# Patient Record
Sex: FEMALE | Race: WHITE | HISPANIC OR LATINO | Employment: UNEMPLOYED | URBAN - METROPOLITAN AREA
[De-identification: names, ages, dates, MRNs, and addresses within clinical notes are randomized per-mention and may not be internally consistent; named-entity substitution may affect disease eponyms.]

---

## 2017-02-24 ENCOUNTER — INITIAL CONSULT (OUTPATIENT)
Dept: DERMATOLOGY | Facility: CLINIC | Age: 47
End: 2017-02-24
Payer: COMMERCIAL

## 2017-02-24 ENCOUNTER — OFFICE VISIT (OUTPATIENT)
Dept: INTERNAL MEDICINE | Facility: CLINIC | Age: 47
End: 2017-02-24
Payer: COMMERCIAL

## 2017-02-24 VITALS
DIASTOLIC BLOOD PRESSURE: 70 MMHG | TEMPERATURE: 97 F | HEART RATE: 64 BPM | SYSTOLIC BLOOD PRESSURE: 130 MMHG | BODY MASS INDEX: 42.87 KG/M2 | WEIGHT: 266.75 LBS | RESPIRATION RATE: 16 BRPM | HEIGHT: 66 IN

## 2017-02-24 DIAGNOSIS — D23.9 INTRADERMAL NEVUS: ICD-10-CM

## 2017-02-24 DIAGNOSIS — F32.A DEPRESSION, UNSPECIFIED DEPRESSION TYPE: ICD-10-CM

## 2017-02-24 DIAGNOSIS — L82.0 SEBORRHEIC KERATOSES, INFLAMED: Primary | ICD-10-CM

## 2017-02-24 DIAGNOSIS — M25.562 LEFT KNEE PAIN, UNSPECIFIED CHRONICITY: Primary | ICD-10-CM

## 2017-02-24 DIAGNOSIS — D22.9 ATYPICAL MOLE: ICD-10-CM

## 2017-02-24 DIAGNOSIS — D23.9 DERMATOFIBROMA: ICD-10-CM

## 2017-02-24 DIAGNOSIS — M25.562 ACUTE PAIN OF LEFT KNEE: ICD-10-CM

## 2017-02-24 DIAGNOSIS — M72.2 PLANTAR FASCIITIS: ICD-10-CM

## 2017-02-24 PROBLEM — L82.1 SK (SEBORRHEIC KERATOSIS): Status: ACTIVE | Noted: 2017-02-24

## 2017-02-24 PROCEDURE — 3074F SYST BP LT 130 MM HG: CPT | Mod: S$GLB,,, | Performed by: DERMATOLOGY

## 2017-02-24 PROCEDURE — 3078F DIAST BP <80 MM HG: CPT | Mod: S$GLB,,, | Performed by: INTERNAL MEDICINE

## 2017-02-24 PROCEDURE — 99999 PR PBB SHADOW E&M-EST. PATIENT-LVL III: CPT | Mod: PBBFAC,,, | Performed by: INTERNAL MEDICINE

## 2017-02-24 PROCEDURE — 99214 OFFICE O/P EST MOD 30 MIN: CPT | Mod: S$GLB,,, | Performed by: DERMATOLOGY

## 2017-02-24 PROCEDURE — 3075F SYST BP GE 130 - 139MM HG: CPT | Mod: S$GLB,,, | Performed by: INTERNAL MEDICINE

## 2017-02-24 PROCEDURE — 1160F RVW MEDS BY RX/DR IN RCRD: CPT | Mod: S$GLB,,, | Performed by: INTERNAL MEDICINE

## 2017-02-24 PROCEDURE — 99214 OFFICE O/P EST MOD 30 MIN: CPT | Mod: S$GLB,,, | Performed by: INTERNAL MEDICINE

## 2017-02-24 PROCEDURE — 3078F DIAST BP <80 MM HG: CPT | Mod: S$GLB,,, | Performed by: DERMATOLOGY

## 2017-02-24 PROCEDURE — 1160F RVW MEDS BY RX/DR IN RCRD: CPT | Mod: S$GLB,,, | Performed by: DERMATOLOGY

## 2017-02-24 PROCEDURE — 99999 PR PBB SHADOW E&M-EST. PATIENT-LVL III: CPT | Mod: PBBFAC,,, | Performed by: DERMATOLOGY

## 2017-02-24 RX ORDER — MELOXICAM 15 MG/1
15 TABLET ORAL DAILY
Qty: 30 TABLET | Refills: 6 | Status: SHIPPED | OUTPATIENT
Start: 2017-02-24 | End: 2017-03-26

## 2017-02-24 RX ORDER — BUPROPION HYDROCHLORIDE 150 MG/1
150 TABLET ORAL DAILY
Qty: 30 TABLET | Refills: 11 | Status: SHIPPED | OUTPATIENT
Start: 2017-02-24 | End: 2017-07-01 | Stop reason: DRUGHIGH

## 2017-02-24 NOTE — MR AVS SNAPSHOT
[place] [facility]   [facility]  [place] [address]  Phone: [phone]  Fax: [phone]                  Irina Duagn   2017 12:40 PM   Office Visit    Descripción:  Female : 1970   Personal Médico:  Adrianna Pope DO   Departamento:  [place] [facility]           Razón de la manny     Knee Pain     Foot Injury           Diagnósticos de Esta Visita        Comentarios    Left knee pain, unspecified chronicity    -  Primario     Plantar fasciitis         Acute pain of left knee         Atypical mole         Depression, unspecified depression type                Lista de tareas           Citas próximas        Personal Médico Departamento Tfno del dpto    2017 2:00 PM Carla Fan MD [facility] - Dermatology [phone]      Metas (5 Years of Data)     Ninguna      Recetas para recoger        Disp Refills Start End    meloxicam (MOBIC) 15 MG tablet 30 tablet 6 2017 3/26/2017    Take 1 tablet (15 mg total) by mouth once daily. With food - Oral    Farmacia: [facility] [address] [place] [address] No. de tlfo: #: [phone]       buPROPion (WELLBUTRIN XL) 150 MG TB24 tablet 30 tablet 11 2017     Take 1 tablet (150 mg total) by mouth once daily. - Oral    Farmacia: [address] No. de tlfo: #: [phone]         [facility]     [facility]ermeras - Asistencia   Enfermeras registradas de [facility] pueden ayudarle a reservar issa manny, proveer educación para la brennan, asesoría clínica, y otros servicios de asesoramiento.   Llame para che servicio gratuito a [phone].             Medicamentos           Mensaje sobre Medicamentos     Verificar los cambios y / o adiciones a gant régimen de medicación son los mismos que discutir con gant médico. Si cualquiera de estos cambios o adiciones son incorrectos, por favor notifique a gant  proveedor de atención médica.        EMPEZAR a anay estos medicamentos NUEVOS        Refills    meloxicam (MOBIC) 15 MG tablet 6    Sig: Take 1 tablet (15 mg total) by mouth once daily. With food    Categoría: Normal    Vía: Oral    buPROPion (WELLBUTRIN XL) 150 MG TB24 tablet 11    Sig: Take 1 tablet (150 mg total) by mouth once daily.    Categoría: Normal    Vía: Oral      DEJAR de anay estos medicamentos     azithromycin (Z-HERLINDA) 250 MG tablet            Verifique que la siguiente lista de medicamentos es issa representación exacta de los medicamentos que está tomando actualmente. Si no hay ningunos reportados, la lista puede estar en dunbar. Si no es correcta, por favor póngase en contacto con gant proveedor de atención médica. Lleve esta lista con usted en brenda de emergencia.           Medicamentos Actuales     fluconazole (DIFLUCAN) 150 MG Tab Take 1 tablet (150 mg total) by mouth every 72 hours as needed (itching).    furosemide (LASIX) 20 MG tablet TAKE ONE TABLET BY MOUTH DAILY AS NEEDED (SWELLING)    hydrOXYzine HCl (ATARAX) 25 MG tablet Take 1 tablet (25 mg total) by mouth 3 (three) times daily as needed for Itching.    ibuprofen (ADVIL,MOTRIN) 200 MG tablet Take 3-4 tablets (600-800 mg total) by mouth every 12 (twelve) hours as needed for Pain.    ibuprofen (ADVIL,MOTRIN) 800 MG tablet Take 1 tablet (800 mg total) by mouth 3 (three) times daily as needed for Pain. With food    lisinopril-hydrochlorothiazide (PRINZIDE,ZESTORETIC) 20-25 mg Tab TAKE ONE TABLET BY MOUTH ONCE DAILY    lisinopril-hydrochlorothiazide (PRINZIDE,ZESTORETIC) 20-25 mg Tab TAKE ONE TABLET BY MOUTH ONCE DAILY    norgestimate-ethinyl estradiol (SPRINTEC, 28,) 0.25-35 mg-mcg per tablet Take 1 tablet by mouth once daily.    norgestimate-ethinyl estradiol (SPRINTEC, 28,) 0.25-35 mg-mcg per tablet Take 1 tablet by mouth once daily.    pantoprazole (PROTONIX) 40 MG tablet Take 1 tablet (40 mg total) by mouth once daily.    buPROPion  (WELLBUTRIN XL) 150 MG TB24 tablet Take 1 tablet (150 mg total) by mouth once daily.    meloxicam (MOBIC) 15 MG tablet Take 1 tablet (15 mg total) by mouth once daily. With food           Información de referencia clínica           Rosa signos vitales laura     PS                   130/70 (BP Location: Left arm, Patient Position: Sitting, BP Method: Manual)           Blood Pressure          Most Recent Value    BP  130/70      Alergias     A partir del:  2/24/2017        No Known Allergies      Vacunas     Administradas en la fecha de la visita:  2/24/2017        None      Orders Placed During Today's Visit      Órdenes normales de esta visita    Ambulatory referral to Dermatology       Registrarse para [facility]     La activación de gant cuenta [facility] es tan fácil yuko 1-2-3!    1) Ir a [email or web address], seleccione Registrarse Ahora, meter el código de activación y gant fecha de nacimiento, y seleccione Próximo.    [id number]  Expires: 4/10/2017  1:48 PM      2) Crear un nombre de usuario y contraseña para usar cuando se visita [facility] en el futuro y selecciona issa pregunta de seguridad en brenda de que pierda gant contraseña y seleccione Próximo.    3) Introduzca gant dirección de correo electrónico y jayme clic en Registrarse!    Información Adicional  Si tiene alguna pregunta, por favor, e-mail [email or web address] o llame al [phone] para hablar con nuestro personal. Recuerde, [facility] no debe ser usada para necesidades urgentes. En brenda de emergencia médica, llame al [phone].        Language Assistance Services     ATTENTION: Language assistance services are available, free of charge. Please call [phone].      ATENCIÓN: Si habla español, tiene a gant disposición servicios gratuitos de asistencia lingüística. Llame al [phone].     CHÚ Ý: N?u b?n nói Ti?ng Vi?t, có các d?ch v? h? tr? ngôn ng? mi?n phí dành cho b?n. G?i s? [phone].         [place] [facility] cumple con las leyes  federales aplicables de derechos civiles y no discrimina por motivos de jared, color, origen nacional, edad, discapacidad, o sexo.                 Irina Dugan   2017 12:40 PM   Office Visit    Description:  Female : 1970   Provider:  Adrianna Pope DO   Department:  [place] [facility]           Reason for Visit     Knee Pain     Foot Injury           Diagnoses this Visit        Comments    Left knee pain, unspecified chronicity    -  Primary     Plantar fasciitis         Acute pain of left knee         Atypical mole         Depression, unspecified depression type                To Do List           Future Appointments        Provider Department Dept Phone    2017 2:00 PM Carla Fan MD [facility] - Dermatology [phone]      Goals     None       These Medications        Disp Refills Start End    meloxicam (MOBIC) 15 MG tablet 30 tablet 6 2017 3/26/2017    Take 1 tablet (15 mg total) by mouth once daily. With food - Oral    Pharmacy: [facility] [address] [place] [address] Ph #: [phone]       buPROPion (WELLBUTRIN XL) 150 MG TB24 tablet 30 tablet 11 2017     Take 1 tablet (150 mg total) by mouth once daily. - Oral    Pharmacy: [address] Ph #: [phone]         [facility]     [facility] On Call Nurse Care Line -  Assistance  Registered nurses in the [facility] On Call Center provide clinical advisement, health education, appointment booking, and other advisory services.  Call for this free service at [phone].             Medications           Message regarding Medications     Verify the changes and/or additions to your medication regime listed below are the same as discussed with your clinician today.  If any of these changes or additions are incorrect, please notify your healthcare provider.        START taking these NEW medications        Refills     meloxicam (MOBIC) 15 MG tablet 6    Sig: Take 1 tablet (15 mg total) by mouth once daily. With food    Class: Normal    Route: Oral    buPROPion (WELLBUTRIN XL) 150 MG TB24 tablet 11    Sig: Take 1 tablet (150 mg total) by mouth once daily.    Class: Normal    Route: Oral      STOP taking these medications     azithromycin (Z-HERLINDA) 250 MG tablet            Verify that the below list of medications is an accurate representation of the medications you are currently taking.  If none reported, the list may be blank. If incorrect, please contact your healthcare provider. Carry this list with you in case of emergency.           Current Medications     fluconazole (DIFLUCAN) 150 MG Tab Take 1 tablet (150 mg total) by mouth every 72 hours as needed (itching).    furosemide (LASIX) 20 MG tablet TAKE ONE TABLET BY MOUTH DAILY AS NEEDED (SWELLING)    hydrOXYzine HCl (ATARAX) 25 MG tablet Take 1 tablet (25 mg total) by mouth 3 (three) times daily as needed for Itching.    ibuprofen (ADVIL,MOTRIN) 200 MG tablet Take 3-4 tablets (600-800 mg total) by mouth every 12 (twelve) hours as needed for Pain.    ibuprofen (ADVIL,MOTRIN) 800 MG tablet Take 1 tablet (800 mg total) by mouth 3 (three) times daily as needed for Pain. With food    lisinopril-hydrochlorothiazide (PRINZIDE,ZESTORETIC) 20-25 mg Tab TAKE ONE TABLET BY MOUTH ONCE DAILY    lisinopril-hydrochlorothiazide (PRINZIDE,ZESTORETIC) 20-25 mg Tab TAKE ONE TABLET BY MOUTH ONCE DAILY    norgestimate-ethinyl estradiol (SPRINTEC, 28,) 0.25-35 mg-mcg per tablet Take 1 tablet by mouth once daily.    norgestimate-ethinyl estradiol (SPRINTEC, 28,) 0.25-35 mg-mcg per tablet Take 1 tablet by mouth once daily.    pantoprazole (PROTONIX) 40 MG tablet Take 1 tablet (40 mg total) by mouth once daily.    buPROPion (WELLBUTRIN XL) 150 MG TB24 tablet Take 1 tablet (150 mg total) by mouth once daily.    meloxicam (MOBIC) 15 MG tablet Take 1 tablet (15 mg total) by mouth once daily. With food            Clinical Reference Information           Your Vitals Were     BP                   130/70 (BP Location: Left arm, Patient Position: Sitting, BP Method: Manual)           Blood Pressure          Most Recent Value    BP  130/70      Allergies as of 2/24/2017     No Known Allergies      Immunizations Administered on Date of Encounter - 2/24/2017     None      Orders Placed During Today's Visit      Normal Orders This Visit    Ambulatory referral to Dermatology       [facility] Sign-Up     Activating your [facility] account is as easy as 1-2-3!     1) Visit [email or web address], select Sign Up Now, enter this activation code and your date of birth, then select Next.  [email or web address]  Expires: 4/10/2017  1:48 PM      2) Create a username and password to use when you visit [facility] in the future and select a security question in case you lose your password and select Next.    3) Enter your e-mail address and click Sign Up!    Additional Information  If you have questions, please e-mail [email or web address] or call [phone] to talk to our [facility] staff. Remember, [facility] is NOT to be used for urgent needs. For medical emergencies, dial 911.         Language Assistance Services     ATTENTION: Language assistance services are available, free of charge. Please call [phone].      ATENCIÓN: Si habla español, tiene a gant disposición servicios gratuitos de asistencia lingüística. Llame al [phone].     CHÚ Ý: N?u b?n nói Ti?ng Vi?t, có các d?ch v? h? tr? ngôn ng? mi?n phí dành cho b?n. G?i s? [phone].         [place] [facility] complies with applicable Federal civil rights laws and does not discriminate on the basis of race, color, national origin, age, disability, or sex.

## 2017-02-24 NOTE — PROGRESS NOTES
Subjective:       Patient ID: Irina Dugan is a 46 y.o. female.    Chief Complaint: Knee Pain (left ) and Foot Injury (plantar fasciatis)    Patient is a 46 y.o.female who presents today for depression; she is getting  from her  and having a hard time.      Left knee pain: worse when she tries to squat to picksomething up at work. She is taking ibuprofen with no relief.    She is having bilateral foot pain; bottom of her feet.     Review of Systems   Constitutional: Negative for appetite change, chills, diaphoresis, fatigue and fever.   HENT: Negative for congestion, dental problem, ear discharge, ear pain, hearing loss, postnasal drip, sinus pressure and sore throat.    Eyes: Negative for discharge, redness and itching.   Respiratory: Negative for cough, chest tightness, shortness of breath and wheezing.    Cardiovascular: Negative for chest pain, palpitations and leg swelling.   Gastrointestinal: Negative for abdominal pain, constipation, diarrhea, nausea and vomiting.   Endocrine: Negative for cold intolerance and heat intolerance.   Genitourinary: Negative for difficulty urinating, frequency, hematuria and urgency.   Musculoskeletal: Positive for arthralgias. Negative for back pain, gait problem, myalgias and neck pain.   Skin: Negative for color change and rash.   Neurological: Negative for dizziness, syncope and headaches.   Hematological: Negative for adenopathy.   Psychiatric/Behavioral: Positive for dysphoric mood. Negative for behavioral problems and sleep disturbance. The patient is not nervous/anxious.        Objective:      Physical Exam   Constitutional: She is oriented to person, place, and time. She appears well-developed and well-nourished. No distress.   HENT:   Head: Normocephalic and atraumatic.   Right Ear: External ear normal.   Left Ear: External ear normal.   Eyes: Conjunctivae and EOM are normal. Pupils are equal, round, and reactive to light. Right eye exhibits no  discharge. Left eye exhibits no discharge. No scleral icterus.   Neck: Normal range of motion. Neck supple. No JVD present. No thyromegaly present.   Cardiovascular: Normal rate, regular rhythm, normal heart sounds and intact distal pulses.  Exam reveals no gallop and no friction rub.    No murmur heard.  Pulmonary/Chest: Effort normal and breath sounds normal. No stridor. No respiratory distress. She has no wheezes. She has no rales. She exhibits no tenderness.   Abdominal: Soft. Bowel sounds are normal. She exhibits no distension. There is no tenderness. There is no rebound.   Musculoskeletal: She exhibits no edema or tenderness.        Left knee: She exhibits decreased range of motion.   Lymphadenopathy:     She has no cervical adenopathy.   Neurological: She is alert and oriented to person, place, and time.   Skin: Skin is warm. No rash noted. She is not diaphoretic. No erythema.        Psychiatric: She has a normal mood and affect. Her behavior is normal.   Nursing note and vitals reviewed.      Assessment and Plan:       1. Left knee pain, unspecified chronicity  - meloxicam (MOBIC) 15 MG tablet; Take 1 tablet (15 mg total) by mouth once daily. With food  Dispense: 30 tablet; Refill: 6    2. Plantar fasciitis  - advised on stretching techniques    3. Acute pain of left knee  - if no improvement with meloxicam, will need xray and ortho referral    4. Atypical mole  - Ambulatory referral to Dermatology    5. Depression: trial of wellbutrin        No Follow-up on file.

## 2017-02-24 NOTE — LETTER
February 26, 2017      Adrianna Pope, DO  [address] [facility] [place] [address]           [facility] Dermatology  [address]  [place] [address]  Phone: [phone]  Fax: [phone]          Patient: Irina Dugan   MR Number: [id number]   YOB: 1970   Date of Visit: 2/24/2017       Dear Dr. Adrianna Pope:    Thank you for referring Irina Dugan to me for evaluation. Attached you will find relevant portions of my assessment and plan of care.    If you have questions, please do not hesitate to call me. I look forward to following Irina Dugan along with you.    Sincerely,    Carla Fan MD    Enclosure  CC:  No Recipients    If you would like to receive this communication electronically, please contact [email or web address] or [phone] to request more information on Mailbox Link access.    For providers and/or their staff who would like to refer a patient to [facility], please contact us through our one-stop-shop provider referral line, [facility], at [phone].    If you feel you have received this communication in error or would no longer like to receive these types of communications, please e-mail [email or web address]

## 2017-02-24 NOTE — PROGRESS NOTES
Subjective:       Patient ID:  Irina Dugan is a 46 y.o. female who presents for   Chief Complaint   Patient presents with    Mole     L chest, x yrs, raised, redness & itchy, no tx     Mole  - Initial  Affected locations: chest  Signs / symptoms: itching and redness  Severity: mild  Timing: constant  Aggravated by: nothing  Relieving factors/Treatments tried: nothing      Past Medical History:   Diagnosis Date    Hypertension 10/12/2012    Ovarian cyst     Tubal ectopic pregnancy       Review of Systems   Constitutional: Negative for fever, chills, fatigue and malaise.   Skin: Positive for activity-related sunscreen use. Negative for daily sunscreen use and recent sunburn.   Hematologic/Lymphatic: Does not bruise/bleed easily.        Objective:    Physical Exam   Constitutional: She appears well-developed and well-nourished. No distress.   Neurological: She is alert and oriented to person, place, and time. She is not disoriented.   Psychiatric: She has a normal mood and affect.   Skin:   Areas Examined (abnormalities noted in diagram):   Head / Face Inspection Performed  Neck Inspection Performed  Chest / Axilla Inspection Performed  Back Inspection Performed  RUE Inspected  LUE Inspection Performed              Diagram Legend     Erythematous scaling macule/papule c/w actinic keratosis       Vascular papule c/w angioma      Pigmented verrucoid papule/plaque c/w seborrheic keratosis      Yellow umbilicated papule c/w sebaceous hyperplasia      Irregularly shaped tan macule c/w lentigo     1-2 mm smooth white papules consistent with Milia      Movable subcutaneous cyst with punctum c/w epidermal inclusion cyst      Subcutaneous movable cyst c/w pilar cyst      Firm pink to brown papule c/w dermatofibroma      Pedunculated fleshy papule(s) c/w skin tag(s)      Evenly pigmented macule c/w junctional nevus     Mildly variegated pigmented, slightly irregular-bordered macule c/w mildly atypical nevus       Flesh colored to evenly pigmented papule c/w intradermal nevus       Pink pearly papule/plaque c/w basal cell carcinoma      Erythematous hyperkeratotic cursted plaque c/w SCC      Surgical scar with no sign of skin cancer recurrence      Open and closed comedones      Inflammatory papules and pustules      Verrucoid papule consistent consistent with wart     Erythematous eczematous patches and plaques     Dystrophic onycholytic nail with subungual debris c/w onychomycosis     Umbilicated papule    Erythematous-base heme-crusted tan verrucoid plaque consistent with inflamed seborrheic keratosis     Erythematous Silvery Scaling Plaque c/w Psoriasis     See annotation      Assessment / Plan:        1. Seborrheic keratoses, inflamed  - left chest  - discussed benign nature of lesion and discussed LN  - brochure provided for patient    2. Dermatofibroma, right upper arm  - reassurance on benign nature of lesions    3. Intradermal nevus, right chest, left arm  - reassurance on benign nature of lesions    4.  Cutaneous skin tags  Reassurance         Return if symptoms worsen or fail to improve.

## 2017-04-07 RX ORDER — LISINOPRIL AND HYDROCHLOROTHIAZIDE 20; 25 MG/1; MG/1
TABLET ORAL
Qty: 30 TABLET | Refills: 6 | Status: SHIPPED | OUTPATIENT
Start: 2017-04-07 | End: 2017-06-14

## 2017-04-10 ENCOUNTER — OFFICE VISIT (OUTPATIENT)
Dept: INTERNAL MEDICINE | Facility: CLINIC | Age: 47
End: 2017-04-10
Payer: COMMERCIAL

## 2017-04-10 VITALS
WEIGHT: 267.44 LBS | RESPIRATION RATE: 16 BRPM | DIASTOLIC BLOOD PRESSURE: 90 MMHG | TEMPERATURE: 98 F | HEIGHT: 66 IN | HEART RATE: 88 BPM | BODY MASS INDEX: 42.98 KG/M2 | SYSTOLIC BLOOD PRESSURE: 119 MMHG

## 2017-04-10 DIAGNOSIS — R30.0 DYSURIA: ICD-10-CM

## 2017-04-10 DIAGNOSIS — J01.90 ACUTE SINUSITIS, RECURRENCE NOT SPECIFIED, UNSPECIFIED LOCATION: Primary | ICD-10-CM

## 2017-04-10 LAB
BACTERIA #/AREA URNS AUTO: NORMAL /HPF
BILIRUB UR QL STRIP: NEGATIVE
CLARITY UR REFRACT.AUTO: ABNORMAL
COLOR UR AUTO: YELLOW
GLUCOSE UR QL STRIP: NEGATIVE
HGB UR QL STRIP: NEGATIVE
KETONES UR QL STRIP: NEGATIVE
LEUKOCYTE ESTERASE UR QL STRIP: NEGATIVE
MICROSCOPIC COMMENT: NORMAL
NITRITE UR QL STRIP: NEGATIVE
PH UR STRIP: 5 [PH] (ref 5–8)
PROT UR QL STRIP: NEGATIVE
RBC #/AREA URNS AUTO: 1 /HPF (ref 0–4)
SP GR UR STRIP: 1.02 (ref 1–1.03)
SQUAMOUS #/AREA URNS AUTO: 13 /HPF
URN SPEC COLLECT METH UR: ABNORMAL
UROBILINOGEN UR STRIP-ACNC: NEGATIVE EU/DL
WBC #/AREA URNS AUTO: 1 /HPF (ref 0–5)

## 2017-04-10 PROCEDURE — 3074F SYST BP LT 130 MM HG: CPT | Mod: S$GLB,,, | Performed by: INTERNAL MEDICINE

## 2017-04-10 PROCEDURE — 87086 URINE CULTURE/COLONY COUNT: CPT

## 2017-04-10 PROCEDURE — 3080F DIAST BP >= 90 MM HG: CPT | Mod: S$GLB,,, | Performed by: INTERNAL MEDICINE

## 2017-04-10 PROCEDURE — 1160F RVW MEDS BY RX/DR IN RCRD: CPT | Mod: S$GLB,,, | Performed by: INTERNAL MEDICINE

## 2017-04-10 PROCEDURE — 96372 THER/PROPH/DIAG INJ SC/IM: CPT | Mod: S$GLB,,, | Performed by: INTERNAL MEDICINE

## 2017-04-10 PROCEDURE — 99214 OFFICE O/P EST MOD 30 MIN: CPT | Mod: 25,S$GLB,, | Performed by: INTERNAL MEDICINE

## 2017-04-10 PROCEDURE — 99999 PR PBB SHADOW E&M-EST. PATIENT-LVL III: CPT | Mod: PBBFAC,,, | Performed by: INTERNAL MEDICINE

## 2017-04-10 PROCEDURE — 81001 URINALYSIS AUTO W/SCOPE: CPT

## 2017-04-10 RX ORDER — AMOXICILLIN AND CLAVULANATE POTASSIUM 875; 125 MG/1; MG/1
1 TABLET, FILM COATED ORAL 2 TIMES DAILY
Qty: 14 TABLET | Refills: 0 | Status: SHIPPED | OUTPATIENT
Start: 2017-04-10 | End: 2021-09-22 | Stop reason: SDUPTHER

## 2017-04-10 RX ORDER — FLUCONAZOLE 150 MG/1
150 TABLET ORAL ONCE
Qty: 2 TABLET | Refills: 0 | Status: SHIPPED | OUTPATIENT
Start: 2017-04-10 | End: 2017-05-04 | Stop reason: SDUPTHER

## 2017-04-10 RX ORDER — AZITHROMYCIN 250 MG/1
TABLET, FILM COATED ORAL
COMMUNITY
Start: 2017-04-09 | End: 2017-06-13

## 2017-04-10 RX ORDER — METHYLPREDNISOLONE 4 MG/1
TABLET ORAL
Qty: 1 PACKAGE | Refills: 0 | Status: SHIPPED | OUTPATIENT
Start: 2017-04-10 | End: 2017-06-13

## 2017-04-10 RX ORDER — TRIAMCINOLONE ACETONIDE 40 MG/ML
40 INJECTION, SUSPENSION INTRA-ARTICULAR; INTRAMUSCULAR
Status: COMPLETED | OUTPATIENT
Start: 2017-04-10 | End: 2017-04-10

## 2017-04-10 RX ORDER — BENZONATATE 200 MG/1
200 CAPSULE ORAL 2 TIMES DAILY PRN
Qty: 20 CAPSULE | Refills: 0 | Status: SHIPPED | OUTPATIENT
Start: 2017-04-10 | End: 2017-04-28 | Stop reason: SDUPTHER

## 2017-04-10 RX ADMIN — TRIAMCINOLONE ACETONIDE 40 MG: 40 INJECTION, SUSPENSION INTRA-ARTICULAR; INTRAMUSCULAR at 11:04

## 2017-04-10 NOTE — PROGRESS NOTES
Subjective:       Patient ID: Irina Dugan is a 46 y.o. female.    Chief Complaint: Sinus Problem; Nasal Congestion; Chest Congestion; Generalized Body Aches; Cough; Headache; and Sore Throat    Patient is a 46 y.o.female who presents today for cough, nasal congestion for three days. She denies any fever or chills. She admits to sinus congestion, maxillary sinus pressure and ear pain. She started taking claritin D without much improvement. Cough is productive with green sputum.    She also complains of burning upon urination with suprapubic tenderness.    Review of Systems   Constitutional: Negative for appetite change, chills, diaphoresis, fatigue and fever.   HENT: Positive for congestion. Negative for dental problem, ear discharge, ear pain, hearing loss, postnasal drip, sinus pressure and sore throat.    Eyes: Negative for discharge, redness and itching.   Respiratory: Positive for cough. Negative for chest tightness, shortness of breath and wheezing.    Cardiovascular: Negative for chest pain, palpitations and leg swelling.   Gastrointestinal: Positive for abdominal pain. Negative for constipation, diarrhea, nausea and vomiting.   Endocrine: Negative for cold intolerance and heat intolerance.   Genitourinary: Negative for difficulty urinating, frequency, hematuria and urgency.   Musculoskeletal: Negative for arthralgias, back pain, gait problem, myalgias and neck pain.   Skin: Negative for color change and rash.   Neurological: Negative for dizziness, syncope and headaches.   Hematological: Negative for adenopathy.   Psychiatric/Behavioral: Negative for behavioral problems and sleep disturbance. The patient is not nervous/anxious.        Objective:      Physical Exam   Constitutional: She is oriented to person, place, and time. She appears well-developed and well-nourished. No distress.   HENT:   Head: Normocephalic and atraumatic.   Right Ear: Tympanic membrane and external ear normal.   Left Ear:  Tympanic membrane and external ear normal.   Nose: Mucosal edema and rhinorrhea present.   Mouth/Throat: Uvula is midline and mucous membranes are normal. No oropharyngeal exudate, posterior oropharyngeal edema, posterior oropharyngeal erythema or tonsillar abscesses.   Eyes: Conjunctivae and EOM are normal. Pupils are equal, round, and reactive to light. Right eye exhibits no discharge. Left eye exhibits no discharge. No scleral icterus.   Neck: Normal range of motion. Neck supple. No JVD present. No thyromegaly present.   Cardiovascular: Normal rate, regular rhythm, normal heart sounds and intact distal pulses.  Exam reveals no gallop and no friction rub.    No murmur heard.  Pulmonary/Chest: Effort normal and breath sounds normal. No stridor. No respiratory distress. She has no wheezes. She has no rales. She exhibits no tenderness.   Abdominal: Soft. Bowel sounds are normal. She exhibits no distension. There is no tenderness. There is no rebound.   Musculoskeletal: Normal range of motion. She exhibits no edema or tenderness.   Lymphadenopathy:     She has no cervical adenopathy.   Neurological: She is alert and oriented to person, place, and time.   Skin: Skin is warm. No rash noted. She is not diaphoretic. No erythema.   Psychiatric: She has a normal mood and affect. Her behavior is normal.   Nursing note and vitals reviewed.      Assessment and Plan:       1. Acute sinusitis, recurrence not specified, unspecified location  - triamcinolone acetonide injection 40 mg; Inject 1 mL (40 mg total) into the muscle one time.  - methylPREDNISolone (MEDROL DOSEPACK) 4 mg tablet; Take as directed  Dispense: 1 Package; Refill: 0  - amoxicillin-clavulanate 875-125mg (AUGMENTIN) 875-125 mg per tablet; Take 1 tablet by mouth 2 (two) times daily.  Dispense: 14 tablet; Refill: 0  - benzonatate (TESSALON) 200 MG capsule; Take 1 capsule (200 mg total) by mouth 2 (two) times daily as needed for Cough.  Dispense: 20 capsule; Refill:  0  - fluconazole (DIFLUCAN) 150 MG Tab; Take 1 tablet (150 mg total) by mouth once. Repeat if not resolved in 3 days.  Dispense: 2 tablet; Refill: 0    2. Dysuria  - amoxicillin-clavulanate 875-125mg (AUGMENTIN) 875-125 mg per tablet; Take 1 tablet by mouth 2 (two) times daily.  Dispense: 14 tablet; Refill: 0  - Urinalysis  - Urine culture          No Follow-up on file.

## 2017-04-11 LAB — BACTERIA UR CULT: NO GROWTH

## 2017-04-28 DIAGNOSIS — J01.90 ACUTE SINUSITIS, RECURRENCE NOT SPECIFIED, UNSPECIFIED LOCATION: ICD-10-CM

## 2017-04-28 RX ORDER — BENZONATATE 200 MG/1
CAPSULE ORAL
Qty: 20 CAPSULE | Refills: 0 | Status: SHIPPED | OUTPATIENT
Start: 2017-04-28 | End: 2017-06-13

## 2017-05-03 DIAGNOSIS — N76.0 VAGINITIS: ICD-10-CM

## 2017-05-04 DIAGNOSIS — J01.90 ACUTE SINUSITIS, RECURRENCE NOT SPECIFIED, UNSPECIFIED LOCATION: ICD-10-CM

## 2017-05-05 RX ORDER — FLUCONAZOLE 150 MG/1
TABLET ORAL
Qty: 2 TABLET | Refills: 1 | Status: SHIPPED | OUTPATIENT
Start: 2017-05-05 | End: 2017-07-27 | Stop reason: SDUPTHER

## 2017-05-05 RX ORDER — FLUCONAZOLE 150 MG/1
TABLET ORAL
Qty: 2 TABLET | Refills: 0 | Status: SHIPPED | OUTPATIENT
Start: 2017-05-05 | End: 2017-06-13

## 2017-06-13 ENCOUNTER — TELEPHONE (OUTPATIENT)
Dept: OBSTETRICS AND GYNECOLOGY | Facility: CLINIC | Age: 47
End: 2017-06-13

## 2017-06-13 NOTE — TELEPHONE ENCOUNTER
----- Message from Sarahi Leyva sent at 6/13/2017  9:22 AM CDT -----  Contact: [phone] or [phone]   Patient is on the birth control pill. Her cycle came down a week early stopped for 3 days and the came back. She has been bleeding for 2 weeks now. Patient had this experience before and it was a ectopic pregnancy.  Patient would like to be seen sooner than the next available appointment. Please advise.

## 2017-06-13 NOTE — TELEPHONE ENCOUNTER
Patient is requesting to be seen due to heavy bleeding.  She states her cycle started last Thursday lasted 3 days stopped   She is now bleeding heavy with large clots.  When questioned if she missed any of her OCP  She states last month she missed to days.  Patient states she had a previous ectopic and now only has one tube.  Informed her message would be sent to Dr Hurley, but advised patient to take a UPT so that we could have that information to provide to Dr Hurley.

## 2017-06-14 ENCOUNTER — LAB VISIT (OUTPATIENT)
Dept: LAB | Facility: HOSPITAL | Age: 47
End: 2017-06-14
Attending: OBSTETRICS & GYNECOLOGY
Payer: COMMERCIAL

## 2017-06-14 ENCOUNTER — OFFICE VISIT (OUTPATIENT)
Dept: OBSTETRICS AND GYNECOLOGY | Facility: CLINIC | Age: 47
End: 2017-06-14
Payer: COMMERCIAL

## 2017-06-14 VITALS
DIASTOLIC BLOOD PRESSURE: 90 MMHG | WEIGHT: 273.81 LBS | SYSTOLIC BLOOD PRESSURE: 132 MMHG | BODY MASS INDEX: 44.19 KG/M2

## 2017-06-14 DIAGNOSIS — N93.9 ABNORMAL UTERINE BLEEDING (AUB): Primary | ICD-10-CM

## 2017-06-14 DIAGNOSIS — N93.9 ABNORMAL UTERINE BLEEDING (AUB): ICD-10-CM

## 2017-06-14 LAB
BASOPHILS # BLD AUTO: 0.04 K/UL
BASOPHILS NFR BLD: 0.6 %
DIFFERENTIAL METHOD: ABNORMAL
EOSINOPHIL # BLD AUTO: 0.1 K/UL
EOSINOPHIL NFR BLD: 1.7 %
ERYTHROCYTE [DISTWIDTH] IN BLOOD BY AUTOMATED COUNT: 14.4 %
HCT VFR BLD AUTO: 35.6 %
HGB BLD-MCNC: 11.7 G/DL
LYMPHOCYTES # BLD AUTO: 1.7 K/UL
LYMPHOCYTES NFR BLD: 25.6 %
MCH RBC QN AUTO: 27.6 PG
MCHC RBC AUTO-ENTMCNC: 32.9 %
MCV RBC AUTO: 84 FL
MONOCYTES # BLD AUTO: 0.5 K/UL
MONOCYTES NFR BLD: 7.6 %
NEUTROPHILS # BLD AUTO: 4.2 K/UL
NEUTROPHILS NFR BLD: 64.3 %
PLATELET # BLD AUTO: 221 K/UL
PMV BLD AUTO: 10.9 FL
RBC # BLD AUTO: 4.24 M/UL
TSH SERPL DL<=0.005 MIU/L-ACNC: 0.75 UIU/ML
WBC # BLD AUTO: 6.48 K/UL

## 2017-06-14 PROCEDURE — 84443 ASSAY THYROID STIM HORMONE: CPT

## 2017-06-14 PROCEDURE — 36415 COLL VENOUS BLD VENIPUNCTURE: CPT

## 2017-06-14 PROCEDURE — 99213 OFFICE O/P EST LOW 20 MIN: CPT | Mod: S$GLB,,, | Performed by: OBSTETRICS & GYNECOLOGY

## 2017-06-14 PROCEDURE — 99999 PR PBB SHADOW E&M-EST. PATIENT-LVL III: CPT | Mod: PBBFAC,,, | Performed by: OBSTETRICS & GYNECOLOGY

## 2017-06-14 PROCEDURE — 85025 COMPLETE CBC W/AUTO DIFF WBC: CPT

## 2017-06-14 RX ORDER — NORGESTIMATE AND ETHINYL ESTRADIOL 0.25-0.035
KIT ORAL
Qty: 60 TABLET | Refills: 12 | Status: SHIPPED | OUTPATIENT
Start: 2017-06-14 | End: 2017-11-06

## 2017-06-14 RX ORDER — MELOXICAM 15 MG/1
TABLET ORAL
COMMUNITY
Start: 2017-06-08 | End: 2017-11-06 | Stop reason: SDUPTHER

## 2017-06-14 NOTE — MEDICAL/APP STUDENT
Chief complaint: Heavy vaginal bleeding    HPI:    Ms. Dugan is a 45 yo female with a pmh significant for ruptured ectopic pregnancy resulting in removal of her right uterine tube, presents to the clinic today with complaints of heavy vaginal bleeding. Patient reports that the bleeding started last 17 and was so heavy that she soaked through 13 pads the day prior on 17. Patient states that the bleeding stopped Saturday and  and resumed on 17. Patient states that her periods are normally regular and last 4-5 days due to her OCP . She stopped her taking her OCP after she started bleeding. Patient denies SOB, chest pain, and palpitations. She endorses cramping pain, HA, and somnolence.    LMP: 5/15/17  Sexual history: Currently sexually active with one males, no new partners in last 6 months.   Contraception: Has had left tube ligated and right tube was removed due to ectopic. On  for maintenance of regular period  STDs: no history of STDs  Pap smear: Last pap smear done 3/17/15 and was normal  Mammogram: Done 16 and was normal  Obhx:     ROS:   GENERAL: Denies weight gain or weight loss. Feeling well overall.   CHEST: Denies chest pain or shortness of breath.   CARDIOVASCULAR: Denies palpitations or left sided chest pain.   URINARY: No frequency, dysuria, hematuria, or burning on urination.  REPRODUCTIVE: See HPI.   NEUROLOGIC: Positive for HA and lightheadedness. Denies weakness.   PSYCHIATRIC: Positive for stress/anxiety (started wellbutrin in 2017)      Physical Exam:  BP (!) 132/90   Wt 124.2 kg (273 lb 13 oz)   BMI 44.19 kg/m²   APPEARANCE: Well nourished, well developed, in no acute distress.  CHEST: Lungs clear to auscultation.  CARDIO: Regular rate and rhythm, no murmurs, rubs or gallops.  PELVIC: Normal external female genitalia without lesions.Vagina moist and well rugated without lesions or blood and clots seen in vaginal vault  and external os of cervix on speculum exam. Cervix pink and without lesions.Bimanual exam showed uterus normal size, shape, position, mobile and nontender. Adnexa without masses or tenderness.     Assessment:  Ms. Dugan is a 45 yo female who presents to the clinic with heavy vaginal bleeding.    Plan:  - possibly due to her regular cycle and may be experiencing her period currently  - CBC ordered to check h/h due to blood loss  - tsh ordered to rule out thyroid abnormalities  - Dr. Hurley counseled patient on taper method to restart birth control   - prescription for extra birth control ordered  - will follow up if bleeding does not resolve, otherwise she is due for an annual exam on her appt on 7/28/17    Heavenly Zhao

## 2017-06-15 ENCOUNTER — TELEPHONE (OUTPATIENT)
Dept: OBSTETRICS AND GYNECOLOGY | Facility: CLINIC | Age: 47
End: 2017-06-15

## 2017-06-15 NOTE — TELEPHONE ENCOUNTER
----- Message from Eleazar Omer sent at 6/15/2017  8:35 AM CDT -----  Contact: [facility]   [phone]  Pharmacy need clarification on the prescription norgestimate-ethinyl estradiol (ORTHO-CYCLEN) 0.25-35 mg-mcg per tablet.   Please advise       Spoke With pharmacy and verified that is how she wants the directions on the rx

## 2017-06-15 NOTE — PROGRESS NOTES
47 yo female who presents today for management of abnormal uterine bleeding.  Patient has been on OCPs for over a year to manage her menstrual cycles.  She reports that she did miss 2 pills in her current pack.  Almost 7 days ago, the patient reports that she started having vaginal bleeding that increasing became heavier.  It was so profuse that she reports changing her maxi pad at least 13 times yesterday. Reports that she had been taking her OCPs during this period of bleeding.  Patient presents today to discuss management.  Reports that today, the bleeding is slightly improved.     On speculum exam, the patient is noted to have blood in the vaginal vault.  When she coughs, blood comes out the cervix.  On bimanual exam, her uterus is difficult to palpate given her body habitus.    AP: AUB  -CBC, TSH ordered  -recommend pelvic U/S - but patient declines for now  -will start OCP taper if bleeding continues/persists - patient instructed on use.    RAEGAN Hurley MD

## 2017-06-16 ENCOUNTER — OFFICE VISIT (OUTPATIENT)
Dept: INTERNAL MEDICINE | Facility: CLINIC | Age: 47
End: 2017-06-16
Payer: COMMERCIAL

## 2017-06-16 ENCOUNTER — TELEPHONE (OUTPATIENT)
Dept: OBSTETRICS AND GYNECOLOGY | Facility: CLINIC | Age: 47
End: 2017-06-16

## 2017-06-16 VITALS
SYSTOLIC BLOOD PRESSURE: 128 MMHG | HEIGHT: 66 IN | RESPIRATION RATE: 16 BRPM | BODY MASS INDEX: 43.15 KG/M2 | HEART RATE: 88 BPM | TEMPERATURE: 99 F | WEIGHT: 268.5 LBS | DIASTOLIC BLOOD PRESSURE: 88 MMHG

## 2017-06-16 DIAGNOSIS — N93.9 ABNORMAL UTERINE BLEEDING: ICD-10-CM

## 2017-06-16 DIAGNOSIS — Z12.4 SCREENING FOR CERVICAL CANCER: ICD-10-CM

## 2017-06-16 DIAGNOSIS — R00.2 HEART PALPITATIONS: ICD-10-CM

## 2017-06-16 DIAGNOSIS — R61 EXCESSIVE SWEATING: ICD-10-CM

## 2017-06-16 DIAGNOSIS — Z00.00 ANNUAL PHYSICAL EXAM: Primary | ICD-10-CM

## 2017-06-16 DIAGNOSIS — F32.A DEPRESSION, UNSPECIFIED DEPRESSION TYPE: ICD-10-CM

## 2017-06-16 PROCEDURE — 99999 PR PBB SHADOW E&M-EST. PATIENT-LVL III: CPT | Mod: PBBFAC,,, | Performed by: INTERNAL MEDICINE

## 2017-06-16 PROCEDURE — 99396 PREV VISIT EST AGE 40-64: CPT | Mod: S$GLB,,, | Performed by: INTERNAL MEDICINE

## 2017-06-16 RX ORDER — FLUTICASONE PROPIONATE 50 MCG
2 SPRAY, SUSPENSION (ML) NASAL DAILY
Qty: 1 BOTTLE | Refills: 6 | Status: SHIPPED | OUTPATIENT
Start: 2017-06-16 | End: 2017-11-06

## 2017-06-16 NOTE — PROGRESS NOTES
Subjective:       Patient ID: Irina Dugan is a 46 y.o. female.    Chief Complaint: Follow-up (medication monitoring; and gyno issues and questions)    Patient is a 46 y.o.female who presents today for annual.      Cholesterol: (due now)  Mammogram: due in aug  Gyn exam: dr. lagos    She started spotting two weeks ago; then, she had super heavy flow with clots, then it stopped. This past Monday, she started a heavy flow again. She went to gyn. She would like a second opinion.    She is under a great deal of stress; feels heart palpitations at times; none currently.     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     Review of patient's allergies indicates:  No Known Allergies  Ms. Dugan does not currently have medications on file.    Review of Systems   Constitutional: Negative for appetite change, chills, diaphoresis, fatigue and fever.   HENT: Negative for congestion, dental problem, ear discharge, ear pain, hearing loss, postnasal drip, sinus pressure and sore throat.    Eyes: Negative for discharge, redness and itching.   Respiratory: Negative for cough, chest tightness, shortness of breath and wheezing.    Cardiovascular: Positive for palpitations.  Negative for chest pain and leg swelling.   Gastrointestinal: Negative for abdominal pain, constipation, diarrhea, nausea and vomiting.   Endocrine: Negative for cold intolerance and heat intolerance.   Genitourinary: Positive for vaginal bleeding. Negative for difficulty urinating, frequency, hematuria and urgency.   Musculoskeletal: Negative for arthralgias, back pain, gait problem, myalgias and neck pain.   Skin: Negative for color change and rash.   Neurological: Negative for dizziness, syncope and headaches.   Hematological: Negative for adenopathy.   Psychiatric/Behavioral: Positive for dysphoric mood. Negative for behavioral problems and sleep disturbance. The patient is not nervous/anxious.        Objective:      Physical Exam   Constitutional: She is oriented to person, place, and time. She appears well-developed and well-nourished. No distress.   HENT:   Head: Normocephalic and atraumatic.   Right Ear: External ear normal.   Left Ear: External ear normal.   Eyes: Conjunctivae and EOM are normal. Pupils are equal, round, and reactive to light. Right eye exhibits no discharge. Left eye exhibits no discharge. No scleral icterus.   Neck: Normal range of motion. Neck supple. No JVD present. No thyromegaly present.   Cardiovascular: Normal rate, regular rhythm, normal heart sounds and intact distal pulses.  Exam reveals no gallop and no friction rub.    No murmur heard.  Pulmonary/Chest: Effort normal and breath sounds normal. No stridor. No respiratory distress. She has no wheezes. She has no rales. She exhibits no tenderness.   Abdominal: Soft. Bowel sounds are normal. She exhibits no distension. There is no tenderness. There is no rebound.   Musculoskeletal: Normal range of motion. She exhibits no edema or tenderness.   Lymphadenopathy:     She has no cervical adenopathy.   Neurological: She is alert and oriented to person, place, and time.   Skin: Skin is warm. No rash noted. She is not diaphoretic. No  erythema.   Psychiatric: She has a normal mood and affect. Her behavior is normal.   Nursing note and vitals reviewed.      Assessment and Plan:       1. Annual physical exam  - CBC auto differential; Future  - Comprehensive metabolic panel; Future  - Lipid panel; Future  - Urinalysis; Future    2. Abnormal uterine bleeding  - Ambulatory consult to Gynecology    3. Screening for cervical cancer  - Ambulatory consult to Gynecology    4. Excessive sweating  - Follicle stimulating hormone; Future  - Luteinizing hormone; Future  - Estradiol; Future    5. Heart palpitations  - Holter monitor - 24 hour; Future    6. Depression, unspecified depression type  - increase wellbutrin to 300 mg daily          No Follow-up on file.

## 2017-06-16 NOTE — TELEPHONE ENCOUNTER
Left a message informing the patient that her blood count and thyroid level is normal  If she has any questions to please contact the office

## 2017-06-16 NOTE — TELEPHONE ENCOUNTER
----- Message from Christiana Hurley MD sent at 6/14/2017  9:19 PM CDT -----  Inform the patient that blood count and thyroid level is normal    Dr hurley

## 2017-06-23 ENCOUNTER — TELEPHONE (OUTPATIENT)
Dept: INTERNAL MEDICINE | Facility: CLINIC | Age: 47
End: 2017-06-23

## 2017-06-23 ENCOUNTER — LAB VISIT (OUTPATIENT)
Dept: LAB | Facility: HOSPITAL | Age: 47
End: 2017-06-23
Attending: INTERNAL MEDICINE
Payer: COMMERCIAL

## 2017-06-23 DIAGNOSIS — Z00.00 ANNUAL PHYSICAL EXAM: ICD-10-CM

## 2017-06-23 LAB
BILIRUB UR QL STRIP: NEGATIVE
CLARITY UR REFRACT.AUTO: ABNORMAL
COLOR UR AUTO: YELLOW
GLUCOSE UR QL STRIP: NEGATIVE
HGB UR QL STRIP: NEGATIVE
KETONES UR QL STRIP: NEGATIVE
LEUKOCYTE ESTERASE UR QL STRIP: NEGATIVE
MICROSCOPIC COMMENT: NORMAL
NITRITE UR QL STRIP: NEGATIVE
PH UR STRIP: 5 [PH] (ref 5–8)
PROT UR QL STRIP: NEGATIVE
RBC #/AREA URNS AUTO: 1 /HPF (ref 0–4)
SP GR UR STRIP: 1.02 (ref 1–1.03)
SQUAMOUS #/AREA URNS AUTO: 16 /HPF
URN SPEC COLLECT METH UR: ABNORMAL
UROBILINOGEN UR STRIP-ACNC: NEGATIVE EU/DL
WBC #/AREA URNS AUTO: 2 /HPF (ref 0–5)

## 2017-06-23 PROCEDURE — 81001 URINALYSIS AUTO W/SCOPE: CPT

## 2017-06-23 NOTE — TELEPHONE ENCOUNTER
Notify pt of results:  - blood counts are normal  - electrolytes, kidney, liver and glucose are normal  - cholesterol is at goal  - female hormones are not in a menopausal range  - urine is clear

## 2017-06-30 ENCOUNTER — CLINICAL SUPPORT (OUTPATIENT)
Dept: CARDIOLOGY | Facility: CLINIC | Age: 47
End: 2017-06-30
Payer: COMMERCIAL

## 2017-06-30 DIAGNOSIS — R00.2 HEART PALPITATIONS: ICD-10-CM

## 2017-06-30 PROCEDURE — 93224 XTRNL ECG REC UP TO 48 HRS: CPT | Mod: S$GLB,,, | Performed by: INTERNAL MEDICINE

## 2017-07-01 ENCOUNTER — TELEPHONE (OUTPATIENT)
Dept: INTERNAL MEDICINE | Facility: CLINIC | Age: 47
End: 2017-07-01

## 2017-07-01 RX ORDER — BUPROPION HYDROCHLORIDE 300 MG/1
300 TABLET ORAL DAILY
Qty: 30 TABLET | Refills: 11 | Status: SHIPPED | OUTPATIENT
Start: 2017-07-01 | End: 2018-10-01 | Stop reason: SDUPTHER

## 2017-07-01 NOTE — TELEPHONE ENCOUNTER
----- Message from Osmany Levy sent at 6/30/2017 11:04 AM CDT -----  Contact: Patient walk-in  [phone]   Divina,    Patient came by and I informed her Dr. Pope is on vacation and will not be back til Wednesday.  Patient ask if you could call [facility]) for a refill on her rx: Wellbutrin 150mg.  But patient stating Dr. Pope told her she will double it because she takes it twice and to let her know if that's what she wanted to do. Patient ask if you could please give her a call.  Patient can be reached at [phone].    Thanks Rima

## 2017-07-06 ENCOUNTER — TELEPHONE (OUTPATIENT)
Dept: INTERNAL MEDICINE | Facility: CLINIC | Age: 47
End: 2017-07-06

## 2017-07-06 DIAGNOSIS — E66.9 OBESITY, UNSPECIFIED OBESITY SEVERITY, UNSPECIFIED OBESITY TYPE: Primary | ICD-10-CM

## 2017-07-06 NOTE — TELEPHONE ENCOUNTER
We can do a cardio referral for heart palpitations if she wants; if so, just let me know; however, more than likely her palpitations are due to anxiety

## 2017-07-06 NOTE — TELEPHONE ENCOUNTER
Spoke to pt and informed of holter monitor results. Pt stated that a referral was to be done since holter was normal. Please advise.

## 2017-07-27 DIAGNOSIS — J01.90 ACUTE SINUSITIS, RECURRENCE NOT SPECIFIED, UNSPECIFIED LOCATION: ICD-10-CM

## 2017-07-27 RX ORDER — FLUCONAZOLE 150 MG/1
TABLET ORAL
Qty: 2 TABLET | Refills: 0 | Status: SHIPPED | OUTPATIENT
Start: 2017-07-27 | End: 2017-11-06 | Stop reason: SDUPTHER

## 2017-07-27 RX ORDER — CIPROFLOXACIN 500 MG/1
TABLET ORAL
Qty: 14 TABLET | Refills: 0 | Status: SHIPPED | OUTPATIENT
Start: 2017-07-27 | End: 2017-11-06

## 2017-09-11 ENCOUNTER — INITIAL CONSULT (OUTPATIENT)
Dept: BARIATRICS | Facility: CLINIC | Age: 47
End: 2017-09-11
Payer: COMMERCIAL

## 2017-09-11 VITALS
DIASTOLIC BLOOD PRESSURE: 86 MMHG | HEIGHT: 66 IN | SYSTOLIC BLOOD PRESSURE: 130 MMHG | HEART RATE: 90 BPM | WEIGHT: 271.81 LBS | BODY MASS INDEX: 43.68 KG/M2

## 2017-09-11 DIAGNOSIS — I10 ESSENTIAL HYPERTENSION: ICD-10-CM

## 2017-09-11 DIAGNOSIS — F41.8 ANXIETY ASSOCIATED WITH DEPRESSION: ICD-10-CM

## 2017-09-11 DIAGNOSIS — M25.569 KNEE PAIN, UNSPECIFIED CHRONICITY, UNSPECIFIED LATERALITY: ICD-10-CM

## 2017-09-11 DIAGNOSIS — E66.01 MORBID OBESITY WITH BMI OF 40.0-44.9, ADULT: ICD-10-CM

## 2017-09-11 PROCEDURE — 99999 PR PBB SHADOW E&M-EST. PATIENT-LVL III: CPT | Mod: PBBFAC,,, | Performed by: INTERNAL MEDICINE

## 2017-09-11 PROCEDURE — 99244 OFF/OP CNSLTJ NEW/EST MOD 40: CPT | Mod: S$GLB,,, | Performed by: INTERNAL MEDICINE

## 2017-09-11 RX ORDER — TOPIRAMATE 50 MG/1
50 CAPSULE, EXTENDED RELEASE ORAL DAILY
Qty: 30 CAPSULE | Refills: 2 | Status: SHIPPED | OUTPATIENT
Start: 2017-09-11 | End: 2018-02-05

## 2017-09-11 NOTE — PROGRESS NOTES
Subjective:       Patient ID: Irina Dugan is a 46 y.o. female.    Chief Complaint: No chief complaint on file.    CC: for the evelia year I have gained a lot of weight.     Current attempts at weight loss: New pt to me, referred by Adrianna Pope, DO  [address] [facility]  [place] [address] with Patient Active Problem List:        Sciatica     Hypertension     Colitis, nonspecific     Carpal tunnel syndrome     Other affections of shoulder region, not elsewhere classified     Pain in joint, lower leg     SK (seborrheic keratosis)   States she has gained 20 lbs in the past 2 years. Has knee pain and plantar fasciitis. No exercise currently. Used to exercise before a year ago. Has just purchased 21 day fix containers.     Previous diet attempts: [place] weight loss- 40 lbs lost.    History of medication for loss: [facility] took phentermine- only went for a month. Was irritable.     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  2 sons at home 24 and 18 yo. Pt cooks sometimes. Also Ex-  living there. She is stressed with this situation. Rarely cooks, although she used to..   Breakfast:  Coffee w flavored creamer and 2 pieces toast with cream cheese. Weekends may add egg.     Lunch: Goes out- [place] or [place] soup with salad or sandwich. Weekends- may skip.     Dinner: Fast food- mexican pizza and 2 tacos and drink. Chick yovani sandwich or nuggets. Sub[employer]. If cooking- steaka nd potatoes. Spaghetti.     Snacks: grapes, granola bar and yoguryt    Beverages: Coffee above. Diet coke 2 a day. Eleuterio tea latte with espresso or cold brew and milk. Water.     Willingness to change: 10/10    EKG: Holter without significant abn    BMR: 1898        Review of Systems   Constitutional: Positive for diaphoresis. Negative for chills and fever.   Respiratory: Positive for shortness of  "breath.         + snores   Cardiovascular: Positive for chest pain and leg swelling.        Posistional   Gastrointestinal: Negative for constipation and diarrhea.        Denies GERD   Genitourinary: Negative for menstrual problem.        + stress incontinence   Musculoskeletal: Positive for arthralgias. Negative for back pain.   Neurological: Negative for dizziness and light-headedness.   Psychiatric/Behavioral: Positive for dysphoric mood. The patient is nervous/anxious.         On Wellbutrin       Objective:     /86   Pulse 90   Ht 5' 6" (1.676 m)   Wt 123.3 kg (271 lb 13.2 oz)   BMI 43.87 kg/m²     Physical Exam   Constitutional: She is oriented to person, place, and time. She appears well-developed. No distress.   Morbidly obese    HENT:   Head: Normocephalic.   Eyes: EOM are normal. Pupils are equal, round, and reactive to light. No scleral icterus.   Neck: Normal range of motion. Neck supple. No thyromegaly present.   Cardiovascular: Normal rate and normal heart sounds.  Exam reveals no gallop and no friction rub.    No murmur heard.  Pulmonary/Chest: Effort normal and breath sounds normal. No respiratory distress. She has no wheezes.   Abdominal: Soft. Bowel sounds are normal. She exhibits no distension. There is no tenderness.   Musculoskeletal: Normal range of motion. She exhibits no edema.   Neurological: She is alert and oriented to person, place, and time. No cranial nerve deficit.   Skin: Skin is warm and dry. No erythema.   Psychiatric: She has a normal mood and affect. Her behavior is normal. Judgment normal.   Vitals reviewed.      Assessment:       1. Anxiety associated with depression    2. Essential hypertension    3. Knee pain, unspecified chronicity, unspecified laterality    4. Morbid obesity with BMI of 40.0-44.9, adult        Plan:         1. Anxiety associated with depression  She was tearful during part of visit today. Did talk to her about stress management and being sure that " she is addressing the depression and recent anxiety.     2. Essential hypertension  The current medical regimen is effective;  continue present plan and medications. Expect improvement with weight loss.     3. Knee pain, unspecified chronicity, unspecified laterality  Increase low impact activity as tolerated.  Avoid high impact activity, very heavy lifting or other exercise regimens that may cause discomfort.      4. Morbid obesity with BMI of 40.0-44.9, adult  Patient was informed that topiramate is used for migraine prevention and seizures. Weight loss is a common side effect that is well documented. She understands this. She was informed of the potential side effects such as serious and possibly fatal rash in which case the medication should be discontinued immediately. Paresthesias, forgetfulness, fatigue, kidney stones, GI symptoms, and changes in lab values such as electrolytes, blood counts and kidney function.    3 meals a day made up of the following:  Unlimited green vegetables, tomatoes, mushrooms, spaghetti squash, cauliflower, meat, poultry, seafood, eggs and hard cheeses.   Milk and plain yogurt  Dressings, seasonings, condiments, etc should have less than 2 g sugars.   beans or nuts can have 1 x a day.   1-2 servings of citrus fruits, berries, pineapple or melon a day (1/2 cup)  Avoid fried foods    No grains, rice, pasta, potatoes, bread, corn, peas, oatmeal, grits, tortillas, crackers, chips    No soda, sweet tea, juices or lemonade    [email or web address] for recipes. Moderate carb intake.       Exercise 20 min 3 days a week this month.     Nutrition materials provided today.  Meal ideas given and comfort food recipes given.

## 2017-09-11 NOTE — LETTER
[facility]  [address]  [facility] [address]  Phone: [phone]  Fax: [phone] September 11, 2017      Adrianna Pope,   [address] [facility] [place] [address]    Patient: Irina Dugan   MR Number: [id number]   YOB: 1970   Date of Visit: 9/11/2017     Dear Dr. Pope:    Thank you for referring Irina Dugan to me for evaluation. Below are the relevant portions of my assessment and plan of care.    ASSESSMENT:  1. Anxiety associated with depression    2. Essential hypertension    3. Knee pain, unspecified chronicity, unspecified laterality    4. Morbid obesity with BMI of 40.0-44.9, adult      PLAN:  1. Anxiety associated with depression - She was tearful during part of visit today. Did talk to her about stress management and being sure that she is addressing the depression and recent anxiety.      2. Essential hypertension - The current medical regimen is effective;  continue present plan and medications. Expect improvement with weight loss.      3. Knee pain, unspecified chronicity, unspecified laterality - Increase low impact activity as tolerated.  Avoid high impact activity, very heavy lifting or other exercise regimens that may cause discomfort.       4. Morbid obesity with BMI of 40.0-44.9, adult -  Patient was informed that Topiramate is used for migraine prevention and seizures. Weight loss is a common side effect that is well documented. She understands this. She was informed of the potential side effects such as serious and possibly fatal rash in which case the medication should be discontinued immediately. Paresthesias, forgetfulness, fatigue, kidney stones, GI symptoms, and changes in lab values such as electrolytes, blood counts and kidney function.     3 meals a day made up of the following:  Unlimited green vegetables, tomatoes, mushrooms, spaghetti squash, cauliflower, meat, poultry, seafood, eggs and hard cheeses.   Milk and  plain yogurt  Dressings, seasonings, condiments, etc should have less than 2 g sugars.   beans or nuts can have 1 x a day.   1-2 servings of citrus fruits, berries, pineapple or melon a day (1/2 cup)  Avoid fried foods     No grains, rice, pasta, potatoes, bread, corn, peas, oatmeal, grits, tortillas, crackers, chips     No soda, sweet tea, juices or lemonade     [email or web address] for recipes. Moderate carb intake.      Exercise 20 min 3 days a week this month.      The patient was given individualized diet, exercise, and follow-up instructions.    Nutrition materials provided today.  Meal ideas given and comfort food recipes given.     If you have questions, please do not hesitate to call me. I look forward to following Irina along with you.    Sincerely,      Divina Hardin MD   Medical Weight Loss   [facility]     MARIA TERESA/mary

## 2017-09-11 NOTE — PATIENT INSTRUCTIONS
Patient was informed that topiramate is used for migraine prevention and seizures. Weight loss is a common side effect that is well documented. She understands this. She was informed of the potential side effects such as serious and possibly fatal rash in which case the medication should be discontinued immediately. Paresthesias, forgetfulness, fatigue, kidney stones, GI symptoms, and changes in lab values such as electrolytes, blood counts and kidney function.    3 meals a day made up of the following:  Unlimited green vegetables, tomatoes, mushrooms, spaghetti squash, cauliflower, meat, poultry, seafood, eggs and hard cheeses.   Milk and plain yogurt  Dressings, seasonings, condiments, etc should have less than 2 g sugars.   beans or nuts can have 1 x a day.   1-2 servings of citrus fruits, berries, pineapple or melon a day (1/2 cup)  Avoid fried foods    No grains, rice, pasta, potatoes, bread, corn, peas, oatmeal, grits, tortillas, crackers, chips    No soda, sweet tea, juices or lemonade    [email or web address] for recipes. Moderate carb intake.       Exercise 20 min 3 days a week this month.     Fruits and Vegetables       Include 1-2 servings of fruit daily.      1 serving of fruit includes ½ cup unsweetened applesauce, ½ medium banana, tennis ball size piece of fruit, 17 grapes, 1 cup melon, 1 cup strawberries, ¼ cup dried fruit     Include 2-3 servings of vegetables daily. 1 serving is 1 cup raw or ½ cup cooked.     Non-starchy vegetables include artichoke, asparagus, baby corn, bamboo shoots, beans: green/Italian/wax, bean sprouts, beets, broccoli, [place] sprouts, cabbage, carrots, cauliflower, celery, cucumber, eggplant, green onions or scallions, greens, jicama, leeks, mushrooms, okra, onions, pea pods, peppers, radishes, spinach, summer squash, tomatoes and salsa, turnips, vegetable juice cocktail, water chestnuts, zucchini      Meal Ideas for Regular Bariatric Diet  *Recipes and products available  at www.bariatriceating.com      Breakfast: (15-20g protein)    - Egg white omelet: 2 egg whites or ½ cup Egg Beaters. (Optional proteins: cheese, shrimp, black beans, chicken, sliced turkey) (Optional veggies: tomatoes, salsa, spinach, mushrooms, onions, green peppers, or small slice avocado)     - Egg and sausage: 1 egg or ¼ cup Egg Beaters (any variety), with 1 ezra or 2 links of Turkey sausage or Veggie breakfast sausage (iCIMS or Wealthsimple)    - Crust-less breakfast quiche: To make a glass pie dish, mix 4oz part skim Ricotta, 1 cup skim milk, and 2 eggs as your base. Add protein: shredded cheese, sliced lean ham or turkey, turkey fishman/sausage. Add veggies: tomato, onion, green onion, mushroom, green pepper, spinach, etc.    - Yogurt parfait: Mix 1 - 6oz container Dannon Light N Fit vanilla yogurt, with ¼ cup Kashi Go Lean cereal    - Cottage cheese and fruit: ½ cup part-skim cottage cheese or ricotta cheese topped with fresh fruit or sugar free preserves     - Marie Kruger's Vanilla Egg custard* (add 2 Tbsp instant coffee granules to make Cappuccino Custard*)    - Hi-Protein café latte (skim milk, decaf coffee, 1 scoop protein powder). Optional to add Sugar free syrup or extract flavoring.    Lunch: (20-30g protein)    - ½ cup Black bean soup (Homemade or Progresso), with ¼ cup shredded low-fat cheese. Top with chopped tomato or fresh salsa.     - Lean deli turkey breast and low-fat sliced cheese, mustard or light morales to moisten, rolled up together, or wrapped in a Raul lettuce leaf    - Chicken salad made from dinner leftovers, moisten with low-fat salad dressing or light morales. Also try leftover salmon, shrimp, tuna or boiled eggs. Serve ½ cup over dark green salad    - Fat-free canned refried beans, topped with ¼ cup shredded low-fat cheese. Top with chopped tomato or fresh salsa.     - Greek salad: Top mixed greens with 1-2oz grilled chicken, tomatoes, red onions, 2-3 kalamata olives, and  sprinkle lightly with feta cheese. Spritz with Balsamic vinegar to taste.     - Crust-less lunch quiche: To make a glass pie dish, mix 4oz part skim Ricotta, 1 cup skim milk, and 2 eggs as your base. Add protein: shredded cheese, sliced lean ham or turkey, shrimp, chicken. Add veggies: tomato, onion, green onion, mushroom, green pepper, spinach, artichoke, broccoli, etc.    - Pizza bake: tomato sauce, low-fat shredded mozzarella and turkey pepperoni or [place] fishman. Add any veggies.    - Cucumber crab bites: Spread ¼ cup crab dip (lump crabmeat + light cream cheese and green onions) over sliced cucumber.     - Chicken with light spinach and artichoke dip*: Puree in : 6oz cooked and drained spinach, 2 cloves garlic, 1 can cannelloni beans, ½ cup chopped green onions, 1 can drained artichoke hearts (not marinated in oil), lemon juice and basil. Mix in 2oz chopped up chicken.    Supper: (20-30g protein)    - Serve grilled fish over dark green salad tossed with low-fat dressing, served with grilled asparagus penaloza     - Rotisserie chicken salad: served with sliced strawberries, walnuts, fat-free feta cheese crumbles and 1 tbsp Ara[place]s Own Light Raspberry [place] Vinaigrette    - Shrimp cocktail: Dip cold boiled shrimp in homemade low-sugar cocktail sauce (1/2 cup Emy One Carb ketchup, 2 tbsp horseradish, 1/4 tsp hot sauce, 1 tsp Worcestershire sauce, 1 tbsp freshly-squeezed lemon juice). Serve with dark green salad, walnuts, and crumbled blue cheese drizzled with olive oil and Balsamic vinegar    - Tuna Melt: Spread tuna salad onto 2 thick slices of tomato. Top with low-fat cheese and broil until cheese is melted. May also be made with chicken salad of shrimp salad. [place] with different types of cheeses.    - Homemade low-fat Chili using extra lean ground beef or ground turkey. Top with shredded cheese and salsa as desired. May add dollop fat-free sour cream if desired    - Dinner Omelet with  shrimp or chicken and onion, green peppers and chives.    - No noodle lasagna: Use sliced zucchini or eggplant in place of noodles.  Layer with part skim ricotta cheese and low sugar meat sauce (use very lean ground beef or ground turkey).    - Mexican chicken bake: Bake chunks of chicken breast or thigh with taco seasoning, Pace brand enchilada sauce, green onions and low-fat cheese. Serve with ¼ cup black beans or fat free refried beans topped with chopped tomatoes or salsa.    - Mirela frozen meatballs, simmered in Classico Marinara sauce. Different flavors of salsa or spaghetti sauce create different dishes! Sprinkle with parmesan cheese. Serve with grilled or steamed veggies, or a dark green salad.    - Simmer boneless skinless chicken thigh chunks in Classico Marinara sauce or roasted salsa until tender with chopped onion, bell pepper, garlic, mushrooms, spinach, etc.     - Hamburger, without the bun, dressed the way you like. Served with grilled or steamed veggies.    - Eggplant parmesan: Bake slices of eggplant at 350 degrees for 15 minutes. Layer tomato sauce, sliced eggplant and low-fat mozzarella cheese in a baking dish and cover with foil. Bake 30-40 more minutes or until bubbly. Uncover and bake at 400 degrees for about 15 more minutes, or until top is slightly crisp.    - Fish tacos: grilled/baked white fish, wrapped in Raul lettuce leaf, topped with salsa, shredded low-fat cheese, and light coleslaw.    Snacks: (100-200 calories; >5g protein)    - 1 low-fat cheese stick with 8 cherry tomatoes or 1 serving fresh fruit  - 4 thin slices fat-free turkey breast and 1 slice low-fat cheese  - 4 thin slices fat-free honey ham with wedge of melon  - 1/4 cup unsalted nuts with ½ cup fruit  - 6-oz container Dannon Light n Fit vanilla yogurt, topped with 1oz unsalted nuts         - apple, celery or baby carrots spread with 2 Tbsp natural peanut butter or almond butter   - apple slices with 1 oz slice  low-fat cheese  - celery, cucumber, bell pepper or baby carrots dipped in ¼ cup hummus bean spread or light spinach and artichoke dip (*recipe in lunch section)  - 100 calorie bag microwave light popcorn with 3 tbsp grated parmesan cheese  - Elton Links Beef Steak - 14g protein! (similar to beef jerky)  - 2 wedges [place] - Light Herb & Garlic Cheese with sliced cucumber or green bell pepper  - 1/2 cup low-fat cottage cheese with ¼ cup fruit or ¼ cup salsa  - RTD Protein drinks: Atkins, Low Carb Slim Fast, EAS light, Muscle Milk Light, etc.  - Homemade Protein drinks: GNC Soy95, Isopure, Nectar, UNJURY, Whey Gourmet, etc. Mix 1 scoop powder with 8oz skim/1% milk or light soymilk.  - Protein bars: Atkins, EAS, Pure Protein, Think Thin, Detour, etc. Must have 0-4 grams sugar - Read the label.    Takeout Options: No more than twice/week  Deli - Salads (no pasta or rice), meats, cheeses. Roasted chicken. Lox (salmon)    Mexican - Platters which don't include tortillas, chips, or rice. Go easy on the beans. Example: Fajitas without the tortillas. Ask the  not to bring chips to the table if they are too tempting.    Greek - Meat or fish and vegetable, but no bread or rice. Including hummus, baba ganoush, etc, is OK. Most sit-down Greek restaurants can provide you with cucumber slices for dipping instead of ray bread.    Fast Food (Avoid as much as possible) - Salads (no croutons and limit salad dressing to 2 tbsp), grilled chicken sandwich without the bun and ask for no morales. Glos low fat chili or Taco Bell pintos and cheese.    BBQ - The meats are fine if you ask for sauces on the side, but most of the traditional side dishes are loaded with carbs. Nitesh slaw, baked beans and BBQ sauce are typically made with sugar.    Chinese - Nothing deep-fried, no rice or noodles. Many Chinese sauces have starch and sugar in them, so you'll have to use your judgement. If you find that these sauces trigger cravings, or  cause Dumping, you can ask for the sauce to be made without sugar or just use soy sauce.    Lower Carb Comfort Food Dupes      Skinny Bell Pepper Noel Boats  Yields: 18 boats  Servin boats  Calories: 145  Total Fat: 9g  Saturated Fat: 4g  Trans Fat: 0g  Cholesterol: 50mg  Sodium: 293mg  Carbohydrates: 4g  Fiber: 1g  Sugars: 2g  Protein: 13g  SmartPoints: 4     Ingredients   1 pound lean ground turkey   1 teaspoons chili powder   1 teaspoon cumin   1/2 teaspoon black pepper   1/4 teaspoon kosher or sea salt   3/4 cup salsa, no sugar added   1 cup grated cheddar cheese, reduced-fat   3 bell peppers  Directions  Remove seeds, core, and membrane from bell peppers then slice each one into 6 verticle pieces where they dip down. Set sliced bell peppers aside.  Cook ground turkey over medium-high heat, breaking up as it cooks. Cook until the turkey loses it's pink color and is cooked through. Drain off any fat.  Preheat oven to 375 degrees.  Combine cooked turkey with spices and salsa. Evenly distribute mixture into the bell pepper boats, top with cheese. Bake on a parchment lined baking sheet for 10 minutes or until cheese is melted and peppers are hot.  NOTE: If you prefer much softer bell peppers, add a few tablespoons water to the bottom of a large casserole dish, add filled nachos, cover tightly with foil and bake 15 minutes.  Remove from the oven and add additional toppings, If desired.  Optional ingredients: sliced Jalepeno peppers, diced avocado, fat-free Greek yogurt or sour cream, or sliced green onions.    [place] Chicken Spaghetti Squash  Yields: 4 servings  Calories: 457  Total Fat: 23g  Saturated Fat: 8g  Trans Fat: 0g  Cholesterol: 201mg  Sodium: 1146mg  Carbohydrates: 19g  Fiber: 4g  Sugar: 9g  Protein: 44g  SmartPoints: 13    Ingredients   1 large spaghetti squash   1 small onion, diced   2 medium carrots, diced   2 celery stalks, diced   1 pound cooked chicken,  shredded   1/2 cup hot sauce   1/4 cup Homemade Ranch dressing   1/2 teaspoon garlic powder   salt and pepper to taste   1 cup low-fat shredded cheddar cheese   2 eggs   1/4 cup green onion, chopped  Directions  Preheat oven to 400 degrees F and spray a baking sheet with cooking spray.  Slice your spaghetti squash in half lengthwise and scoop out the seeds, then spray the cut side of the squash with a little olive oil cooking spray and place cut side down on the baking pan.  Roast spaghetti squash for 30-45 minutes or until it is tender.  While the squash is cooking, sauté the onion, celery, and carrots until softened and mostly cooked through.  In a large bowl, combine the sauteed vegetables, chicken, hot sauce, ranch dressing, garlic powder, salt, pepper, eggs, and cheese.  Once the squash is cooked through, allow to cool slightly then use a fork to scrape the insides into your chicken mixture, making sure not to tear the skins.  Make sure that the spaghetti squash is well incorporated with the other ingredients, then divide the mixture between the spaghetti squash halves.  Bake at 350 degrees for 30-35 minutes, or until hot and bubbly.  Remove from the oven and garnish with the green onions and additional ranch or hot sauce if desired.    Lasagna Zucchini PharmaGen  Servings: 8 zucchini boats  Ingredients   Report this ad    4 medium zucchini (2 1/2 lbs), sliced into halves through the length*   1 cup (8.6 oz) part-skim ricotta cheese   1 large egg   1 1/2 Tbsp chopped fresh parsley , plus more for garnish   1 1/4 cups (5 oz) shredded mozzarella cheese   1/2 cup (2 oz) finely shredded parmesan cheese   8 oz 93% lean ground beef or lean ground turkey   4 tsp olive oil , divided   Salt and freshly ground black pepper   1 3/4 cup roasted garlic marinara sauce (low sugar)   1 Tbsp chopped fresh basil , plus more for garnish  Instructions  1. Preheat oven to 400 degrees. Using a spoon, scoop centers from  zucchini while leaving a 1/4-inch rim to create boats. Set aside.  2. In a mixing bowl stir together ricotta cheese, egg and 1 1/2 Tbsp of the parsley. Season lightly with salt and pepper. Stir in 1/2 cup of the mozzarella cheese and the parmesan cheese. Set aside.  3. Heat 2 tsp of the olive oil in a large non-stick skillet over medium-high heat. Crumble beef into pan, season with salt and pepper and cook, stirring occasionally and breaking up beef when stirring, until browned (there shouldn't be any excess fat but if you happened to use a fattier beef then just drain excess rendered fat). Stir in marinara sauce and 1 Tbsp of the basil, remove from heat.  4. To assemble boats, brush both sides of of zucchini lightly with remaining 2 tsp olive oil and place in two baking pans (I used a 13 by 9 and a 9 by 9). Divide cheese mixture among zucchini spooning about 2 1/2 Tbsp into each, then spread cheese mixture into and even layer. Divide sauce among zucchini adding a few heaping spoonfuls to each. Cover baking dishes with foil and place in oven side by side and bake in preheated oven 30 minutes. Remove from oven, sprinkle tops with remaining 3/4 cup mozzarella, return to oven and bake until cheese has melted and zucchini is tender, about 5 minutes. Sprinkle tops with with fresh basil and parsley and serve warm.  5. *Look for zucchini that is wider and more uniform in width. The skinnier zucchini won't fit much filling.  6. Recipe source: Cooking Classy    Chicken Avocado Lime Soup  Prep Time: 15 minutes  Cook Time: 20 minutes  Ingredients   Report this ad    1 1/2 lbs boneless skinless chicken breasts*   1 Tbsp olive oil   1 cup chopped green onions (including whites, mince the whites)   2 jalapeños , seeded and minced (leave seeds if you want soup spicy, omit if you don't like heat)   2 cloves garlic , minced   4 (14.5 oz) cans low-sodium chicken broth   2 [place] tomatoes , seeded and diced   1/2 tsp ground  "cumin   Salt and freshly ground black pepper   1/3 cup chopped cilantro   3 Tbsp fresh lime juice   3 medium avocados , peeled, cored and diced   Tortilla chips , jermaine kamryn cheese, sour cream for serving (optional)    Instructions  1. In a large pot heat 1 Tbsp olive oil over medium heat. Once hot, add green onions and jalapenos and saute until tender, about 2 minutes, adding garlic during last 30 seconds of sauteing. Add chicken broth, tomatoes, cumin, season with salt and pepper to taste and add chicken breasts. Bring mixture to a boil over medium-high heat. Then reduce heat to medium, cover with lid and allow to cook, stirring occasionally, until chicken has cooked through 10 - 15 minutes (cook time will vary based on thickness of chicken breasts). Reduce burner to warm heat, remove chicken from pan and let rest on a cutting board 5 minutes, then shred chicken and return to soup. Stir in cilantro and lime juice. Add avocados to soup just before serving (if you don't plan on serving the soup right away, I would recommend adding the avocados to each bowl individually, about 1/2 an avocado per serving). Serve with tortilla chips, cheese and sour cream if desired.  2. *For thicker chicken breasts, cut breasts in half through the length (thickness) of the breasts, they will cook faster and more evenly.  3. Recipe Source: adapted slightly from [facility]        CAULIFLOWER "MAC" AND CHEESE    INGREDIENTS   1 small head cauliflower cut into small florets about 5-6 cups   1/2 small onion, diced   1 teaspoon olive oil   Kosher salt   Freshly ground black pepper   2 tablespoons parsley   1 teaspoon paprika   2 tablespoons butter   2 tablespoons flour   1 1/4 cups milk, (whole milk or coconut is best)   1/2 teaspoon granulated garlic   1 teaspoon mustard (optional)   1/2 teaspoon paprika   2 1/2 cups grated extra sharp cheddar cheese (reserve 1/2 cup)     PREPARATION  1. Preheat oven to 400°F. Place " cauliflower florets on a baking sheet, mix with diced onion and oil, sprinkle with salt and pepper. Roast for 20-25 minutes tossing half way through until browned.  2. Warm the milk in the microwave, so it is just heated through (this helps prevent the cheese sauce from clumping). Heat a medium saucepan over medium med-high heat. Add 2 tablespoons butter and flour then whisk for 2 minutes (until a smooth laurita is made), add milk and continue whisking until sauce thickens. Once smooth add salt and pepper and other spices. Then add the cheese reserving 1/2 cup. Mix with spatula and add cauliflower, stir gently. Now add the reserved cheese, mix just enough to evenly distribute.   4. Place mixture into a 8x8 inch greased casserole dish and cover with paprika and parsley. Bake in oven for 20 minutes until toasty and bubbly.

## 2017-09-15 ENCOUNTER — TELEPHONE (OUTPATIENT)
Dept: BARIATRICS | Facility: CLINIC | Age: 47
End: 2017-09-15

## 2017-09-15 DIAGNOSIS — J01.90 ACUTE SINUSITIS, RECURRENCE NOT SPECIFIED, UNSPECIFIED LOCATION: ICD-10-CM

## 2017-09-15 RX ORDER — FLUCONAZOLE 150 MG/1
TABLET ORAL
Qty: 2 TABLET | Refills: 1 | Status: SHIPPED | OUTPATIENT
Start: 2017-09-15 | End: 2017-12-21 | Stop reason: SDUPTHER

## 2017-09-15 NOTE — TELEPHONE ENCOUNTER
Called pt to ask if she turned in the coupon code for the medication Trokendi like she was instructed to do at her initial visit, she has not got a chance too but she will do it on today the P/A should be handled once she brings it in

## 2017-11-06 ENCOUNTER — OFFICE VISIT (OUTPATIENT)
Dept: INTERNAL MEDICINE | Facility: CLINIC | Age: 47
End: 2017-11-06
Payer: COMMERCIAL

## 2017-11-06 VITALS
DIASTOLIC BLOOD PRESSURE: 83 MMHG | HEART RATE: 77 BPM | HEIGHT: 66 IN | SYSTOLIC BLOOD PRESSURE: 140 MMHG | WEIGHT: 259.69 LBS | RESPIRATION RATE: 16 BRPM | TEMPERATURE: 98 F | BODY MASS INDEX: 41.73 KG/M2

## 2017-11-06 DIAGNOSIS — R21 RASH: Primary | ICD-10-CM

## 2017-11-06 PROCEDURE — 99999 PR PBB SHADOW E&M-EST. PATIENT-LVL III: CPT | Mod: PBBFAC,,, | Performed by: INTERNAL MEDICINE

## 2017-11-06 PROCEDURE — 99213 OFFICE O/P EST LOW 20 MIN: CPT | Mod: S$GLB,,, | Performed by: INTERNAL MEDICINE

## 2017-11-06 RX ORDER — KETOCONAZOLE 20 MG/G
CREAM TOPICAL 2 TIMES DAILY
Qty: 1 TUBE | Refills: 1 | Status: SHIPPED | OUTPATIENT
Start: 2017-11-06 | End: 2018-02-05

## 2017-11-06 RX ORDER — MELOXICAM 15 MG/1
15 TABLET ORAL DAILY
Qty: 30 TABLET | Refills: 6 | Status: SHIPPED | OUTPATIENT
Start: 2017-11-06 | End: 2018-10-01 | Stop reason: SDUPTHER

## 2017-11-06 RX ORDER — MELOXICAM 15 MG/1
TABLET ORAL
COMMUNITY
Start: 2017-08-19 | End: 2017-11-06 | Stop reason: SDUPTHER

## 2017-11-06 RX ORDER — LISINOPRIL AND HYDROCHLOROTHIAZIDE 20; 25 MG/1; MG/1
1 TABLET ORAL DAILY
Qty: 30 TABLET | Refills: 6 | Status: SHIPPED | OUTPATIENT
Start: 2017-11-06 | End: 2018-10-01 | Stop reason: SDUPTHER

## 2017-11-06 RX ORDER — FLUCONAZOLE 100 MG/1
100 TABLET ORAL DAILY
Qty: 7 TABLET | Refills: 0 | Status: SHIPPED | OUTPATIENT
Start: 2017-11-06 | End: 2017-12-06

## 2017-11-06 NOTE — PROGRESS NOTES
Subjective:       Patient ID: Irina Dugan is a 46 y.o. female.    Chief Complaint: Rash (on inner legs) and Itching    Patient is a 46 y.o.female who presents today for a rash. It is located in the inner thighs. It is itchy but she also noticed a rash under her arms. The rash is red and bumpy.     Review of Systems   Constitutional: Negative for appetite change, chills, diaphoresis, fatigue and fever.   HENT: Negative for congestion, dental problem, ear discharge, ear pain, hearing loss, postnasal drip, sinus pressure and sore throat.    Eyes: Negative for discharge, redness and itching.   Respiratory: Negative for cough, chest tightness, shortness of breath and wheezing.    Cardiovascular: Negative for chest pain, palpitations and leg swelling.   Gastrointestinal: Negative for abdominal pain, constipation, diarrhea, nausea and vomiting.   Endocrine: Negative for cold intolerance and heat intolerance.   Genitourinary: Negative for difficulty urinating, frequency, hematuria and urgency.   Musculoskeletal: Negative for arthralgias, back pain, gait problem, myalgias and neck pain.   Skin: Positive for rash. Negative for color change.   Neurological: Negative for dizziness, syncope and headaches.   Hematological: Negative for adenopathy.   Psychiatric/Behavioral: Negative for behavioral problems and sleep disturbance. The patient is not nervous/anxious.        Objective:      Physical Exam   Constitutional: She is oriented to person, place, and time. She appears well-developed and well-nourished. No distress.   HENT:   Head: Normocephalic and atraumatic.   Right Ear: External ear normal.   Left Ear: External ear normal.   Nose: Nose normal.   Mouth/Throat: Oropharynx is clear and moist. No oropharyngeal exudate.   Eyes: Conjunctivae and EOM are normal. Pupils are equal, round, and reactive to light. Right eye exhibits no discharge. Left eye exhibits no discharge. No scleral icterus.   Neck: Normal range of  motion. Neck supple. No JVD present. No thyromegaly present.   Cardiovascular: Normal rate, regular rhythm, normal heart sounds and intact distal pulses.  Exam reveals no gallop and no friction rub.    No murmur heard.  Pulmonary/Chest: Effort normal and breath sounds normal. No stridor. No respiratory distress. She has no wheezes. She has no rales. She exhibits no tenderness.   Abdominal: Soft. Bowel sounds are normal. She exhibits no distension. There is no tenderness. There is no rebound.   Musculoskeletal: Normal range of motion. She exhibits no edema or tenderness.   Lymphadenopathy:     She has no cervical adenopathy.   Neurological: She is alert and oriented to person, place, and time. No cranial nerve deficit.   Skin: Skin is warm and dry. No rash noted. She is not diaphoretic. No erythema.   Psychiatric: She has a normal mood and affect. Her behavior is normal.   Nursing note and vitals reviewed.      Assessment and Plan:       1. Rash  - ketoconazole (NIZORAL) 2 % cream; Apply topically 2 (two) times daily.  Dispense: 1 Tube; Refill: 1  - fluconazole (DIFLUCAN) 100 MG tablet; Take 1 tablet (100 mg total) by mouth once daily.  Dispense: 7 tablet; Refill: 0          No Follow-up on file.

## 2017-11-10 ENCOUNTER — TELEPHONE (OUTPATIENT)
Dept: INTERNAL MEDICINE | Facility: CLINIC | Age: 47
End: 2017-11-10

## 2017-11-10 DIAGNOSIS — R21 RASH: Primary | ICD-10-CM

## 2017-11-10 RX ORDER — NYSTATIN 100000 [USP'U]/G
POWDER TOPICAL 4 TIMES DAILY PRN
Qty: 30 G | Refills: 6 | Status: SHIPPED | OUTPATIENT
Start: 2017-11-10 | End: 2018-02-05

## 2017-11-10 NOTE — TELEPHONE ENCOUNTER
Powder sent to pharmacy; if she wants to see if an injection would help, she can come to the urgent care clinic tomorrow morning to be seen

## 2017-11-10 NOTE — TELEPHONE ENCOUNTER
----- Message from Kelly Mcnally sent at 11/10/2017  1:19 PM CST -----  Contact: [phone]   Patient called in regards needing to talk with Dr Pope medical assistant about her current situation. Please call and advise.       Thank you!!!

## 2017-11-10 NOTE — TELEPHONE ENCOUNTER
----- Message from Maia Cao sent at 11/10/2017  7:36 AM CST -----  Contact: patient [phone]  Pt was told to call if the medication you prescribed was helping or not. Pt said it is not helping and needs for you to prescribe something else. Pt would like to speak with Divina.

## 2017-11-10 NOTE — TELEPHONE ENCOUNTER
Spoke to, she stated that rash is spreading and medication isn't helping. Informed of derm referral. Referral sent to [facility] to schedule pt.    she stated that she is also getting really bad chills. Advised that we could switch to powder, but requested injection to help.     Please advise.

## 2017-11-13 ENCOUNTER — TELEPHONE (OUTPATIENT)
Dept: DERMATOLOGY | Facility: CLINIC | Age: 47
End: 2017-11-13

## 2017-11-13 NOTE — TELEPHONE ENCOUNTER
----- Message from José Antonio Ibrahim sent at 11/13/2017  1:12 PM CST -----  Contact: PT   Pt returning missed phone call, please call pt at [phone]

## 2017-11-13 NOTE — TELEPHONE ENCOUNTER
----- Message from Hali Mcguire sent at 11/13/2017  9:27 AM CST -----  Contact: [phone]  Good morning, pt is established with Dr Zuñiga. She was recently seen by Dr Pope. She states she has a spreading, itchy, burning rash on her body. She is requesting a urgent appointment. Can you please give her a call to discuss?    Thanks!

## 2017-11-14 ENCOUNTER — OFFICE VISIT (OUTPATIENT)
Dept: DERMATOLOGY | Facility: CLINIC | Age: 47
End: 2017-11-14
Payer: COMMERCIAL

## 2017-11-14 ENCOUNTER — TELEPHONE (OUTPATIENT)
Dept: INTERNAL MEDICINE | Facility: CLINIC | Age: 47
End: 2017-11-14

## 2017-11-14 ENCOUNTER — TELEPHONE (OUTPATIENT)
Dept: DERMATOLOGY | Facility: CLINIC | Age: 47
End: 2017-11-14

## 2017-11-14 DIAGNOSIS — R21 RASH AND NONSPECIFIC SKIN ERUPTION: Primary | ICD-10-CM

## 2017-11-14 PROCEDURE — 99999 PR PBB SHADOW E&M-EST. PATIENT-LVL II: CPT | Mod: PBBFAC,,, | Performed by: DERMATOLOGY

## 2017-11-14 PROCEDURE — 99214 OFFICE O/P EST MOD 30 MIN: CPT | Mod: S$GLB,,, | Performed by: DERMATOLOGY

## 2017-11-14 RX ORDER — PREDNISONE 20 MG/1
TABLET ORAL
Qty: 32 TABLET | Refills: 0 | Status: SHIPPED | OUTPATIENT
Start: 2017-11-14 | End: 2018-02-05 | Stop reason: ALTCHOICE

## 2017-11-14 RX ORDER — TRIAMCINOLONE ACETONIDE 1 MG/G
CREAM TOPICAL 2 TIMES DAILY
Qty: 454 G | Refills: 0 | Status: SHIPPED | OUTPATIENT
Start: 2017-11-14 | End: 2018-02-05

## 2017-11-14 RX ORDER — HYDROXYZINE HYDROCHLORIDE 25 MG/1
25 TABLET, FILM COATED ORAL NIGHTLY
Qty: 30 TABLET | Refills: 1 | Status: SHIPPED | OUTPATIENT
Start: 2017-11-14 | End: 2018-10-01

## 2017-11-14 NOTE — PROGRESS NOTES
Subjective:       Patient ID:  Irina Dugan is a 46 y.o. female who presents for   Chief Complaint   Patient presents with    Rash     body, x 1 month, itchy, burning, tx diflucan, nystatin, keto cream     Rash  - Initial  Affected locations: right axilla, left axilla, chest and groin  Duration: 2 weeks  Signs / symptoms: itching and burning  Timing: worse at night  Treatments tried: keto cream, diflucan, nystatin.  Improvement on treatment: no relief    Uses Gain scented detergent but is unsure if she is currently using a different formula  No new medications  Leaving town next Fri for vacation    Past Medical History:   Diagnosis Date    Hypertension 10/12/2012    Ovarian cyst     Tubal ectopic pregnancy      Review of Systems   Constitutional: Negative for fever, chills, weight loss, weight gain, fatigue and malaise.   Skin: Positive for rash and activity-related sunscreen use. Negative for daily sunscreen use and recent sunburn.   Hematologic/Lymphatic: Does not bruise/bleed easily.        Objective:    Physical Exam   Constitutional: She appears well-developed and well-nourished. No distress.   Neurological: She is alert and oriented to person, place, and time. She is not disoriented.   Psychiatric: She has a normal mood and affect.   Skin:   Areas Examined (abnormalities noted in diagram):   Scalp / Hair Palpated and Inspected  Head / Face Inspection Performed  Neck Inspection Performed  Chest / Axilla Inspection Performed  Abdomen Inspection Performed  Genitals / Buttocks / Groin Inspection Performed  Back Inspection Performed  RUE Inspected  LUE Inspection Performed  RLE Inspected  LLE Inspection Performed  Nails and Digits Inspection Performed              Diagram Legend     Erythematous scaling macule/papule c/w actinic keratosis       Vascular papule c/w angioma      Pigmented verrucoid papule/plaque c/w seborrheic keratosis      Yellow umbilicated papule c/w sebaceous hyperplasia       Irregularly shaped tan macule c/w lentigo     1-2 mm smooth white papules consistent with Milia      Movable subcutaneous cyst with punctum c/w epidermal inclusion cyst      Subcutaneous movable cyst c/w pilar cyst      Firm pink to brown papule c/w dermatofibroma      Pedunculated fleshy papule(s) c/w skin tag(s)      Evenly pigmented macule c/w junctional nevus     Mildly variegated pigmented, slightly irregular-bordered macule c/w mildly atypical nevus      Flesh colored to evenly pigmented papule c/w intradermal nevus       Pink pearly papule/plaque c/w basal cell carcinoma      Erythematous hyperkeratotic cursted plaque c/w SCC      Surgical scar with no sign of skin cancer recurrence      Open and closed comedones      Inflammatory papules and pustules      Verrucoid papule consistent consistent with wart     Erythematous eczematous patches and plaques     Dystrophic onycholytic nail with subungual debris c/w onychomycosis     Umbilicated papule    Erythematous-base heme-crusted tan verrucoid plaque consistent with inflamed seborrheic keratosis     Erythematous Silvery Scaling Plaque c/w Psoriasis     See annotation      Assessment / Plan:        Rash and nonspecific skin eruption-suspect contact dermatitis since it is bilateral, symmetric, in areas of high friction  Discussed drowsiness as potential SE of hydroxyzine  Discussed benefits and risks of treatment with prednisone including but not limited to increased appetite, weight gain, irritability, insomnia, fluid retention, GI upset, increased blood pressure, and increased blood sugars.   Pt is not diabetic  I discussed the side effects of topical steroids including atrophy, telangiectasias, striae.  Avoid use on the face and contact with the eyes    -     predniSONE (DELTASONE) 20 MG tablet; Take 4 pills qd x 3d, 3 pills qd x 3d, then 2 pills qd x3d, then 1 pill qd x 3d, then 1/2 pill qd x 3d  Dispense: 32 tablet; Refill: 0  -     hydrOXYzine HCl (ATARAX)  25 MG tablet; Take 1 tablet (25 mg total) by mouth every evening.  Dispense: 30 tablet; Refill: 1  -     triamcinolone acetonide 0.1% (KENALOG) 0.1 % cream; Apply topically 2 (two) times daily. X up to 2 weeks  Dispense: 454 g; Refill: 0    If no improvement, pt will call prior to her vacation. Discussed biopsy but pt would like to hold off and try the treatment first.           Return for Pt will call for follow up.

## 2017-11-14 NOTE — TELEPHONE ENCOUNTER
Spoke with pt, schedule for this afternoon. Informed pt where to come for appointment.    ----- Message from Andriy Poole MA sent at 11/13/2017  5:03 PM CST -----  Tracy MENSAH Staff  Caller: Unspecified (Today,  1:26 PM)         JACEK-pt- pt is calling to speak with the nurse pt needs to be seen right away pt saw Dr Pope for a rash pt was given some medication but its not helping pt said the rash is spreading. Can you please call pt at [phone] or work [phone]     MEGAN

## 2017-11-14 NOTE — TELEPHONE ENCOUNTER
----- Message from Rigoberto Snider sent at 11/14/2017 10:06 AM CST -----  Contact: self   Patient asks to return her call unable to get derm appt and rash is getting worse. Patient have a name of RX refused to give but before doctor call in asks to speak to nurse first to discuss     Please advise

## 2017-11-30 ENCOUNTER — TELEPHONE (OUTPATIENT)
Dept: INTERNAL MEDICINE | Facility: CLINIC | Age: 47
End: 2017-11-30

## 2017-11-30 ENCOUNTER — TELEPHONE (OUTPATIENT)
Dept: BARIATRICS | Facility: CLINIC | Age: 47
End: 2017-11-30

## 2017-11-30 DIAGNOSIS — Z91.09 ENVIRONMENTAL ALLERGIES: Primary | ICD-10-CM

## 2017-11-30 NOTE — TELEPHONE ENCOUNTER
----- Message from Rigoberto Snider sent at 11/30/2017  8:22 AM CST -----  Contact: self   Patient would like to get a referral.  Does the patient already have the specialty clinic appointment scheduled:  No   If yes, what date is the appointment scheduled:no     Referral to what specialty:  Allergy   Reason (be specific):  constant allergic reaction  Does the patient want the referral with a specific physician:    [facility]   Comments:      ##Advise the patient that once the physician approves this either a nurse or the  will return their call##

## 2017-12-21 DIAGNOSIS — J01.90 ACUTE SINUSITIS, RECURRENCE NOT SPECIFIED, UNSPECIFIED LOCATION: ICD-10-CM

## 2017-12-22 RX ORDER — FLUCONAZOLE 150 MG/1
TABLET ORAL
Qty: 2 TABLET | Refills: 0 | Status: SHIPPED | OUTPATIENT
Start: 2017-12-22 | End: 2018-01-12 | Stop reason: ALTCHOICE

## 2017-12-27 ENCOUNTER — TELEPHONE (OUTPATIENT)
Dept: ALLERGY | Facility: CLINIC | Age: 47
End: 2017-12-27

## 2018-01-12 ENCOUNTER — OFFICE VISIT (OUTPATIENT)
Dept: INTERNAL MEDICINE | Facility: CLINIC | Age: 48
End: 2018-01-12
Payer: COMMERCIAL

## 2018-01-12 VITALS
HEIGHT: 66 IN | TEMPERATURE: 98 F | BODY MASS INDEX: 42.73 KG/M2 | HEART RATE: 76 BPM | WEIGHT: 265.88 LBS | DIASTOLIC BLOOD PRESSURE: 91 MMHG | RESPIRATION RATE: 16 BRPM | SYSTOLIC BLOOD PRESSURE: 123 MMHG

## 2018-01-12 DIAGNOSIS — N39.0 URINARY TRACT INFECTION WITHOUT HEMATURIA, SITE UNSPECIFIED: Primary | ICD-10-CM

## 2018-01-12 DIAGNOSIS — G56.03 BILATERAL CARPAL TUNNEL SYNDROME: ICD-10-CM

## 2018-01-12 DIAGNOSIS — E66.01 MORBID OBESITY: ICD-10-CM

## 2018-01-12 LAB
BILIRUB UR QL STRIP: NEGATIVE
CLARITY UR REFRACT.AUTO: ABNORMAL
COLOR UR AUTO: YELLOW
GLUCOSE UR QL STRIP: NEGATIVE
HGB UR QL STRIP: NEGATIVE
KETONES UR QL STRIP: NEGATIVE
LEUKOCYTE ESTERASE UR QL STRIP: NEGATIVE
NITRITE UR QL STRIP: NEGATIVE
PH UR STRIP: 5 [PH] (ref 5–8)
PROT UR QL STRIP: NEGATIVE
SP GR UR STRIP: 1.02 (ref 1–1.03)
URN SPEC COLLECT METH UR: ABNORMAL
UROBILINOGEN UR STRIP-ACNC: NEGATIVE EU/DL

## 2018-01-12 PROCEDURE — 99999 PR PBB SHADOW E&M-EST. PATIENT-LVL IV: CPT | Mod: PBBFAC,,, | Performed by: INTERNAL MEDICINE

## 2018-01-12 PROCEDURE — 81003 URINALYSIS AUTO W/O SCOPE: CPT

## 2018-01-12 PROCEDURE — 99214 OFFICE O/P EST MOD 30 MIN: CPT | Mod: S$GLB,,, | Performed by: INTERNAL MEDICINE

## 2018-01-12 RX ORDER — FLUCONAZOLE 150 MG/1
150 TABLET ORAL ONCE
Qty: 2 TABLET | Refills: 6 | Status: SHIPPED | OUTPATIENT
Start: 2018-01-12 | End: 2018-01-12

## 2018-01-12 RX ORDER — NITROFURANTOIN 25; 75 MG/1; MG/1
100 CAPSULE ORAL 2 TIMES DAILY
Qty: 14 CAPSULE | Refills: 0 | Status: SHIPPED | OUTPATIENT
Start: 2018-01-12 | End: 2018-01-19

## 2018-01-12 NOTE — PROGRESS NOTES
Subjective:       Patient ID: Irina Dugan is a 47 y.o. female.    Chief Complaint: Urinary Tract Infection (poss.) and Wrist Pain    Patient is a 47 y.o.female with morbid obesity who presents today for urine urgency and frequency. Ongoing for 2-3 days. She also suffers with chronic bilateral carpal tunnel pain; had injections one year ago but pain has returned.    Review of Systems   Constitutional: Negative for appetite change, chills, diaphoresis, fatigue and fever.   HENT: Negative for congestion, dental problem, ear discharge, ear pain, hearing loss, postnasal drip, sinus pressure and sore throat.    Eyes: Negative for discharge, redness and itching.   Respiratory: Negative for cough, chest tightness, shortness of breath and wheezing.    Cardiovascular: Negative for chest pain, palpitations and leg swelling.   Gastrointestinal: Negative for abdominal pain, constipation, diarrhea, nausea and vomiting.   Endocrine: Negative for cold intolerance and heat intolerance.   Genitourinary: Positive for urgency. Negative for difficulty urinating, frequency and hematuria.   Musculoskeletal: Negative for arthralgias, back pain, gait problem, myalgias and neck pain.   Skin: Negative for color change and rash.   Neurological: Negative for dizziness, syncope and headaches.   Hematological: Negative for adenopathy.   Psychiatric/Behavioral: Negative for behavioral problems and sleep disturbance. The patient is not nervous/anxious.        Objective:      Physical Exam   Constitutional: She is oriented to person, place, and time. She appears well-developed and well-nourished. No distress.   HENT:   Head: Normocephalic and atraumatic.   Right Ear: External ear normal.   Left Ear: External ear normal.   Nose: Nose normal.   Mouth/Throat: Oropharynx is clear and moist. No oropharyngeal exudate.   Eyes: Conjunctivae and EOM are normal. Pupils are equal, round, and reactive to light. Right eye exhibits no discharge. Left eye  exhibits no discharge. No scleral icterus.   Neck: Normal range of motion. Neck supple. No JVD present. No thyromegaly present.   Cardiovascular: Normal rate, regular rhythm, normal heart sounds and intact distal pulses.  Exam reveals no gallop and no friction rub.    No murmur heard.  Pulmonary/Chest: Effort normal and breath sounds normal. No stridor. No respiratory distress. She has no wheezes. She has no rales. She exhibits no tenderness.   Abdominal: Soft. Bowel sounds are normal. She exhibits no distension. There is no tenderness. There is no rebound.   Musculoskeletal: Normal range of motion. She exhibits no edema or tenderness.   Lymphadenopathy:     She has no cervical adenopathy.   Neurological: She is alert and oriented to person, place, and time. No cranial nerve deficit.   Skin: Skin is warm and dry. No rash noted. She is not diaphoretic. No erythema.   Psychiatric: She has a normal mood and affect. Her behavior is normal.   Nursing note and vitals reviewed.      Assessment and Plan:       1. Urinary tract infection without hematuria, site unspecified  - start macrobid  - Urinalysis  - Urine culture    2. Morbid obesity  - Patient educated on importance of diet and exercise.  Recommended 30-45 minutes of exercise five days a week.  In addition, counseled patient on importance of low fat diet.  Limit carbohydrate intake.  Increase protein intake and vegetables.     3. Bilateral carpal tunnel syndrome    - Ambulatory Referral to Orthopedics          No Follow-up on file.

## 2018-02-05 ENCOUNTER — OFFICE VISIT (OUTPATIENT)
Dept: INTERNAL MEDICINE | Facility: CLINIC | Age: 48
End: 2018-02-05
Payer: COMMERCIAL

## 2018-02-05 VITALS
RESPIRATION RATE: 16 BRPM | WEIGHT: 265 LBS | HEART RATE: 88 BPM | HEIGHT: 66 IN | DIASTOLIC BLOOD PRESSURE: 100 MMHG | BODY MASS INDEX: 42.59 KG/M2 | TEMPERATURE: 99 F | SYSTOLIC BLOOD PRESSURE: 158 MMHG

## 2018-02-05 DIAGNOSIS — J20.9 ACUTE BRONCHITIS, UNSPECIFIED ORGANISM: Primary | ICD-10-CM

## 2018-02-05 DIAGNOSIS — J01.00 ACUTE NON-RECURRENT MAXILLARY SINUSITIS: ICD-10-CM

## 2018-02-05 DIAGNOSIS — R05.9 COUGH: ICD-10-CM

## 2018-02-05 PROCEDURE — 99999 PR PBB SHADOW E&M-EST. PATIENT-LVL IV: CPT | Mod: PBBFAC,,, | Performed by: INTERNAL MEDICINE

## 2018-02-05 PROCEDURE — 99214 OFFICE O/P EST MOD 30 MIN: CPT | Mod: 25,S$GLB,, | Performed by: INTERNAL MEDICINE

## 2018-02-05 PROCEDURE — 96372 THER/PROPH/DIAG INJ SC/IM: CPT | Mod: S$GLB,,, | Performed by: INTERNAL MEDICINE

## 2018-02-05 PROCEDURE — 3008F BODY MASS INDEX DOCD: CPT | Mod: S$GLB,,, | Performed by: INTERNAL MEDICINE

## 2018-02-05 RX ORDER — CODEINE PHOSPHATE AND GUAIFENESIN 10; 100 MG/5ML; MG/5ML
5 SOLUTION ORAL 3 TIMES DAILY PRN
Qty: 118 ML | Refills: 0 | Status: SHIPPED | OUTPATIENT
Start: 2018-02-05 | End: 2018-02-15

## 2018-02-05 RX ORDER — DOXYCYCLINE HYCLATE 100 MG
100 TABLET ORAL 2 TIMES DAILY
Qty: 14 TABLET | Refills: 0 | Status: SHIPPED | OUTPATIENT
Start: 2018-02-05 | End: 2018-02-12

## 2018-02-05 RX ORDER — TRIAMCINOLONE ACETONIDE 40 MG/ML
40 INJECTION, SUSPENSION INTRA-ARTICULAR; INTRAMUSCULAR ONCE
Status: COMPLETED | OUTPATIENT
Start: 2018-02-05 | End: 2018-02-05

## 2018-02-05 RX ORDER — BENZONATATE 100 MG/1
100 CAPSULE ORAL 3 TIMES DAILY PRN
Qty: 30 CAPSULE | Refills: 0 | Status: SHIPPED | OUTPATIENT
Start: 2018-02-05 | End: 2018-02-12

## 2018-02-05 RX ADMIN — TRIAMCINOLONE ACETONIDE 40 MG: 40 INJECTION, SUSPENSION INTRA-ARTICULAR; INTRAMUSCULAR at 01:02

## 2018-02-05 NOTE — PATIENT INSTRUCTIONS
Below are suggestions for symptomatic relief:              -Tylenol every 4 hours OR ibuprofen every 6 hours as needed for pain/fever.              -Salt water gargles to soothe throat pain.              -Chloroseptic spray also helps to numb throat pain.              -Nasal saline spray reduces inflammation and dryness.              -Warm face compresses to help with facial sinus pain/pressure.              -Vicks vapor rub at night.              -Flonase OTC or Nasacort OTC for nasal congestion.              -Simple foods like chicken noodle soup.              -Delsym helps with coughing at night              -Zyrtec/Claritin during the day & Benadryl at night may help with allergies.                If you DO NOT have Hypertension or any history of palpitations, it is ok to take over the counter Sudafed or Mucinex D or Allegra-D or Claritin-D or Zyrtec-D.  If you do take one of the above, it is ok to combine that with plain over the counter Mucinex or Allegra or Claritin or Zyrtec. If, for example, you are taking Zyrtec -D, you can combine that with Mucinex, but not Mucinex-D.  If you are taking Mucinex-D, you can combine that with plain Allegra or Claritin or Zyrtec.   If you DO have Hypertension or palpitations, it is safe to take Coricidin HBP for relief of sinus symptoms.       Acute Bronchitis  Your healthcare provider has told you that you have acute bronchitis. Bronchitis is infection or inflammation of the bronchial tubes (airways in the lungs). Normally, air moves easily in and out of the airways. Bronchitis narrows the airways, making it harder for air to flow in and out of the lungs. This causes symptoms such as shortness of breath, coughing up yellow or green mucus, and wheezing. Bronchitis can be acute or chronic. Acute means the condition comes on quickly and goes away in a short time, usually within 3 to 10 days. Chronic means a condition lasts a long time and often comes back.    What causes acute  bronchitis?  Acute bronchitis almost always starts as a viral respiratory infection, such as a cold or the flu. Certain factors make it more likely for a cold or flu to turn into bronchitis. These include being very young, being elderly, having a heart or lung problem, or having a weak immune system. Cigarette smoking also makes bronchitis more likely.  When bronchitis develops, the airways become swollen. The airways may also become infected with bacteria. This is known as a secondary infection.  Diagnosing acute bronchitis  Your healthcare provider will examine you and ask about your symptoms and health history. You may also have a sputum culture to test the fluid in your lungs. Chest X-rays may be done to look for infection in the lungs.  Treating acute bronchitis  Bronchitis usually clears up as the cold or flu goes away. You can help feel better faster by doing the following:  · Take medicine as directed. You may be told to take ibuprofen or other over-the-counter medicines. These help relieve inflammation in your bronchial tubes. Your healthcare provider may prescribe an inhaler to help open up the bronchial tubes. Most of the time, acute bronchitis is caused by a viral infection. Antibiotics are usually not prescribed for viral infections.  · Drink plenty of fluids, such as water, juice, or warm soup. Fluids loosen mucus so that you can cough it up. This helps you breathe more easily. Fluids also prevent dehydration.  · Make sure you get plenty of rest.  · Do not smoke. Do not allow anyone else to smoke in your home.  Recovery and follow-up  Follow up with your doctor as you are told. You will likely feel better in a week or two. But a dry cough can linger beyond that time. Let your doctor know if you still have symptoms (other than a dry cough) after 2 weeks, or if youre prone to getting bronchial infections. Take steps to protect yourself from future infections. These steps include stopping smoking and  avoiding tobacco smoke, washing your hands often, and getting a yearly flu shot.  When to call your healthcare provider  Call the healthcare provider if you have any of the following:  · Fever of 100.4°F (38.0°C) or higher, or as advised  · Symptoms that get worse, or new symptoms  · Trouble breathing  · Symptoms that dont start to improve within a week, or within 3 days of taking antibiotics   Date Last Reviewed: 12/1/2016  © 9052-0609 [employer]. [address] [place] [address], [place] [address]. All rights reserved. This information is not intended as a substitute for professional medical care. Always follow your healthcare professional's instructions.

## 2018-02-12 ENCOUNTER — OFFICE VISIT (OUTPATIENT)
Dept: INTERNAL MEDICINE | Facility: CLINIC | Age: 48
End: 2018-02-12
Payer: COMMERCIAL

## 2018-02-12 VITALS
HEIGHT: 66 IN | WEIGHT: 268.31 LBS | HEART RATE: 77 BPM | TEMPERATURE: 98 F | DIASTOLIC BLOOD PRESSURE: 93 MMHG | BODY MASS INDEX: 43.12 KG/M2 | SYSTOLIC BLOOD PRESSURE: 133 MMHG | OXYGEN SATURATION: 99 %

## 2018-02-12 DIAGNOSIS — J20.9 ACUTE BRONCHITIS, UNSPECIFIED ORGANISM: Primary | ICD-10-CM

## 2018-02-12 PROCEDURE — 99999 PR PBB SHADOW E&M-EST. PATIENT-LVL III: CPT | Mod: PBBFAC,,, | Performed by: FAMILY MEDICINE

## 2018-02-12 PROCEDURE — 99213 OFFICE O/P EST LOW 20 MIN: CPT | Mod: S$GLB,,, | Performed by: FAMILY MEDICINE

## 2018-02-12 PROCEDURE — 3008F BODY MASS INDEX DOCD: CPT | Mod: S$GLB,,, | Performed by: FAMILY MEDICINE

## 2018-02-12 RX ORDER — PREDNISONE 20 MG/1
40 TABLET ORAL DAILY
Qty: 10 TABLET | Refills: 0 | Status: SHIPPED | OUTPATIENT
Start: 2018-02-12 | End: 2018-02-17

## 2018-02-12 RX ORDER — ALBUTEROL SULFATE 90 UG/1
2 AEROSOL, METERED RESPIRATORY (INHALATION) EVERY 6 HOURS PRN
Qty: 18 G | Refills: 0 | Status: SHIPPED | OUTPATIENT
Start: 2018-02-12 | End: 2018-10-01 | Stop reason: ALTCHOICE

## 2018-02-12 NOTE — PROGRESS NOTES
Subjective:   Patient ID: Irina Dugan is a 47 y.o. female.    Chief Complaint: Cough      Cough   This is a new problem. The current episode started 1 to 4 weeks ago. The problem has been gradually worsening. The cough is productive of purulent sputum and productive of sputum. Associated symptoms include chest pain, chills, ear congestion, ear pain, a fever, headaches, myalgias, nasal congestion, postnasal drip, a sore throat, shortness of breath and wheezing. Pertinent negatives include no rash. The symptoms are aggravated by lying down, dust, exercise and fumes. She has tried OTC cough suppressant for the symptoms. The treatment provided no relief.       Patient queried and denies any further complaints.        ALLERGIES AND MEDICATIONS: updated and reviewed.  Review of patient's allergies indicates:  No Known Allergies    Current Outpatient Prescriptions:     buPROPion (WELLBUTRIN XL) 300 MG 24 hr tablet, Take 1 tablet (300 mg total) by mouth once daily., Disp: 30 tablet, Rfl: 11    furosemide (LASIX) 20 MG tablet, TAKE ONE TABLET BY MOUTH DAILY AS NEEDED (SWELLING), Disp: 30 tablet, Rfl: 3    guaifenesin-codeine 100-10 mg/5 ml (TUSSI-ORGANIDIN NR)  mg/5 mL syrup, Take 5 mLs by mouth 3 (three) times daily as needed., Disp: 118 mL, Rfl: 0    hydrOXYzine HCl (ATARAX) 25 MG tablet, Take 1 tablet (25 mg total) by mouth every evening., Disp: 30 tablet, Rfl: 1    lisinopril-hydrochlorothiazide (PRINZIDE,ZESTORETIC) 20-25 mg Tab, Take 1 tablet by mouth once daily., Disp: 30 tablet, Rfl: 6    meloxicam (MOBIC) 15 MG tablet, Take 1 tablet (15 mg total) by mouth once daily., Disp: 30 tablet, Rfl: 6    norgestimate-ethinyl estradiol (SPRINTEC, 28,) 0.25-35 mg-mcg per tablet, Take 1 tablet by mouth once daily., Disp: 28 tablet, Rfl: 12    albuterol 90 mcg/actuation inhaler, Inhale 2 puffs into the lungs every 6 (six) hours as needed for Wheezing. Rescue, Disp: 18 g, Rfl: 0    predniSONE (DELTASONE) 20  "MG tablet, Take 2 tablets (40 mg total) by mouth once daily. For 5 days, Disp: 10 tablet, Rfl: 0    Review of Systems   Constitutional: Positive for chills and fever.   HENT: Positive for ear pain, postnasal drip and sore throat.    Respiratory: Positive for cough, shortness of breath and wheezing.    Cardiovascular: Positive for chest pain. Negative for palpitations.   Musculoskeletal: Positive for myalgias.   Skin: Negative for rash.   Neurological: Positive for headaches.       Objective:     Vitals:    02/12/18 1342   BP: (!) 133/93   Pulse: 77   Temp: 98.3 °F (36.8 °C)   TempSrc: Oral   SpO2: 99%   Weight: 121.7 kg (268 lb 4.8 oz)   Height: 5' 6" (1.676 m)   PainSc:   4   PainLoc: Head     Body mass index is 43.3 kg/m².    Physical Exam   Constitutional: She is oriented to person, place, and time. She appears well-developed and well-nourished. She is cooperative. She does not have a sickly appearance. No distress.   HENT:   Head: Normocephalic and atraumatic.   Right Ear: Hearing, tympanic membrane, external ear and ear canal normal. No tenderness.   Left Ear: Hearing, tympanic membrane, external ear and ear canal normal. No tenderness.   Nose: Nose normal.   Mouth/Throat: Oropharynx is clear and moist.   Eyes: Conjunctivae and lids are normal. Pupils are equal, round, and reactive to light. Right eye exhibits no discharge. Left eye exhibits no discharge. Right conjunctiva is not injected. Left conjunctiva is not injected. No scleral icterus. Right eye exhibits normal extraocular motion. Left eye exhibits normal extraocular motion.   Neck: Normal range of motion. Neck supple. No JVD present. Carotid bruit is not present. No tracheal deviation and no edema present. No thyromegaly present.   Cardiovascular: Normal rate, regular rhythm, normal heart sounds and normal pulses.  Exam reveals no friction rub.    No murmur heard.  Pulmonary/Chest: Effort normal. No accessory muscle usage. No respiratory distress. She " has wheezes. She has no rhonchi. She has no rales.   Abdominal: Soft. Bowel sounds are normal. She exhibits no distension, no abdominal bruit, no pulsatile midline mass and no mass. There is no hepatosplenomegaly. There is no tenderness. There is no rebound, no guarding, no CVA tenderness, no tenderness at McBurney's point and negative Parra's sign.   Musculoskeletal: She exhibits no edema.   Lymphadenopathy:        Head (right side): No submandibular, no preauricular and no posterior auricular adenopathy present.        Head (left side): No submandibular, no preauricular and no posterior auricular adenopathy present.     She has no cervical adenopathy.   Neurological: She is alert and oriented to person, place, and time. GCS eye subscore is 4. GCS verbal subscore is 5. GCS motor subscore is 6.   Skin: Skin is warm and dry. No ecchymosis and no rash noted. Rash is not maculopapular and not urticarial. She is not diaphoretic. No cyanosis or erythema. Nails show no clubbing.   Psychiatric: She has a normal mood and affect. Her speech is normal and behavior is normal. Thought content normal. Her mood appears not anxious. Her affect is not angry and not inappropriate. She does not exhibit a depressed mood.       Assessment and Plan:   Irina was seen today for cough.    Diagnoses and all orders for this visit:    Acute bronchitis, unspecified organism    Other orders  -     predniSONE (DELTASONE) 20 MG tablet; Take 2 tablets (40 mg total) by mouth once daily. For 5 days  -     albuterol 90 mcg/actuation inhaler; Inhale 2 puffs into the lungs every 6 (six) hours as needed for Wheezing. Rescue    Hydrate, rest, OTC Mucinex Expectorant as directed, Nasal saline as needed.  OTC Zyrtec as directed.      Follow-up in about 2 weeks (around 2/26/2018), or if symptoms worsen or fail to improve.    THIS NOTE WILL BE SHARED WITH THE PATIENT.

## 2018-02-16 ENCOUNTER — TELEPHONE (OUTPATIENT)
Dept: INTERNAL MEDICINE | Facility: CLINIC | Age: 48
End: 2018-02-16

## 2018-02-16 NOTE — TELEPHONE ENCOUNTER
----- Message from Rigoberto Snider sent at 2/16/2018  9:33 AM CST -----  Contact: self   Patient would like to get medical advice.  Symptoms (please be specific):  Sore throat, ear pain, nasal congestion   How long has patient had these symptoms:  2 days   Pharmacy name and phone #:  [id number]   [phone] (Phone)  [phone] (Fax)  Any drug allergies:  None   Comments Pt asks for a call

## 2018-02-21 ENCOUNTER — HOSPITAL ENCOUNTER (OUTPATIENT)
Dept: RADIOLOGY | Facility: HOSPITAL | Age: 48
Discharge: HOME | End: 2018-02-21
Attending: FAMILY MEDICINE
Payer: COMMERCIAL

## 2018-02-21 ENCOUNTER — OFFICE VISIT (OUTPATIENT)
Dept: INTERNAL MEDICINE | Facility: CLINIC | Age: 48
End: 2018-02-21
Payer: COMMERCIAL

## 2018-02-21 VITALS
BODY MASS INDEX: 42.8 KG/M2 | HEIGHT: 66 IN | WEIGHT: 266.31 LBS | DIASTOLIC BLOOD PRESSURE: 102 MMHG | TEMPERATURE: 99 F | SYSTOLIC BLOOD PRESSURE: 132 MMHG | HEART RATE: 93 BPM

## 2018-02-21 DIAGNOSIS — J20.9 ACUTE BRONCHITIS, UNSPECIFIED ORGANISM: Primary | ICD-10-CM

## 2018-02-21 DIAGNOSIS — R05.9 COUGH: ICD-10-CM

## 2018-02-21 PROCEDURE — 71046 X-RAY EXAM CHEST 2 VIEWS: CPT | Mod: TC,PO

## 2018-02-21 PROCEDURE — 96372 THER/PROPH/DIAG INJ SC/IM: CPT | Mod: S$GLB,,, | Performed by: FAMILY MEDICINE

## 2018-02-21 PROCEDURE — 99999 PR PBB SHADOW E&M-EST. PATIENT-LVL III: CPT | Mod: PBBFAC,,, | Performed by: FAMILY MEDICINE

## 2018-02-21 PROCEDURE — 3008F BODY MASS INDEX DOCD: CPT | Mod: S$GLB,,, | Performed by: FAMILY MEDICINE

## 2018-02-21 PROCEDURE — 71046 X-RAY EXAM CHEST 2 VIEWS: CPT | Mod: 26,,, | Performed by: RADIOLOGY

## 2018-02-21 PROCEDURE — 99213 OFFICE O/P EST LOW 20 MIN: CPT | Mod: 25,S$GLB,, | Performed by: FAMILY MEDICINE

## 2018-02-21 RX ORDER — METHYLPREDNISOLONE 4 MG/1
TABLET ORAL
Qty: 1 PACKAGE | Refills: 0 | Status: SHIPPED | OUTPATIENT
Start: 2018-02-21 | End: 2018-03-14

## 2018-02-21 RX ORDER — TRIAMCINOLONE ACETONIDE 40 MG/ML
40 INJECTION, SUSPENSION INTRA-ARTICULAR; INTRAMUSCULAR
Status: COMPLETED | OUTPATIENT
Start: 2018-02-21 | End: 2018-02-21

## 2018-02-21 RX ORDER — FLUCONAZOLE 150 MG/1
TABLET ORAL
COMMUNITY
Start: 2018-01-26 | End: 2018-10-01

## 2018-02-21 RX ORDER — FLUTICASONE PROPIONATE AND SALMETEROL 100; 50 UG/1; UG/1
1 POWDER RESPIRATORY (INHALATION) 2 TIMES DAILY
Qty: 60 EACH | Refills: 1 | Status: SHIPPED | OUTPATIENT
Start: 2018-02-21 | End: 2018-10-01 | Stop reason: ALTCHOICE

## 2018-02-21 RX ADMIN — TRIAMCINOLONE ACETONIDE 40 MG: 40 INJECTION, SUSPENSION INTRA-ARTICULAR; INTRAMUSCULAR at 11:02

## 2018-02-21 NOTE — PROGRESS NOTES
Subjective:   Patient ID: Irina Dugan is a 47 y.o. female.    Chief Complaint: Cough      Cough   This is a new problem. The current episode started 1 to 4 weeks ago. The problem has been rapidly worsening. The cough is productive of sputum. Associated symptoms include chest pain, chills, ear congestion, ear pain, headaches, myalgias, nasal congestion, postnasal drip, a sore throat, shortness of breath, sweats and wheezing. Pertinent negatives include no fever or rash. The symptoms are aggravated by lying down, dust, exercise and fumes. She has tried a beta-agonist inhaler, cool air, OTC cough suppressant, rest and prescription cough suppressant for the symptoms. The treatment provided mild relief. There is no history of asthma.       Patient queried and denies any further complaints.      ALLERGIES AND MEDICATIONS: updated and reviewed.  Review of patient's allergies indicates:  No Known Allergies    Current Outpatient Prescriptions:     albuterol 90 mcg/actuation inhaler, Inhale 2 puffs into the lungs every 6 (six) hours as needed for Wheezing. Rescue, Disp: 18 g, Rfl: 0    buPROPion (WELLBUTRIN XL) 300 MG 24 hr tablet, Take 1 tablet (300 mg total) by mouth once daily., Disp: 30 tablet, Rfl: 11    fluconazole (DIFLUCAN) 150 MG Tab, , Disp: , Rfl:     furosemide (LASIX) 20 MG tablet, TAKE ONE TABLET BY MOUTH DAILY AS NEEDED (SWELLING), Disp: 30 tablet, Rfl: 3    hydrOXYzine HCl (ATARAX) 25 MG tablet, Take 1 tablet (25 mg total) by mouth every evening., Disp: 30 tablet, Rfl: 1    lisinopril-hydrochlorothiazide (PRINZIDE,ZESTORETIC) 20-25 mg Tab, Take 1 tablet by mouth once daily., Disp: 30 tablet, Rfl: 6    meloxicam (MOBIC) 15 MG tablet, Take 1 tablet (15 mg total) by mouth once daily., Disp: 30 tablet, Rfl: 6    norgestimate-ethinyl estradiol (SPRINTEC, 28,) 0.25-35 mg-mcg per tablet, Take 1 tablet by mouth once daily., Disp: 28 tablet, Rfl: 12    fluticasone-salmeterol 100-50 mcg/dose (ADVAIR)  "100-50 mcg/dose diskus inhaler, Inhale 1 puff into the lungs 2 (two) times daily. Controller, Disp: 60 each, Rfl: 1    methylPREDNISolone (MEDROL DOSEPACK) 4 mg tablet, use as directed, Disp: 1 Package, Rfl: 0  No current facility-administered medications for this visit.     Review of Systems   Constitutional: Positive for chills. Negative for fever.   HENT: Positive for ear pain, postnasal drip and sore throat.    Respiratory: Positive for cough, shortness of breath and wheezing.    Cardiovascular: Positive for chest pain.   Musculoskeletal: Positive for myalgias.   Skin: Negative for rash.   Neurological: Positive for headaches.       Objective:     Vitals:    02/21/18 1102   BP: (!) 132/102   Pulse: 93   Temp: 99.3 °F (37.4 °C)   TempSrc: Oral   Weight: 120.8 kg (266 lb 5.1 oz)   Height: 5' 6" (1.676 m)   PainSc:   5     Body mass index is 42.98 kg/m².    Physical Exam   Constitutional: She is oriented to person, place, and time. She appears well-developed and well-nourished. She is cooperative. She does not have a sickly appearance. No distress.   HENT:   Head: Normocephalic and atraumatic.   Right Ear: Hearing, tympanic membrane, external ear and ear canal normal. No tenderness.   Left Ear: Hearing, tympanic membrane, external ear and ear canal normal. No tenderness.   Nose: Nose normal.   Mouth/Throat: Oropharynx is clear and moist. Normal dentition. No oropharyngeal exudate, posterior oropharyngeal edema or posterior oropharyngeal erythema.   Eyes: Conjunctivae and lids are normal. Right eye exhibits no discharge. Left eye exhibits no discharge. Right conjunctiva is not injected. Left conjunctiva is not injected. No scleral icterus. Right eye exhibits normal extraocular motion. Left eye exhibits normal extraocular motion.   Neck: Normal range of motion. Neck supple. No JVD present. Carotid bruit is not present. No tracheal deviation and no edema present. No thyromegaly present.   Cardiovascular: Normal rate, " regular rhythm, normal heart sounds and normal pulses.  Exam reveals no friction rub.    No murmur heard.  Pulmonary/Chest: Effort normal. No accessory muscle usage. No respiratory distress. She has wheezes in the right lower field and the left lower field. She has no rhonchi. She has no rales.   Musculoskeletal: She exhibits no edema.   Lymphadenopathy:        Head (right side): No submandibular adenopathy present.        Head (left side): No submandibular adenopathy present.     She has no cervical adenopathy.   Neurological: She is alert and oriented to person, place, and time.   Skin: Skin is warm and dry. She is not diaphoretic.   Psychiatric: Her speech is normal and behavior is normal. Thought content normal. Her mood appears not anxious. Her affect is not angry, not labile and not inappropriate. She does not exhibit a depressed mood.       Assessment and Plan:   Irina was seen today for cough.    Diagnoses and all orders for this visit:    Acute bronchitis    Cough  -     X-Ray Chest PA And Lateral; Future    Other orders  -     methylPREDNISolone (MEDROL DOSEPACK) 4 mg tablet; use as directed  -     triamcinolone acetonide injection 40 mg; Inject 1 mL (40 mg total) into the muscle one time.  -     fluticasone-salmeterol 100-50 mcg/dose (ADVAIR) 100-50 mcg/dose diskus inhaler; Inhale 1 puff into the lungs 2 (two) times daily. Controller      Hydrate, rest, OTC Mucinex Expectorant as directed, Nasal saline as needed.  OTC Zyrtec as directed.    Follow-up in about 1 week (around 2/28/2018), or if symptoms worsen or fail to improve.    THIS NOTE WILL BE SHARED WITH THE PATIENT.

## 2018-02-23 ENCOUNTER — TELEPHONE (OUTPATIENT)
Dept: ORTHOPEDICS | Facility: CLINIC | Age: 48
End: 2018-02-23

## 2018-02-23 DIAGNOSIS — M25.531 BILATERAL WRIST PAIN: Primary | ICD-10-CM

## 2018-02-23 DIAGNOSIS — M25.532 BILATERAL WRIST PAIN: Primary | ICD-10-CM

## 2018-02-23 NOTE — TELEPHONE ENCOUNTER
Irina Dugan reminded of appointment on 2/27/18 with Dr. DORENE Albarado w/time and location. Notified of need for xray before OV w/date, time, and location of appts.

## 2018-02-27 ENCOUNTER — OFFICE VISIT (OUTPATIENT)
Dept: ORTHOPEDICS | Facility: CLINIC | Age: 48
End: 2018-02-27
Payer: COMMERCIAL

## 2018-02-27 ENCOUNTER — HOSPITAL ENCOUNTER (OUTPATIENT)
Dept: RADIOLOGY | Facility: OTHER | Age: 48
Discharge: HOME | End: 2018-02-27
Attending: ORTHOPAEDIC SURGERY
Payer: COMMERCIAL

## 2018-02-27 VITALS
HEART RATE: 81 BPM | HEIGHT: 66 IN | DIASTOLIC BLOOD PRESSURE: 93 MMHG | RESPIRATION RATE: 18 BRPM | BODY MASS INDEX: 42.8 KG/M2 | SYSTOLIC BLOOD PRESSURE: 141 MMHG | WEIGHT: 266.31 LBS

## 2018-02-27 DIAGNOSIS — G56.03 BILATERAL CARPAL TUNNEL SYNDROME: Primary | ICD-10-CM

## 2018-02-27 DIAGNOSIS — M25.532 BILATERAL WRIST PAIN: ICD-10-CM

## 2018-02-27 DIAGNOSIS — M25.531 BILATERAL WRIST PAIN: ICD-10-CM

## 2018-02-27 PROCEDURE — 73110 X-RAY EXAM OF WRIST: CPT | Mod: 50,TC,FY

## 2018-02-27 PROCEDURE — 73110 X-RAY EXAM OF WRIST: CPT | Mod: 26,50,, | Performed by: RADIOLOGY

## 2018-02-27 PROCEDURE — 99999 PR PBB SHADOW E&M-EST. PATIENT-LVL III: CPT | Mod: PBBFAC,,, | Performed by: ORTHOPAEDIC SURGERY

## 2018-02-27 PROCEDURE — 3008F BODY MASS INDEX DOCD: CPT | Mod: S$GLB,,, | Performed by: ORTHOPAEDIC SURGERY

## 2018-02-27 PROCEDURE — 99204 OFFICE O/P NEW MOD 45 MIN: CPT | Mod: S$GLB,,, | Performed by: ORTHOPAEDIC SURGERY

## 2018-02-27 NOTE — PROGRESS NOTES
I have personally taken the history and examined the patient. I agree with the Hand Surgery PA's note. The plan will be EMG/ NCS.  Pt has been given carpal tunnel injections which do help. Pt was diagnosed a couple years ago. Pt has never had EMG/ NCS. Pt was offered surgery at first visit with other MD.   Pt has on and off N/T depending on activity. Using mouse gets numb. Phone calls- numb. Now hurting in wrist.   + tinels' B, ? provacative exam on nerve compression

## 2018-02-27 NOTE — LETTER
February 27, 2018      Adrianna Pope, DO  [address] [facility] [address]           [facility]  [address], [address]  [facility] [address]  Phone: [phone]          Patient: Irina Dugan   MR Number: [id number]   YOB: 1970   Date of Visit: 2/27/2018       Dear Dr. Adrianna Pope:    Thank you for referring Irina Dugan to me for evaluation. Attached you will find relevant portions of my assessment and plan of care.    If you have questions, please do not hesitate to call me. I look forward to following Irina Dugan along with you.    Sincerely,    Rosa Lema PA-C    Enclosure  CC:  No Recipients    If you would like to receive this communication electronically, please contact [email or web address] or [phone] to request more information on [employer] access.    For providers and/or their staff who would like to refer a patient to [facility], please contact us through our one-stop-shop provider referral line, [facility], at [phone].    If you feel you have received this communication in error or would no longer like to receive these types of communications, please e-mail [email or web address]

## 2018-02-27 NOTE — PROGRESS NOTES
Subjective:      Patient ID: Irina Dugan is a 47 y.o. female.    Chief Complaint: Pain and Numbness of the Right Hand      HPI  Irina Dugan is a right hand dominant 47 y.o. female presenting today for bilateral carpal tunnel. She notes onset x yrs, R>L. Pt complains of numbness and tingling in the median n distribution bilaterally. Does not wake her at night, but does have morning numbness. 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 She has had two injections in each wrist. Does not have bracing, has not had EMG.     Review of patient's allergies indicates:  No Known Allergies      Current Outpatient Prescriptions   Medication Sig Dispense Refill    albuterol 90 mcg/actuation inhaler Inhale 2 puffs into the lungs every 6 (six) hours as needed for Wheezing. Rescue 18 g 0    buPROPion (WELLBUTRIN XL) 300 MG 24 hr tablet Take 1 tablet (300 mg total) by mouth once daily. 30 tablet 11    fluconazole (DIFLUCAN) 150 MG Tab       fluticasone-salmeterol 100-50 mcg/dose (ADVAIR) 100-50 mcg/dose diskus inhaler Inhale 1 puff into the lungs 2 (two) times daily. Controller 60 each 1    furosemide (LASIX) 20 MG tablet TAKE ONE TABLET BY MOUTH DAILY AS NEEDED (SWELLING) 30 tablet 3    hydrOXYzine HCl (ATARAX) 25 MG tablet Take 1 tablet (25 mg total) by mouth every evening. 30 tablet 1    lisinopril-hydrochlorothiazide (PRINZIDE,ZESTORETIC) 20-25 mg Tab Take 1 tablet by mouth once daily. 30 tablet 6    meloxicam (MOBIC) 15 MG tablet Take 1 tablet (15 mg total) by mouth once daily. 30 tablet 6    methylPREDNISolone (MEDROL DOSEPACK) 4 mg tablet use as directed 1 Package 0    norgestimate-ethinyl estradiol (SPRINTEC, 28,) 0.25-35 mg-mcg per tablet Take 1 tablet by mouth once daily. 28 tablet 12     No current facility-administered medications for this visit.        Past Medical History:   Diagnosis Date    Hypertension 10/12/2012    Ovarian cyst     Tubal ectopic pregnancy   "      Past Surgical History:   Procedure Laterality Date     SECTION, CLASSIC      CHOLECYSTECTOMY      ECTOPIC PREGNANCY SURGERY  age 25    TUBAL LIGATION         Review of Systems:  Constitutional: Negative for chills and fever.   Respiratory: Negative for cough and shortness of breath.    Gastrointestinal: Negative for nausea and vomiting.   Skin: Negative for rash.   Neurological: Negative for dizziness and headaches.   Psychiatric/Behavioral: Negative for depression.   MSK as in HPI       OBJECTIVE:     PHYSICAL EXAM:  BP (!) 141/93   Pulse 81   Resp 18   Ht 5' 6" (1.676 m)   Wt 120.8 kg (266 lb 5.1 oz)   LMP 2018 (Approximate)   BMI 42.98 kg/m²     GEN:  NAD, well-developed, well-groomed.  NEURO: Awake, alert, and oriented. Normal attention and concentration.    PSYCH: Normal mood and affect. Behavior is normal.  HEENT: No cervical lymphadenopathy noted.  CARDIOVASCULAR: Radial pulses 2+ bilaterally. No LE edema noted.  PULMONARY: Breath sounds normal. No respiratory distress.  SKIN: Intact, no rashes.      MSK:   RUE:  Good active ROM of the wrist and fingers. AIN/PIN/Radial/Median/Ulnar Nerves assessed in isolation without deficit. Radial & Ulnar arteries palpated 2+. Capillary Refill <3s. Positive tinels and durkans.     LUE:  Good active ROM of the wrist and fingers. AIN/PIN/Radial/Median/Ulnar Nerves assessed in isolation without deficit. Radial & Ulnar arteries palpated 2+. Capillary Refill <3s. Positive tinels and durkans.       RADIOGRAPHS:  Xray bilateral wrist 18  Impression    No abnormality identified.     Comments: I have personally reviewed the imaging and I agree with the above radiologist's report.    ASSESSMENT/PLAN:       ICD-10-CM ICD-9-CM   1. Bilateral carpal tunnel syndrome G56.03 354.0       Orders Placed This Encounter    EMG W/ ULTRASOUND AND NERVE CONDUCTION TEST 2 Extremities     Orders Placed This Encounter   Procedures    EMG W/ ULTRASOUND AND NERVE " CONDUCTION TEST 2 Extremities        Plan:   -EMG  -bl braces  -RTC after above       The patient indicates understanding of these issues and agrees to the plan.    Rosa Lema PA-C  [facility]   [facility] [place]

## 2018-04-12 ENCOUNTER — PROCEDURE VISIT (OUTPATIENT)
Dept: NEUROLOGY | Facility: CLINIC | Age: 48
End: 2018-04-12
Payer: COMMERCIAL

## 2018-04-12 DIAGNOSIS — G56.03 BILATERAL CARPAL TUNNEL SYNDROME: ICD-10-CM

## 2018-04-12 PROCEDURE — 95886 MUSC TEST DONE W/N TEST COMP: CPT | Mod: S$GLB,,, | Performed by: PSYCHIATRY & NEUROLOGY

## 2018-04-12 PROCEDURE — 95913 NRV CNDJ TEST 13/> STUDIES: CPT | Mod: S$GLB,,, | Performed by: PSYCHIATRY & NEUROLOGY

## 2018-04-17 ENCOUNTER — OFFICE VISIT (OUTPATIENT)
Dept: ORTHOPEDICS | Facility: CLINIC | Age: 48
End: 2018-04-17
Payer: COMMERCIAL

## 2018-04-17 VITALS
WEIGHT: 266.31 LBS | DIASTOLIC BLOOD PRESSURE: 86 MMHG | HEART RATE: 90 BPM | BODY MASS INDEX: 42.8 KG/M2 | SYSTOLIC BLOOD PRESSURE: 133 MMHG | HEIGHT: 66 IN

## 2018-04-17 DIAGNOSIS — G56.03 BILATERAL CARPAL TUNNEL SYNDROME: Primary | ICD-10-CM

## 2018-04-17 DIAGNOSIS — G56.01 RIGHT CARPAL TUNNEL SYNDROME: ICD-10-CM

## 2018-04-17 PROCEDURE — 99999 PR PBB SHADOW E&M-EST. PATIENT-LVL III: CPT | Mod: PBBFAC,,, | Performed by: ORTHOPAEDIC SURGERY

## 2018-04-17 PROCEDURE — 3079F DIAST BP 80-89 MM HG: CPT | Mod: CPTII,S$GLB,, | Performed by: ORTHOPAEDIC SURGERY

## 2018-04-17 PROCEDURE — 99214 OFFICE O/P EST MOD 30 MIN: CPT | Mod: S$GLB,,, | Performed by: ORTHOPAEDIC SURGERY

## 2018-04-17 PROCEDURE — 3075F SYST BP GE 130 - 139MM HG: CPT | Mod: CPTII,S$GLB,, | Performed by: ORTHOPAEDIC SURGERY

## 2018-04-17 NOTE — H&P
Patient ID: Irina Dugan is a 47 y.o. female.    Chief Complaint: Pain of the Right Hand and Pain of the Left Hand      HPI  Irina Dugan is a right hand dominant 47 y.o. 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 She notes onset several yrs, R>L. Pt complains of numbness and tingling in the median n distribution bilaterally. Does not wake her at night, but does have morning numbness. 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 She has had two injections in each wrist - last injection lasted 6 months. Wears night braces occasionally.  Numbness was better after taking PO steroids for another illness.  She reports some weakness in right hand with opening bottles.    Review of patient's allergies indicates:  No Known Allergies      Current Outpatient Prescriptions   Medication Sig Dispense Refill    albuterol 90 mcg/actuation inhaler Inhale 2 puffs into the lungs every 6 (six) hours as needed for Wheezing. Rescue 18 g 0    buPROPion (WELLBUTRIN XL) 300 MG 24 hr tablet Take 1 tablet (300 mg total) by mouth once daily. 30 tablet 11    fluconazole (DIFLUCAN) 150 MG Tab       fluticasone-salmeterol 100-50 mcg/dose (ADVAIR) 100-50 mcg/dose diskus inhaler Inhale 1 puff into the lungs 2 (two) times daily. Controller 60 each 1    furosemide (LASIX) 20 MG tablet TAKE ONE TABLET BY MOUTH DAILY AS NEEDED (SWELLING) 30 tablet 3    hydrOXYzine HCl (ATARAX) 25 MG tablet Take 1 tablet (25 mg total) by mouth every evening. 30 tablet 1    lisinopril-hydrochlorothiazide (PRINZIDE,ZESTORETIC) 20-25 mg Tab Take 1 tablet by mouth once daily. 30 tablet 6    meloxicam (MOBIC) 15 MG tablet Take 1 tablet (15 mg total) by mouth once daily. 30 tablet 6    norgestimate-ethinyl estradiol (SPRINTEC, 28,) 0.25-35 mg-mcg per tablet Take 1 tablet by mouth once daily. 28 tablet 12     No current facility-administered medications for this  "visit.        Past Medical History:   Diagnosis Date    Hypertension 10/12/2012    Ovarian cyst     Tubal ectopic pregnancy        Past Surgical History:   Procedure Laterality Date     SECTION, CLASSIC      CHOLECYSTECTOMY      ECTOPIC PREGNANCY SURGERY  age 25    TUBAL LIGATION         Review of Systems:  Constitutional: Negative for chills and fever.   Respiratory: Negative for cough and shortness of breath.    Gastrointestinal: Negative for nausea and vomiting.   Skin: Negative for rash.   Neurological: Negative for dizziness and headaches.   Psychiatric/Behavioral: Negative for depression.   MSK as in HPI       OBJECTIVE:     PHYSICAL EXAM:  /86   Pulse 90   Ht 5' 6" (1.676 m)   Wt 120.8 kg (266 lb 5.1 oz)   BMI 42.98 kg/m²     GEN:  NAD, well-developed, well-groomed.  NEURO: Awake, alert, and oriented. Normal attention and concentration.    PSYCH: Normal mood and affect. Behavior is normal.  HEENT: No cervical lymphadenopathy noted.  CARDIOVASCULAR: Radial pulses 2+ bilaterally. No LE edema noted.  PULMONARY: Breath sounds normal. No respiratory distress.  SKIN: Intact, no rashes.      MSK:   RUE:  Good active ROM of the wrist and fingers. AIN/PIN/Radial/Median/Ulnar Nerves assessed in isolation without deficit. Radial & Ulnar arteries palpated 2+. Capillary Refill <3s. Positive tinels and +++ durkans.     No thenar atrophy    LUE:  Good active ROM of the wrist and fingers. AIN/PIN/Radial/Median/Ulnar Nerves assessed in isolation without deficit. Radial & Ulnar arteries palpated 2+. Capillary Refill <3s. Positive tinels and +++durkans.     No thenar atrophy    RADIOGRAPHS:  Xray bilateral wrist 18 no fractures    EMG 18 shows moderately severe right carpal tunnel, mild-moderately severe left carpal tunnel syndromes    ASSESSMENT/PLAN:       ICD-10-CM ICD-9-CM   1. Bilateral carpal tunnel syndrome G56.03 354.0       Right > left bilateral carpal tunnel syndrome.  Symptoms and " EMG showing moderately severe carpal tunnel.  She is in danger of permanent symptoms and/or muscle weakness if she does not have operative intervention.     Plan:     PLAN FOR RIGHT CARPAL TUNNEL RELEASE

## 2018-04-17 NOTE — PROGRESS NOTES
I have personally taken the history and examined this patient. I agree with the resident's note as stated above. Plan for Right aCZTR- EMG/NCS + for CTs mod/severe. Discussed surgery at length with pt.  I have explained the risks, benefits, and alternatives of the procedure to the patient in great detail. The patient voices understanding and all questions have been answered. The patient agrees with to proceed as planned. Consents were performed in clinic.  Of note pt was 23 min late for appt.

## 2018-04-17 NOTE — PROGRESS NOTES
Subjective:      Patient ID: Irina Dugan is a 47 y.o. female.    Chief Complaint: Pain of the Right Hand and Pain of the Left Hand      HPI  Irina Dugan is a right hand dominant 47 y.o. 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 She notes onset several yrs, R>L. Pt complains of numbness and tingling in the median n distribution bilaterally. Does not wake her at night, but does have morning numbness. 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 She has had two injections in each wrist - last injection lasted 6 months. Wears night braces occasionally.  Numbness was better after taking PO steroids for another illness.  She reports some weakness in right hand with opening bottles.    Review of patient's allergies indicates:  No Known Allergies      Current Outpatient Prescriptions   Medication Sig Dispense Refill    albuterol 90 mcg/actuation inhaler Inhale 2 puffs into the lungs every 6 (six) hours as needed for Wheezing. Rescue 18 g 0    buPROPion (WELLBUTRIN XL) 300 MG 24 hr tablet Take 1 tablet (300 mg total) by mouth once daily. 30 tablet 11    fluconazole (DIFLUCAN) 150 MG Tab       fluticasone-salmeterol 100-50 mcg/dose (ADVAIR) 100-50 mcg/dose diskus inhaler Inhale 1 puff into the lungs 2 (two) times daily. Controller 60 each 1    furosemide (LASIX) 20 MG tablet TAKE ONE TABLET BY MOUTH DAILY AS NEEDED (SWELLING) 30 tablet 3    hydrOXYzine HCl (ATARAX) 25 MG tablet Take 1 tablet (25 mg total) by mouth every evening. 30 tablet 1    lisinopril-hydrochlorothiazide (PRINZIDE,ZESTORETIC) 20-25 mg Tab Take 1 tablet by mouth once daily. 30 tablet 6    meloxicam (MOBIC) 15 MG tablet Take 1 tablet (15 mg total) by mouth once daily. 30 tablet 6    norgestimate-ethinyl estradiol (SPRINTEC, 28,) 0.25-35 mg-mcg per tablet Take 1 tablet by mouth once daily. 28 tablet 12     No current facility-administered  "medications for this visit.        Past Medical History:   Diagnosis Date    Hypertension 10/12/2012    Ovarian cyst     Tubal ectopic pregnancy        Past Surgical History:   Procedure Laterality Date     SECTION, CLASSIC      CHOLECYSTECTOMY      ECTOPIC PREGNANCY SURGERY  age 25    TUBAL LIGATION         Review of Systems:  Constitutional: Negative for chills and fever.   Respiratory: Negative for cough and shortness of breath.    Gastrointestinal: Negative for nausea and vomiting.   Skin: Negative for rash.   Neurological: Negative for dizziness and headaches.   Psychiatric/Behavioral: Negative for depression.   MSK as in HPI       OBJECTIVE:     PHYSICAL EXAM:  /86   Pulse 90   Ht 5' 6" (1.676 m)   Wt 120.8 kg (266 lb 5.1 oz)   BMI 42.98 kg/m²     GEN:  NAD, well-developed, well-groomed.  NEURO: Awake, alert, and oriented. Normal attention and concentration.    PSYCH: Normal mood and affect. Behavior is normal.  HEENT: No cervical lymphadenopathy noted.  CARDIOVASCULAR: Radial pulses 2+ bilaterally. No LE edema noted.  PULMONARY: Breath sounds normal. No respiratory distress.  SKIN: Intact, no rashes.      MSK:   RUE:  Good active ROM of the wrist and fingers. AIN/PIN/Radial/Median/Ulnar Nerves assessed in isolation without deficit. Radial & Ulnar arteries palpated 2+. Capillary Refill <3s. Positive tinels and +++ durkans.     No thenar atrophy    LUE:  Good active ROM of the wrist and fingers. AIN/PIN/Radial/Median/Ulnar Nerves assessed in isolation without deficit. Radial & Ulnar arteries palpated 2+. Capillary Refill <3s. Positive tinels and +++durkans.     No thenar atrophy    RADIOGRAPHS:  Xray bilateral wrist 18 no fractures    EMG 18 shows moderately severe right carpal tunnel, mild-moderately severe left carpal tunnel syndromes    ASSESSMENT/PLAN:       ICD-10-CM ICD-9-CM   1. Bilateral carpal tunnel syndrome G56.03 354.0       Right > left bilateral carpal tunnel " syndrome.  Symptoms and EMG showing moderately severe carpal tunnel.  She is in danger of permanent symptoms and/or muscle weakness if she does not have operative intervention.     Plan:     PLAN FOR RIGHT CARPAL TUNNEL RELEASE

## 2018-07-02 ENCOUNTER — TELEPHONE (OUTPATIENT)
Dept: OBSTETRICS AND GYNECOLOGY | Facility: CLINIC | Age: 48
End: 2018-07-02

## 2018-07-02 DIAGNOSIS — Z30.011 ENCOUNTER FOR INITIAL PRESCRIPTION OF CONTRACEPTIVE PILLS: ICD-10-CM

## 2018-07-02 NOTE — TELEPHONE ENCOUNTER
Pt. Stating she no longer has ins. And wants a refill on her ocp's,  She is due for her annual.  Last seen 06/2017      Please advice

## 2018-07-03 RX ORDER — NORGESTIMATE AND ETHINYL ESTRADIOL 0.25-0.035
1 KIT ORAL DAILY
Qty: 28 TABLET | Refills: 2 | Status: ON HOLD | OUTPATIENT
Start: 2018-07-03 | End: 2020-07-19

## 2018-09-03 RX ORDER — NORGESTIMATE AND ETHINYL ESTRADIOL 0.25-0.035
KIT ORAL
Qty: 28 TABLET | Refills: 25 | Status: SHIPPED | OUTPATIENT
Start: 2018-09-03 | End: 2018-10-01 | Stop reason: SDUPTHER

## 2018-10-01 ENCOUNTER — OFFICE VISIT (OUTPATIENT)
Dept: INTERNAL MEDICINE | Facility: CLINIC | Age: 48
End: 2018-10-01
Payer: COMMERCIAL

## 2018-10-01 VITALS
HEART RATE: 80 BPM | BODY MASS INDEX: 44.93 KG/M2 | TEMPERATURE: 98 F | SYSTOLIC BLOOD PRESSURE: 128 MMHG | DIASTOLIC BLOOD PRESSURE: 88 MMHG | HEIGHT: 66 IN | WEIGHT: 279.56 LBS

## 2018-10-01 DIAGNOSIS — T78.40XA ALLERGIC REACTION, INITIAL ENCOUNTER: Primary | ICD-10-CM

## 2018-10-01 DIAGNOSIS — L30.9 DERMATITIS: ICD-10-CM

## 2018-10-01 PROCEDURE — 3079F DIAST BP 80-89 MM HG: CPT | Mod: CPTII,S$GLB,, | Performed by: FAMILY MEDICINE

## 2018-10-01 PROCEDURE — 99999 PR PBB SHADOW E&M-EST. PATIENT-LVL III: CPT | Mod: PBBFAC,,, | Performed by: FAMILY MEDICINE

## 2018-10-01 PROCEDURE — 96372 THER/PROPH/DIAG INJ SC/IM: CPT | Mod: S$GLB,,, | Performed by: FAMILY MEDICINE

## 2018-10-01 PROCEDURE — 3074F SYST BP LT 130 MM HG: CPT | Mod: CPTII,S$GLB,, | Performed by: FAMILY MEDICINE

## 2018-10-01 PROCEDURE — 3008F BODY MASS INDEX DOCD: CPT | Mod: CPTII,S$GLB,, | Performed by: FAMILY MEDICINE

## 2018-10-01 PROCEDURE — 99214 OFFICE O/P EST MOD 30 MIN: CPT | Mod: 25,S$GLB,, | Performed by: FAMILY MEDICINE

## 2018-10-01 RX ORDER — LISINOPRIL AND HYDROCHLOROTHIAZIDE 20; 25 MG/1; MG/1
1 TABLET ORAL DAILY
Qty: 90 TABLET | Refills: 1 | Status: SHIPPED | OUTPATIENT
Start: 2018-10-01 | End: 2019-05-06

## 2018-10-01 RX ORDER — MELOXICAM 15 MG/1
15 TABLET ORAL DAILY
Qty: 90 TABLET | Refills: 1 | Status: SHIPPED | OUTPATIENT
Start: 2018-10-01 | End: 2019-08-09 | Stop reason: SDUPTHER

## 2018-10-01 RX ORDER — TRIAMCINOLONE ACETONIDE 40 MG/ML
40 INJECTION, SUSPENSION INTRA-ARTICULAR; INTRAMUSCULAR
Status: COMPLETED | OUTPATIENT
Start: 2018-10-01 | End: 2018-10-01

## 2018-10-01 RX ORDER — FLUCONAZOLE 150 MG/1
150 TABLET ORAL ONCE
Qty: 1 TABLET | Refills: 5 | Status: SHIPPED | OUTPATIENT
Start: 2018-10-01 | End: 2018-10-01

## 2018-10-01 RX ORDER — FUROSEMIDE 20 MG/1
TABLET ORAL
Qty: 90 TABLET | Refills: 1 | Status: SHIPPED | OUTPATIENT
Start: 2018-10-01 | End: 2022-06-01

## 2018-10-01 RX ORDER — BUPROPION HYDROCHLORIDE 300 MG/1
300 TABLET ORAL DAILY
Qty: 90 TABLET | Refills: 1 | Status: SHIPPED | OUTPATIENT
Start: 2018-10-01 | End: 2019-02-16 | Stop reason: SDUPTHER

## 2018-10-01 RX ADMIN — TRIAMCINOLONE ACETONIDE 40 MG: 40 INJECTION, SUSPENSION INTRA-ARTICULAR; INTRAMUSCULAR at 02:10

## 2018-10-02 NOTE — PROGRESS NOTES
Subjective:   Patient ID: Irina Dugan is a 47 y.o. female.    Chief Complaint: Rash (all over body)      HPI  46 yo with rash that is intensely pruritic. Had shellfish prior but has consumed shellfish all of her life. She has had this event 3-4 times prior without known cause. She denies sensation of buccal mucosa edema or sloughing, dyspnea or sensation of throat swelling.     Patient queried and denies any further complaints.      ALLERGIES AND MEDICATIONS: updated and reviewed.  Review of patient's allergies indicates:  No Known Allergies    Current Outpatient Medications:     buPROPion (WELLBUTRIN XL) 300 MG 24 hr tablet, Take 1 tablet (300 mg total) by mouth once daily., Disp: 90 tablet, Rfl: 1    furosemide (LASIX) 20 MG tablet, TAKE ONE TABLET BY MOUTH DAILY AS NEEDED (SWELLING), Disp: 90 tablet, Rfl: 1    lisinopril-hydrochlorothiazide (PRINZIDE,ZESTORETIC) 20-25 mg Tab, Take 1 tablet by mouth once daily., Disp: 90 tablet, Rfl: 1    meloxicam (MOBIC) 15 MG tablet, Take 1 tablet (15 mg total) by mouth once daily., Disp: 90 tablet, Rfl: 1    norgestimate-ethinyl estradiol (SPRINTEC, 28,) 0.25-35 mg-mcg per tablet, Take 1 tablet by mouth once daily., Disp: 28 tablet, Rfl: 2  No current facility-administered medications for this visit.     Review of Systems   Constitutional: Negative for activity change, appetite change, chills, diaphoresis, fatigue, fever and unexpected weight change.   HENT: Negative for congestion, ear discharge, ear pain, facial swelling, hearing loss, nosebleeds, postnasal drip, rhinorrhea, sinus pressure, sneezing, sore throat, tinnitus, trouble swallowing and voice change.    Eyes: Negative for photophobia, pain, discharge, redness, itching and visual disturbance.   Respiratory: Negative for cough, chest tightness, shortness of breath and wheezing.    Cardiovascular: Negative for chest pain, palpitations and leg swelling.   Gastrointestinal: Negative for abdominal distention,  "abdominal pain, anal bleeding, blood in stool, constipation, diarrhea, nausea, rectal pain and vomiting.   Endocrine: Negative for cold intolerance, heat intolerance, polydipsia, polyphagia and polyuria.   Genitourinary: Negative for difficulty urinating, dysuria and flank pain.   Musculoskeletal: Negative for arthralgias, back pain, joint swelling, myalgias and neck pain.   Skin: Positive for rash. Negative for wound.   Neurological: Negative for dizziness, tremors, seizures, syncope, speech difficulty, weakness, light-headedness, numbness and headaches.   Psychiatric/Behavioral: Negative for behavioral problems, confusion, decreased concentration, dysphoric mood, sleep disturbance and suicidal ideas. The patient is not nervous/anxious and is not hyperactive.        Objective:     Vitals:    10/01/18 1347   BP: 128/88   Pulse: 80   Temp: 98.1 °F (36.7 °C)   TempSrc: Oral   Weight: 126.8 kg (279 lb 8.7 oz)   Height: 5' 6" (1.676 m)   PainSc: 0-No pain     Body mass index is 45.12 kg/m².    Physical Exam   Constitutional: She appears well-developed and well-nourished.   HENT:   Head: Normocephalic and atraumatic.   Pulmonary/Chest: Effort normal and breath sounds normal.   Skin: Skin is warm and dry. Capillary refill takes less than 2 seconds. Rash noted. No abrasion, no bruising, no burn, no ecchymosis, no laceration, no lesion and no petechiae noted. Rash is urticarial. Rash is not maculopapular. No cyanosis or erythema.        Psychiatric: She has a normal mood and affect. Her behavior is normal.   Nursing note and vitals reviewed.      Assessment and Plan:   Irina was seen today for rash.    Diagnoses and all orders for this visit:    Allergic reaction, initial encounter  -     Ambulatory consult to Allergy    Dermatitis    Other orders  -     furosemide (LASIX) 20 MG tablet; TAKE ONE TABLET BY MOUTH DAILY AS NEEDED (SWELLING)  -     buPROPion (WELLBUTRIN XL) 300 MG 24 hr tablet; Take 1 tablet (300 mg total) by " mouth once daily.  -     meloxicam (MOBIC) 15 MG tablet; Take 1 tablet (15 mg total) by mouth once daily.  -     lisinopril-hydrochlorothiazide (PRINZIDE,ZESTORETIC) 20-25 mg Tab; Take 1 tablet by mouth once daily.  -     fluconazole (DIFLUCAN) 150 MG Tab; Take 1 tablet (150 mg total) by mouth once. Prn yeast vaginitis for 1 dose  -     triamcinolone acetonide injection 40 mg; Inject 1 mL (40 mg total) into the muscle one time.      Likely shellfish. Refer to allergist. Avoid shellfish for now.     Time spent in the evaluation and management of this patient exceeded 45min and greater than 50% of this time was in face-to-face education regarding diagnoses, medications, plan, and follow-up.    Follow-up in about 2 weeks (around 10/15/2018).    THIS NOTE WILL BE SHARED WITH THE PATIENT.

## 2018-11-02 ENCOUNTER — OFFICE VISIT (OUTPATIENT)
Dept: ALLERGY | Facility: CLINIC | Age: 48
End: 2018-11-02
Payer: COMMERCIAL

## 2018-11-02 VITALS — WEIGHT: 279.13 LBS | OXYGEN SATURATION: 98 % | BODY MASS INDEX: 44.86 KG/M2 | HEART RATE: 97 BPM | HEIGHT: 66 IN

## 2018-11-02 DIAGNOSIS — J31.0 CHRONIC RHINITIS: ICD-10-CM

## 2018-11-02 DIAGNOSIS — L30.9 DERMATITIS: Primary | ICD-10-CM

## 2018-11-02 PROCEDURE — 99244 OFF/OP CNSLTJ NEW/EST MOD 40: CPT | Mod: 25,S$GLB,, | Performed by: ALLERGY & IMMUNOLOGY

## 2018-11-02 PROCEDURE — 99999 PR PBB SHADOW E&M-EST. PATIENT-LVL III: CPT | Mod: PBBFAC,,, | Performed by: ALLERGY & IMMUNOLOGY

## 2018-11-02 PROCEDURE — 95004 PERQ TESTS W/ALRGNC XTRCS: CPT | Mod: S$GLB,,, | Performed by: ALLERGY & IMMUNOLOGY

## 2018-11-02 RX ORDER — AMOXICILLIN 500 MG
CAPSULE ORAL DAILY
Status: ON HOLD | COMMUNITY
End: 2020-07-19

## 2018-11-02 RX ORDER — CETIRIZINE HYDROCHLORIDE 10 MG/1
10 TABLET ORAL DAILY
COMMUNITY
End: 2019-08-30

## 2018-11-02 NOTE — PROGRESS NOTES
Subjective:       Patient ID: Irina Dugan is a 47 y.o. female.    Chief Complaint:  Allergies (suspects food allergy, know trigger vodka. breaks out in rash when she eats. )      48 yo woman presents for consult from Dr Avi Stuart for possible allergies. She states she breaks out in rash. She gets red bumps all over, often starts on thighs then spreads. Very itchy. Will last for days to week until get steroid shot them resolves. Last November when had saw derm and told contact allergy. She feels like may be something she is eating but sometimes eats and fine and then another time eats and breaks out. She thinks cheap alcohol like cheap vodka is a trigger. She often has skin itching without rash as well.   She has frequent sinus pressure and congestion. No sneeze or runny nose or PND. She takes zyrtec off and on for this but none recently. No asthma, or eczema although has had bronchitis in past and told had asthma symptoms then. She has no insect or latex allergy. Been told has deviated septum but no sinus surgery. Has HTN but no other medical issues.         Environmental History: see history section for home environment  Review of Systems   Constitutional: Negative for appetite change, chills, fatigue and fever.   HENT: Positive for congestion and sinus pressure. Negative for ear discharge, ear pain, facial swelling, nosebleeds, postnasal drip, rhinorrhea, sneezing, sore throat, trouble swallowing and voice change.    Eyes: Negative for discharge, redness, itching and visual disturbance.   Respiratory: Negative for cough, choking, chest tightness, shortness of breath and wheezing.    Cardiovascular: Negative for chest pain, palpitations and leg swelling.   Gastrointestinal: Negative for abdominal distention, abdominal pain, constipation, diarrhea, nausea and vomiting.   Genitourinary: Negative for difficulty urinating.   Musculoskeletal: Negative for arthralgias, gait problem, joint swelling and  myalgias.   Skin: Positive for rash. Negative for color change.   Neurological: Positive for headaches. Negative for dizziness, syncope, weakness and light-headedness.   Hematological: Negative for adenopathy. Does not bruise/bleed easily.   Psychiatric/Behavioral: Negative for agitation, behavioral problems, confusion and sleep disturbance. The patient is not nervous/anxious.         Objective:      Physical Exam   Constitutional: She is oriented to person, place, and time. She appears well-developed and well-nourished. No distress.   HENT:   Head: Normocephalic and atraumatic.   Right Ear: Hearing, tympanic membrane, external ear and ear canal normal.   Left Ear: Hearing, tympanic membrane, external ear and ear canal normal.   Nose: No mucosal edema, rhinorrhea, sinus tenderness or septal deviation. No epistaxis. Right sinus exhibits no maxillary sinus tenderness and no frontal sinus tenderness. Left sinus exhibits no maxillary sinus tenderness and no frontal sinus tenderness.   Mouth/Throat: Uvula is midline, oropharynx is clear and moist and mucous membranes are normal. No uvula swelling.   Eyes: Conjunctivae are normal. Right eye exhibits no discharge. Left eye exhibits no discharge.   Neck: Normal range of motion. No thyromegaly present.   Cardiovascular: Normal rate, regular rhythm and normal heart sounds.   No murmur heard.  Pulmonary/Chest: Effort normal and breath sounds normal. No respiratory distress. She has no wheezes.   Abdominal: Soft. She exhibits no distension. There is no tenderness.   Musculoskeletal: Normal range of motion. She exhibits no edema or tenderness.   Lymphadenopathy:     She has no cervical adenopathy.   Neurological: She is alert and oriented to person, place, and time.   Skin: Skin is warm and dry. No rash noted. No erythema.   Psychiatric: She has a normal mood and affect. Her behavior is normal. Judgment and thought content normal.   Nursing note and vitals  reviewed.      Laboratory:   Percutaneous Skin Testing: prick skin test inhalants and select foods #70, 11/2/18: 3+  Histamine and remainder inhalants and foods all negative, see flow sheet  Assessment:       1. Dermatitis    2. Chronic rhinitis         Plan:       1. advised no evidence of IgE mediated allergy to inhalants or foods. Suspect rash is mor contact allergy or other derm process so referred to derm  2. Dr carrillo notified of completed consult via [facility]

## 2018-11-02 NOTE — LETTER
November 2, 2018      Avi Stuart MD  [address]  [address]  [place] [address]           [place] [facility]  [address]  [place] [address]  Phone: [phone]          Patient: Irina Dugan   MR Number: [id number]   YOB: 1970   Date of Visit: 11/2/2018       Dear Dr. Aiv Stuart:    Thank you for referring Irina Dugan to me for evaluation. Attached you will find relevant portions of my assessment and plan of care.    If you have questions, please do not hesitate to call me. I look forward to following Irina Dugan along with you.    Sincerely,    Vandana Cronin MD    Enclosure  CC:  No Recipients    If you would like to receive this communication electronically, please contact [email or web address] or [phone] to request more information on [employer] Link access.    For providers and/or their staff who would like to refer a patient to [facility], please contact us through our one-stop-shop provider referral line, [facility], at [phone].    If you feel you have received this communication in error or would no longer like to receive these types of communications, please e-mail [email or web address]

## 2018-11-30 DIAGNOSIS — Z12.39 BREAST CANCER SCREENING: ICD-10-CM

## 2019-02-18 RX ORDER — BUPROPION HYDROCHLORIDE 300 MG/1
TABLET ORAL
Qty: 30 TABLET | Refills: 0 | Status: SHIPPED | OUTPATIENT
Start: 2019-02-18 | End: 2019-03-11

## 2019-02-19 ENCOUNTER — TELEPHONE (OUTPATIENT)
Dept: INTERNAL MEDICINE | Facility: CLINIC | Age: 49
End: 2019-02-19

## 2019-02-19 NOTE — TELEPHONE ENCOUNTER
----- Message from Avi Stuart MD sent at 2/18/2019 10:10 AM CST -----  30 days wellbutrin sent in. She needs f/u and pancho labs. Please clarify if I or Dr. Pope is her pcp. Thank you

## 2019-03-11 ENCOUNTER — OFFICE VISIT (OUTPATIENT)
Dept: INTERNAL MEDICINE | Facility: CLINIC | Age: 49
End: 2019-03-11
Payer: COMMERCIAL

## 2019-03-11 ENCOUNTER — LAB VISIT (OUTPATIENT)
Dept: LAB | Facility: HOSPITAL | Age: 49
End: 2019-03-11
Attending: FAMILY MEDICINE
Payer: COMMERCIAL

## 2019-03-11 VITALS
HEART RATE: 78 BPM | OXYGEN SATURATION: 98 % | WEIGHT: 278.69 LBS | BODY MASS INDEX: 44.79 KG/M2 | DIASTOLIC BLOOD PRESSURE: 84 MMHG | HEIGHT: 66 IN | TEMPERATURE: 98 F | SYSTOLIC BLOOD PRESSURE: 138 MMHG

## 2019-03-11 DIAGNOSIS — N30.00 ACUTE CYSTITIS WITHOUT HEMATURIA: Primary | ICD-10-CM

## 2019-03-11 DIAGNOSIS — E66.01 MORBID OBESITY: ICD-10-CM

## 2019-03-11 DIAGNOSIS — N30.00 ACUTE CYSTITIS WITHOUT HEMATURIA: ICD-10-CM

## 2019-03-11 LAB
BILIRUB UR QL STRIP: NEGATIVE
CLARITY UR REFRACT.AUTO: CLEAR
COLOR UR AUTO: NORMAL
GLUCOSE UR QL STRIP: NEGATIVE
HGB UR QL STRIP: NEGATIVE
KETONES UR QL STRIP: NEGATIVE
LEUKOCYTE ESTERASE UR QL STRIP: NEGATIVE
NITRITE UR QL STRIP: NEGATIVE
PH UR STRIP: 6 [PH] (ref 5–8)
PROT UR QL STRIP: NEGATIVE
SP GR UR STRIP: 1 (ref 1–1.03)
URN SPEC COLLECT METH UR: NORMAL

## 2019-03-11 PROCEDURE — 99999 PR PBB SHADOW E&M-EST. PATIENT-LVL III: CPT | Mod: PBBFAC,,, | Performed by: FAMILY MEDICINE

## 2019-03-11 PROCEDURE — 3075F SYST BP GE 130 - 139MM HG: CPT | Mod: CPTII,S$GLB,, | Performed by: FAMILY MEDICINE

## 2019-03-11 PROCEDURE — 3079F PR MOST RECENT DIASTOLIC BLOOD PRESSURE 80-89 MM HG: ICD-10-PCS | Mod: CPTII,S$GLB,, | Performed by: FAMILY MEDICINE

## 2019-03-11 PROCEDURE — 99214 OFFICE O/P EST MOD 30 MIN: CPT | Mod: S$GLB,,, | Performed by: FAMILY MEDICINE

## 2019-03-11 PROCEDURE — 99214 PR OFFICE/OUTPT VISIT, EST, LEVL IV, 30-39 MIN: ICD-10-PCS | Mod: S$GLB,,, | Performed by: FAMILY MEDICINE

## 2019-03-11 PROCEDURE — 3075F PR MOST RECENT SYSTOLIC BLOOD PRESS GE 130-139MM HG: ICD-10-PCS | Mod: CPTII,S$GLB,, | Performed by: FAMILY MEDICINE

## 2019-03-11 PROCEDURE — 81003 URINALYSIS AUTO W/O SCOPE: CPT

## 2019-03-11 PROCEDURE — 87086 URINE CULTURE/COLONY COUNT: CPT

## 2019-03-11 PROCEDURE — 3008F PR BODY MASS INDEX (BMI) DOCUMENTED: ICD-10-PCS | Mod: CPTII,S$GLB,, | Performed by: FAMILY MEDICINE

## 2019-03-11 PROCEDURE — 3008F BODY MASS INDEX DOCD: CPT | Mod: CPTII,S$GLB,, | Performed by: FAMILY MEDICINE

## 2019-03-11 PROCEDURE — 3079F DIAST BP 80-89 MM HG: CPT | Mod: CPTII,S$GLB,, | Performed by: FAMILY MEDICINE

## 2019-03-11 PROCEDURE — 99999 PR PBB SHADOW E&M-EST. PATIENT-LVL III: ICD-10-PCS | Mod: PBBFAC,,, | Performed by: FAMILY MEDICINE

## 2019-03-11 RX ORDER — BUPROPION HYDROCHLORIDE 450 MG/1
450 TABLET, FILM COATED, EXTENDED RELEASE ORAL DAILY
Qty: 30 TABLET | Refills: 0 | Status: SHIPPED | OUTPATIENT
Start: 2019-03-11 | End: 2019-05-06

## 2019-03-11 RX ORDER — FLUCONAZOLE 150 MG/1
150 TABLET ORAL DAILY
Qty: 2 TABLET | Refills: 2 | Status: SHIPPED | OUTPATIENT
Start: 2019-03-11 | End: 2019-03-13

## 2019-03-11 RX ORDER — NITROFURANTOIN (MACROCRYSTALS) 100 MG/1
100 CAPSULE ORAL EVERY 12 HOURS
Qty: 14 CAPSULE | Refills: 0 | Status: SHIPPED | OUTPATIENT
Start: 2019-03-11 | End: 2019-05-06

## 2019-03-12 NOTE — PROGRESS NOTES
Subjective:   Patient ID: Irina Dugan is a 48 y.o. female.    Chief Complaint: Urinary Tract Infection      Urinary Tract Infection    This is a new problem. The current episode started yesterday. The problem has been rapidly worsening. The quality of the pain is described as shooting, stabbing and burning. The pain is at a severity of 9/10. The pain is severe. There has been no fever. She is sexually active. There is no history of pyelonephritis. Pertinent negatives include no behavior changes, weight loss or bubble bath use.   Depression   Visit Type: follow-up  Patient presents with the following symptoms: anhedonia, decreased concentration, depressed mood, fatigue, feelings of hopelessness and weight gain.  Patient is not experiencing: chest pain, choking sensation, compulsions, confusion, dizziness, dry mouth, excessive worry, feelings of worthlessness, hypersomnia, hyperventilation, impotence, palpitations, panic, psychomotor agitation, psychomotor retardation, restlessness, shortness of breath, suicidal ideas, suicidal planning and weight loss.      47 yo female  Patient queried and denies any further complaints.    LOCATION  DURATION  SEVERITY  QUALITY  TIMING  CAUSE  ASSOCIATED SYMPTOMS  MODIFIERS.    ALLERGIES AND MEDICATIONS: updated and reviewed.  Review of patient's allergies indicates:   Allergen Reactions    Shellfish containing products Rash     Possible allergy; unknown; rash       Current Outpatient Medications:     cetirizine (ZYRTEC) 10 MG tablet, Take 10 mg by mouth once daily., Disp: , Rfl:     fish oil-omega-3 fatty acids 300-1,000 mg capsule, Take by mouth once daily., Disp: , Rfl:     furosemide (LASIX) 20 MG tablet, TAKE ONE TABLET BY MOUTH DAILY AS NEEDED (SWELLING), Disp: 90 tablet, Rfl: 1    lisinopril-hydrochlorothiazide (PRINZIDE,ZESTORETIC) 20-25 mg Tab, Take 1 tablet by mouth once daily., Disp: 90 tablet, Rfl: 1    meloxicam (MOBIC) 15 MG tablet, Take 1 tablet (15 mg  "total) by mouth once daily., Disp: 90 tablet, Rfl: 1    norgestimate-ethinyl estradiol (SPRINTEC, 28,) 0.25-35 mg-mcg per tablet, Take 1 tablet by mouth once daily., Disp: 28 tablet, Rfl: 2    buPROPion 450 mg Tb24, Take 450 mg by mouth once daily., Disp: 30 tablet, Rfl: 0    fluconazole (DIFLUCAN) 150 MG Tab, Take 1 tablet (150 mg total) by mouth once daily. As needed for yeast vaginitis for 2 days, Disp: 2 tablet, Rfl: 2    nitrofurantoin (MACRODANTIN) 100 MG capsule, Take 1 capsule (100 mg total) by mouth every 12 (twelve) hours. X 7 days, Disp: 14 capsule, Rfl: 0    Review of Systems   Constitutional: Positive for weight gain. Negative for weight loss.   Respiratory: Negative for choking and shortness of breath.    Cardiovascular: Negative for palpitations.   Genitourinary: Negative for impotence.   Psychiatric/Behavioral: Positive for decreased concentration and depression. Negative for confusion and suicidal ideas.       Objective:     Vitals:    03/11/19 1554   BP: 138/84   Pulse: 78   Temp: 98.3 °F (36.8 °C)   TempSrc: Oral   SpO2: 98%   Weight: 126.4 kg (278 lb 10.6 oz)   Height: 5' 6" (1.676 m)   PainSc:   4   PainLoc: Abdomen     Body mass index is 44.98 kg/m².    Physical Exam    Assessment and Plan:   Irina was seen today for urinary tract infection.    Diagnoses and all orders for this visit:    Acute cystitis without hematuria  -     Urinalysis; Future  -     Urine culture; Future    Morbid obesity  -     Ambulatory consult to Bariatric Surgery    Other orders  -     nitrofurantoin (MACRODANTIN) 100 MG capsule; Take 1 capsule (100 mg total) by mouth every 12 (twelve) hours. X 7 days  -     fluconazole (DIFLUCAN) 150 MG Tab; Take 1 tablet (150 mg total) by mouth once daily. As needed for yeast vaginitis for 2 days  -     buPROPion 450 mg Tb24; Take 450 mg by mouth once daily.    Time spent in the evaluation and management of this patient exceeded 45min and greater than 50% of this time was in " face-to-face education regarding diagnoses, medications, plan, and follow-up.      No Follow-up on file.    THIS NOTE WILL BE SHARED WITH THE PATIENT.

## 2019-03-13 LAB — BACTERIA UR CULT: NORMAL

## 2019-05-06 ENCOUNTER — OFFICE VISIT (OUTPATIENT)
Dept: INTERNAL MEDICINE | Facility: CLINIC | Age: 49
End: 2019-05-06
Payer: COMMERCIAL

## 2019-05-06 VITALS
HEART RATE: 90 BPM | WEIGHT: 281.5 LBS | BODY MASS INDEX: 45.24 KG/M2 | HEIGHT: 66 IN | TEMPERATURE: 99 F | SYSTOLIC BLOOD PRESSURE: 140 MMHG | DIASTOLIC BLOOD PRESSURE: 90 MMHG

## 2019-05-06 DIAGNOSIS — J01.90 ACUTE SINUSITIS, RECURRENCE NOT SPECIFIED, UNSPECIFIED LOCATION: Primary | ICD-10-CM

## 2019-05-06 DIAGNOSIS — I10 ESSENTIAL HYPERTENSION: ICD-10-CM

## 2019-05-06 PROCEDURE — 99999 PR PBB SHADOW E&M-EST. PATIENT-LVL III: ICD-10-PCS | Mod: PBBFAC,,, | Performed by: INTERNAL MEDICINE

## 2019-05-06 PROCEDURE — 99214 PR OFFICE/OUTPT VISIT, EST, LEVL IV, 30-39 MIN: ICD-10-PCS | Mod: S$GLB,,, | Performed by: INTERNAL MEDICINE

## 2019-05-06 PROCEDURE — 3008F PR BODY MASS INDEX (BMI) DOCUMENTED: ICD-10-PCS | Mod: CPTII,S$GLB,, | Performed by: INTERNAL MEDICINE

## 2019-05-06 PROCEDURE — 3077F SYST BP >= 140 MM HG: CPT | Mod: CPTII,S$GLB,, | Performed by: INTERNAL MEDICINE

## 2019-05-06 PROCEDURE — 3080F PR MOST RECENT DIASTOLIC BLOOD PRESSURE >= 90 MM HG: ICD-10-PCS | Mod: CPTII,S$GLB,, | Performed by: INTERNAL MEDICINE

## 2019-05-06 PROCEDURE — 99214 OFFICE O/P EST MOD 30 MIN: CPT | Mod: S$GLB,,, | Performed by: INTERNAL MEDICINE

## 2019-05-06 PROCEDURE — 99999 PR PBB SHADOW E&M-EST. PATIENT-LVL III: CPT | Mod: PBBFAC,,, | Performed by: INTERNAL MEDICINE

## 2019-05-06 PROCEDURE — 3077F PR MOST RECENT SYSTOLIC BLOOD PRESSURE >= 140 MM HG: ICD-10-PCS | Mod: CPTII,S$GLB,, | Performed by: INTERNAL MEDICINE

## 2019-05-06 PROCEDURE — 3008F BODY MASS INDEX DOCD: CPT | Mod: CPTII,S$GLB,, | Performed by: INTERNAL MEDICINE

## 2019-05-06 PROCEDURE — 3080F DIAST BP >= 90 MM HG: CPT | Mod: CPTII,S$GLB,, | Performed by: INTERNAL MEDICINE

## 2019-05-06 RX ORDER — FLUCONAZOLE 150 MG/1
150 TABLET ORAL DAILY
Qty: 1 TABLET | Refills: 0 | Status: SHIPPED | OUTPATIENT
Start: 2019-05-06 | End: 2019-05-07

## 2019-05-06 RX ORDER — DOXYCYCLINE HYCLATE 100 MG
100 TABLET ORAL 2 TIMES DAILY
Qty: 14 TABLET | Refills: 0 | Status: SHIPPED | OUTPATIENT
Start: 2019-05-06 | End: 2019-08-09

## 2019-05-06 RX ORDER — IRBESARTAN AND HYDROCHLOROTHIAZIDE 150; 12.5 MG/1; MG/1
1 TABLET, FILM COATED ORAL DAILY
Qty: 90 TABLET | Refills: 3 | Status: SHIPPED | OUTPATIENT
Start: 2019-05-06 | End: 2019-08-09 | Stop reason: SDUPTHER

## 2019-05-06 RX ORDER — BUPROPION HYDROCHLORIDE 150 MG/1
150 TABLET ORAL DAILY
Qty: 30 TABLET | Refills: 3 | Status: SHIPPED | OUTPATIENT
Start: 2019-05-06 | End: 2020-02-15

## 2019-05-06 NOTE — PROGRESS NOTES
CC: sinusitis  HPI:  The patient is a 48 y.o. year old female who presents to the office for sinusitis.  she complains of nasal congestion, postnasal drip, rhinorrhea and headache.  Symptoms started 3 weeks ago.  she also complains of productive cough.  The patient has taken a zpak.  Her symptoms improved initially, but worsened after returning from vacation.  She also complains of intermittent redness and swelling of her right lower extremity    PAST MEDICAL HISTORY:  Past Medical History:   Diagnosis Date    Hypertension 10/12/2012    Ovarian cyst     Tubal ectopic pregnancy        SURGICAL HISTORY:  Past Surgical History:   Procedure Laterality Date     SECTION, CLASSIC      CHOLECYSTECTOMY      ECTOPIC PREGNANCY SURGERY  age 25    TUBAL LIGATION         MEDS:  Medcard reviewed and updated    ALLERGIES: Allergy Card reviewed and updated    SOCIAL HISTORY:   The patient is a nonsmoker.    PE:   APPEARANCE: Well nourished, well developed, in no acute distress.    EARS: TM's intact. No retraction or perforation.    NOSE: Mucosa pink. Airway clear. Positive tenderness of maxillary sinuses.  MOUTH & THROAT: No tonsillar enlargement. No pharyngeal erythema or exudate. No stridor.  CHEST: Lungs clear to auscultation with unlabored respirations.  CARDIOVASCULAR: Normal S1, S2. No murmurs. No carotid bruits. No pedal edema.  ABDOMEN: Bowel sounds normal. Not distended. Soft. No tenderness or masses.  PSYCHIATRIC: The patient is oriented to person, place, and time and has a pleasant affect.        ASSESSMENT/PLAN:  Irina was seen today for sinus problem.    Diagnoses and all orders for this visit:    Acute sinusitis, recurrence not specified, unspecified location  -     prescribe doxycycline    Essential hypertension  -     blood pressure is elevated  -     start Avalide    Other orders  -     irbesartan-hydrochlorothiazide (AVALIDE) 150-12.5 mg per tablet; Take 1 tablet by mouth once daily.  -      buPROPion (WELLBUTRIN XL) 150 MG TB24 tablet; Take 1 tablet (150 mg total) by mouth once daily.  -     doxycycline (VIBRA-TABS) 100 MG tablet; Take 1 tablet (100 mg total) by mouth 2 (two) times daily.

## 2019-08-09 ENCOUNTER — OFFICE VISIT (OUTPATIENT)
Dept: INTERNAL MEDICINE | Facility: CLINIC | Age: 49
End: 2019-08-09
Payer: COMMERCIAL

## 2019-08-09 ENCOUNTER — HOSPITAL ENCOUNTER (OUTPATIENT)
Dept: RADIOLOGY | Facility: HOSPITAL | Age: 49
Discharge: HOME | End: 2019-08-09
Attending: INTERNAL MEDICINE
Payer: COMMERCIAL

## 2019-08-09 VITALS
BODY MASS INDEX: 44.36 KG/M2 | HEART RATE: 86 BPM | RESPIRATION RATE: 18 BRPM | HEIGHT: 66 IN | TEMPERATURE: 99 F | WEIGHT: 276 LBS | DIASTOLIC BLOOD PRESSURE: 99 MMHG | SYSTOLIC BLOOD PRESSURE: 137 MMHG

## 2019-08-09 DIAGNOSIS — M79.641 PAIN OF RIGHT HAND: ICD-10-CM

## 2019-08-09 DIAGNOSIS — J01.90 ACUTE SINUSITIS, RECURRENCE NOT SPECIFIED, UNSPECIFIED LOCATION: ICD-10-CM

## 2019-08-09 DIAGNOSIS — J34.2 DEVIATED SEPTUM: ICD-10-CM

## 2019-08-09 DIAGNOSIS — I83.893 VARICOSE VEINS OF LEG WITH EDEMA, BILATERAL: ICD-10-CM

## 2019-08-09 DIAGNOSIS — I10 ESSENTIAL HYPERTENSION: Primary | ICD-10-CM

## 2019-08-09 PROCEDURE — 3080F DIAST BP >= 90 MM HG: CPT | Mod: CPTII,S$GLB,, | Performed by: INTERNAL MEDICINE

## 2019-08-09 PROCEDURE — 99999 PR PBB SHADOW E&M-EST. PATIENT-LVL IV: ICD-10-PCS | Mod: PBBFAC,,, | Performed by: INTERNAL MEDICINE

## 2019-08-09 PROCEDURE — 73130 X-RAY EXAM OF HAND: CPT | Mod: 26,RT,, | Performed by: RADIOLOGY

## 2019-08-09 PROCEDURE — 73130 XR HAND COMPLETE 3 VIEW RIGHT: ICD-10-PCS | Mod: 26,RT,, | Performed by: RADIOLOGY

## 2019-08-09 PROCEDURE — 3008F BODY MASS INDEX DOCD: CPT | Mod: CPTII,S$GLB,, | Performed by: INTERNAL MEDICINE

## 2019-08-09 PROCEDURE — 3075F SYST BP GE 130 - 139MM HG: CPT | Mod: CPTII,S$GLB,, | Performed by: INTERNAL MEDICINE

## 2019-08-09 PROCEDURE — 3080F PR MOST RECENT DIASTOLIC BLOOD PRESSURE >= 90 MM HG: ICD-10-PCS | Mod: CPTII,S$GLB,, | Performed by: INTERNAL MEDICINE

## 2019-08-09 PROCEDURE — 99214 PR OFFICE/OUTPT VISIT, EST, LEVL IV, 30-39 MIN: ICD-10-PCS | Mod: S$GLB,,, | Performed by: INTERNAL MEDICINE

## 2019-08-09 PROCEDURE — 73130 X-RAY EXAM OF HAND: CPT | Mod: TC,PO,RT

## 2019-08-09 PROCEDURE — 3008F PR BODY MASS INDEX (BMI) DOCUMENTED: ICD-10-PCS | Mod: CPTII,S$GLB,, | Performed by: INTERNAL MEDICINE

## 2019-08-09 PROCEDURE — 99214 OFFICE O/P EST MOD 30 MIN: CPT | Mod: S$GLB,,, | Performed by: INTERNAL MEDICINE

## 2019-08-09 PROCEDURE — 3075F PR MOST RECENT SYSTOLIC BLOOD PRESS GE 130-139MM HG: ICD-10-PCS | Mod: CPTII,S$GLB,, | Performed by: INTERNAL MEDICINE

## 2019-08-09 PROCEDURE — 99999 PR PBB SHADOW E&M-EST. PATIENT-LVL IV: CPT | Mod: PBBFAC,,, | Performed by: INTERNAL MEDICINE

## 2019-08-09 RX ORDER — MELOXICAM 15 MG/1
15 TABLET ORAL DAILY
Qty: 90 TABLET | Refills: 1 | Status: SHIPPED | OUTPATIENT
Start: 2019-08-09 | End: 2020-02-15

## 2019-08-09 RX ORDER — IRBESARTAN AND HYDROCHLOROTHIAZIDE 150; 12.5 MG/1; MG/1
1 TABLET, FILM COATED ORAL DAILY
Qty: 90 TABLET | Refills: 3 | Status: SHIPPED | OUTPATIENT
Start: 2019-08-09 | End: 2019-08-22

## 2019-08-09 NOTE — PROGRESS NOTES
"CC: followup of hypertension  HPI:  The patient is a 48 y.o. year old female who presents to the office for followup of hypertension.  The patient denies any chest pain, shortness of breath, headache, blurred vision, excessive fatigue, nausea or vomiting.  She stopped taking wellbutrin about 2 months ago.  She started taken a vitamin "Balance" with improvement in symptoms.  She complains of right hand stiffness and swelling, especially in the morning.  She also complains of lower extremity swelling.  She has not taken her blood pressure medication yet today.  She complains of painful varicose veins, right greater than left.  She complains of intermittent dizziness and nasal congestion.    PAST MEDICAL HISTORY:  Past Medical History:   Diagnosis Date    Hypertension 10/12/2012    Ovarian cyst     Tubal ectopic pregnancy        SURGICAL HISTORY:  Past Surgical History:   Procedure Laterality Date     SECTION, CLASSIC      CHOLECYSTECTOMY      ECTOPIC PREGNANCY SURGERY  age 25    TUBAL LIGATION         MEDS:  Medcard reviewed and updated    ALLERGIES: Allergy Card reviewed and updated    SOCIAL HISTORY:   The patient is a nonsmoker.    PE:   APPEARANCE: Well nourished, well developed, in no acute distress.    EYES: Sclerae anicteric. PERRL. EOMI.      EARS: TM's intact. No retraction or perforation.    NOSE: Mucosa pink. Airway clear. Positive tenderness of maxillary sinuses.  MOUTH & THROAT: No tonsillar enlargement. No pharyngeal erythema or exudate. No stridor.  CHEST: Lungs clear to auscultation with unlabored respirations.  CARDIOVASCULAR: Normal S1, S2. No murmurs. No carotid bruits. No pedal edema.  ABDOMEN: Bowel sounds normal. Not distended. Soft. No tenderness or masses.   NEUROLOGIC: Cranial Nerves: Intact.  PSYCHIATRIC: The patient is oriented to person, place, and time and has a pleasant affect.        ASSESSMENT/PLAN:  Irina was seen today for follow-up.    Diagnoses and all orders for " this visit:    Essential hypertension  -     blood pressure is elevated  -     start Avalide    Varicose veins of leg with edema, bilateral  -     Ambulatory Referral to Vascular Medicine    Deviated septum  -     Ambulatory Referral to ENT    Acute sinusitis, recurrence not specified, unspecified location  -     recommend antihistamine over-the-counter    Pain of right hand  -     X-Ray Hand Complete Right; Future    Other orders  -     irbesartan-hydrochlorothiazide (AVALIDE) 150-12.5 mg per tablet; Take 1 tablet by mouth once daily.  -     meloxicam (MOBIC) 15 MG tablet; Take 1 tablet (15 mg total) by mouth once daily.

## 2019-08-21 ENCOUNTER — TELEPHONE (OUTPATIENT)
Dept: INTERNAL MEDICINE | Facility: CLINIC | Age: 49
End: 2019-08-21

## 2019-08-21 NOTE — TELEPHONE ENCOUNTER
----- Message from Yue Nevarez sent at 8/21/2019 11:22 AM CDT -----  Contact: [phone] cell or [phone]  Patient is requesting a call from the office regarding the new medication the doctor prescribe for her at the last visit.  Patient does not know the name, but the pharmacy stated it is on back order.      Patient stated she is complete out of lisinopril.  Please advise, thank you.

## 2019-08-22 RX ORDER — OLMESARTAN MEDOXOMIL AND HYDROCHLOROTHIAZIDE 20/12.5 20; 12.5 MG/1; MG/1
1 TABLET ORAL DAILY
Qty: 30 TABLET | Refills: 3 | Status: SHIPPED | OUTPATIENT
Start: 2019-08-22 | End: 2020-02-15 | Stop reason: SDUPTHER

## 2019-08-28 ENCOUNTER — PATIENT OUTREACH (OUTPATIENT)
Dept: ADMINISTRATIVE | Facility: OTHER | Age: 49
End: 2019-08-28

## 2019-08-30 ENCOUNTER — OFFICE VISIT (OUTPATIENT)
Dept: OTOLARYNGOLOGY | Facility: CLINIC | Age: 49
End: 2019-08-30
Payer: COMMERCIAL

## 2019-08-30 VITALS
SYSTOLIC BLOOD PRESSURE: 134 MMHG | HEART RATE: 85 BPM | BODY MASS INDEX: 44.63 KG/M2 | HEIGHT: 66 IN | DIASTOLIC BLOOD PRESSURE: 82 MMHG | WEIGHT: 277.69 LBS | TEMPERATURE: 98 F

## 2019-08-30 DIAGNOSIS — M95.0 NASAL DEFORMITY, ACQUIRED: ICD-10-CM

## 2019-08-30 DIAGNOSIS — J34.2 NASAL SEPTAL DEVIATION: ICD-10-CM

## 2019-08-30 DIAGNOSIS — J34.3 HYPERTROPHY OF INFERIOR NASAL TURBINATE: ICD-10-CM

## 2019-08-30 DIAGNOSIS — J32.8 OTHER CHRONIC SINUSITIS: Primary | ICD-10-CM

## 2019-08-30 PROCEDURE — 31231 PR NASAL ENDOSCOPY, DX: ICD-10-PCS | Mod: S$GLB,,, | Performed by: OTOLARYNGOLOGY

## 2019-08-30 PROCEDURE — 31231 NASAL ENDOSCOPY DX: CPT | Mod: S$GLB,,, | Performed by: OTOLARYNGOLOGY

## 2019-08-30 PROCEDURE — 99999 PR PBB SHADOW E&M-EST. PATIENT-LVL III: CPT | Mod: PBBFAC,,, | Performed by: OTOLARYNGOLOGY

## 2019-08-30 PROCEDURE — 99243 OFF/OP CNSLTJ NEW/EST LOW 30: CPT | Mod: 25,S$GLB,, | Performed by: OTOLARYNGOLOGY

## 2019-08-30 PROCEDURE — 99243 PR OFFICE CONSULTATION,LEVEL III: ICD-10-PCS | Mod: 25,S$GLB,, | Performed by: OTOLARYNGOLOGY

## 2019-08-30 PROCEDURE — 99999 PR PBB SHADOW E&M-EST. PATIENT-LVL III: ICD-10-PCS | Mod: PBBFAC,,, | Performed by: OTOLARYNGOLOGY

## 2019-08-30 RX ORDER — LORATADINE 10 MG/1
10 TABLET ORAL DAILY
Refills: 0 | COMMUNITY
Start: 2019-08-30 | End: 2020-02-15

## 2019-08-30 RX ORDER — AMOXICILLIN AND CLAVULANATE POTASSIUM 875; 125 MG/1; MG/1
1 TABLET, FILM COATED ORAL 2 TIMES DAILY
Qty: 42 TABLET | Refills: 0 | Status: SHIPPED | OUTPATIENT
Start: 2019-08-30 | End: 2019-09-20

## 2019-08-30 RX ORDER — AZELASTINE 1 MG/ML
1 SPRAY, METERED NASAL 2 TIMES DAILY
Qty: 30 ML | Refills: 0 | Status: SHIPPED | OUTPATIENT
Start: 2019-08-30 | End: 2020-02-15

## 2019-08-30 RX ORDER — FLUTICASONE PROPIONATE 50 MCG
2 SPRAY, SUSPENSION (ML) NASAL DAILY
Qty: 1 BOTTLE | Refills: 12 | Status: SHIPPED | OUTPATIENT
Start: 2019-08-30 | End: 2020-02-15

## 2019-08-30 RX ORDER — FLUCONAZOLE 150 MG/1
150 TABLET ORAL DAILY
Qty: 1 TABLET | Refills: 0 | Status: SHIPPED | OUTPATIENT
Start: 2019-08-30 | End: 2019-08-31

## 2019-08-30 NOTE — LETTER
August 30, 2019      Cherie Duggan MD  [address] [facility] [address]  [place] [address]           [place] [facility] Otorhinol[facility]ology  [address]  [facility] [address]  Phone: [phone]  Fax: [phone]          Patient: Irina Dugan   MR Number: [id number]   YOB: 1970   Date of Visit: 8/30/2019       Dear Dr. Cherie Duggan:    Thank you for referring Irina Dugan to me for evaluation. Attached you will find relevant portions of my assessment and plan of care.    If you have questions, please do not hesitate to call me. I look forward to following Irina Dugan along with you.    Sincerely,    Cheryl Callahan MD    Enclosure  CC:  No Recipients    If you would like to receive this communication electronically, please contact [email or web address] or [phone] to request more information on [employer] access.    For providers and/or their staff who would like to refer a patient to [facility], please contact us through our one-stop-shop provider referral line, [facility] [place], at [phone].    If you feel you have received this communication in error or would no longer like to receive these types of communications, please e-mail [email or web address]

## 2019-08-30 NOTE — PROCEDURES
Procedures     PROCEDURE NOTE:  Nasal endoscopy   Preprocedure diagnosis:  Chronic sinusitis  Postprocedure diangosis:  Same  Complications:  None  Blood Loss:  None    Procedure in detail:  After verbal consent was obtained, the patient's nasal cavity was anesthesized using topical 1%lidocaine and Neosynepherine.  A rigid 0 degree endoscope was placed in first the right, then the left nasal cavity.  The inferior and middle turbinates were examined, and found to be edematous bilaterally.  The middle meatus and maxillary antrum was also examined, and found to be narrowed bilaterally with edematous mucosa.  Purulent posterior nasal drainage noted.  No masses seen.  The patient tolerated the procedure well and there were no complications.

## 2019-08-30 NOTE — PROGRESS NOTES
Chief Complaint   Patient presents with    Nasal Congestion     midface pressure, deviated septum    Sinusitis   .    HPI:     Irina Dugan is a 48 y.o. female who is referred by Dr. Cherie Duggan for  evaluation of a several year history of nasal obstruction, nasal congestion, and postnasal drip. She states that she was diagnosed with nasal septal deviation approximately 6 years ago.  She describes difficulty breathing at night. There is bilateral nasal obstruction but the left is much worse. She does not use sinus rinses or nasal sprays. She had been taking Zyrtec with some relief but had to stop because she felt it was causing drowsiness. She does take sudafed on occasion.  She admits to midface pain and pressure.  She admits to rhinorrhea and postnasal drip intermittently. There is not maxillary tooth pain. She  admits to headaches.  She has not had sinus or nasal surgery. There is no history of sinonasal trauma.      Past Medical History:   Diagnosis Date    Hypertension 10/12/2012    Ovarian cyst     Tubal ectopic pregnancy      Social History     Socioeconomic History    Marital status: Single     Spouse name: Not on file    Number of children: 2    Years of education: 15    Highest education level: Not on file   Occupational History    Occupation: Dental assistant      Employer: [employer]   Social Needs    Financial resource strain: Not on file    Food insecurity:     Worry: Not on file     Inability: Not on file    Transportation needs:     Medical: Not on file     Non-medical: Not on file   Tobacco Use    Smoking status: Never Smoker    Smokeless tobacco: Never Used   Substance and Sexual Activity    Alcohol use: No     Alcohol/week: 0.0 oz    Drug use: No    Sexual activity: Yes     Partners: Male     Birth control/protection: OCP   Lifestyle    Physical activity:     Days per week: Not on file     Minutes per session: Not on file    Stress: Not on file    Relationships    Social connections:     Talks on phone: Not on file     Gets together: Not on file     Attends Anglican service: Not on file     Active member of club or organization: Not on file     Attends meetings of clubs or organizations: Not on file     Relationship status: Not on file   Other Topics Concern    Are you pregnant or think you may be? Not Asked    Breast-feeding Not Asked   Social History Narrative    Single mom of 2 children, 19 & yr old.     Past Surgical History:   Procedure Laterality Date     SECTION, CLASSIC      CHOLECYSTECTOMY      ECTOPIC PREGNANCY SURGERY  age 25    TUBAL LIGATION       Family History   Problem Relation Age of Onset    Hypertension Unknown     Cancer Unknown     Ovarian cysts Unknown     Breast cancer Neg Hx     Colon cancer Neg Hx     Ovarian cancer Neg Hx     Allergic rhinitis Neg Hx     Angioedema Neg Hx     Atopy Neg Hx     Immunodeficiency Neg Hx     Rhinitis Neg Hx     Urticaria Neg Hx     Eczema Neg Hx     Asthma Neg Hx     Allergies Neg Hx            Review of Systems  General: negative for chills, fever or weight loss  Psychological: negative for mood changes or depression  Ophthalmic: negative for blurry vision, photophobia or eye pain  ENT: see HPI  Respiratory: no cough, shortness of breath, or wheezing  Cardiovascular: no chest pain or dyspnea on exertion  Gastrointestinal: no abdominal pain, change in bowel habits, or black/ bloody stools  Musculoskeletal: negative for gait disturbance or muscular weakness  Neurological: no syncope or seizures; no ataxia  Dermatological: negative for puritis,  rash and jaundice  Hematologic/lymphatic: no easy bruising, no new lumps or bumps      Physical Exam:    Vitals:    19 1331   BP: 134/82   Pulse: 85   Temp: 97.8 °F (36.6 °C)       Constitutional: Well appearing / communicating without difficutly.  NAD.  Eyes: EOM I Bilaterally  Head/Face: Normocephalic.  Negative paranasal  sinus pressure/tenderness.  Salivary glands WNL.  House Brackmann I Bilaterally.    Right Ear: Auricle normal appearance. External Auditory Canal within normal limits,TM w/o masses/lesions/perforations. TM mobility noted.   Left Ear: Auricle normal appearance. External Auditory Canal WNL,TM w/o masses/lesions/perforations. TM mobility noted.  Nose: Nasal septal deviation to the left; +internal nasal valve collapse; +nasal dorsum deviation . Inferior Turbinates 3+ bilaterally. No septal perforation. No masses/lesions. External nasal skin appears normal without masses/lesions.  Oral Cavity: Gingiva/lips within normal limits.  Dentition/gingiva healthy appearing. Mucus membranes moist. Floor of mouth soft, no masses palpated. Oral Tongue mobile. Hard Palate appears normal.    Oropharynx: Base of tongue appears normal. No masses/lesions noted. Tonsillar fossa/pharyngeal wall without lesions. Posterior oropharynx WNL.  Soft palate without masses. Midline uvula.   Neck/Lymphatic: No LAD I-VI bilaterally.  No thyromegaly.  No masses noted on exam.    Mirror laryngoscopy/nasopharyngoscopy: Active gag reflex.  Unable to perform.    Neuro/Psychiatric: AOx3.  Normal mood and affect.   Cardiovascular: Normal carotid pulses bilaterally, no increasing jugular venous distention noted at cervical region bilaterally.    Respiratory: Normal respiratory effort, no stridor, no retractions noted.      See separate procedure note for nasal endoscopy.       Assessment:    ICD-10-CM ICD-9-CM    1. Other chronic sinusitis J32.8 473.8 CT Medtronic Sinuses without   2. Nasal septal deviation J34.2 470    3. Hypertrophy of inferior nasal turbinate J34.3 478.0    4. Nasal deformity, acquired M95.0 738.0      The primary encounter diagnosis was Other chronic sinusitis. Diagnoses of Nasal septal deviation, Hypertrophy of inferior nasal turbinate, and Nasal deformity, acquired were also pertinent to this visit.      Plan:  Orders Placed This  Encounter   Procedures    CT Medtronic Sinuses without     Start Flonase 2 sprays per nostril daily and Astelin 1 spray per nostril twice daily  Start Claritin 10 mg p.o. Daily  Start nasal saline rinses b.i.d.  Start Augmentin 875 mg p.o. b.i.d. for 2 weeks  Obtain CT scan of the sinuses to assess intraluminal patency.  Follow-up in 3-4 weeks to re-evaluate response to treatment and to further discuss CT scan findings.    Cheryl Callahan MD

## 2019-09-23 ENCOUNTER — PATIENT OUTREACH (OUTPATIENT)
Dept: ADMINISTRATIVE | Facility: OTHER | Age: 49
End: 2019-09-23

## 2019-09-24 ENCOUNTER — OFFICE VISIT (OUTPATIENT)
Dept: INTERNAL MEDICINE | Facility: CLINIC | Age: 49
End: 2019-09-24
Payer: COMMERCIAL

## 2019-09-24 VITALS
WEIGHT: 280.44 LBS | RESPIRATION RATE: 20 BRPM | TEMPERATURE: 98 F | BODY MASS INDEX: 45.07 KG/M2 | SYSTOLIC BLOOD PRESSURE: 124 MMHG | HEART RATE: 80 BPM | DIASTOLIC BLOOD PRESSURE: 80 MMHG | HEIGHT: 66 IN

## 2019-09-24 DIAGNOSIS — Z00.00 ROUTINE MEDICAL EXAM: Primary | ICD-10-CM

## 2019-09-24 PROCEDURE — 99396 PR PREVENTIVE VISIT,EST,40-64: ICD-10-PCS | Mod: 25,S$GLB,, | Performed by: INTERNAL MEDICINE

## 2019-09-24 PROCEDURE — 99999 PR PBB SHADOW E&M-EST. PATIENT-LVL III: CPT | Mod: PBBFAC,,, | Performed by: INTERNAL MEDICINE

## 2019-09-24 PROCEDURE — 3079F PR MOST RECENT DIASTOLIC BLOOD PRESSURE 80-89 MM HG: ICD-10-PCS | Mod: CPTII,S$GLB,, | Performed by: INTERNAL MEDICINE

## 2019-09-24 PROCEDURE — 3074F PR MOST RECENT SYSTOLIC BLOOD PRESSURE < 130 MM HG: ICD-10-PCS | Mod: CPTII,S$GLB,, | Performed by: INTERNAL MEDICINE

## 2019-09-24 PROCEDURE — 93005 EKG 12-LEAD: ICD-10-PCS | Mod: S$GLB,,, | Performed by: INTERNAL MEDICINE

## 2019-09-24 PROCEDURE — 90686 IIV4 VACC NO PRSV 0.5 ML IM: CPT | Mod: S$GLB,,, | Performed by: INTERNAL MEDICINE

## 2019-09-24 PROCEDURE — 3074F SYST BP LT 130 MM HG: CPT | Mod: CPTII,S$GLB,, | Performed by: INTERNAL MEDICINE

## 2019-09-24 PROCEDURE — 99396 PREV VISIT EST AGE 40-64: CPT | Mod: 25,S$GLB,, | Performed by: INTERNAL MEDICINE

## 2019-09-24 PROCEDURE — 99999 PR PBB SHADOW E&M-EST. PATIENT-LVL III: ICD-10-PCS | Mod: PBBFAC,,, | Performed by: INTERNAL MEDICINE

## 2019-09-24 PROCEDURE — 90471 IMMUNIZATION ADMIN: CPT | Mod: 59,S$GLB,, | Performed by: INTERNAL MEDICINE

## 2019-09-24 PROCEDURE — 3079F DIAST BP 80-89 MM HG: CPT | Mod: CPTII,S$GLB,, | Performed by: INTERNAL MEDICINE

## 2019-09-24 PROCEDURE — 90471 FLU VACCINE (QUAD) GREATER THAN OR EQUAL TO 3YO PRESERVATIVE FREE IM: ICD-10-PCS | Mod: 59,S$GLB,, | Performed by: INTERNAL MEDICINE

## 2019-09-24 PROCEDURE — 93010 EKG 12-LEAD: ICD-10-PCS | Mod: S$GLB,,, | Performed by: INTERNAL MEDICINE

## 2019-09-24 PROCEDURE — 93010 ELECTROCARDIOGRAM REPORT: CPT | Mod: S$GLB,,, | Performed by: INTERNAL MEDICINE

## 2019-09-24 PROCEDURE — 90686 FLU VACCINE (QUAD) GREATER THAN OR EQUAL TO 3YO PRESERVATIVE FREE IM: ICD-10-PCS | Mod: S$GLB,,, | Performed by: INTERNAL MEDICINE

## 2019-09-24 PROCEDURE — 93005 ELECTROCARDIOGRAM TRACING: CPT | Mod: S$GLB,,, | Performed by: INTERNAL MEDICINE

## 2019-09-24 RX ORDER — CIPROFLOXACIN 500 MG/1
500 TABLET ORAL EVERY 12 HOURS
Qty: 10 TABLET | Refills: 0 | Status: SHIPPED | OUTPATIENT
Start: 2019-09-24 | End: 2020-02-15

## 2019-09-24 NOTE — PROGRESS NOTES
The patient is a 48 y.o. old female who presents to the office for a physical.    PAST MEDICAL HISTORY  Past Medical History:   Diagnosis Date    Hypertension 10/12/2012    Ovarian cyst     Tubal ectopic pregnancy        SURGICAL HISTORY:  Past Surgical History:   Procedure Laterality Date     SECTION, CLASSIC      CHOLECYSTECTOMY      ECTOPIC PREGNANCY SURGERY  age 25    TUBAL LIGATION           MEDS:  Medcard reviewed and updated    ALLERGIES: Allergy Card reviewed and updated    SOCIAL HISTORY:   The patient is a nonsmoker, denies alcohol or illicit drug use.    ROS:  GENERAL: No fever, chills or weight loss.  Positive fatigue.  SKIN: No rashes.  HEAD: No headaches or recent head trauma.  EYES: No photophobia, ocular pain or diplopia.  Blurred vision.  EARS: Denies ear pain or discharge.  Positive vertigo.  NOSE: No epistaxis or postnasal drip.  MOUTH & THROAT: Mild hoarseness.   NODES: Denies swollen glands.  CHEST: Denies shortness of breath, wheezing, cough and sputum production.  CARDIOVASCULAR: Recent chest pain that radiated to her back and neck.  Denies palpitations.  ABDOMEN: Appetite fine. Denies diarrhea, abdominal pain, constipation or blood in stool.  URINARY: No dysuria or hematuria.  MUSCULOSKELETAL: Right knee pain.. Denies back pain.  NEUROLOGIC: No history of seizures.  ENDOCRINE: Denies polyuria or polydipsia.  PSYCHIATRIC: Denies mood swings, depression, anxiety, homicidal or suicidal thoughts.    SCREENINGS:  Last cholesterol:   Last colonoscopy: none  Last mammogram:    Last Pap smear:   Last tetanus:   Last Pneumovax: none  Last eye exam: 1 year ago  Last bone density: none  Last menstrual period: 2019    PE:   Vitals:  Vitals:    19 1401   BP: 124/80   Pulse: 80   Resp: 20   Temp: 98 °F (36.7 °C)       APPEARANCE: Well nourished, well developed, in no acute distress.    EYES: Sclerae anicteric. PERRL. EOMI.      EARS: TM's intact. No retraction or  perforation.    NOSE: Mucosa pink. Airway clear.  MOUTH & THROAT: No tonsillar enlargement. No pharyngeal erythema or exudate. No stridor.  NECK: Supple, no thyromegaly.  CHEST: Lungs clear to auscultation with unlabored respirations.  CARDIOVASCULAR: Normal S1, S2. No murmurs. No carotid bruits. No pedal edema.  ABDOMEN: Bowel sounds normal. Not distended. Soft. No tenderness or masses.   MUSCULOSKELETAL:  Normal gait, no cyanosis or clubbing.   SKIN: Normal skin turgor, warm and dry.  NEUROLOGIC: Cranial Nerves: Intact.  PSYCHIATRIC: The patient is oriented to person, place, and time and has a pleasant affect.        ASSESSMENT/PLAN:  Irina was seen today for annual exam.    Diagnoses and all orders for this visit:    Routine medical exam  -     EKG 12-lead  -     CBC auto differential; Future  -     Comprehensive metabolic panel; Future  -     Hemoglobin A1c; Future  -     Lipid panel; Future  -     TSH; Future  -     Vitamin D; Future  -     Urinalysis; Future    Other orders  -     ciprofloxacin HCl (CIPRO) 500 MG tablet; Take 1 tablet (500 mg total) by mouth every 12 (twelve) hours.

## 2019-09-26 ENCOUNTER — TELEPHONE (OUTPATIENT)
Dept: VASCULAR SURGERY | Facility: CLINIC | Age: 49
End: 2019-09-26

## 2019-09-26 DIAGNOSIS — I87.2 VENOUS INSUFFICIENCY: Primary | ICD-10-CM

## 2019-09-26 NOTE — TELEPHONE ENCOUNTER
"Spoke with Ms Dugan appointment canceled today with Vascular Surgery because patient referral is for Vascular Medicine. Patients states" she wants to be seen in Vascular Medicine."  "

## 2019-09-27 ENCOUNTER — HOSPITAL ENCOUNTER (OUTPATIENT)
Dept: RADIOLOGY | Facility: HOSPITAL | Age: 49
Discharge: HOME | End: 2019-09-27
Attending: INTERNAL MEDICINE
Payer: COMMERCIAL

## 2019-09-27 DIAGNOSIS — Z12.39 BREAST CANCER SCREENING: ICD-10-CM

## 2019-09-27 PROCEDURE — 77067 SCR MAMMO BI INCL CAD: CPT | Mod: TC

## 2019-09-27 PROCEDURE — 77067 SCR MAMMO BI INCL CAD: CPT | Mod: 26,,, | Performed by: RADIOLOGY

## 2019-09-27 PROCEDURE — 77063 MAMMO DIGITAL SCREENING BILAT WITH TOMOSYNTHESIS_CAD: ICD-10-PCS | Mod: 26,,, | Performed by: RADIOLOGY

## 2019-09-27 PROCEDURE — 77063 BREAST TOMOSYNTHESIS BI: CPT | Mod: 26,,, | Performed by: RADIOLOGY

## 2019-09-27 PROCEDURE — 77067 MAMMO DIGITAL SCREENING BILAT WITH TOMOSYNTHESIS_CAD: ICD-10-PCS | Mod: 26,,, | Performed by: RADIOLOGY

## 2019-09-30 ENCOUNTER — TELEPHONE (OUTPATIENT)
Dept: INTERNAL MEDICINE | Facility: CLINIC | Age: 49
End: 2019-09-30

## 2019-09-30 ENCOUNTER — LAB VISIT (OUTPATIENT)
Dept: LAB | Facility: HOSPITAL | Age: 49
End: 2019-09-30
Attending: INTERNAL MEDICINE
Payer: COMMERCIAL

## 2019-09-30 DIAGNOSIS — Z00.00 ROUTINE MEDICAL EXAM: ICD-10-CM

## 2019-09-30 LAB
25(OH)D3+25(OH)D2 SERPL-MCNC: 15 NG/ML (ref 30–96)
ALBUMIN SERPL BCP-MCNC: 3.6 G/DL (ref 3.5–5.2)
ALP SERPL-CCNC: 71 U/L (ref 55–135)
ALT SERPL W/O P-5'-P-CCNC: 27 U/L (ref 10–44)
ANION GAP SERPL CALC-SCNC: 7 MMOL/L (ref 8–16)
AST SERPL-CCNC: 17 U/L (ref 10–40)
BASOPHILS # BLD AUTO: 0.04 K/UL (ref 0–0.2)
BASOPHILS NFR BLD: 0.7 % (ref 0–1.9)
BILIRUB SERPL-MCNC: 0.3 MG/DL (ref 0.1–1)
BUN SERPL-MCNC: 19 MG/DL (ref 6–20)
CALCIUM SERPL-MCNC: 9 MG/DL (ref 8.7–10.5)
CHLORIDE SERPL-SCNC: 104 MMOL/L (ref 95–110)
CHOLEST SERPL-MCNC: 183 MG/DL (ref 120–199)
CHOLEST/HDLC SERPL: 3.4 {RATIO} (ref 2–5)
CO2 SERPL-SCNC: 26 MMOL/L (ref 23–29)
CREAT SERPL-MCNC: 0.7 MG/DL (ref 0.5–1.4)
DIFFERENTIAL METHOD: ABNORMAL
EOSINOPHIL # BLD AUTO: 0.1 K/UL (ref 0–0.5)
EOSINOPHIL NFR BLD: 2 % (ref 0–8)
ERYTHROCYTE [DISTWIDTH] IN BLOOD BY AUTOMATED COUNT: 14.3 % (ref 11.5–14.5)
EST. GFR  (AFRICAN AMERICAN): >60 ML/MIN/1.73 M^2
EST. GFR  (NON AFRICAN AMERICAN): >60 ML/MIN/1.73 M^2
ESTIMATED AVG GLUCOSE: 114 MG/DL (ref 68–131)
GLUCOSE SERPL-MCNC: 96 MG/DL (ref 70–110)
HBA1C MFR BLD HPLC: 5.6 % (ref 4–5.6)
HCT VFR BLD AUTO: 34.8 % (ref 37–48.5)
HDLC SERPL-MCNC: 54 MG/DL (ref 40–75)
HDLC SERPL: 29.5 % (ref 20–50)
HGB BLD-MCNC: 11.6 G/DL (ref 12–16)
IMM GRANULOCYTES # BLD AUTO: 0.02 K/UL (ref 0–0.04)
IMM GRANULOCYTES NFR BLD AUTO: 0.4 % (ref 0–0.5)
LDLC SERPL CALC-MCNC: 100.8 MG/DL (ref 63–159)
LYMPHOCYTES # BLD AUTO: 2.1 K/UL (ref 1–4.8)
LYMPHOCYTES NFR BLD: 37.3 % (ref 18–48)
MCH RBC QN AUTO: 28.6 PG (ref 27–31)
MCHC RBC AUTO-ENTMCNC: 33.3 G/DL (ref 32–36)
MCV RBC AUTO: 86 FL (ref 82–98)
MONOCYTES # BLD AUTO: 0.5 K/UL (ref 0.3–1)
MONOCYTES NFR BLD: 8.2 % (ref 4–15)
NEUTROPHILS # BLD AUTO: 2.8 K/UL (ref 1.8–7.7)
NEUTROPHILS NFR BLD: 51.4 % (ref 38–73)
NONHDLC SERPL-MCNC: 129 MG/DL
NRBC BLD-RTO: 0 /100 WBC
PLATELET # BLD AUTO: 170 K/UL (ref 150–350)
PMV BLD AUTO: 12.7 FL (ref 9.2–12.9)
POTASSIUM SERPL-SCNC: 4.1 MMOL/L (ref 3.5–5.1)
PROT SERPL-MCNC: 7.1 G/DL (ref 6–8.4)
RBC # BLD AUTO: 4.06 M/UL (ref 4–5.4)
SODIUM SERPL-SCNC: 137 MMOL/L (ref 136–145)
TRIGL SERPL-MCNC: 141 MG/DL (ref 30–150)
TSH SERPL DL<=0.005 MIU/L-ACNC: 2.18 UIU/ML (ref 0.4–4)
WBC # BLD AUTO: 5.5 K/UL (ref 3.9–12.7)

## 2019-09-30 PROCEDURE — 83036 HEMOGLOBIN GLYCOSYLATED A1C: CPT

## 2019-09-30 PROCEDURE — 36415 COLL VENOUS BLD VENIPUNCTURE: CPT | Mod: PO

## 2019-09-30 PROCEDURE — 80061 LIPID PANEL: CPT

## 2019-09-30 PROCEDURE — 82306 VITAMIN D 25 HYDROXY: CPT

## 2019-09-30 PROCEDURE — 85025 COMPLETE CBC W/AUTO DIFF WBC: CPT

## 2019-09-30 PROCEDURE — 84443 ASSAY THYROID STIM HORMONE: CPT

## 2019-09-30 PROCEDURE — 80053 COMPREHEN METABOLIC PANEL: CPT

## 2020-01-24 ENCOUNTER — TELEPHONE (OUTPATIENT)
Dept: INTERNAL MEDICINE | Facility: CLINIC | Age: 50
End: 2020-01-24

## 2020-01-24 ENCOUNTER — OFFICE VISIT (OUTPATIENT)
Dept: INTERNAL MEDICINE | Facility: CLINIC | Age: 50
End: 2020-01-24
Payer: COMMERCIAL

## 2020-01-24 VITALS
BODY MASS INDEX: 46.28 KG/M2 | DIASTOLIC BLOOD PRESSURE: 86 MMHG | TEMPERATURE: 99 F | HEIGHT: 66 IN | RESPIRATION RATE: 18 BRPM | WEIGHT: 287.94 LBS | SYSTOLIC BLOOD PRESSURE: 124 MMHG | HEART RATE: 85 BPM

## 2020-01-24 DIAGNOSIS — B35.1 FUNGAL NAIL INFECTION: ICD-10-CM

## 2020-01-24 DIAGNOSIS — J01.90 ACUTE SINUSITIS, RECURRENCE NOT SPECIFIED, UNSPECIFIED LOCATION: ICD-10-CM

## 2020-01-24 DIAGNOSIS — J10.1 INFLUENZA A: Primary | ICD-10-CM

## 2020-01-24 LAB
INFLUENZA A, MOLECULAR: POSITIVE
INFLUENZA B, MOLECULAR: NEGATIVE
SPECIMEN SOURCE: ABNORMAL

## 2020-01-24 PROCEDURE — 99213 PR OFFICE/OUTPT VISIT, EST, LEVL III, 20-29 MIN: ICD-10-PCS | Mod: S$GLB,,, | Performed by: HOSPITALIST

## 2020-01-24 PROCEDURE — 3074F PR MOST RECENT SYSTOLIC BLOOD PRESSURE < 130 MM HG: ICD-10-PCS | Mod: CPTII,S$GLB,, | Performed by: HOSPITALIST

## 2020-01-24 PROCEDURE — 3079F DIAST BP 80-89 MM HG: CPT | Mod: CPTII,S$GLB,, | Performed by: HOSPITALIST

## 2020-01-24 PROCEDURE — 3008F PR BODY MASS INDEX (BMI) DOCUMENTED: ICD-10-PCS | Mod: CPTII,S$GLB,, | Performed by: HOSPITALIST

## 2020-01-24 PROCEDURE — 99999 PR PBB SHADOW E&M-EST. PATIENT-LVL III: CPT | Mod: PBBFAC,,, | Performed by: HOSPITALIST

## 2020-01-24 PROCEDURE — 3079F PR MOST RECENT DIASTOLIC BLOOD PRESSURE 80-89 MM HG: ICD-10-PCS | Mod: CPTII,S$GLB,, | Performed by: HOSPITALIST

## 2020-01-24 PROCEDURE — 87502 INFLUENZA DNA AMP PROBE: CPT | Mod: PO

## 2020-01-24 PROCEDURE — 99999 PR PBB SHADOW E&M-EST. PATIENT-LVL III: ICD-10-PCS | Mod: PBBFAC,,, | Performed by: HOSPITALIST

## 2020-01-24 PROCEDURE — 99213 OFFICE O/P EST LOW 20 MIN: CPT | Mod: S$GLB,,, | Performed by: HOSPITALIST

## 2020-01-24 PROCEDURE — 3008F BODY MASS INDEX DOCD: CPT | Mod: CPTII,S$GLB,, | Performed by: HOSPITALIST

## 2020-01-24 PROCEDURE — 3074F SYST BP LT 130 MM HG: CPT | Mod: CPTII,S$GLB,, | Performed by: HOSPITALIST

## 2020-01-24 RX ORDER — OSELTAMIVIR PHOSPHATE 75 MG/1
75 CAPSULE ORAL 2 TIMES DAILY
Qty: 10 CAPSULE | Refills: 0 | Status: SHIPPED | OUTPATIENT
Start: 2020-01-24 | End: 2020-01-29

## 2020-01-24 RX ORDER — AZITHROMYCIN 250 MG/1
TABLET, FILM COATED ORAL
Qty: 6 TABLET | Refills: 0 | Status: SHIPPED | OUTPATIENT
Start: 2020-01-24 | End: 2020-01-29

## 2020-01-24 RX ORDER — BENZONATATE 100 MG/1
100 CAPSULE ORAL 3 TIMES DAILY PRN
Qty: 30 CAPSULE | Refills: 1 | Status: SHIPPED | OUTPATIENT
Start: 2020-01-24 | End: 2020-02-03

## 2020-01-24 RX ORDER — CICLOPIROX 80 MG/ML
SOLUTION TOPICAL NIGHTLY
Qty: 1 BOTTLE | Refills: 0 | Status: ON HOLD | OUTPATIENT
Start: 2020-01-24 | End: 2020-07-19

## 2020-01-24 NOTE — PROGRESS NOTES
"Subjective:     @Patient ID: Irina Dugan is a 49 y.o. female.    Chief Complaint: URI (abdomen pain)    HPI  50 yo F with HTN presents for urgent visit with c/o 5 days of runny nose, congestion. Endorse having chills. Reports having body aches since 2 days ago  Mild sore throat, PND. Endorsing dry cough and sneezing.   Taking sudafed, tylenol cold&sinus  Has had flu shot   Reports burning sensation of epigastric pain  Reports her son has been sick w/ similar symptoms but he is better now  Pt does report having yellowing of great toe nails      Review of Systems   Constitutional: Negative for chills and fever.   HENT: Positive for congestion, postnasal drip, rhinorrhea and sore throat.    Eyes: Negative for pain and visual disturbance.   Respiratory: Positive for cough. Negative for shortness of breath.    Cardiovascular: Negative for chest pain and leg swelling.   Musculoskeletal: Negative for arthralgias and back pain.   Neurological: Negative for dizziness, weakness and headaches.   Psychiatric/Behavioral: Negative for agitation and confusion.     Past medical history, surgical history, and family medical history reviewed and updated as appropriate.    Medications and allergies reviewed.     Objective:     Vitals:    01/24/20 1047   BP: 124/86   Pulse: 85   Resp: 18   Temp: 99.1 °F (37.3 °C)   TempSrc: Oral   Weight: 130.6 kg (287 lb 14.7 oz)   Height: 5' 6" (1.676 m)     Body mass index is 46.47 kg/m².  Physical Exam   Constitutional: She is oriented to person, place, and time. She appears well-developed and well-nourished. No distress.   HENT:   Head: Normocephalic and atraumatic.   Right Ear: External ear normal.   Left Ear: External ear normal.   Mouth/Throat: Oropharynx is clear and moist. No oropharyngeal exudate.   Eyes: Conjunctivae are normal. Right eye exhibits no discharge. Left eye exhibits no discharge.   Neck: Normal range of motion. Neck supple.   Cardiovascular: Normal rate, regular rhythm " and intact distal pulses. Exam reveals no friction rub.   No murmur heard.  Pulmonary/Chest: Effort normal and breath sounds normal.   Musculoskeletal: Normal range of motion.   Lymphadenopathy:     She has no cervical adenopathy.   Neurological: She is alert and oriented to person, place, and time.   Skin: Skin is warm and dry.   Psychiatric: She has a normal mood and affect. Her behavior is normal.   Vitals reviewed.      Lab Results   Component Value Date    WBC 5.50 09/30/2019    HGB 11.6 (L) 09/30/2019    HCT 34.8 (L) 09/30/2019     09/30/2019    CHOL 183 09/30/2019    TRIG 141 09/30/2019    HDL 54 09/30/2019    ALT 27 09/30/2019    AST 17 09/30/2019     09/30/2019    K 4.1 09/30/2019     09/30/2019    CREATININE 0.7 09/30/2019    BUN 19 09/30/2019    CO2 26 09/30/2019    TSH 2.177 09/30/2019    HGBA1C 5.6 09/30/2019       Assessment:     1. Influenza A    2. Acute sinusitis, recurrence not specified, unspecified location    3. Fungal nail infection      Plan:   Irina was seen today for uri.    Diagnoses and all orders for this visit:    Influenza A  - Flu returned positive after office visit. Pt notified at home. Tamiflu sent in to pharmacy. Pt counseled on hydration. Will notify family members to call their pcp for prophylaxis   -     benzonatate (TESSALON) 100 MG capsule; Take 1 capsule (100 mg total) by mouth 3 (three) times daily as needed.  -     Influenza A & B by Molecular    Acute sinusitis, recurrence not specified, unspecified location  - Pt will hold off on zpak while on tamiflu unless sinus sx did not improve.  -     benzonatate (TESSALON) 100 MG capsule; Take 1 capsule (100 mg total) by mouth 3 (three) times daily as needed.  -     azithromycin (Z-HERLINDA) 250 MG tablet; Take 2 tablets by mouth on day 1; Take 1 tablet by mouth on days 2-5    Fungal nail infection  -     ciclopirox (PENLAC) 8 % Soln; Apply topically nightly.      No follow-ups on file.    Hilda Barnett,  MD  Internal Medicine    1/24/2020

## 2020-02-15 ENCOUNTER — OFFICE VISIT (OUTPATIENT)
Dept: INTERNAL MEDICINE | Facility: CLINIC | Age: 50
End: 2020-02-15
Payer: COMMERCIAL

## 2020-02-15 VITALS
DIASTOLIC BLOOD PRESSURE: 84 MMHG | BODY MASS INDEX: 46.67 KG/M2 | WEIGHT: 290.38 LBS | SYSTOLIC BLOOD PRESSURE: 122 MMHG | HEART RATE: 93 BPM | OXYGEN SATURATION: 98 % | HEIGHT: 66 IN

## 2020-02-15 DIAGNOSIS — B96.89 ACUTE BACTERIAL BRONCHITIS: Primary | ICD-10-CM

## 2020-02-15 DIAGNOSIS — I10 ESSENTIAL HYPERTENSION: ICD-10-CM

## 2020-02-15 DIAGNOSIS — E66.01 MORBID OBESITY: ICD-10-CM

## 2020-02-15 DIAGNOSIS — L82.1 SK (SEBORRHEIC KERATOSIS): ICD-10-CM

## 2020-02-15 DIAGNOSIS — J30.9 CHRONIC ALLERGIC RHINITIS: ICD-10-CM

## 2020-02-15 DIAGNOSIS — J20.8 ACUTE BACTERIAL BRONCHITIS: Primary | ICD-10-CM

## 2020-02-15 DIAGNOSIS — G56.03 BILATERAL CARPAL TUNNEL SYNDROME: ICD-10-CM

## 2020-02-15 DIAGNOSIS — E66.01 CLASS 3 SEVERE OBESITY DUE TO EXCESS CALORIES WITH SERIOUS COMORBIDITY AND BODY MASS INDEX (BMI) OF 45.0 TO 49.9 IN ADULT: ICD-10-CM

## 2020-02-15 PROBLEM — E66.813 CLASS 3 SEVERE OBESITY DUE TO EXCESS CALORIES WITH SERIOUS COMORBIDITY AND BODY MASS INDEX (BMI) OF 45.0 TO 49.9 IN ADULT: Status: ACTIVE | Noted: 2020-02-15

## 2020-02-15 PROCEDURE — 3079F DIAST BP 80-89 MM HG: CPT | Mod: CPTII,S$GLB,, | Performed by: FAMILY MEDICINE

## 2020-02-15 PROCEDURE — 3008F BODY MASS INDEX DOCD: CPT | Mod: CPTII,S$GLB,, | Performed by: FAMILY MEDICINE

## 2020-02-15 PROCEDURE — 96372 PR INJECTION,THERAP/PROPH/DIAG2ST, IM OR SUBCUT: ICD-10-PCS | Mod: S$GLB,,, | Performed by: FAMILY MEDICINE

## 2020-02-15 PROCEDURE — 3079F PR MOST RECENT DIASTOLIC BLOOD PRESSURE 80-89 MM HG: ICD-10-PCS | Mod: CPTII,S$GLB,, | Performed by: FAMILY MEDICINE

## 2020-02-15 PROCEDURE — 96372 THER/PROPH/DIAG INJ SC/IM: CPT | Mod: S$GLB,,, | Performed by: FAMILY MEDICINE

## 2020-02-15 PROCEDURE — 3074F SYST BP LT 130 MM HG: CPT | Mod: CPTII,S$GLB,, | Performed by: FAMILY MEDICINE

## 2020-02-15 PROCEDURE — 99213 OFFICE O/P EST LOW 20 MIN: CPT | Mod: 25,S$GLB,, | Performed by: FAMILY MEDICINE

## 2020-02-15 PROCEDURE — 99213 PR OFFICE/OUTPT VISIT, EST, LEVL III, 20-29 MIN: ICD-10-PCS | Mod: 25,S$GLB,, | Performed by: FAMILY MEDICINE

## 2020-02-15 PROCEDURE — 99999 PR PBB SHADOW E&M-EST. PATIENT-LVL III: CPT | Mod: PBBFAC,,, | Performed by: FAMILY MEDICINE

## 2020-02-15 PROCEDURE — 3008F PR BODY MASS INDEX (BMI) DOCUMENTED: ICD-10-PCS | Mod: CPTII,S$GLB,, | Performed by: FAMILY MEDICINE

## 2020-02-15 PROCEDURE — 99999 PR PBB SHADOW E&M-EST. PATIENT-LVL III: ICD-10-PCS | Mod: PBBFAC,,, | Performed by: FAMILY MEDICINE

## 2020-02-15 PROCEDURE — 3074F PR MOST RECENT SYSTOLIC BLOOD PRESSURE < 130 MM HG: ICD-10-PCS | Mod: CPTII,S$GLB,, | Performed by: FAMILY MEDICINE

## 2020-02-15 RX ORDER — ALBUTEROL SULFATE 90 UG/1
2 AEROSOL, METERED RESPIRATORY (INHALATION) EVERY 6 HOURS PRN
Qty: 18 G | Refills: 2 | Status: SHIPPED | OUTPATIENT
Start: 2020-02-15 | End: 2020-02-15 | Stop reason: SDUPTHER

## 2020-02-15 RX ORDER — METHYLPREDNISOLONE 4 MG/1
TABLET ORAL
Qty: 1 PACKAGE | Refills: 0 | Status: SHIPPED | OUTPATIENT
Start: 2020-02-15 | End: 2020-03-07

## 2020-02-15 RX ORDER — CIPROFLOXACIN 500 MG/1
500 TABLET ORAL EVERY 12 HOURS
Qty: 20 TABLET | Refills: 0 | Status: SHIPPED | OUTPATIENT
Start: 2020-02-15 | End: 2020-07-11 | Stop reason: ALTCHOICE

## 2020-02-15 RX ORDER — FLUCONAZOLE 150 MG/1
150 TABLET ORAL DAILY
Qty: 1 TABLET | Refills: 2 | Status: SHIPPED | OUTPATIENT
Start: 2020-02-15 | End: 2020-02-15

## 2020-02-15 RX ORDER — PROMETHAZINE HYDROCHLORIDE AND DEXTROMETHORPHAN HYDROBROMIDE 6.25; 15 MG/5ML; MG/5ML
5 SYRUP ORAL EVERY 4 HOURS PRN
Qty: 240 ML | Refills: 0 | Status: SHIPPED | OUTPATIENT
Start: 2020-02-15 | End: 2020-02-25

## 2020-02-15 RX ORDER — TRIAMCINOLONE ACETONIDE 40 MG/ML
40 INJECTION, SUSPENSION INTRA-ARTICULAR; INTRAMUSCULAR
Status: COMPLETED | OUTPATIENT
Start: 2020-02-15 | End: 2020-02-15

## 2020-02-15 RX ORDER — OLMESARTAN MEDOXOMIL AND HYDROCHLOROTHIAZIDE 20/12.5 20; 12.5 MG/1; MG/1
1 TABLET ORAL DAILY
Qty: 90 TABLET | Refills: 0 | Status: SHIPPED | OUTPATIENT
Start: 2020-02-15 | End: 2020-08-22 | Stop reason: SDUPTHER

## 2020-02-15 RX ORDER — ALBUTEROL SULFATE 90 UG/1
2 AEROSOL, METERED RESPIRATORY (INHALATION) EVERY 6 HOURS PRN
Qty: 18 G | Refills: 2 | OUTPATIENT
Start: 2020-02-15 | End: 2020-07-16

## 2020-02-15 RX ADMIN — TRIAMCINOLONE ACETONIDE 40 MG: 40 INJECTION, SUSPENSION INTRA-ARTICULAR; INTRAMUSCULAR at 11:02

## 2020-02-15 NOTE — PROGRESS NOTES
Subjective:   Patient ID: Irina Dugan is a 49 y.o. female.    Chief Complaint: Cough      Cough   This is a new problem. The current episode started 1 to 4 weeks ago. The problem has been gradually worsening. The cough is productive of sputum and productive of purulent sputum. Associated symptoms include chills, ear congestion, ear pain, a fever, headaches, nasal congestion, postnasal drip, a sore throat, shortness of breath, sweats and wheezing. Pertinent negatives include no chest pain or rhinorrhea. The symptoms are aggravated by dust, exercise, fumes and lying down. She has tried nothing for the symptoms. The treatment provided no relief.       Patient queried and denies any further complaints.    LOCATION  DURATION  SEVERITY  QUALITY  TIMING  CAUSE  ASSOCIATED SYMPTOMS  MODIFIERS.    ALLERGIES AND MEDICATIONS: updated and reviewed.  Review of patient's allergies indicates:  No Known Allergies    Current Outpatient Medications:     ciclopirox (PENLAC) 8 % Soln, Apply topically nightly., Disp: 1 Bottle, Rfl: 0    fish oil-omega-3 fatty acids 300-1,000 mg capsule, Take by mouth once daily., Disp: , Rfl:     furosemide (LASIX) 20 MG tablet, TAKE ONE TABLET BY MOUTH DAILY AS NEEDED (SWELLING), Disp: 90 tablet, Rfl: 1    norgestimate-ethinyl estradiol (SPRINTEC, 28,) 0.25-35 mg-mcg per tablet, Take 1 tablet by mouth once daily., Disp: 28 tablet, Rfl: 2    olmesartan-hydrochlorothiazide (BENICAR HCT) 20-12.5 mg per tablet, Take 1 tablet by mouth once daily., Disp: 90 tablet, Rfl: 0    albuterol (PROVENTIL/VENTOLIN HFA) 90 mcg/actuation inhaler, Inhale 2 puffs into the lungs every 6 (six) hours as needed for Wheezing. CANCEL DIFLUCAN, Disp: 18 g, Rfl: 2    ciprofloxacin HCl (CIPRO) 500 MG tablet, Take 1 tablet (500 mg total) by mouth every 12 (twelve) hours., Disp: 20 tablet, Rfl: 0    methylPREDNISolone (MEDROL DOSEPACK) 4 mg tablet, use as directed, Disp: 1 Package, Rfl: 0     "promethazine-dextromethorphan (PROMETHAZINE-DM) 6.25-15 mg/5 mL Syrp, Take 5 mLs by mouth every 4 (four) hours as needed. Do not take and drive. Do not take while working., Disp: 240 mL, Rfl: 0  No current facility-administered medications for this visit.     Review of Systems   Constitutional: Positive for chills and fever. Negative for fatigue.   HENT: Positive for ear pain, postnasal drip and sore throat. Negative for congestion, rhinorrhea and sinus pressure.    Respiratory: Positive for cough, shortness of breath and wheezing.    Cardiovascular: Negative for chest pain, palpitations and leg swelling.   Gastrointestinal: Negative for abdominal pain, diarrhea, nausea and vomiting.   Genitourinary: Negative for dysuria.   Neurological: Positive for headaches. Negative for dizziness and light-headedness.       Objective:     Vitals:    02/15/20 1056   BP: 122/84   Pulse: 93   SpO2: 98%   Weight: 131.7 kg (290 lb 5.5 oz)   Height: 5' 6" (1.676 m)   PainSc: 0-No pain     Body mass index is 46.86 kg/m².    Physical Exam   Pulmonary/Chest: She has wheezes in the right lower field and the left lower field.   Nursing note and vitals reviewed.      Assessment and Plan:   Irina was seen today for cough.    Diagnoses and all orders for this visit:    Acute bacterial bronchitis    Class 3 severe obesity due to excess calories with serious comorbidity and body mass index (BMI) of 45.0 to 49.9 in adult    Bilateral carpal tunnel syndrome    SK (seborrheic keratosis)    Essential hypertension    Morbid obesity    Chronic allergic rhinitis    Other orders  -     olmesartan-hydrochlorothiazide (BENICAR HCT) 20-12.5 mg per tablet; Take 1 tablet by mouth once daily.  -     promethazine-dextromethorphan (PROMETHAZINE-DM) 6.25-15 mg/5 mL Syrp; Take 5 mLs by mouth every 4 (four) hours as needed. Do not take and drive. Do not take while working.  -     ciprofloxacin HCl (CIPRO) 500 MG tablet; Take 1 tablet (500 mg total) by mouth " every 12 (twelve) hours.  -     triamcinolone acetonide injection 40 mg  -     methylPREDNISolone (MEDROL DOSEPACK) 4 mg tablet; use as directed  -     Discontinue: albuterol (PROVENTIL/VENTOLIN HFA) 90 mcg/actuation inhaler; Inhale 2 puffs into the lungs every 6 (six) hours as needed for Wheezing. Rescue  -     Discontinue: fluconazole (DIFLUCAN) 150 MG Tab; Take 1 tablet (150 mg total) by mouth once daily. As needed for yeast vaginitis for 1 day  -     albuterol (PROVENTIL/VENTOLIN HFA) 90 mcg/actuation inhaler; Inhale 2 puffs into the lungs every 6 (six) hours as needed for Wheezing. CANCEL DIFLUCAN    Hydrate, rest, OTC Mucinex Expectorant as directed, Nasal saline as needed.  OTC Zyrtec as directed.      Follow up in about 2 weeks (around 2/29/2020) for lack of improvement.    THIS NOTE WILL BE SHARED WITH THE PATIENT.

## 2020-07-11 ENCOUNTER — OFFICE VISIT (OUTPATIENT)
Dept: URGENT CARE | Facility: CLINIC | Age: 50
End: 2020-07-11
Payer: MEDICAID

## 2020-07-11 VITALS
BODY MASS INDEX: 46.61 KG/M2 | OXYGEN SATURATION: 97 % | WEIGHT: 290 LBS | DIASTOLIC BLOOD PRESSURE: 99 MMHG | SYSTOLIC BLOOD PRESSURE: 152 MMHG | HEART RATE: 84 BPM | RESPIRATION RATE: 18 BRPM | HEIGHT: 66 IN

## 2020-07-11 DIAGNOSIS — J02.9 SORE THROAT: ICD-10-CM

## 2020-07-11 DIAGNOSIS — Z86.19 HISTORY OF CANDIDAL VULVOVAGINITIS: ICD-10-CM

## 2020-07-11 DIAGNOSIS — M79.10 MUSCLE PAIN: ICD-10-CM

## 2020-07-11 DIAGNOSIS — B96.89 ACUTE BACTERIAL SINUSITIS: Primary | ICD-10-CM

## 2020-07-11 DIAGNOSIS — R05.9 COUGH: ICD-10-CM

## 2020-07-11 DIAGNOSIS — J01.90 ACUTE BACTERIAL SINUSITIS: Primary | ICD-10-CM

## 2020-07-11 DIAGNOSIS — Z11.59 SCREENING FOR VIRAL DISEASE: ICD-10-CM

## 2020-07-11 DIAGNOSIS — B99.9 FEVER DUE TO INFECTION: ICD-10-CM

## 2020-07-11 PROCEDURE — 99214 PR OFFICE/OUTPT VISIT, EST, LEVL IV, 30-39 MIN: ICD-10-PCS | Mod: S$GLB,,, | Performed by: NURSE PRACTITIONER

## 2020-07-11 PROCEDURE — U0003 INFECTIOUS AGENT DETECTION BY NUCLEIC ACID (DNA OR RNA); SEVERE ACUTE RESPIRATORY SYNDROME CORONAVIRUS 2 (SARS-COV-2) (CORONAVIRUS DISEASE [COVID-19]), AMPLIFIED PROBE TECHNIQUE, MAKING USE OF HIGH THROUGHPUT TECHNOLOGIES AS DESCRIBED BY CMS-2020-01-R: HCPCS

## 2020-07-11 PROCEDURE — 99214 OFFICE O/P EST MOD 30 MIN: CPT | Mod: S$GLB,,, | Performed by: NURSE PRACTITIONER

## 2020-07-11 RX ORDER — AMOXICILLIN AND CLAVULANATE POTASSIUM 875; 125 MG/1; MG/1
1 TABLET, FILM COATED ORAL 2 TIMES DAILY
Qty: 20 TABLET | Refills: 0 | Status: ON HOLD | OUTPATIENT
Start: 2020-07-11 | End: 2020-07-19 | Stop reason: ALTCHOICE

## 2020-07-11 RX ORDER — FLUCONAZOLE 150 MG/1
150 TABLET ORAL DAILY
Qty: 1 TABLET | Refills: 1 | Status: SHIPPED | OUTPATIENT
Start: 2020-07-11 | End: 2020-07-12

## 2020-07-11 RX ORDER — IPRATROPIUM BROMIDE 21 UG/1
2 SPRAY, METERED NASAL 2 TIMES DAILY
Qty: 30 ML | Refills: 0 | Status: ON HOLD | OUTPATIENT
Start: 2020-07-11 | End: 2020-07-26 | Stop reason: HOSPADM

## 2020-07-11 NOTE — PATIENT INSTRUCTIONS
Sinusitis (Antibiotic Treatment)    The sinuses are air-filled spaces within the bones of the face. They connect to the inside of the nose. Sinusitis is an inflammation of the tissue lining the sinus cavity. Sinus inflammation can occur during a cold. It can also be due to allergies to pollens and other particles in the air. Sinusitis can cause symptoms of sinus congestion and fullness. A sinus infection causes fever, headache and facial pain. There is often green or yellow drainage from the nose or into the back of the throat (post-nasal drip). You have been given antibiotics to treat this condition.  Home care:  · Take the full course of antibiotics as instructed. Do not stop taking them, even if you feel better.  · Drink plenty of water, hot tea, and other liquids. This may help thin mucus. It also may promote sinus drainage.  · Heat may help soothe painful areas of the face. Use a towel soaked in hot water. Or,  the shower and direct the hot spray onto your face. Using a vaporizer along with a menthol rub at night may also help.   · An expectorant containing guaifenesin may help thin the mucus and promote drainage from the sinuses.  · Over-the-counter decongestants may be used unless a similar medicine was prescribed. Nasal sprays work the fastest. Use one that contains phenylephrine or oxymetazoline. First blow the nose gently. Then use the spray. Do not use these medicines more often than directed on the label or symptoms may get worse. You may also use tablets containing pseudoephedrine. Avoid products that combine ingredients, because side effects may be increased. Read labels. You can also ask the pharmacist for help. (NOTE: Persons with high blood pressure should not use decongestants. They can raise blood pressure.)  · Over-the-counter antihistamines may help if allergies contributed to your sinusitis.    · Do not use nasal rinses or irrigation during an acute sinus infection, unless told to by  your health care provider. Rinsing may spread the infection to other sinuses.  · Use acetaminophen or ibuprofen to control pain, unless another pain medicine was prescribed. (If you have chronic liver or kidney disease or ever had a stomach ulcer, talk with your doctor before using these medicines. Aspirin should never be used in anyone under 18 years of age who is ill with a fever. It may cause severe liver damage.)  · Don't smoke. This can worsen symptoms.  Follow-up care  Follow up with your healthcare provider or our staff if you are not improving within the next week.  When to seek medical advice  Call your healthcare provider if any of these occur:  · Facial pain or headache becoming more severe  · Stiff neck  · Unusual drowsiness or confusion  · Swelling of the forehead or eyelids  · Vision problems, including blurred or double vision  · Fever of 100.4ºF (38ºC) or higher, or as directed by your healthcare provider  · Seizure  · Breathing problems  · Symptoms not resolving within 10 days  Date Last Reviewed: 4/13/2015 © 2000-2017 [employer]. [address] [place] [address]. All rights reserved. This information is not intended as a substitute for professional medical care. Always follow your healthcare professional's instructions.    Instructions for Patients with Confirmed or Suspected COVID-19    If you are awaiting your test result, you will either be called or it will be released to the patient portal.  If you have any questions about your test, please visit [email or web address] or call our COVID-19 information line at [phone].      Preventing the Spread of Coronavirus Disease 2019 (COVID-19) in Homes and Residential Communities -- Patients     Prevention steps for people with confirmed or suspected COVID-19 (including persons under investigation) who do not need to be hospitalized and people with confirmed COVID-19 who were hospitalized and determined to be  medically stable to go home.      Stay home except to get medical care.    Separate yourself from other people and animals in your home.    Call ahead before visiting your doctor.    Wear a face mask.    Cover your coughs and sneezes.    Clean your hands often.    Avoid sharing personal household items.    Clean all high-touch surfaces every day.    Monitor your symptoms. Seek prompt medical attention if your illness is worsening (e.g., difficulty breathing). Before seeking care, call your healthcare provider.    If you have a medical emergency and must call 911, notify the dispatcher that you have or are being evaluated for COVID-19. If possible, put on a face mask before emergency medical services arrive.    Use the following symptom-based strategy to return to normal activity following a suspected or confirmed case of COVID-19. Continue isolation until:   o At least 3 days (72 hours) have passed since recovery defined as resolution of fever without the use of fever-reducing medications and improvement in respiratory symptoms (e.g. cough, shortness of breath), and   o At least 10 days have passed since symptoms first appeared.     Precautions for household members, intimate partners and caregivers in a non-healthcare setting of a patient with symptomatic laboratory-confirmed COVID-19 or a patient under investigation.     Household members, intimate partners and caregivers in a non-healthcare setting may have close contact with a person with symptomatic, laboratory-confirmed COVID-19 or a person under investigation. Close contacts should monitor their health; they should call their healthcare provider right away if they develop symptoms suggestive of COVID-19 (e.g., fever, cough, shortness of breath). Close contacts should also follow these recommendations:     · Stay home for the duration of the time recommended by healthcare provider, except to get medical care. Separate yourself from other people  and animals in the home.  · Monitor the patients symptoms. If the patient is getting sicker, call his or her healthcare provider. If the patient has a medical emergency and you need to call 911, notify the dispatch personnel that the patient has or is being evaluated for COVID-19.   · Wear a facemask when around other people such as sharing a room or vehicle and before entering a healthcare provider's office.  · Cover coughs and sneezes with a tissue. Throw used tissues in a lined trash can immediately and wash hands.  · Clean hands often with soap and water for at least 20 seconds or with an alcohol-based hand , rubbing hands together until they feel dry. Avoid touching your eyes, nose, and mouth with unwashed hands.  · Clean all high-touch; surfaces every day, including counters, tabletops, doorknobs, bathroom fixtures, toilets, phones, keyboards, tablets, bedside tables, etc. Use a household cleaning spray or wipe according to label instructions.  · Avoid sharing personal household items such as dishes, drinking glasses, cups, towels, bedding, etc. After these items are used, they should be washed thoroughly with soap and water.  · Use the following symptom-based strategy to return to normal activity following a suspected or confirmed case of COVID-19. Continue isolation until:   · At least 3 days (72 hours) have passed since recovery defined as resolution of fever without the use of fever-reducing medications and improvement in respiratory symptoms (e.g. cough, shortness of breath), and   · At least 10 days have passed since symptoms first appeared.

## 2020-07-11 NOTE — PROGRESS NOTES
"Subjective:       Patient ID: Irina Dugan is a 49 y.o. female.    Vitals:  height is 5' 6" (1.676 m) and weight is 131.5 kg (290 lb). Her blood pressure is 152/99 (abnormal) and her pulse is 84. Her respiration is 18 and oxygen saturation is 97%.     Chief Complaint: Sinus Problem and Cough    This is a 49 y.o. female who presents today with a chief complaint of sinus, cough and fever that started 2 days ago. Patient wants to be tested for Covid-19.    Provider note begins below:    Patient with 2 days of severe sinus congestion, PND, scratchy sore throat, upset stomach, full feeling to ears and frontal headache. Also states intermittent fever and chills. No known sick contacts. No diarrhea. No anosmia or ageusia. No known sick contacts.    Pt is tearful on exam. States she has an elderly parent she cares for at home and  is diabetic and she is concerned regarding COVID19. We discussed isolation within home and other measures to mitigate possibility of infection. Pt acknolwedged understanding and agrees to isolation measures.      Sinus Problem  This is a new problem. The current episode started in the past 7 days. The problem has been gradually worsening since onset. The maximum temperature recorded prior to her arrival was 101 - 101.9 F. Her pain is at a severity of 0/10. She is experiencing no pain. Associated symptoms include chills, congestion, coughing, diaphoresis and a sore throat. Pertinent negatives include no ear pain, shortness of breath or sinus pressure. Past treatments include oral decongestants. The treatment provided mild relief.   Cough  This is a new problem. The current episode started in the past 7 days. The problem has been gradually worsening. The problem occurs every few minutes. The cough is non-productive. Associated symptoms include chills, a fever, myalgias, postnasal drip and a sore throat. Pertinent negatives include no ear pain, eye redness, hemoptysis, rash, shortness " of breath or wheezing. Nothing aggravates the symptoms. She has tried OTC cough suppressant for the symptoms. The treatment provided mild relief. Her past medical history is significant for bronchitis. There is no history of asthma or pneumonia.       Constitution: Positive for chills, sweating, fatigue and fever.   HENT: Positive for congestion, postnasal drip and sore throat. Negative for ear pain, sinus pain, sinus pressure and voice change.    Neck: Negative for painful lymph nodes.   Eyes: Negative for eye redness.   Respiratory: Positive for cough. Negative for chest tightness, sputum production, bloody sputum, COPD, shortness of breath, stridor, wheezing and asthma.    Gastrointestinal: Negative for nausea and vomiting.   Musculoskeletal: Positive for muscle ache.   Skin: Negative for rash.   Allergic/Immunologic: Negative for seasonal allergies and asthma.   Hematologic/Lymphatic: Negative for swollen lymph nodes.       Objective:      Physical Exam   Constitutional: She is oriented to person, place, and time. She appears well-developed. She is cooperative.  Non-toxic appearance. She does not appear ill. No distress.   HENT:   Head: Normocephalic and atraumatic.   Right Ear: Hearing, external ear and ear canal normal. A middle ear effusion is present.   Left Ear: Hearing, external ear and ear canal normal. A middle ear effusion is present.   Nose: Mucosal edema and rhinorrhea present. No nasal deformity. No epistaxis. Right sinus exhibits maxillary sinus tenderness. Right sinus exhibits no frontal sinus tenderness. Left sinus exhibits maxillary sinus tenderness. Left sinus exhibits no frontal sinus tenderness.   Mouth/Throat: Uvula is midline, oropharynx is clear and moist and mucous membranes are normal. No trismus in the jaw. Normal dentition. No uvula swelling. Cobblestoning present. No oropharyngeal exudate, posterior oropharyngeal edema or posterior oropharyngeal erythema.   Bilateral clear fluid  effusion      Comments: Bilateral clear fluid effusion  Eyes: Conjunctivae and lids are normal. No scleral icterus.   Neck: Trachea normal, full passive range of motion without pain and phonation normal. Neck supple. No neck rigidity. No edema and no erythema present.   Cardiovascular: Normal rate, regular rhythm, normal heart sounds and normal pulses.   Pulmonary/Chest: Effort normal and breath sounds normal. No stridor. No respiratory distress. She has no decreased breath sounds. She has no wheezes. She has no rhonchi. She has no rales.   Abdominal: Normal appearance.   Musculoskeletal: Normal range of motion.         General: No deformity.   Lymphadenopathy:     She has no cervical adenopathy.   Neurological: She is alert and oriented to person, place, and time. She exhibits normal muscle tone. Coordination normal.   Skin: Skin is warm, dry, intact, not diaphoretic and not pale.   Psychiatric: Her speech is normal and behavior is normal. Judgment and thought content normal. Her mood appears anxious.   Nursing note and vitals reviewed.        Assessment:       1. Acute bacterial sinusitis    2. Screening for viral disease    3. Cough    4. Muscle pain    5. Sore throat    6. Fever    7. History of candidal vulvovaginitis        Plan:         Acute bacterial sinusitis  -     amoxicillin-clavulanate 875-125mg (AUGMENTIN) 875-125 mg per tablet; Take 1 tablet by mouth 2 (two) times daily. for 10 days  Dispense: 20 tablet; Refill: 0  -     ipratropium (ATROVENT) 0.03 % nasal spray; 2 sprays by Nasal route 2 (two) times daily. for 7 days  Dispense: 30 mL; Refill: 0    Screening for viral disease    Cough  -     COVID-19 Routine Screening    Muscle pain  -     COVID-19 Routine Screening    Sore throat  -     COVID-19 Routine Screening    Fever  -     COVID-19 Routine Screening    History of candidal vulvovaginitis  -     fluconazole (DIFLUCAN) 150 MG Tab; Take 1 tablet (150 mg total) by mouth once daily. for 1 day   Dispense: 1 tablet; Refill: 1      Patient Instructions     Sinusitis (Antibiotic Treatment)    The sinuses are air-filled spaces within the bones of the face. They connect to the inside of the nose. Sinusitis is an inflammation of the tissue lining the sinus cavity. Sinus inflammation can occur during a cold. It can also be due to allergies to pollens and other particles in the air. Sinusitis can cause symptoms of sinus congestion and fullness. A sinus infection causes fever, headache and facial pain. There is often green or yellow drainage from the nose or into the back of the throat (post-nasal drip). You have been given antibiotics to treat this condition.  Home care:  · Take the full course of antibiotics as instructed. Do not stop taking them, even if you feel better.  · Drink plenty of water, hot tea, and other liquids. This may help thin mucus. It also may promote sinus drainage.  · Heat may help soothe painful areas of the face. Use a towel soaked in hot water. Or,  the shower and direct the hot spray onto your face. Using a vaporizer along with a menthol rub at night may also help.   · An expectorant containing guaifenesin may help thin the mucus and promote drainage from the sinuses.  · Over-the-counter decongestants may be used unless a similar medicine was prescribed. Nasal sprays work the fastest. Use one that contains phenylephrine or oxymetazoline. First blow the nose gently. Then use the spray. Do not use these medicines more often than directed on the label or symptoms may get worse. You may also use tablets containing pseudoephedrine. Avoid products that combine ingredients, because side effects may be increased. Read labels. You can also ask the pharmacist for help. (NOTE: Persons with high blood pressure should not use decongestants. They can raise blood pressure.)  · Over-the-counter antihistamines may help if allergies contributed to your sinusitis.    · Do not use nasal rinses or  irrigation during an acute sinus infection, unless told to by your health care provider. Rinsing may spread the infection to other sinuses.  · Use acetaminophen or ibuprofen to control pain, unless another pain medicine was prescribed. (If you have chronic liver or kidney disease or ever had a stomach ulcer, talk with your doctor before using these medicines. Aspirin should never be used in anyone under 18 years of age who is ill with a fever. It may cause severe liver damage.)  · Don't smoke. This can worsen symptoms.  Follow-up care  Follow up with your healthcare provider or our staff if you are not improving within the next week.  When to seek medical advice  Call your healthcare provider if any of these occur:  · Facial pain or headache becoming more severe  · Stiff neck  · Unusual drowsiness or confusion  · Swelling of the forehead or eyelids  · Vision problems, including blurred or double vision  · Fever of 100.4ºF (38ºC) or higher, or as directed by your healthcare provider  · Seizure  · Breathing problems  · Symptoms not resolving within 10 days  Date Last Reviewed: 4/13/2015  © 1113-2877 [employer]. [address] [place] [address]. All rights reserved. This information is not intended as a substitute for professional medical care. Always follow your healthcare professional's instructions.    Instructions for Patients with Confirmed or Suspected COVID-19    If you are awaiting your test result, you will either be called or it will be released to the patient portal.  If you have any questions about your test, please visit [email or web address] or call our COVID-19 information line at [phone].      Preventing the Spread of Coronavirus Disease 2019 (COVID-19) in Homes and Residential Communities -- Patients     Prevention steps for people with confirmed or suspected COVID-19 (including persons under investigation) who do not need to be hospitalized and people with  confirmed COVID-19 who were hospitalized and determined to be medically stable to go home.      Stay home except to get medical care.    Separate yourself from other people and animals in your home.    Call ahead before visiting your doctor.    Wear a face mask.    Cover your coughs and sneezes.    Clean your hands often.    Avoid sharing personal household items.    Clean all high-touch surfaces every day.    Monitor your symptoms. Seek prompt medical attention if your illness is worsening (e.g., difficulty breathing). Before seeking care, call your healthcare provider.    If you have a medical emergency and must call 911, notify the dispatcher that you have or are being evaluated for COVID-19. If possible, put on a face mask before emergency medical services arrive.    Use the following symptom-based strategy to return to normal activity following a suspected or confirmed case of COVID-19. Continue isolation until:   o At least 3 days (72 hours) have passed since recovery defined as resolution of fever without the use of fever-reducing medications and improvement in respiratory symptoms (e.g. cough, shortness of breath), and   o At least 10 days have passed since symptoms first appeared.     Precautions for household members, intimate partners and caregivers in a non-healthcare setting of a patient with symptomatic laboratory-confirmed COVID-19 or a patient under investigation.     Household members, intimate partners and caregivers in a non-healthcare setting may have close contact with a person with symptomatic, laboratory-confirmed COVID-19 or a person under investigation. Close contacts should monitor their health; they should call their healthcare provider right away if they develop symptoms suggestive of COVID-19 (e.g., fever, cough, shortness of breath). Close contacts should also follow these recommendations:     · Stay home for the duration of the time recommended by healthcare provider,  except to get medical care. Separate yourself from other people and animals in the home.  · Monitor the patients symptoms. If the patient is getting sicker, call his or her healthcare provider. If the patient has a medical emergency and you need to call 911, notify the dispatch personnel that the patient has or is being evaluated for COVID-19.   · Wear a facemask when around other people such as sharing a room or vehicle and before entering a healthcare provider's office.  · Cover coughs and sneezes with a tissue. Throw used tissues in a lined trash can immediately and wash hands.  · Clean hands often with soap and water for at least 20 seconds or with an alcohol-based hand , rubbing hands together until they feel dry. Avoid touching your eyes, nose, and mouth with unwashed hands.  · Clean all high-touch; surfaces every day, including counters, tabletops, doorknobs, bathroom fixtures, toilets, phones, keyboards, tablets, bedside tables, etc. Use a household cleaning spray or wipe according to label instructions.  · Avoid sharing personal household items such as dishes, drinking glasses, cups, towels, bedding, etc. After these items are used, they should be washed thoroughly with soap and water.  · Use the following symptom-based strategy to return to normal activity following a suspected or confirmed case of COVID-19. Continue isolation until:   · At least 3 days (72 hours) have passed since recovery defined as resolution of fever without the use of fever-reducing medications and improvement in respiratory symptoms (e.g. cough, shortness of breath), and   · At least 10 days have passed since symptoms first appeared.

## 2020-07-14 ENCOUNTER — NURSE TRIAGE (OUTPATIENT)
Dept: ADMINISTRATIVE | Facility: CLINIC | Age: 50
End: 2020-07-14

## 2020-07-14 NOTE — TELEPHONE ENCOUNTER
Went to [facility] over weekend, diagnosed with sinusitis, prescribed Augmentin. Pt reports symptoms have persisted and evolved, now having loss of taste, fatigue, temp of 101.4 body aches, and concerned regarding Elderly mother that has COPD is now having symptoms and she is concerned about her. Pt denies SOB or chest pain. Waiting on COVID results. Agreed to [facility]    Mother (Rere Ca).     Reason for Disposition   [1] COVID-19 infection suspected by caller or triager AND [2] mild symptoms (cough, fever, or others) AND [3] no complications or SOB    Additional Information   Negative: SEVERE difficulty breathing (e.g., struggling for each breath, speaks in single words)   Negative: Difficult to awaken or acting confused (e.g., disoriented, slurred speech)   Negative: Bluish (or gray) lips or face now   Negative: Shock suspected (e.g., cold/pale/clammy skin, too weak to stand, low BP, rapid pulse)   Negative: Sounds like a life-threatening emergency to the triager   Negative: [1] COVID-19 exposure AND [2] NO symptoms   Negative: COVID-19 and Breastfeeding, questions about   Negative: [1] Adult with possible COVID-19 symptoms AND [2] triager concerned about severity of symptoms or other causes   Negative: SEVERE or constant chest pain or pressure (Exception: mild central chest pain, present only when coughing)   Negative: MODERATE difficulty breathing (e.g., speaks in phrases, SOB even at rest, pulse 100-120)   Negative: MILD difficulty breathing (e.g., minimal/no SOB at rest, SOB with walking, pulse <100)   Negative: Chest pain   Negative: Patient sounds very sick or weak to the triager   Negative: Fever > 103 F (39.4 C)   Negative: [1] Fever > 101 F (38.3 C) AND [2] age > 60   Negative: [1] Fever > 100.0 F (37.8 C) AND [2] bedridden (e.g., nursing home patient, CVA, chronic illness, recovering from surgery)   Negative: HIGH RISK patient (e.g., age > 64 years, diabetes, heart or lung disease, weak  immune system)    Protocols used: CORONAVIRUS (COVID-19) DIAGNOSED OR [id number]

## 2020-07-15 ENCOUNTER — TELEPHONE (OUTPATIENT)
Dept: URGENT CARE | Facility: CLINIC | Age: 50
End: 2020-07-15

## 2020-07-15 DIAGNOSIS — U07.1 COVID-19 VIRUS DETECTED: ICD-10-CM

## 2020-07-15 LAB — SARS-COV-2 RNA RESP QL NAA+PROBE: DETECTED

## 2020-07-15 NOTE — TELEPHONE ENCOUNTER
Discussed positive COVID 19 test.  Discussed ER precautions.  Answered all questions.        Your test was POSITIVE for COVID-19 (coronavirus).       Prevention steps for patients with confirmed COVID-19       Stay home and stay away from family members and friends. The CDC says, you can leave home after these three things have happened: 1) You have had no fever for at least 72 hours (that is three full days of no fever without the use of medicine that reduces fevers) 2) AND other symptoms have improved (for example, when your cough or shortness of breath have improved) 3) AND at least 10 days have passed since your first positive test.   Separate yourself from other people and animals in your home.   Call ahead before visiting your doctor.   Wear a facemask.   Cover your coughs and sneezes.   Wash your hands often with soap and water; hand  can be used, too.   Avoid sharing personal household items.   Wipe down surfaces used daily.   Monitor your symptoms. Seek prompt medical attention if your illness is worsening (e.g., difficulty breathing).    Before seeking care, call your healthcare provider.   If you have a medical emergency and need to call 911, notify the dispatch personnel that you have, or are being evaluated for COVID-19. If possible, put on a facemask before emergency medical services arrive.        Recommended precautions for household members, intimate partners, and caregivers in a home setting of a patient with symptomatic laboratory-confirmed COVID-19 or a patient under investigation.  Household members, intimate partners, and caregivers in the home setting awaiting tests results have close contact with a person with symptomatic, laboratory-confirmed COVID-19 or a person under investigation. Close contacts should monitor their health; they should call their provider right away if they develop symptoms suggestive of COVID-19 (e.g., fever, cough, shortness of breath).    Close  contacts should also follow these recommendations:   Make sure that you understand and can help the patient follow their provider's instructions for medication(s) and care. You should help the patient with basic needs in the home and provide support for getting groceries, prescriptions, and other personal needs.   Monitor the patient's symptoms. If the patient is getting sicker, call his or her healthcare provider and tell them that the patient has laboratory-confirmed COVID-19. If the patient has a medical emergency and you need to call 911, notify the dispatch personnel that the patient has, or is being evaluated for COVID-19.   Household members should stay in another room or be  from the patient. Household members should use a separate bedroom and bathroom, if available.   Prohibit visitors.   Household members should care for any pets in the home.   Make sure that shared spaces in the home have good air flow, such as by an air conditioner or an opened window, weather permitting.   Perform hand hygiene frequently. Wash your hands often with soap and water for at least 20 seconds or use an alcohol-based hand  (that contains > 60% alcohol) covering all surfaces of your hands and rubbing them together until they feel dry. Soap and water should be used preferentially.   Avoid touching your eyes, nose, and mouth.   The patient should wear a facemask. If the patient is not able to wear a facemask (for example, because it causes trouble breathing), caregivers should wear a mask when they are in the same room as the patient.   Wear a disposable facemask and gloves when you touch or have contact with the patient's blood, stool, or body fluids, such as saliva, sputum, nasal mucus, vomit, urine.  o Throw out disposable facemasks and gloves after using them. Do not reuse.  o When removing personal protective equipment, first remove and dispose of gloves. Then, immediately clean your hands with  soap and water or alcohol-based hand . Next, remove and dispose of facemask, and immediately clean your hands again with soap and water or alcohol-based hand .   You should not share dishes, drinking glasses, cups, eating utensils, towels, bedding, or other items with the patient. After the patient uses these items, you should wash them thoroughly (see below Wash laundry thoroughly).   Clean all high-touch surfaces, such as counters, tabletops, doorknobs, bathroom fixtures, toilets, phones, keyboards, tablets, and bedside tables, every day. Also, clean any surfaces that may have blood, stool, or body fluids on them.   Use a household cleaning spray or wipe, according to the label instructions. Labels contain instructions for safe and effective use of the cleaning product including precautions you should take when applying the product, such as wearing gloves and making sure you have good ventilation during use of the product.   Wash laundry thoroughly.  o Immediately remove and wash clothes or bedding that have blood, stool, or body fluids on them.  o Wear disposable gloves while handling soiled items and keep soiled items away from your body. Clean your hands (with soap and water or an alcohol-based hand ) immediately after removing your gloves.  o Read and follow directions on labels of laundry or clothing items and detergent. In general, using a normal laundry detergent according to washing machine instructions and dry thoroughly using the warmest temperatures recommended on the clothing label.   Place all used disposable gloves, facemasks, and other contaminated items in a lined container before disposing of them with other household waste. Clean your hands (with soap and water or an alcohol-based hand ) immediately after handling these items. Soap and water should be used preferentially if hands are visibly dirty.   Discuss any additional questions with your state or  local health department or healthcare provider. Check available hours when contacting your local health department.    For more information see CDC link below.      https://www.cdc.gov/coronavirus/2019-ncov/hcp/guidance-prevent-spread.html#precautions        Sources:  [facility], [place] [facility]     Sincerely,     Avi Jalloh PA-C

## 2020-07-16 ENCOUNTER — NURSE TRIAGE (OUTPATIENT)
Dept: ADMINISTRATIVE | Facility: CLINIC | Age: 50
End: 2020-07-16

## 2020-07-16 ENCOUNTER — HOSPITAL ENCOUNTER (EMERGENCY)
Facility: HOSPITAL | Age: 50
Discharge: HOME | End: 2020-07-16
Attending: EMERGENCY MEDICINE
Payer: MEDICAID

## 2020-07-16 VITALS
SYSTOLIC BLOOD PRESSURE: 115 MMHG | HEART RATE: 80 BPM | RESPIRATION RATE: 18 BRPM | BODY MASS INDEX: 46.61 KG/M2 | OXYGEN SATURATION: 97 % | WEIGHT: 290 LBS | TEMPERATURE: 99 F | HEIGHT: 66 IN | DIASTOLIC BLOOD PRESSURE: 73 MMHG

## 2020-07-16 DIAGNOSIS — R11.2 NAUSEA AND VOMITING, INTRACTABILITY OF VOMITING NOT SPECIFIED, UNSPECIFIED VOMITING TYPE: Primary | ICD-10-CM

## 2020-07-16 DIAGNOSIS — U07.1 PNEUMONIA DUE TO COVID-19 VIRUS: ICD-10-CM

## 2020-07-16 DIAGNOSIS — J12.82 PNEUMONIA DUE TO COVID-19 VIRUS: ICD-10-CM

## 2020-07-16 DIAGNOSIS — U07.1 COVID-19 VIRUS INFECTION: ICD-10-CM

## 2020-07-16 LAB
ALBUMIN SERPL BCP-MCNC: 3.9 G/DL (ref 3.5–5.2)
ALP SERPL-CCNC: 73 U/L (ref 55–135)
ALT SERPL W/O P-5'-P-CCNC: 65 U/L (ref 10–44)
ANION GAP SERPL CALC-SCNC: 9 MMOL/L (ref 8–16)
AST SERPL-CCNC: 43 U/L (ref 10–40)
B-HCG UR QL: NEGATIVE
BASOPHILS # BLD AUTO: 0.01 K/UL (ref 0–0.2)
BASOPHILS NFR BLD: 0.3 % (ref 0–1.9)
BILIRUB SERPL-MCNC: 0.4 MG/DL (ref 0.1–1)
BILIRUB UR QL STRIP: NEGATIVE
BUN SERPL-MCNC: 10 MG/DL (ref 6–20)
CALCIUM SERPL-MCNC: 8.9 MG/DL (ref 8.7–10.5)
CHLORIDE SERPL-SCNC: 98 MMOL/L (ref 95–110)
CLARITY UR: CLEAR
CO2 SERPL-SCNC: 26 MMOL/L (ref 23–29)
COLOR UR: ABNORMAL
CREAT SERPL-MCNC: 0.8 MG/DL (ref 0.5–1.4)
CTP QC/QA: YES
DIFFERENTIAL METHOD: ABNORMAL
EOSINOPHIL # BLD AUTO: 0 K/UL (ref 0–0.5)
EOSINOPHIL NFR BLD: 0 % (ref 0–8)
ERYTHROCYTE [DISTWIDTH] IN BLOOD BY AUTOMATED COUNT: 13.6 % (ref 11.5–14.5)
EST. GFR  (AFRICAN AMERICAN): >60 ML/MIN/1.73 M^2
EST. GFR  (NON AFRICAN AMERICAN): >60 ML/MIN/1.73 M^2
GLUCOSE SERPL-MCNC: 117 MG/DL (ref 70–110)
GLUCOSE UR QL STRIP: NEGATIVE
HCT VFR BLD AUTO: 38.8 % (ref 37–48.5)
HGB BLD-MCNC: 13 G/DL (ref 12–16)
HGB UR QL STRIP: ABNORMAL
IMM GRANULOCYTES # BLD AUTO: 0.01 K/UL (ref 0–0.04)
IMM GRANULOCYTES NFR BLD AUTO: 0.3 % (ref 0–0.5)
KETONES UR QL STRIP: NEGATIVE
LEUKOCYTE ESTERASE UR QL STRIP: NEGATIVE
LIPASE SERPL-CCNC: 33 U/L (ref 4–60)
LYMPHOCYTES # BLD AUTO: 0.7 K/UL (ref 1–4.8)
LYMPHOCYTES NFR BLD: 21.8 % (ref 18–48)
MCH RBC QN AUTO: 28.1 PG (ref 27–31)
MCHC RBC AUTO-ENTMCNC: 33.5 G/DL (ref 32–36)
MCV RBC AUTO: 84 FL (ref 82–98)
MONOCYTES # BLD AUTO: 0.3 K/UL (ref 0.3–1)
MONOCYTES NFR BLD: 9.4 % (ref 4–15)
NEUTROPHILS # BLD AUTO: 2.1 K/UL (ref 1.8–7.7)
NEUTROPHILS NFR BLD: 68.2 % (ref 38–73)
NITRITE UR QL STRIP: NEGATIVE
NRBC BLD-RTO: 0 /100 WBC
PH UR STRIP: 6 [PH] (ref 5–8)
PLATELET # BLD AUTO: 110 K/UL (ref 150–350)
PLATELET BLD QL SMEAR: ABNORMAL
PMV BLD AUTO: 12 FL (ref 9.2–12.9)
POTASSIUM SERPL-SCNC: 4.1 MMOL/L (ref 3.5–5.1)
PROT SERPL-MCNC: 7.9 G/DL (ref 6–8.4)
PROT UR QL STRIP: NEGATIVE
RBC # BLD AUTO: 4.62 M/UL (ref 4–5.4)
SODIUM SERPL-SCNC: 133 MMOL/L (ref 136–145)
SP GR UR STRIP: 1.02 (ref 1–1.03)
URN SPEC COLLECT METH UR: ABNORMAL
UROBILINOGEN UR STRIP-ACNC: NEGATIVE EU/DL
WBC # BLD AUTO: 3.07 K/UL (ref 3.9–12.7)

## 2020-07-16 PROCEDURE — 25000003 PHARM REV CODE 250: Performed by: PHYSICIAN ASSISTANT

## 2020-07-16 PROCEDURE — 99284 EMERGENCY DEPT VISIT MOD MDM: CPT | Mod: 25

## 2020-07-16 PROCEDURE — 81025 URINE PREGNANCY TEST: CPT | Performed by: PHYSICIAN ASSISTANT

## 2020-07-16 PROCEDURE — 96361 HYDRATE IV INFUSION ADD-ON: CPT

## 2020-07-16 PROCEDURE — 85025 COMPLETE CBC W/AUTO DIFF WBC: CPT

## 2020-07-16 PROCEDURE — 63600175 PHARM REV CODE 636 W HCPCS: Performed by: PHYSICIAN ASSISTANT

## 2020-07-16 PROCEDURE — 81003 URINALYSIS AUTO W/O SCOPE: CPT

## 2020-07-16 PROCEDURE — 80053 COMPREHEN METABOLIC PANEL: CPT

## 2020-07-16 PROCEDURE — 96374 THER/PROPH/DIAG INJ IV PUSH: CPT

## 2020-07-16 PROCEDURE — 83690 ASSAY OF LIPASE: CPT

## 2020-07-16 RX ORDER — ACETAMINOPHEN 325 MG/1
650 TABLET ORAL
Status: COMPLETED | OUTPATIENT
Start: 2020-07-16 | End: 2020-07-16

## 2020-07-16 RX ORDER — ONDANSETRON 4 MG/1
4 TABLET, ORALLY DISINTEGRATING ORAL EVERY 6 HOURS PRN
Qty: 30 TABLET | Refills: 0 | Status: SHIPPED | OUTPATIENT
Start: 2020-07-16 | End: 2021-05-31

## 2020-07-16 RX ORDER — IBUPROFEN 600 MG/1
600 TABLET ORAL EVERY 6 HOURS PRN
Qty: 20 TABLET | Refills: 0 | Status: SHIPPED | OUTPATIENT
Start: 2020-07-16 | End: 2023-12-06

## 2020-07-16 RX ORDER — ALBUTEROL SULFATE 90 UG/1
1-2 AEROSOL, METERED RESPIRATORY (INHALATION) EVERY 6 HOURS PRN
Qty: 8 G | Refills: 0 | Status: SHIPPED | OUTPATIENT
Start: 2020-07-16 | End: 2021-05-31

## 2020-07-16 RX ORDER — AZITHROMYCIN 250 MG/1
TABLET, FILM COATED ORAL
Qty: 6 TABLET | Refills: 0 | Status: ON HOLD | OUTPATIENT
Start: 2020-07-16 | End: 2020-07-26 | Stop reason: HOSPADM

## 2020-07-16 RX ORDER — IBUPROFEN 600 MG/1
600 TABLET ORAL
Status: COMPLETED | OUTPATIENT
Start: 2020-07-16 | End: 2020-07-16

## 2020-07-16 RX ORDER — ONDANSETRON 2 MG/ML
4 INJECTION INTRAMUSCULAR; INTRAVENOUS
Status: COMPLETED | OUTPATIENT
Start: 2020-07-16 | End: 2020-07-16

## 2020-07-16 RX ADMIN — ACETAMINOPHEN 650 MG: 325 TABLET ORAL at 03:07

## 2020-07-16 RX ADMIN — LIDOCAINE HYDROCHLORIDE: 20 SOLUTION ORAL; TOPICAL at 05:07

## 2020-07-16 RX ADMIN — ONDANSETRON 4 MG: 2 INJECTION INTRAMUSCULAR; INTRAVENOUS at 03:07

## 2020-07-16 RX ADMIN — SODIUM CHLORIDE 500 ML: 0.9 INJECTION, SOLUTION INTRAVENOUS at 03:07

## 2020-07-16 RX ADMIN — IBUPROFEN 600 MG: 600 TABLET, FILM COATED ORAL at 05:07

## 2020-07-16 NOTE — TELEPHONE ENCOUNTER
Called pt back. Informed report was given to DENISE Trinidad @ [facility]. Verbalized understanding. Reminded pt if condition changed/ worsened and she no longer felt safe to ride POV to call 911 for ambulance assistance. Verbalized understanding

## 2020-07-16 NOTE — ED PROVIDER NOTES
Encounter Date: 2020       History     Chief Complaint   Patient presents with    COVID-19 Concerns     Tested Positive for COVID yesterday.     Emesis     Vomiting since yesterday morning and through the night- bile emesis.     Fever     Pt running 101.2 fever x 2 days     Irina Dugan, a 49 y.o. female  has a past medical history of Colitis, nonspecific (2012), Hypertension (10/12/2012), Ovarian cyst, and Tubal ectopic pregnancy.     She presents to the ED evaluation nausea and vomiting that started yesterday.  Patient states that she tested positive for COVID yesterday.  She has been running a fever for the 2 days.  She has been alternating Tylenol Motrin with her last dose taken yesterday.  Denies any alan abdominal pain.  No diarrhea.  No blood or stool      The history is provided by the patient.     Review of patient's allergies indicates:  No Known Allergies  Past Medical History:   Diagnosis Date    Colitis, nonspecific 2012    Hypertension 10/12/2012    Ovarian cyst     Tubal ectopic pregnancy      Past Surgical History:   Procedure Laterality Date     SECTION, CLASSIC      CHOLECYSTECTOMY      ECTOPIC PREGNANCY SURGERY  age 25    TUBAL LIGATION       Family History   Problem Relation Age of Onset    Hypertension Unknown     Cancer Unknown     Ovarian cysts Unknown     Breast cancer Mother     Colon cancer Neg Hx     Ovarian cancer Neg Hx     Allergic rhinitis Neg Hx     Angioedema Neg Hx     Atopy Neg Hx     Immunodeficiency Neg Hx     Rhinitis Neg Hx     Urticaria Neg Hx     Eczema Neg Hx     Asthma Neg Hx     Allergies Neg Hx      Social History     Tobacco Use    Smoking status: Never Smoker    Smokeless tobacco: Never Used   Substance Use Topics    Alcohol use: No     Alcohol/week: 0.0 standard drinks    Drug use: No     Review of Systems   Constitutional: Positive for appetite change (decreased), chills, fatigue and fever.   HENT: Negative  for congestion.    Respiratory: Negative for cough and shortness of breath.    Cardiovascular: Negative for chest pain.   Gastrointestinal: Positive for nausea and vomiting. Negative for abdominal pain and diarrhea.   Genitourinary: Negative for dysuria.   Musculoskeletal: Positive for myalgias.   Skin: Negative for rash.   Allergic/Immunologic: Negative for immunocompromised state.   Neurological: Negative for weakness.   Hematological: Negative for adenopathy.   Psychiatric/Behavioral: Negative for agitation.       Physical Exam     Initial Vitals [07/16/20 1433]   BP Pulse Resp Temp SpO2   128/83 91 18 (!) 101.2 °F (38.4 °C) (!) 94 %      MAP       --         Physical Exam    Nursing note and vitals reviewed.  Constitutional: She appears well-developed and well-nourished. She is not diaphoretic. No distress.   HENT:   Head: Normocephalic and atraumatic.   Right Ear: External ear normal.   Left Ear: External ear normal.   Nose: Nose normal.   Eyes: Conjunctivae and EOM are normal.   Neck: Normal range of motion.   Cardiovascular: Normal rate and regular rhythm.   Pulmonary/Chest: Breath sounds normal. No respiratory distress. She has no wheezes. She has no rhonchi. She has no rales. She exhibits no tenderness.   Abdominal: Soft. Bowel sounds are normal. She exhibits no distension. There is no abdominal tenderness. There is no rebound and no guarding.   Musculoskeletal: Normal range of motion.   Neurological: She is alert and oriented to person, place, and time. No cranial nerve deficit or sensory deficit. GCS score is 15. GCS eye subscore is 4. GCS verbal subscore is 5. GCS motor subscore is 6.   Skin: Skin is warm and dry. Capillary refill takes less than 2 seconds. No rash noted. No erythema.   Psychiatric: She has a normal mood and affect. Thought content normal.         ED Course   Procedures  Labs Reviewed   CBC W/ AUTO DIFFERENTIAL - Abnormal; Notable for the following components:       Result Value    WBC  3.07 (*)     Platelets 110 (*)     Lymph # 0.7 (*)     Platelet Estimate Decreased (*)     All other components within normal limits   COMPREHENSIVE METABOLIC PANEL - Abnormal; Notable for the following components:    Sodium 133 (*)     Glucose 117 (*)     AST 43 (*)     ALT 65 (*)     All other components within normal limits   URINALYSIS, REFLEX TO URINE CULTURE - Abnormal; Notable for the following components:    Color, UA Brown (*)     Occult Blood UA Trace (*)     All other components within normal limits    Narrative:     Specimen Source->Urine   LIPASE   POCT URINE PREGNANCY          Imaging Results          X-Ray Chest AP Portable (In process)  Result time 07/16/20 16:49:39                 Medical Decision Making:   Initial Assessment:   Fever, body, fatigue  Differential Diagnosis:   Dehydration, electrolyte abnormality, COVID pneumonia, sepsis  Clinical Tests:   Lab Tests: Reviewed and Ordered  The following lab test(s) were unremarkable: CBC, CMP, Urinalysis and Lipase  Radiological Study: Ordered and Reviewed  ED Management:  Patient presents to the ER for evaluation of fever, nausea vomiting.  Recent COVID positive test.  Tylenol and ibuprofen were given as well as Zofran fluids with improvement of her symptoms.  No concerning on blood work.  Chest x-ray concerning for pneumonia.  She will be discharged home with albuterol, azithromycin, ibuprofen and albuterol.  She was strict return precautions.  She verbalized understanding and agreement with plan.                                Clinical Impression:       ICD-10-CM ICD-9-CM   1. Nausea and vomiting, intractability of vomiting not specified, unspecified vomiting type  R11.2 787.01   2. COVID-19 virus infection  U07.1    3. Pneumonia due to COVID-19 virus  U07.1     J12.89                                 Divina Peña PA-C  07/16/20 1802

## 2020-07-16 NOTE — ED NOTES
APPEARANCE: Alert, oriented and in no acute distress.  CARDIAC: Normal rate and rhythm, no murmur heard.   PERIPHERAL VASCULAR: peripheral pulses present. Normal cap refill. No edema. Warm to touch.    RESPIRATORY:Normal rate and effort, breath sounds clear bilaterally throughout chest. Respirations are equal and unlabored no obvious signs of distress.  GASTRO: soft, bowel sounds normal,upper mid abdominal pain, no abdominal distention.  MUSC: Full ROM. No bony tenderness or soft tissue tenderness. No obvious deformity.  SKIN: Skin is warm and dry, normal skin turgor, mucous membranes moist.  NEURO: 5/5 strength major flexors/extensors bilaterally. Sensory intact to light touch bilaterally. [place] coma scale: eyes open spontaneously-4, oriented & converses-5, obeys commands-6. No neurological abnormalities.   MENTAL STATUS: awake, alert and aware of environment.

## 2020-07-16 NOTE — ED NOTES
Assumed care of pt, pt resting in stretcher, in no acute distress. Vitals taken, will continue to monitor.

## 2020-07-16 NOTE — TELEPHONE ENCOUNTER
Pt contacted through Covid Symptom tracking for an escalation of symptoms. Pt stats she is having nausea, vomiting since yesterday. Vomiting bile per pt. Adds now she is having burning sensation in stomach. Also complains of AVITIA. Fever 101 yesterday. States she feels febrile today but she did not take her temp or treat fever because she is too nauseated to keep it mai. Reports she ate some watermelon today and has kept it down and helped with the burning pain in stomach. No diarrhea/. + dizziness upon standing. Also reports back pain worse when she tries to lay in bed -- adds finding it difficult to find a comfortable poisition.    Dispo+ to ED. Discussed dispo with pt. States she has a ride to hospital and states she will go to [facility] for eval and treatment.         Reason for Disposition   MILD difficulty breathing (e.g., minimal/no SOB at rest, SOB with walking, pulse <100)    Additional Information   Negative: SEVERE difficulty breathing (e.g., struggling for each breath, speaks in single words)   Negative: Difficult to awaken or acting confused (e.g., disoriented, slurred speech)   Negative: Bluish (or gray) lips or face now   Negative: Shock suspected (e.g., cold/pale/clammy skin, too weak to stand, low BP, rapid pulse)   Negative: Sounds like a life-threatening emergency to the triager   Negative: SEVERE or constant chest pain or pressure (Exception: mild central chest pain, present only when coughing)   Negative: MODERATE difficulty breathing (e.g., speaks in phrases, SOB even at rest, pulse 100-120)    Protocols used: CORONAVIRUS (COVID-19) DIAGNOSED OR [id number]

## 2020-07-18 ENCOUNTER — NURSE TRIAGE (OUTPATIENT)
Dept: ADMINISTRATIVE | Facility: CLINIC | Age: 50
End: 2020-07-18

## 2020-07-18 NOTE — TELEPHONE ENCOUNTER
COVID 19 Symptom Tracker Outreach:  Pt contacted through Covid Symptom tracking for an escalation of symptoms. Patient reported that she has not had any fever since yesterday, last temp was 101.2.  Denies any CP, reported experiencing SOB with exertion. Stated that she is having headaches and back pain, and Vomiting. Reported that her last emesis was earlier today.  COVID 19 protocol completed with patient.       DISPO: Call PCP Now.  Reviewed disposition with patient, Patient declined contact with provider, stated that she felt worse earlier and is feeling better now.Reviewed care recommendations care recommendations with patient for management of symptoms and vomiting. Patient verbalized understanding. Encouraged to contact [facility] with additional questions or concerns. Verbalized understanding.     Contacted IM on call provider, Dr. Werner for further advice.  In agreement with continued home care and going to ED if symptoms worsen.    Reason for Disposition   MILD difficulty breathing (e.g., minimal/no SOB at rest, SOB with walking, pulse <100)    Additional Information   Negative: Severe difficulty breathing (e.g., struggling for each breath, speaks in single words)   Negative: Difficult to awaken or acting confused (e.g., disoriented, slurred speech)   Negative: Shock suspected (e.g., cold/pale/clammy skin, too weak to stand, low BP, rapid pulse)   Negative: Bluish (or gray) lips or face now   Negative: SEVERE or constant chest pain (Exception: mild central chest pain, present only when coughing)   Negative: MODERATE difficulty breathing (e.g., speaks in phrases, SOB even at rest, pulse 100-120)   Negative: Patient sounds very sick or weak to the triager   Negative: Sounds like a life-threatening emergency to the triager    Protocols used: CORONAVIRUS (COVID-19) - DIAGNOSED OR [id number]

## 2020-07-19 ENCOUNTER — NURSE TRIAGE (OUTPATIENT)
Dept: ADMINISTRATIVE | Facility: CLINIC | Age: 50
End: 2020-07-19

## 2020-07-19 ENCOUNTER — HOSPITAL ENCOUNTER (INPATIENT)
Facility: HOSPITAL | Age: 50
LOS: 7 days | Discharge: HOME | DRG: 177 | End: 2020-07-26
Attending: EMERGENCY MEDICINE | Admitting: FAMILY MEDICINE
Payer: MEDICAID

## 2020-07-19 DIAGNOSIS — U07.1 COVID-19 VIRUS INFECTION: Primary | ICD-10-CM

## 2020-07-19 DIAGNOSIS — Z20.822 SUSPECTED COVID-19 VIRUS INFECTION: ICD-10-CM

## 2020-07-19 PROBLEM — J12.82 PNEUMONIA DUE TO COVID-19 VIRUS: Status: ACTIVE | Noted: 2020-07-19

## 2020-07-19 PROBLEM — R74.8 ELEVATED LIVER ENZYMES: Status: ACTIVE | Noted: 2020-07-19

## 2020-07-19 LAB
ALBUMIN SERPL BCP-MCNC: 3.4 G/DL (ref 3.5–5.2)
ALP SERPL-CCNC: 71 U/L (ref 55–135)
ALT SERPL W/O P-5'-P-CCNC: 56 U/L (ref 10–44)
ANION GAP SERPL CALC-SCNC: 9 MMOL/L (ref 8–16)
AST SERPL-CCNC: 47 U/L (ref 10–40)
BASOPHILS # BLD AUTO: 0 K/UL (ref 0–0.2)
BASOPHILS NFR BLD: 0 % (ref 0–1.9)
BILIRUB SERPL-MCNC: 0.4 MG/DL (ref 0.1–1)
BUN SERPL-MCNC: 10 MG/DL (ref 6–20)
CALCIUM SERPL-MCNC: 8.2 MG/DL (ref 8.7–10.5)
CHLORIDE SERPL-SCNC: 100 MMOL/L (ref 95–110)
CK SERPL-CCNC: 34 U/L (ref 20–180)
CO2 SERPL-SCNC: 26 MMOL/L (ref 23–29)
CREAT SERPL-MCNC: 0.7 MG/DL (ref 0.5–1.4)
CRP SERPL-MCNC: 74.6 MG/L (ref 0–8.2)
D DIMER PPP IA.FEU-MCNC: 0.4 MG/L FEU
DIFFERENTIAL METHOD: ABNORMAL
EOSINOPHIL # BLD AUTO: 0 K/UL (ref 0–0.5)
EOSINOPHIL NFR BLD: 0 % (ref 0–8)
ERYTHROCYTE [DISTWIDTH] IN BLOOD BY AUTOMATED COUNT: 13.7 % (ref 11.5–14.5)
EST. GFR  (AFRICAN AMERICAN): >60 ML/MIN/1.73 M^2
EST. GFR  (NON AFRICAN AMERICAN): >60 ML/MIN/1.73 M^2
FERRITIN SERPL-MCNC: 278 NG/ML (ref 20–300)
GLUCOSE SERPL-MCNC: 112 MG/DL (ref 70–110)
HCT VFR BLD AUTO: 34.9 % (ref 37–48.5)
HGB BLD-MCNC: 11.7 G/DL (ref 12–16)
IMM GRANULOCYTES # BLD AUTO: 0.01 K/UL (ref 0–0.04)
IMM GRANULOCYTES NFR BLD AUTO: 0.3 % (ref 0–0.5)
LACTATE SERPL-SCNC: 0.7 MMOL/L (ref 0.5–2.2)
LDH SERPL L TO P-CCNC: 294 U/L (ref 110–260)
LDH SERPL L TO P-CCNC: 309 U/L (ref 110–260)
LYMPHOCYTES # BLD AUTO: 0.8 K/UL (ref 1–4.8)
LYMPHOCYTES NFR BLD: 21 % (ref 18–48)
MCH RBC QN AUTO: 28.3 PG (ref 27–31)
MCHC RBC AUTO-ENTMCNC: 33.5 G/DL (ref 32–36)
MCV RBC AUTO: 85 FL (ref 82–98)
MONOCYTES # BLD AUTO: 0.2 K/UL (ref 0.3–1)
MONOCYTES NFR BLD: 5.8 % (ref 4–15)
NEUTROPHILS # BLD AUTO: 2.8 K/UL (ref 1.8–7.7)
NEUTROPHILS NFR BLD: 72.9 % (ref 38–73)
NRBC BLD-RTO: 0 /100 WBC
PLATELET # BLD AUTO: 107 K/UL (ref 150–350)
PMV BLD AUTO: 11.7 FL (ref 9.2–12.9)
POTASSIUM SERPL-SCNC: 4.3 MMOL/L (ref 3.5–5.1)
PROCALCITONIN SERPL IA-MCNC: 0.03 NG/ML
PROT SERPL-MCNC: 7.1 G/DL (ref 6–8.4)
RBC # BLD AUTO: 4.13 M/UL (ref 4–5.4)
SODIUM SERPL-SCNC: 135 MMOL/L (ref 136–145)
TROPONIN I SERPL DL<=0.01 NG/ML-MCNC: 0.02 NG/ML (ref 0–0.03)
WBC # BLD AUTO: 3.77 K/UL (ref 3.9–12.7)

## 2020-07-19 PROCEDURE — 25000003 PHARM REV CODE 250: Performed by: NURSE PRACTITIONER

## 2020-07-19 PROCEDURE — 25000003 PHARM REV CODE 250: Performed by: EMERGENCY MEDICINE

## 2020-07-19 PROCEDURE — 63600175 PHARM REV CODE 636 W HCPCS: Performed by: NURSE PRACTITIONER

## 2020-07-19 PROCEDURE — 93010 ELECTROCARDIOGRAM REPORT: CPT | Mod: ,,, | Performed by: INTERNAL MEDICINE

## 2020-07-19 PROCEDURE — 96376 TX/PRO/DX INJ SAME DRUG ADON: CPT

## 2020-07-19 PROCEDURE — 99285 EMERGENCY DEPT VISIT HI MDM: CPT | Mod: 25

## 2020-07-19 PROCEDURE — 93005 ELECTROCARDIOGRAM TRACING: CPT

## 2020-07-19 PROCEDURE — 84145 PROCALCITONIN (PCT): CPT

## 2020-07-19 PROCEDURE — 96374 THER/PROPH/DIAG INJ IV PUSH: CPT

## 2020-07-19 PROCEDURE — 84484 ASSAY OF TROPONIN QUANT: CPT

## 2020-07-19 PROCEDURE — 83605 ASSAY OF LACTIC ACID: CPT

## 2020-07-19 PROCEDURE — 63600175 PHARM REV CODE 636 W HCPCS: Performed by: EMERGENCY MEDICINE

## 2020-07-19 PROCEDURE — 85379 FIBRIN DEGRADATION QUANT: CPT

## 2020-07-19 PROCEDURE — 11000001 HC ACUTE MED/SURG PRIVATE ROOM

## 2020-07-19 PROCEDURE — 93010 EKG 12-LEAD: ICD-10-PCS | Mod: ,,, | Performed by: INTERNAL MEDICINE

## 2020-07-19 PROCEDURE — 86140 C-REACTIVE PROTEIN: CPT

## 2020-07-19 PROCEDURE — 96372 THER/PROPH/DIAG INJ SC/IM: CPT | Mod: 59

## 2020-07-19 PROCEDURE — 82550 ASSAY OF CK (CPK): CPT

## 2020-07-19 PROCEDURE — 63600175 PHARM REV CODE 636 W HCPCS: Performed by: FAMILY MEDICINE

## 2020-07-19 PROCEDURE — 80053 COMPREHEN METABOLIC PANEL: CPT

## 2020-07-19 PROCEDURE — 83615 LACTATE (LD) (LDH) ENZYME: CPT

## 2020-07-19 PROCEDURE — 96375 TX/PRO/DX INJ NEW DRUG ADDON: CPT

## 2020-07-19 PROCEDURE — 82728 ASSAY OF FERRITIN: CPT

## 2020-07-19 PROCEDURE — 25000003 PHARM REV CODE 250: Performed by: FAMILY MEDICINE

## 2020-07-19 PROCEDURE — 85025 COMPLETE CBC W/AUTO DIFF WBC: CPT

## 2020-07-19 RX ORDER — ASCORBIC ACID 500 MG
500 TABLET ORAL DAILY
Status: DISCONTINUED | OUTPATIENT
Start: 2020-07-19 | End: 2020-07-26 | Stop reason: HOSPADM

## 2020-07-19 RX ORDER — ALBUTEROL SULFATE 90 UG/1
2 AEROSOL, METERED RESPIRATORY (INHALATION) EVERY 6 HOURS PRN
Status: DISCONTINUED | OUTPATIENT
Start: 2020-07-19 | End: 2020-07-26 | Stop reason: HOSPADM

## 2020-07-19 RX ORDER — ONDANSETRON 2 MG/ML
4 INJECTION INTRAMUSCULAR; INTRAVENOUS EVERY 6 HOURS PRN
Status: DISCONTINUED | OUTPATIENT
Start: 2020-07-19 | End: 2020-07-26 | Stop reason: HOSPADM

## 2020-07-19 RX ORDER — HYDROCHLOROTHIAZIDE 12.5 MG/1
12.5 TABLET ORAL DAILY
Status: DISCONTINUED | OUTPATIENT
Start: 2020-07-19 | End: 2020-07-26 | Stop reason: HOSPADM

## 2020-07-19 RX ORDER — LOSARTAN POTASSIUM 50 MG/1
50 TABLET ORAL DAILY
Status: DISCONTINUED | OUTPATIENT
Start: 2020-07-19 | End: 2020-07-26 | Stop reason: HOSPADM

## 2020-07-19 RX ORDER — ENOXAPARIN SODIUM 100 MG/ML
30 INJECTION SUBCUTANEOUS EVERY 12 HOURS
Status: DISCONTINUED | OUTPATIENT
Start: 2020-07-19 | End: 2020-07-19

## 2020-07-19 RX ORDER — ACETAMINOPHEN 325 MG/1
650 TABLET ORAL EVERY 6 HOURS PRN
Status: DISCONTINUED | OUTPATIENT
Start: 2020-07-19 | End: 2020-07-26 | Stop reason: HOSPADM

## 2020-07-19 RX ORDER — OLMESARTAN MEDOXOMIL AND HYDROCHLOROTHIAZIDE 20/12.5 20; 12.5 MG/1; MG/1
1 TABLET ORAL DAILY
Status: DISCONTINUED | OUTPATIENT
Start: 2020-07-19 | End: 2020-07-19

## 2020-07-19 RX ORDER — GUAIFENESIN 1200 MG
650 TABLET, EXTENDED RELEASE 12 HR ORAL
COMMUNITY
End: 2023-12-06

## 2020-07-19 RX ORDER — ENOXAPARIN SODIUM 100 MG/ML
40 INJECTION SUBCUTANEOUS EVERY 24 HOURS
Status: DISCONTINUED | OUTPATIENT
Start: 2020-07-19 | End: 2020-07-26 | Stop reason: HOSPADM

## 2020-07-19 RX ORDER — BENZONATATE 100 MG/1
200 CAPSULE ORAL 3 TIMES DAILY PRN
Status: DISCONTINUED | OUTPATIENT
Start: 2020-07-19 | End: 2020-07-26 | Stop reason: HOSPADM

## 2020-07-19 RX ORDER — DEXAMETHASONE SODIUM PHOSPHATE 4 MG/ML
6 INJECTION, SOLUTION INTRA-ARTICULAR; INTRALESIONAL; INTRAMUSCULAR; INTRAVENOUS; SOFT TISSUE EVERY 24 HOURS
Status: DISCONTINUED | OUTPATIENT
Start: 2020-07-20 | End: 2020-07-19

## 2020-07-19 RX ORDER — LANOLIN ALCOHOL/MO/W.PET/CERES
400 CREAM (GRAM) TOPICAL DAILY
Status: DISCONTINUED | OUTPATIENT
Start: 2020-07-19 | End: 2020-07-26 | Stop reason: HOSPADM

## 2020-07-19 RX ORDER — ACETAMINOPHEN 325 MG/1
650 TABLET ORAL
Status: COMPLETED | OUTPATIENT
Start: 2020-07-19 | End: 2020-07-19

## 2020-07-19 RX ORDER — ONDANSETRON 2 MG/ML
4 INJECTION INTRAMUSCULAR; INTRAVENOUS
Status: COMPLETED | OUTPATIENT
Start: 2020-07-19 | End: 2020-07-19

## 2020-07-19 RX ORDER — IBUPROFEN 400 MG/1
400 TABLET ORAL ONCE
Status: COMPLETED | OUTPATIENT
Start: 2020-07-19 | End: 2020-07-19

## 2020-07-19 RX ADMIN — ONDANSETRON 4 MG: 2 INJECTION INTRAMUSCULAR; INTRAVENOUS at 02:07

## 2020-07-19 RX ADMIN — MAGNESIUM OXIDE 400 MG (241.3 MG MAGNESIUM) TABLET 400 MG: TABLET at 04:07

## 2020-07-19 RX ADMIN — ONDANSETRON 4 MG: 2 INJECTION INTRAMUSCULAR; INTRAVENOUS at 08:07

## 2020-07-19 RX ADMIN — IBUPROFEN 400 MG: 400 TABLET ORAL at 02:07

## 2020-07-19 RX ADMIN — DEXAMETHASONE 6 MG: 4 TABLET ORAL at 03:07

## 2020-07-19 RX ADMIN — HYDROCHLOROTHIAZIDE 12.5 MG: 12.5 TABLET ORAL at 08:07

## 2020-07-19 RX ADMIN — BENZONATATE 200 MG: 100 CAPSULE ORAL at 10:07

## 2020-07-19 RX ADMIN — Medication 500 MG: at 04:07

## 2020-07-19 RX ADMIN — THERA TABS 1 TABLET: TAB at 04:07

## 2020-07-19 RX ADMIN — CEFTRIAXONE 1 G: 1 INJECTION, SOLUTION INTRAVENOUS at 08:07

## 2020-07-19 RX ADMIN — ENOXAPARIN SODIUM 40 MG: 100 INJECTION SUBCUTANEOUS at 05:07

## 2020-07-19 RX ADMIN — AZITHROMYCIN MONOHYDRATE 500 MG: 500 INJECTION, POWDER, LYOPHILIZED, FOR SOLUTION INTRAVENOUS at 09:07

## 2020-07-19 RX ADMIN — DEXAMETHASONE 6 MG: 4 TABLET ORAL at 08:07

## 2020-07-19 RX ADMIN — ACETAMINOPHEN 650 MG: 325 TABLET ORAL at 03:07

## 2020-07-19 RX ADMIN — ACETAMINOPHEN 650 MG: 325 TABLET ORAL at 08:07

## 2020-07-19 RX ADMIN — LOSARTAN POTASSIUM 50 MG: 50 TABLET ORAL at 08:07

## 2020-07-19 NOTE — ED PROVIDER NOTES
Encounter Date: 2020    SCRIBE #1 NOTE: I, Michelle Ahumada, am scribing for, and in the presence of,  Dr. Ansari. I have scribed the entire note.       History     Chief Complaint   Patient presents with    Cough     pt seen 2 days ago and dx with pneumonia.  reports continuing cough, SOB, nausea/vomiting.  on Z-hai    Shortness of Breath     Irina Dugan is a 49 y.o. female who  has a past medical history of Colitis, nonspecific (2012), Hypertension (10/12/2012), Ovarian cyst, and Tubal ectopic pregnancy.    The patient presents to the ED due to worsening nausea, vomiting, myalgias, chills, and shortness of breath for the past week. Patient was tested positive for COVID-19 about 2 days ago. She had a CXR done which showed bilateral infiltrates. She reports her shortness of breath is worse today. She says she is unable to walk a block without having to catch her breath. She denies diarrhea, fever, changes of smell or taste, abdominal pain, or any other complaints at this time. No medication reported.    The history is provided by the patient.     Review of patient's allergies indicates:  No Known Allergies  Past Medical History:   Diagnosis Date    Colitis, nonspecific 2012    Hypertension 10/12/2012    Ovarian cyst     Tubal ectopic pregnancy      Past Surgical History:   Procedure Laterality Date     SECTION, CLASSIC      CHOLECYSTECTOMY      ECTOPIC PREGNANCY SURGERY  age 25    TUBAL LIGATION       Family History   Problem Relation Age of Onset    Hypertension Unknown     Cancer Unknown     Ovarian cysts Unknown     Breast cancer Mother     Colon cancer Neg Hx     Ovarian cancer Neg Hx     Allergic rhinitis Neg Hx     Angioedema Neg Hx     Atopy Neg Hx     Immunodeficiency Neg Hx     Rhinitis Neg Hx     Urticaria Neg Hx     Eczema Neg Hx     Asthma Neg Hx     Allergies Neg Hx      Social History     Tobacco Use    Smoking status: Never Smoker    Smokeless  tobacco: Never Used   Substance Use Topics    Alcohol use: Yes     Alcohol/week: 0.0 standard drinks     Comment: socially    Drug use: No     Review of Systems   Constitutional: Positive for chills. Negative for fever.   HENT: Negative for congestion, rhinorrhea and sore throat.    Eyes: Negative for redness and visual disturbance.   Respiratory: Positive for shortness of breath. Negative for cough and wheezing.    Cardiovascular: Negative for chest pain and palpitations.   Gastrointestinal: Positive for nausea and vomiting. Negative for abdominal pain and diarrhea.   Genitourinary: Negative for dysuria and hematuria.   Musculoskeletal: Positive for myalgias. Negative for back pain and neck pain.   Skin: Negative for rash.   Neurological: Negative for dizziness, weakness and light-headedness.   Psychiatric/Behavioral: Negative for confusion.   All other systems reviewed and are negative.      Physical Exam     Initial Vitals [07/19/20 0112]   BP Pulse Resp Temp SpO2   132/81 87 20 99.3 °F (37.4 °C) (!) 92 %      MAP       --         Physical Exam    Nursing note and vitals reviewed.  Constitutional: She appears well-developed and well-nourished. She is not diaphoretic. No distress.   HENT:   Head: Normocephalic and atraumatic.   Mouth/Throat: Oropharynx is clear and moist.   Eyes: Conjunctivae are normal.   Cardiovascular: Normal rate, regular rhythm and intact distal pulses.   Pulmonary/Chest: No respiratory distress.   Musculoskeletal: Normal range of motion.   Neurological: She is alert and oriented to person, place, and time.   Skin: Skin is warm and dry. Capillary refill takes less than 2 seconds. No rash noted. No erythema.   Psychiatric: She has a normal mood and affect.         ED Course   Procedures  Labs Reviewed   CBC W/ AUTO DIFFERENTIAL - Abnormal; Notable for the following components:       Result Value    WBC 3.77 (*)     Hemoglobin 11.7 (*)     Hematocrit 34.9 (*)     Platelets 107 (*)     Lymph #  0.8 (*)     Mono # 0.2 (*)     All other components within normal limits   COMPREHENSIVE METABOLIC PANEL - Abnormal; Notable for the following components:    Sodium 135 (*)     Glucose 112 (*)     Calcium 8.2 (*)     Albumin 3.4 (*)     AST 47 (*)     ALT 56 (*)     All other components within normal limits   C-REACTIVE PROTEIN - Abnormal; Notable for the following components:    CRP 74.6 (*)     All other components within normal limits   LACTATE DEHYDROGENASE - Abnormal; Notable for the following components:     (*)     All other components within normal limits   FERRITIN   CK   LACTIC ACID, PLASMA   TROPONIN I   D DIMER, QUANTITATIVE     EKG Readings: (Independently Interpreted)   Normal sinus rhythm, rate 85, no ST elevation or depression, single flipped T wave in lead III, no STEMI. This EKG was interpreted by myself.       X-Rays:   Independently Interpreted Readings:   Other Readings:  Reviewed by myself, read by radiology.    Imaging Results          X-Ray Chest AP Portable (Final result)  Result time 07/19/20 01:59:09    Final result by Romaine Trejo MD (07/19/20 01:59:09)                 Impression:      Prominence of the central pulmonary vascular as well as bilateral interstitial and alveolar infiltrates noted.  The findings demonstrate progression of radiographic findings when compared to the prior exam.      Electronically signed by: Romaine Trejo  Date:    07/19/2020  Time:    01:59             Narrative:    EXAMINATION:  XR CHEST AP PORTABLE    CLINICAL HISTORY:  Suspected Covid-19 Virus Infection;    TECHNIQUE:  Single frontal view of the chest was performed.    COMPARISON:  June 16, 2020    FINDINGS:  Single portable chest view is submitted.  There is mild diminished depth of inspiration and mild rotation when accounting for this the cardiomediastinal silhouette appears stable.  There is appearance thought to represent increased prominence of the central pulmonary vascular as well as  bilateral pattern interstitial and alveolar infiltrates.  There is no evidence for dense consolidation, significant pleural effusion or pneumothorax.  The osseous structures demonstrate chronic change.                              Medical Decision Making:   Initial Assessment:   The patient presents to the ED due to worsening nausea, vomiting, myalgias, chills, and shortness of breath for the past week. Patient's initial SpO2 92%.  Differential Diagnosis:   Ddx includes but is not limited to:  COVID-19, pneumonia, ACS, heart failure  Clinical Tests:   Lab Tests: Ordered and Reviewed  Radiological Study: Ordered and Reviewed  Medical Tests: Ordered and Reviewed  ED Management:  Plan: Obtain labs, CXR, and EKG. Will order Zofran and Tylenol. Will monitor and reassess.                   ED Course as of Jul 19 2248   Sun Jul 19, 2020   0442 Ambulated patient patient satting well however became acutely dyspneic with lightheadedness and increased work of breathing.  Noted chest x-ray with progressively worsening bilateral infiltrates.  Leukopenia elevated LDH elevated CRP will admit for likely worsening COVID-19 infection.    [DC]   0449 Patient signed out to [facility].    [DC]      ED Course User Index  [DC] Craig Ansari Jr., MD                Clinical Impression:     1. COVID-19 virus infection    2. Suspected Covid-19 Virus Infection          ED Disposition Condition    Observation                I, Craig Ansari,  personally performed the services described in this documentation. All medical record entries made by the scribe were at my direction and in my presence.  I have reviewed the chart and agree that the record reflects my personal performance and is accurate and complete. Craig Ansari Jr., MD  07/19/20 6717

## 2020-07-19 NOTE — HPI
Irina Dugan is a 48 y/o F with PMH of hypertension, ovarian cyst, presents to the ED presents with 1-week history of progressive SOB that is worse in the past 2 days especially with activities such as walking, with associated nausea, vomiting, myalgias, chills. Patient denies fever, diarrhea, changes of smell or taste, or abdominal pain. Patient tested positive for COVID-19 2 days ago, CXR done, showed bilateral infiltrates. CRP 74.6, , LFT slightly elevated, ferritin, troponin, CK, D-dimer all wnl.

## 2020-07-19 NOTE — CONSULTS
U Infectious Diseases Consult - Resident Note    Primary Attending Physician: Dr. Ines Llanos  Primary Team: [facility]  Consultant Attending: Dr. Tong Osullivan  Consultant Resident: Watson    Assessment/Plan:     Acute hypoxic respiratory failure 2/2 COVID-19 infection  - Remdesivir: consult pharmacy for hospital approval. If started, monitor renal function (discontinue for CrCl <30).  - Steroids: continue dexamethasone 6mg IV qd (IV preferred over oral - changed by ID team), day 1.  - Antibiotics: procal ordered by ID team: 0.03, no consolidations seen on imaging. On rocephin and azithro by primary team, recommend discontinue at this time.  - Anti-coagulation: per primary team  - Monitor on tele  - Monitor LFTs     Thank you for allowing us to participate in the care of this patient. Please contact me if you have any questions regarding this consult.    Milena Oakley MD   [facility]  U Infectious Diseases    Reason for Consult:     COVID-19 infection    Subjective:      History of Present Illness:  Irina Dugan is a 49 y.o. female who  has a past medical history of Colitis, nonspecific (11/6/2012), Hypertension (10/12/2012), Ovarian cyst, and Tubal ectopic pregnancy.. The patient presented to the [facility] on 7/19/2020 with a primary complaint of Cough (pt seen 2 days ago and dx with pneumonia.  reports continuing cough, SOB, nausea/vomiting.  on Z-hai) and Shortness of Breath    The patient was in her usual state of health until a week ago when she had onset of myalgias, shortness of breath, nausea and vomiting. Tested +COVID on 7/11. Was discharged from ED with z-pack. Returned to ED this morning with worsening of symptoms. Cannot walk a block without having to rest.     In the ED, patient afebrile and on room air. D-Dimer 0.4, Ferritin 278,  CRP 74.6, . CXR with worsening bilateral interstitial infiltrates than previously seen 7/11.      Past Medical History:  Past Medical History:   Diagnosis Date    Colitis, nonspecific 2012    Hypertension 10/12/2012    Ovarian cyst     Tubal ectopic pregnancy        Past Surgical History:  Past Surgical History:   Procedure Laterality Date     SECTION, CLASSIC      CHOLECYSTECTOMY      ECTOPIC PREGNANCY SURGERY  age 25    TUBAL LIGATION         Allergies:  Review of patient's allergies indicates:  No Known Allergies    Medications:   In-Hospital Scheduled Medications:   azithromycin  500 mg Intravenous Q24H    cefTRIAXone (ROCEPHIN) IVPB  1 g Intravenous Q24H    dexAMETHasone  6 mg Oral Daily    enoxaparin  40 mg Subcutaneous Q24H    hydroCHLOROthiazide  12.5 mg Oral Daily    losartan  50 mg Oral Daily      In-Hospital PRN Medications:  acetaminophen, albuterol, ondansetron   In-Hospital IV Infusion Medications:     Home Medications:  Prior to Admission medications    Medication Sig Start Date End Date Taking? Authorizing Provider   acetaminophen (TYLENOL) 325 mg Cap Take 650 mg by mouth as needed.   Yes Historical Provider, MD   albuterol (PROVENTIL/VENTOLIN HFA) 90 mcg/actuation inhaler Inhale 1-2 puffs into the lungs every 6 (six) hours as needed. Rescue 20 Yes Divina Peña PA-C   azithromycin (Z-HERLINDA) 250 MG tablet Take 2 tablets by mouth on day 1; Take 1 tablet by mouth on days 2-5 20 Yes Divina Peña PA-C   ibuprofen (ADVIL,MOTRIN) 600 MG tablet Take 1 tablet (600 mg total) by mouth every 6 (six) hours as needed. 20  Yes Divina Peña PA-C   ipratropium (ATROVENT) 0.03 % nasal spray 2 sprays by Nasal route 2 (two) times daily. for 7 days 20 Yes Jose Alfredo Martin NP   olmesartan-hydrochlorothiazide (BENICAR HCT) 20-12.5 mg per tablet Take 1 tablet by mouth once daily. 2/15/20 2/14/21 Yes Avi Stuart MD   ondansetron (ZOFRAN-ODT) 4 MG TbDL Take 1 tablet (4 mg total) by mouth every 6 (six) hours as needed. 20   "Yes Divina Peña PA-C   furosemide (LASIX) 20 MG tablet TAKE ONE TABLET BY MOUTH DAILY AS NEEDED (SWELLING) 10/1/18   Avi Stuart MD   amoxicillin-clavulanate 875-125mg (AUGMENTIN) 875-125 mg per tablet Take 1 tablet by mouth 2 (two) times daily. for 10 days 20  Jose Alfredo Martin NP   ciclopirox (PENLAC) 8 % Soln Apply topically nightly.  Patient not taking: Reported on 2020  Hilda Barnett MD   fish oil-omega-3 fatty acids 300-1,000 mg capsule Take by mouth once daily.  20  Historical Provider, MD   norgestimate-ethinyl estradiol (SPRINTEC, 28,) 0.25-35 mg-mcg per tablet Take 1 tablet by mouth once daily.  Patient not taking: Reported on 2020 7/3/18 7/19/20  Christiana Hurley MD       Family History:  Family History   Problem Relation Age of Onset    Hypertension Unknown     Cancer Unknown     Ovarian cysts Unknown     Breast cancer Mother     Colon cancer Neg Hx     Ovarian cancer Neg Hx     Allergic rhinitis Neg Hx     Angioedema Neg Hx     Atopy Neg Hx     Immunodeficiency Neg Hx     Rhinitis Neg Hx     Urticaria Neg Hx     Eczema Neg Hx     Asthma Neg Hx     Allergies Neg Hx        Social History:  Social History     Tobacco Use    Smoking status: Never Smoker    Smokeless tobacco: Never Used   Substance Use Topics    Alcohol use: Yes     Alcohol/week: 0.0 standard drinks     Comment: socially    Drug use: No       Review of Systems:  Pertinent positives as noted in HPI. All other systems are reviewed and are negative.     Objective:   Last 24 Hour Vital Signs:  BP  Min: 124/71  Max: 142/84  Temp  Av °F (37.2 °C)  Min: 97.6 °F (36.4 °C)  Max: 99.9 °F (37.7 °C)  Pulse  Av.2  Min: 82  Max: 90  Resp  Av.3  Min: 17  Max: 22  SpO2  Av.7 %  Min: 87 %  Max: 97 %  Height  Av' 6" (167.6 cm)  Min: 5' 6" (167.6 cm)  Max: 5' 6" (167.6 cm)  Weight  Av.6 kg (287 lb 13.7 oz)  Min: 129.6 kg (285 lb 11.5 oz)  Max: 131.5 " kg (290 lb)  No intake/output data recorded.    Physical Examination:  Limited 2/2 COVID-19 pandemic. Comfortable on room air. No apparent distress.     Laboratory Results:  Most Recent Data:  CBC:   Lab Results   Component Value Date    WBC 3.77 (L) 07/19/2020    HGB 11.7 (L) 07/19/2020    HCT 34.9 (L) 07/19/2020     (L) 07/19/2020    MCV 85 07/19/2020    RDW 13.7 07/19/2020     BMP:   Lab Results   Component Value Date     (L) 07/19/2020    K 4.3 07/19/2020     07/19/2020    CO2 26 07/19/2020    BUN 10 07/19/2020     (H) 07/19/2020    CALCIUM 8.2 (L) 07/19/2020     LFTs:   Lab Results   Component Value Date    PROT 7.1 07/19/2020    ALBUMIN 3.4 (L) 07/19/2020    BILITOT 0.4 07/19/2020    AST 47 (H) 07/19/2020    ALKPHOS 71 07/19/2020    ALT 56 (H) 07/19/2020     Coags: No results found for: INR, PROTIME, PTT  FLP:   Lab Results   Component Value Date    CHOL 183 09/30/2019    HDL 54 09/30/2019    LDLCALC 100.8 09/30/2019    TRIG 141 09/30/2019    CHOLHDL 29.5 09/30/2019     DM:   Lab Results   Component Value Date    HGBA1C 5.6 09/30/2019    HGBA1C 5.7 09/18/2015    LDLCALC 100.8 09/30/2019    CREATININE 0.7 07/19/2020     Thyroid:   Lab Results   Component Value Date    TSH 2.177 09/30/2019    FREET4 1.06 03/18/2016     Anemia:   Lab Results   Component Value Date    FERRITIN 278 07/19/2020    JNWICJIW16 427 06/25/2013     Cardiac:   Lab Results   Component Value Date    TROPONINI 0.024 07/19/2020     Urinalysis:   Lab Results   Component Value Date    LABURIN No significant growth 03/11/2019    COLORU Brown (A) 07/16/2020    SPECGRAV 1.025 07/16/2020    NITRITE Negative 07/16/2020    PROTEINUR 30 01/26/2015    KETONESU Negative 07/16/2020    UROBILINOGEN Negative 07/16/2020    BILIRUBINUR Neg 01/26/2015       Trended Lab Data:  Recent Labs   Lab 07/16/20  1455 07/19/20  0203   WBC 3.07* 3.77*   HGB 13.0 11.7*   HCT 38.8 34.9*   * 107*   MCV 84 85   RDW 13.6 13.7   * 135*    K 4.1 4.3   CL 98 100   CO2 26 26   BUN 10 10   * 112*   PROT 7.9 7.1   ALBUMIN 3.9 3.4*   BILITOT 0.4 0.4   AST 43* 47*   ALKPHOS 73 71   ALT 65* 56*       Trended Cardiac Data:  Recent Labs   Lab 07/19/20  0203   TROPONINI 0.024       Microbiology Data:  None      Other Results:  Radiology:  X-ray Chest Ap Portable    Result Date: 7/19/2020  EXAMINATION: XR CHEST AP PORTABLE CLINICAL HISTORY: Suspected Covid-19 Virus Infection; TECHNIQUE: Single frontal view of the chest was performed. COMPARISON: June 16, 2020 FINDINGS: Single portable chest view is submitted.  There is mild diminished depth of inspiration and mild rotation when accounting for this the cardiomediastinal silhouette appears stable.  There is appearance thought to represent increased prominence of the central pulmonary vascular as well as bilateral pattern interstitial and alveolar infiltrates.  There is no evidence for dense consolidation, significant pleural effusion or pneumothorax.  The osseous structures demonstrate chronic change.     Prominence of the central pulmonary vascular as well as bilateral interstitial and alveolar infiltrates noted.  The findings demonstrate progression of radiographic findings when compared to the prior exam. Electronically signed by: Romaine Trejo Date:    07/19/2020 Time:    01:59    X-ray Chest Ap Portable    Result Date: 7/16/2020  EXAMINATION: XR CHEST AP PORTABLE CLINICAL HISTORY: COVID-19 TECHNIQUE: Single frontal view of the chest was performed. COMPARISON: Chest radiograph 02/21/2018 FINDINGS: Resolution is somewhat limited by body habitus with underpenetration.  Cardiomediastinal silhouette is midline and within normal limits for age.  Pulmonary vasculature and hilar contours are within normal limits. The lungs are symmetrically normal to slightly hypoinflated with bilateral mild diffuse nonspecific interstitial coarsening and subtle patchy opacities at the left lung base.  Slight blunting of  the left costophrenic angle that may represent pleural thickening/scarring versus trace pleural effusion.  No lobar consolidation or pneumothorax.  Osseous structures appear intact.  PA and lateral views can be obtained.     Bilateral diffuse nonspecific interstitial coarsening with left basilar patchy opacities concerning for sequela of inflammatory or infectious process including atypical bacterial or viral etiology, noting COVID 19 pneumonia could present similarly. This report was flagged in Epic as abnormal. Electronically signed by: Jason Richter MD Date:    07/16/2020 Time:    16:49

## 2020-07-19 NOTE — H&P
[facility] Medicine  History & Physical    Patient Name: Irina Dugan  MRN: [id number]  Admission Date: 2020  Attending Physician: Ines Wright MD  Primary Care Provider: Adrianna Pope DO         Patient information was obtained from patient and ER records.     Subjective:     Principal Problem:Pneumonia due to COVID-19 virus    Chief Complaint:   Chief Complaint   Patient presents with    Cough     pt seen 2 days ago and dx with pneumonia.  reports continuing cough, SOB, nausea/vomiting.  on Z-herlinda    Shortness of Breath        HPI:  Irina Dugan is a 50 y/o F with PMH of hypertension, ovarian cyst, presents to the ED presents with 1-week history of progressive SOB that is worse in the past 2 days especially with activities such as walking, with associated nausea, vomiting, myalgias, chills. Patient denies fever, diarrhea, changes of smell or taste, or abdominal pain. Patient tested positive for COVID-19 2 days ago, CXR done, showed bilateral infiltrates. CRP 74.6, , LFT slightly elevated, ferritin, troponin, CK, D-dimer all wnl.     Past Medical History:   Diagnosis Date    Colitis, nonspecific 2012    Hypertension 10/12/2012    Ovarian cyst     Tubal ectopic pregnancy        Past Surgical History:   Procedure Laterality Date     SECTION, CLASSIC      CHOLECYSTECTOMY      ECTOPIC PREGNANCY SURGERY  age 25    TUBAL LIGATION         Review of patient's allergies indicates:  No Known Allergies    No current facility-administered medications on file prior to encounter.      Current Outpatient Medications on File Prior to Encounter   Medication Sig    acetaminophen (TYLENOL) 325 mg Cap Take 650 mg by mouth as needed.    albuterol (PROVENTIL/VENTOLIN HFA) 90 mcg/actuation inhaler Inhale 1-2 puffs into the lungs every 6 (six) hours as needed. Rescue    azithromycin (Z-HERLINDA) 250 MG tablet Take 2 tablets by mouth on day 1; Take 1  tablet by mouth on days 2-5    ibuprofen (ADVIL,MOTRIN) 600 MG tablet Take 1 tablet (600 mg total) by mouth every 6 (six) hours as needed.    ipratropium (ATROVENT) 0.03 % nasal spray 2 sprays by Nasal route 2 (two) times daily. for 7 days    olmesartan-hydrochlorothiazide (BENICAR HCT) 20-12.5 mg per tablet Take 1 tablet by mouth once daily.    ondansetron (ZOFRAN-ODT) 4 MG TbDL Take 1 tablet (4 mg total) by mouth every 6 (six) hours as needed.    furosemide (LASIX) 20 MG tablet TAKE ONE TABLET BY MOUTH DAILY AS NEEDED (SWELLING)    [DISCONTINUED] amoxicillin-clavulanate 875-125mg (AUGMENTIN) 875-125 mg per tablet Take 1 tablet by mouth 2 (two) times daily. for 10 days    [DISCONTINUED] ciclopirox (PENLAC) 8 % Soln Apply topically nightly. (Patient not taking: Reported on 7/11/2020)    [DISCONTINUED] fish oil-omega-3 fatty acids 300-1,000 mg capsule Take by mouth once daily.    [DISCONTINUED] norgestimate-ethinyl estradiol (SPRINTEC, 28,) 0.25-35 mg-mcg per tablet Take 1 tablet by mouth once daily. (Patient not taking: Reported on 7/11/2020)     Family History     Problem Relation (Age of Onset)    Breast cancer Mother    Cancer     Hypertension     Ovarian cysts         Tobacco Use    Smoking status: Never Smoker    Smokeless tobacco: Never Used   Substance and Sexual Activity    Alcohol use: Yes     Alcohol/week: 0.0 standard drinks     Comment: socially    Drug use: No    Sexual activity: Yes     Partners: Male     Birth control/protection: OCP     Review of Systems   Constitutional: Positive for chills. Negative for fatigue and fever.   HENT: Negative for congestion.    Respiratory: Positive for shortness of breath. Negative for cough and chest tightness.    Cardiovascular: Negative for chest pain.   Gastrointestinal: Positive for abdominal distention, abdominal pain and nausea. Negative for anal bleeding, blood in stool, constipation, diarrhea and vomiting.   Genitourinary: Negative for dysuria  and urgency.   Musculoskeletal: Positive for arthralgias and myalgias.   Neurological: Negative for dizziness and weakness.   Psychiatric/Behavioral: Negative for agitation. The patient is not nervous/anxious.      Objective:     Vital Signs (Most Recent):  Temp: 97.6 °F (36.4 °C) (07/19/20 0813)  Pulse: 85 (07/19/20 0813)  Resp: (!) 22 (07/19/20 0813)  BP: 139/86 (07/19/20 0813)  SpO2: (!) 87 % (07/19/20 0813) Vital Signs (24h Range):  Temp:  [97.6 °F (36.4 °C)-99.9 °F (37.7 °C)] 97.6 °F (36.4 °C)  Pulse:  [82-90] 85  Resp:  [17-22] 22  SpO2:  [87 %-97 %] 87 %  BP: (124-142)/(71-86) 139/86     Weight: 129.6 kg (285 lb 11.5 oz)  Body mass index is 46.12 kg/m².    Physical Exam  Constitutional:       Appearance: She is well-developed.   HENT:      Head: Normocephalic and atraumatic.      Nose: Nose normal.      Mouth/Throat:      Mouth: Mucous membranes are moist.   Eyes:      Pupils: Pupils are equal, round, and reactive to light.   Neck:      Musculoskeletal: Normal range of motion and neck supple.   Cardiovascular:      Rate and Rhythm: Normal rate and regular rhythm.      Heart sounds: Normal heart sounds. No murmur. No friction rub. No gallop.    Pulmonary:      Effort: Tachypnea present.      Breath sounds: Normal breath sounds.   Abdominal:      General: Bowel sounds are normal. There is no distension.      Palpations: Abdomen is soft. There is no mass.      Tenderness: There is no abdominal tenderness. There is no rebound.   Musculoskeletal: Normal range of motion.         General: No tenderness.   Skin:     General: Skin is warm.   Neurological:      General: No focal deficit present.      Mental Status: She is alert and oriented to person, place, and time.      Deep Tendon Reflexes: Reflexes are normal and symmetric.   Psychiatric:         Mood and Affect: Mood normal.         Behavior: Behavior normal.           CRANIAL NERVES     CN III, IV, VI   Pupils are equal, round, and reactive to light.        Significant Labs:   A1C: No results for input(s): HGBA1C in the last 4320 hours.  BMP:   Recent Labs   Lab 07/19/20 0203   *   *   K 4.3      CO2 26   BUN 10   CREATININE 0.7   CALCIUM 8.2*     CBC:   Recent Labs   Lab 07/19/20 0203   WBC 3.77*   HGB 11.7*   HCT 34.9*   *     CMP:   Recent Labs   Lab 07/19/20 0203   *   K 4.3      CO2 26   *   BUN 10   CREATININE 0.7   CALCIUM 8.2*   PROT 7.1   ALBUMIN 3.4*   BILITOT 0.4   ALKPHOS 71   AST 47*   ALT 56*   ANIONGAP 9   EGFRNONAA >60     Coagulation: No results for input(s): PT, INR, APTT in the last 48 hours.  Lactic Acid:   Recent Labs   Lab 07/19/20 0203   LACTATE 0.7     Lipid Panel: No results for input(s): CHOL, HDL, LDLCALC, TRIG, CHOLHDL in the last 48 hours.  Magnesium: No results for input(s): MG in the last 48 hours.  Prealbumin: No results for input(s): PREALBUMIN in the last 48 hours.  Respiratory Culture: No results for input(s): GSRESP, RESPIRATORYC in the last 48 hours.  Troponin:   Recent Labs   Lab 07/19/20 0203   TROPONINI 0.024     Urine Culture: No results for input(s): LABURIN in the last 48 hours.  Urine Studies: No results for input(s): COLORU, APPEARANCEUA, PHUR, SPECGRAV, PROTEINUA, GLUCUA, KETONESU, BILIRUBINUA, OCCULTUA, NITRITE, UROBILINOGEN, LEUKOCYTESUR, RBCUA, WBCUA, BACTERIA, SQUAMEPITHEL, HYALINECASTS in the last 48 hours.    Invalid input(s): WRIGHTSUR    Significant Imaging: I have reviewed all pertinent imaging results/findings within the past 24 hours.    Assessment/Plan:     * Pneumonia due to COVID-19 virus  Elevated Liver enzymes  CXR showed worsening central pulmonary vascular as well as bilateral interstitial and alveolar infiltrates noted.    COVID 19 test positive   CRP 74.6, , LFT slightly elevated, ferritin, troponin, CK, D-dimer all wnl.   Rocephin /Azithromycin  Decadron 6mg   Albuterol inhaler prn  Lovenox bid  Supplemental oxygen  Tylenol   Antiemetics.  Blood  cultures pending  Enoxaparin  40mg subq BID  Tylenol prn  Consult Pulm  Consult ID  Consult Remdesivir      Essential hypertension    continue losartan, HCT      VTE Risk Mitigation (From admission, onward)         Ordered     enoxaparin injection 40 mg  Every 24 hours      07/19/20 0512                   Ines Wright MD  Department of Hospital Medicine   [facility]

## 2020-07-19 NOTE — TELEPHONE ENCOUNTER
COVID 19 Symptom Tracker Outreach:   Chart review completed. Patient currently admitted to hospital. No contact required at this time.         Additional Information   Negative: Caller has already spoken with the PCP and has no further questions.   Negative: Caller has already spoken with another triager and has no further questions.   Negative: Caller has already spoken with another triager or PCP AND has further questions AND triager able to answer questions.   Negative: Caller is angry or rude (e.g., hangs up, verbally abusive, yelling)   Negative: Caller hangs up   Negative: Busy signal.  First attempt to contact caller.  Follow-up call scheduled within 15 minutes.   Negative: No answer.  First attempt to contact caller.  Follow-up call scheduled within 15 minutes.   Negative: Message left on identified voice mail   Negative: Message left on unidentified voice mail.  Phone number verified.   Negative: Message left with person in household.   Negative: Wrong number reached.  Phone number verified.   Negative: Second attempt to contact family AND no contact made.  Phone number verified.   Negative: Cell phone out of range.  Phone number verified.   Negative: Pager number given.  Answering service notified.   Negative: Patient already left for the hospital/clinic.   Negative: Caller has cancelled the call before the first contact   Negative: Unable to complete triage due to phone connection issues   Negative: NON-URGENT call redirected to PCP's office because it is open    Protocols used: NO CONTACT OR DUPLICATE CONTACT CALL-A-

## 2020-07-19 NOTE — PLAN OF CARE
Received from ED on stable conditions. Care plan reviewed with patient, Britt4, no respiratory distress noted, on room air. Education provided on medication effect and side effect, voices understanding. C/O headache, states received tylenol in the emergency room. Call light within her reach, no apparent distress noted, bed in low position, bed alarm on, educated on the importance of calling as needed, voices understanding, stable at this time.

## 2020-07-19 NOTE — PLAN OF CARE
VN cued into room to complete admit assessment, VIP model introduced, VN working alongside bedside treatment team.  Plan of care reviewed with patient. Patient verbalized understanding. Patient informed of fall risk and fall precautions, call light within reach, 2x bed rails. Patient notified to ask staff for assistance and pt verbalized complete understanding. Time allowed for questions. Will continue to monitor and intervene as needed.    Pt c/o headache 7/10. Pt received tylenol in ed. States it help but then came back. Pt thinks it may be bc she is hungry. She hasnt eaten anything and had been vomiting. Received zofran in ed. Pt denies any nausea at this time. Pt would like to try and eat something and see if that helps with headache. Pt provided crackers by bedside nurse until breakfast served. Instructed pt to notify nursing if eating does not help with headache.

## 2020-07-19 NOTE — ASSESSMENT & PLAN NOTE
Elevated Liver enzymes  CXR showed worsening central pulmonary vascular as well as bilateral interstitial and alveolar infiltrates noted.    COVID 19 test positive   CRP 74.6, , LFT slightly elevated, ferritin, troponin, CK, D-dimer all wnl.   Rocephin /Azithromycin  Decadron 6mg   Albuterol inhaler prn  Lovenox bid  Supplemental oxygen  Tylenol   Antiemetics.  Blood cultures pending  Enoxaparin  40mg subq BID  Tylenol prn  Consult Pulm  Consult ID  Consult Remdesivir

## 2020-07-19 NOTE — PLAN OF CARE
VN cued into pt's room for introduction. VN informed pt that VN would be working along side bedside nurse and PCT throughout shift. Level of present pain assessed. At present no distress noted. Patient states cough worsened but remains dry. Patient states severe head ache is not being relieved with Tylenol. Dr Llanos notified via secure chat. Discussed with patient the plan of care. Discussed with patient High fall risk protocol and interventions that have been initiated and cont be in place for safety. Patient verbalized clear understanding and cooperation using teach back method. Bed alarm presently activated and in use. Will cont to be available to patient and intervene prn.

## 2020-07-19 NOTE — PLAN OF CARE
POC reviewed with the pt, verbalized understanding. AAOx3. c/o pain,analgesia given as per MAR. PIV remained intact.  On 2L oxygen via NC, O2 sats 95-96%.Safety maintained at all times. Instructed to call for any assistance. Call bell within reach. Bed alarm on. Contact, Droplet, Respiratory precautions maintained. Will continue to monitor.

## 2020-07-19 NOTE — SUBJECTIVE & OBJECTIVE
Past Medical History:   Diagnosis Date    Colitis, nonspecific 2012    Hypertension 10/12/2012    Ovarian cyst     Tubal ectopic pregnancy        Past Surgical History:   Procedure Laterality Date     SECTION, CLASSIC      CHOLECYSTECTOMY      ECTOPIC PREGNANCY SURGERY  age 25    TUBAL LIGATION         Review of patient's allergies indicates:  No Known Allergies    No current facility-administered medications on file prior to encounter.      Current Outpatient Medications on File Prior to Encounter   Medication Sig    acetaminophen (TYLENOL) 325 mg Cap Take 650 mg by mouth as needed.    albuterol (PROVENTIL/VENTOLIN HFA) 90 mcg/actuation inhaler Inhale 1-2 puffs into the lungs every 6 (six) hours as needed. Rescue    azithromycin (Z-HERLINDA) 250 MG tablet Take 2 tablets by mouth on day 1; Take 1 tablet by mouth on days 2-5    ibuprofen (ADVIL,MOTRIN) 600 MG tablet Take 1 tablet (600 mg total) by mouth every 6 (six) hours as needed.    ipratropium (ATROVENT) 0.03 % nasal spray 2 sprays by Nasal route 2 (two) times daily. for 7 days    olmesartan-hydrochlorothiazide (BENICAR HCT) 20-12.5 mg per tablet Take 1 tablet by mouth once daily.    ondansetron (ZOFRAN-ODT) 4 MG TbDL Take 1 tablet (4 mg total) by mouth every 6 (six) hours as needed.    furosemide (LASIX) 20 MG tablet TAKE ONE TABLET BY MOUTH DAILY AS NEEDED (SWELLING)    [DISCONTINUED] amoxicillin-clavulanate 875-125mg (AUGMENTIN) 875-125 mg per tablet Take 1 tablet by mouth 2 (two) times daily. for 10 days    [DISCONTINUED] ciclopirox (PENLAC) 8 % Soln Apply topically nightly. (Patient not taking: Reported on 2020)    [DISCONTINUED] fish oil-omega-3 fatty acids 300-1,000 mg capsule Take by mouth once daily.    [DISCONTINUED] norgestimate-ethinyl estradiol (SPRINTEC, 28,) 0.25-35 mg-mcg per tablet Take 1 tablet by mouth once daily. (Patient not taking: Reported on 2020)     Family History     Problem Relation (Age of Onset)     Breast cancer Mother    Cancer     Hypertension     Ovarian cysts         Tobacco Use    Smoking status: Never Smoker    Smokeless tobacco: Never Used   Substance and Sexual Activity    Alcohol use: Yes     Alcohol/week: 0.0 standard drinks     Comment: socially    Drug use: No    Sexual activity: Yes     Partners: Male     Birth control/protection: OCP     Review of Systems   Constitutional: Positive for chills. Negative for fatigue and fever.   HENT: Negative for congestion.    Respiratory: Positive for shortness of breath. Negative for cough and chest tightness.    Cardiovascular: Negative for chest pain.   Gastrointestinal: Positive for abdominal distention, abdominal pain and nausea. Negative for anal bleeding, blood in stool, constipation, diarrhea and vomiting.   Genitourinary: Negative for dysuria and urgency.   Musculoskeletal: Positive for arthralgias and myalgias.   Neurological: Negative for dizziness and weakness.   Psychiatric/Behavioral: Negative for agitation. The patient is not nervous/anxious.      Objective:     Vital Signs (Most Recent):  Temp: 97.6 °F (36.4 °C) (07/19/20 0813)  Pulse: 85 (07/19/20 0813)  Resp: (!) 22 (07/19/20 0813)  BP: 139/86 (07/19/20 0813)  SpO2: (!) 87 % (07/19/20 0813) Vital Signs (24h Range):  Temp:  [97.6 °F (36.4 °C)-99.9 °F (37.7 °C)] 97.6 °F (36.4 °C)  Pulse:  [82-90] 85  Resp:  [17-22] 22  SpO2:  [87 %-97 %] 87 %  BP: (124-142)/(71-86) 139/86     Weight: 129.6 kg (285 lb 11.5 oz)  Body mass index is 46.12 kg/m².    Physical Exam  Constitutional:       Appearance: She is well-developed.   HENT:      Head: Normocephalic and atraumatic.      Nose: Nose normal.      Mouth/Throat:      Mouth: Mucous membranes are moist.   Eyes:      Pupils: Pupils are equal, round, and reactive to light.   Neck:      Musculoskeletal: Normal range of motion and neck supple.   Cardiovascular:      Rate and Rhythm: Normal rate and regular rhythm.      Heart sounds: Normal heart  sounds. No murmur. No friction rub. No gallop.    Pulmonary:      Effort: Tachypnea present.      Breath sounds: Normal breath sounds.   Abdominal:      General: Bowel sounds are normal. There is no distension.      Palpations: Abdomen is soft. There is no mass.      Tenderness: There is no abdominal tenderness. There is no rebound.   Musculoskeletal: Normal range of motion.         General: No tenderness.   Skin:     General: Skin is warm.   Neurological:      General: No focal deficit present.      Mental Status: She is alert and oriented to person, place, and time.      Deep Tendon Reflexes: Reflexes are normal and symmetric.   Psychiatric:         Mood and Affect: Mood normal.         Behavior: Behavior normal.           CRANIAL NERVES     CN III, IV, VI   Pupils are equal, round, and reactive to light.       Significant Labs:   A1C: No results for input(s): HGBA1C in the last 4320 hours.  BMP:   Recent Labs   Lab 07/19/20 0203   *   *   K 4.3      CO2 26   BUN 10   CREATININE 0.7   CALCIUM 8.2*     CBC:   Recent Labs   Lab 07/19/20 0203   WBC 3.77*   HGB 11.7*   HCT 34.9*   *     CMP:   Recent Labs   Lab 07/19/20 0203   *   K 4.3      CO2 26   *   BUN 10   CREATININE 0.7   CALCIUM 8.2*   PROT 7.1   ALBUMIN 3.4*   BILITOT 0.4   ALKPHOS 71   AST 47*   ALT 56*   ANIONGAP 9   EGFRNONAA >60     Coagulation: No results for input(s): PT, INR, APTT in the last 48 hours.  Lactic Acid:   Recent Labs   Lab 07/19/20 0203   LACTATE 0.7     Lipid Panel: No results for input(s): CHOL, HDL, LDLCALC, TRIG, CHOLHDL in the last 48 hours.  Magnesium: No results for input(s): MG in the last 48 hours.  Prealbumin: No results for input(s): PREALBUMIN in the last 48 hours.  Respiratory Culture: No results for input(s): GSRESP, RESPIRATORYC in the last 48 hours.  Troponin:   Recent Labs   Lab 07/19/20 0203   TROPONINI 0.024     Urine Culture: No results for input(s): LABURIN in the last  48 hours.  Urine Studies: No results for input(s): COLORU, APPEARANCEUA, PHUR, SPECGRAV, PROTEINUA, GLUCUA, KETONESU, BILIRUBINUA, OCCULTUA, NITRITE, UROBILINOGEN, LEUKOCYTESUR, RBCUA, WBCUA, BACTERIA, SQUAMEPITHEL, HYALINECASTS in the last 48 hours.    Invalid input(s): LOULOU    Significant Imaging: I have reviewed all pertinent imaging results/findings within the past 24 hours.

## 2020-07-19 NOTE — ED NOTES
Patient identifiers for Irina Dugan checked and correct.  LOC: The patient is awake, alert and aware of environment with an appropriate affect, the patient is oriented x 3 and speaking appropriately.  APPEARANCE: Obese. Patient uncomfortable, patient is clean and well groomed, patient's clothing are properly fastened.  SKIN: The skin is warm and dry, patient has normal skin turgor and moist mucus membranes, skin intact.  MUSKULOSKELETAL: Patient moving all extremities well, no obvious swelling or deformities noted.  RESPIRATORY: Airway is open and patent, respirations are spontaneous, patient has a normal effort and rate.  CARDIAC: Patient has a normal rate and rhythm.

## 2020-07-19 NOTE — CONSULTS
Pulmonary / Critical Care Medicine  Consult Note        Reason for Consult: COVID-19       History of Present Illness:     Ms. Irina Dugan is a 49 y.o. female who  has a past medical history of HTN who presented to [facility] 7/19/2020 with a primary complaint of Cough, headache, fatigue, and shortness of breath. Found to have COVID-19.     The patient was in her usual state of health until a week ago when she had onset of myalgias, shortness of breath, nausea and vomiting. Tested +COVID on 7/11. Was discharged from ED with z-pack. Returned to ED this morning with worsening of symptoms. Cannot walk a block without having to rest.      Maciel symptoms inclulde shortness of breath, headache, myalgias. Reports works as . States both her mother and son are currently in ER now getting evaluated for similar symptoms.     ROS: Comprehensive review of systems obtained and is negative except as noted in HPI.    PMHx:   Past Medical History:   Diagnosis Date    Colitis, nonspecific 11/6/2012    Hypertension 10/12/2012    Ovarian cyst     Tubal ectopic pregnancy        Home Meds:   No current facility-administered medications on file prior to encounter.      Current Outpatient Medications on File Prior to Encounter   Medication Sig Dispense Refill    acetaminophen (TYLENOL) 325 mg Cap Take 650 mg by mouth as needed.      albuterol (PROVENTIL/VENTOLIN HFA) 90 mcg/actuation inhaler Inhale 1-2 puffs into the lungs every 6 (six) hours as needed. Rescue 8 g 0    azithromycin (Z-HERLINDA) 250 MG tablet Take 2 tablets by mouth on day 1; Take 1 tablet by mouth on days 2-5 6 tablet 0    ibuprofen (ADVIL,MOTRIN) 600 MG tablet Take 1 tablet (600 mg total) by mouth every 6 (six) hours as needed. 20 tablet 0    ipratropium (ATROVENT) 0.03 % nasal spray 2 sprays by Nasal route 2 (two) times daily. for 7 days 30 mL 0    olmesartan-hydrochlorothiazide (BENICAR HCT) 20-12.5 mg per tablet Take 1 tablet by mouth once daily. 90  tablet 0    ondansetron (ZOFRAN-ODT) 4 MG TbDL Take 1 tablet (4 mg total) by mouth every 6 (six) hours as needed. 30 tablet 0    furosemide (LASIX) 20 MG tablet TAKE ONE TABLET BY MOUTH DAILY AS NEEDED (SWELLING) 90 tablet 1    [DISCONTINUED] amoxicillin-clavulanate 875-125mg (AUGMENTIN) 875-125 mg per tablet Take 1 tablet by mouth 2 (two) times daily. for 10 days 20 tablet 0    [DISCONTINUED] ciclopirox (PENLAC) 8 % Soln Apply topically nightly. (Patient not taking: Reported on 2020) 1 Bottle 0    [DISCONTINUED] fish oil-omega-3 fatty acids 300-1,000 mg capsule Take by mouth once daily.      [DISCONTINUED] norgestimate-ethinyl estradiol (SPRINTEC, 28,) 0.25-35 mg-mcg per tablet Take 1 tablet by mouth once daily. (Patient not taking: Reported on 2020) 28 tablet 2       PSHx:   Past Surgical History:   Procedure Laterality Date     SECTION, CLASSIC      CHOLECYSTECTOMY      ECTOPIC PREGNANCY SURGERY  age 25    TUBAL LIGATION         Allergies:   Review of patient's allergies indicates:  No Known Allergies      SHX:   - Tobacco:  reports that she has never smoked. She has never used smokeless tobacco.  - EtOH:  reports current alcohol use.  - Illicit:   Social History     Substance and Sexual Activity   Drug Use No     - Occupation: Data Unavailable    FHx:   family history includes Breast cancer in her mother; Cancer in her unknown relative; Hypertension in her unknown relative; Ovarian cysts in her unknown relative.    Current Meds:   Scheduled:    azithromycin  500 mg Intravenous Q24H    cefTRIAXone (ROCEPHIN) IVPB  1 g Intravenous Q24H    [START ON 2020] dexamethasone  6 mg Intravenous Q24H    enoxaparin  40 mg Subcutaneous Q24H    hydroCHLOROthiazide  12.5 mg Oral Daily    losartan  50 mg Oral Daily       Continuous Infusions:       PRN:   acetaminophen, albuterol, ondansetron    VITAL SIGNS:   Temp:  [97.6 °F (36.4 °C)-99.9 °F (37.7 °C)]   Pulse:  [82-90]   Resp:  [17-22]    BP: (124-142)/(71-86)   SpO2:  [87 %-97 %]       Physical Exam:   Limited 2/2 COVID-19 pandemic, to reduce infectiious risk and preserve PPE patient was seen and examined via virtual rounds.  Comfortable on room air. No apparent distress.         LABS:     Recent Labs   Lab 07/19/20  0203   WBC 3.77*   RBC 4.13   HGB 11.7*   HCT 34.9*   *   MCV 85   MCH 28.3   MCHC 33.5   *   K 4.3      CO2 26   BUN 10   CREATININE 0.7   ALT 56*   AST 47*   ALKPHOS 71   BILITOT 0.4   PROT 7.1   ALBUMIN 3.4*   CPK 34   TROPONINI 0.024           CXR:    X-ray Chest Ap Portable    Result Date: 7/19/2020  Prominence of the central pulmonary vascular as well as bilateral interstitial and alveolar infiltrates noted.  The findings demonstrate progression of radiographic findings when compared to the prior exam. Electronically signed by: Romaine Trejo Date:    07/19/2020 Time:    01:59        ASSESSMENT/PLAN:     1. COVID-19 Infection  2. Acute hypoxic respiratory failure   - symptom onset ~ 10 days ago  - CXR with poor lung volumes, but show bilateral patchy airspace disease consistent with COVID.   -  d-dimer normal, Ferritin normal, CRP elevated to 74  - on RA and intermittently 2L NC to achieve sats > 90%%  - continue dexamethasone 6mg IV daily x 10 days  - agree with pharmacy consult to assess for possible remdesivir  - renal function good, Cr 0.7  - low suspicion for bacterial coinfection, procalcitonin low, but can continue rocephin/azithro for now, if lack of other signs of bacterial superinfection, can consider discontinuing abx tomorrow  - on lovenox 40 daily, AC per primary  - incentive spirometry       Thank you for this consult. Do not hesitate to contact team for questions or if there is change in clinical status.     Adeola Myrick MD  Pulmonary / Critical Care Fellow  Pager: [phone]  07/19/2020  1:27 PM

## 2020-07-20 LAB
ALBUMIN SERPL BCP-MCNC: 3.4 G/DL (ref 3.5–5.2)
ALP SERPL-CCNC: 73 U/L (ref 55–135)
ALT SERPL W/O P-5'-P-CCNC: 48 U/L (ref 10–44)
ANION GAP SERPL CALC-SCNC: 10 MMOL/L (ref 8–16)
AST SERPL-CCNC: 30 U/L (ref 10–40)
BASOPHILS # BLD AUTO: 0 K/UL (ref 0–0.2)
BASOPHILS NFR BLD: 0 % (ref 0–1.9)
BILIRUB SERPL-MCNC: 0.4 MG/DL (ref 0.1–1)
BUN SERPL-MCNC: 9 MG/DL (ref 6–20)
CALCIUM SERPL-MCNC: 8.9 MG/DL (ref 8.7–10.5)
CHLORIDE SERPL-SCNC: 98 MMOL/L (ref 95–110)
CO2 SERPL-SCNC: 29 MMOL/L (ref 23–29)
CREAT SERPL-MCNC: 0.6 MG/DL (ref 0.5–1.4)
DIFFERENTIAL METHOD: ABNORMAL
EOSINOPHIL # BLD AUTO: 0 K/UL (ref 0–0.5)
EOSINOPHIL NFR BLD: 0 % (ref 0–8)
ERYTHROCYTE [DISTWIDTH] IN BLOOD BY AUTOMATED COUNT: 13.4 % (ref 11.5–14.5)
EST. GFR  (AFRICAN AMERICAN): >60 ML/MIN/1.73 M^2
EST. GFR  (NON AFRICAN AMERICAN): >60 ML/MIN/1.73 M^2
GLUCOSE SERPL-MCNC: 131 MG/DL (ref 70–110)
HCT VFR BLD AUTO: 37.3 % (ref 37–48.5)
HGB BLD-MCNC: 12.5 G/DL (ref 12–16)
IMM GRANULOCYTES # BLD AUTO: 0.01 K/UL (ref 0–0.04)
IMM GRANULOCYTES NFR BLD AUTO: 0.2 % (ref 0–0.5)
LYMPHOCYTES # BLD AUTO: 0.6 K/UL (ref 1–4.8)
LYMPHOCYTES NFR BLD: 12.7 % (ref 18–48)
MAGNESIUM SERPL-MCNC: 1.9 MG/DL (ref 1.6–2.6)
MCH RBC QN AUTO: 28 PG (ref 27–31)
MCHC RBC AUTO-ENTMCNC: 33.5 G/DL (ref 32–36)
MCV RBC AUTO: 83 FL (ref 82–98)
MONOCYTES # BLD AUTO: 0.3 K/UL (ref 0.3–1)
MONOCYTES NFR BLD: 7.2 % (ref 4–15)
NEUTROPHILS # BLD AUTO: 3.5 K/UL (ref 1.8–7.7)
NEUTROPHILS NFR BLD: 79.9 % (ref 38–73)
NRBC BLD-RTO: 0 /100 WBC
PHOSPHATE SERPL-MCNC: 3.8 MG/DL (ref 2.7–4.5)
PLATELET # BLD AUTO: 158 K/UL (ref 150–350)
PMV BLD AUTO: 11.1 FL (ref 9.2–12.9)
POTASSIUM SERPL-SCNC: 3.7 MMOL/L (ref 3.5–5.1)
PROT SERPL-MCNC: 7.5 G/DL (ref 6–8.4)
RBC # BLD AUTO: 4.47 M/UL (ref 4–5.4)
SODIUM SERPL-SCNC: 137 MMOL/L (ref 136–145)
WBC # BLD AUTO: 4.42 K/UL (ref 3.9–12.7)

## 2020-07-20 PROCEDURE — 36415 COLL VENOUS BLD VENIPUNCTURE: CPT

## 2020-07-20 PROCEDURE — 25000242 PHARM REV CODE 250 ALT 637 W/ HCPCS: Performed by: NURSE PRACTITIONER

## 2020-07-20 PROCEDURE — 85025 COMPLETE CBC W/AUTO DIFF WBC: CPT

## 2020-07-20 PROCEDURE — 11000001 HC ACUTE MED/SURG PRIVATE ROOM

## 2020-07-20 PROCEDURE — 99900035 HC TECH TIME PER 15 MIN (STAT)

## 2020-07-20 PROCEDURE — 63600175 PHARM REV CODE 636 W HCPCS: Performed by: NURSE PRACTITIONER

## 2020-07-20 PROCEDURE — 63600175 PHARM REV CODE 636 W HCPCS: Performed by: FAMILY MEDICINE

## 2020-07-20 PROCEDURE — 25000003 PHARM REV CODE 250: Performed by: FAMILY MEDICINE

## 2020-07-20 PROCEDURE — 83735 ASSAY OF MAGNESIUM: CPT

## 2020-07-20 PROCEDURE — 27000221 HC OXYGEN, UP TO 24 HOURS

## 2020-07-20 PROCEDURE — 25000003 PHARM REV CODE 250: Performed by: NURSE PRACTITIONER

## 2020-07-20 PROCEDURE — 84100 ASSAY OF PHOSPHORUS: CPT

## 2020-07-20 PROCEDURE — 94640 AIRWAY INHALATION TREATMENT: CPT

## 2020-07-20 PROCEDURE — 80053 COMPREHEN METABOLIC PANEL: CPT

## 2020-07-20 RX ORDER — CETIRIZINE HYDROCHLORIDE 10 MG/1
10 TABLET ORAL DAILY
Status: DISCONTINUED | OUTPATIENT
Start: 2020-07-20 | End: 2020-07-26 | Stop reason: HOSPADM

## 2020-07-20 RX ORDER — GUAIFENESIN 100 MG/5ML
200 SOLUTION ORAL EVERY 6 HOURS PRN
Status: DISCONTINUED | OUTPATIENT
Start: 2020-07-20 | End: 2020-07-26 | Stop reason: HOSPADM

## 2020-07-20 RX ORDER — DEXAMETHASONE SODIUM PHOSPHATE 4 MG/ML
6 INJECTION, SOLUTION INTRA-ARTICULAR; INTRALESIONAL; INTRAMUSCULAR; INTRAVENOUS; SOFT TISSUE DAILY
Status: COMPLETED | OUTPATIENT
Start: 2020-07-21 | End: 2020-07-24

## 2020-07-20 RX ORDER — AZITHROMYCIN 250 MG/1
500 TABLET, FILM COATED ORAL DAILY
Status: DISCONTINUED | OUTPATIENT
Start: 2020-07-21 | End: 2020-07-20

## 2020-07-20 RX ADMIN — ALBUTEROL SULFATE 2 PUFF: 90 AEROSOL, METERED RESPIRATORY (INHALATION) at 06:07

## 2020-07-20 RX ADMIN — GUAIFENESIN 200 MG: 200 SOLUTION ORAL at 06:07

## 2020-07-20 RX ADMIN — Medication 500 MG: at 09:07

## 2020-07-20 RX ADMIN — LOSARTAN POTASSIUM 50 MG: 50 TABLET ORAL at 09:07

## 2020-07-20 RX ADMIN — ENOXAPARIN SODIUM 40 MG: 100 INJECTION SUBCUTANEOUS at 05:07

## 2020-07-20 RX ADMIN — CETIRIZINE HYDROCHLORIDE 10 MG: 10 TABLET, FILM COATED ORAL at 03:07

## 2020-07-20 RX ADMIN — DEXAMETHASONE 6 MG: 4 TABLET ORAL at 09:07

## 2020-07-20 RX ADMIN — HYDROCHLOROTHIAZIDE 12.5 MG: 12.5 TABLET ORAL at 09:07

## 2020-07-20 RX ADMIN — AZITHROMYCIN MONOHYDRATE 500 MG: 500 INJECTION, POWDER, LYOPHILIZED, FOR SOLUTION INTRAVENOUS at 09:07

## 2020-07-20 RX ADMIN — MAGNESIUM OXIDE 400 MG (241.3 MG MAGNESIUM) TABLET 400 MG: TABLET at 09:07

## 2020-07-20 RX ADMIN — THERA TABS 1 TABLET: TAB at 09:07

## 2020-07-20 NOTE — PROGRESS NOTES
U Infectious Diseases Plan of Care    Assessment/Plan:    1. Acute hypoxic respiratory failure 2/2 COVID-19 infection  - Remdesivir: Pharmacy consulted. Patients are reviewed by ID group at [facility] for eligibility. If approved, this will be communicated to the primary team via secure chat.   - Steroids: continue dexamethasone 6mg IV qd, today is day 2/10.  - Antibiotics: CXR without consolidation, procal < 1. All symptoms documented/reported consistent with COVID infection. There is no indication for antibiotics in this patient, please discontinue.   - Anti-coagulation: per primary team  - Monitor on tele  - Monitor LFTs      Thank you for allowing us to participate in the care of this patient. ID will sign off at this time. Please call us with any further questions.     Hakan Alejandre MD  [facility] Infectious Diseases Fellow, PGY-4  Cell: [phone]

## 2020-07-20 NOTE — PLAN OF CARE
VN cued into patients room for rounding - Patient is in no acute distress at this time.  o2 nc intact. Call light within reach. Will continue to monitor closely.

## 2020-07-20 NOTE — SUBJECTIVE & OBJECTIVE
Interval History: awake and alert, stilll feel very SOB, chest pain with deep breath, cough irritated throat.   O2 sat 91% on 2l NC    Review of Systems   Constitutional: Negative for chills, fatigue and fever.   Respiratory: Positive for cough, chest tightness and shortness of breath.    Cardiovascular: Positive for chest pain.   Gastrointestinal: Negative for abdominal distention, abdominal pain, anal bleeding, blood in stool, constipation, diarrhea, nausea and vomiting.   Genitourinary: Negative for dysuria and urgency.   Musculoskeletal: Negative for arthralgias and myalgias.   Neurological: Negative for dizziness and weakness.   Psychiatric/Behavioral: Negative for agitation. The patient is not nervous/anxious.      Objective:     Vital Signs (Most Recent):  Temp: 96.8 °F (36 °C) (07/20/20 1142)  Pulse: 81 (07/20/20 1142)  Resp: 18 (07/20/20 1142)  BP: 127/83 (07/20/20 1142)  SpO2: (!) 91 % (07/20/20 1142) Vital Signs (24h Range):  Temp:  [96.5 °F (35.8 °C)-97.5 °F (36.4 °C)] 96.8 °F (36 °C)  Pulse:  [70-81] 81  Resp:  [16-20] 18  SpO2:  [91 %-96 %] 91 %  BP: (127-139)/(77-90) 127/83     Weight: 129.6 kg (285 lb 11.5 oz)  Body mass index is 46.12 kg/m².  No intake or output data in the 24 hours ending 07/20/20 1331   Physical Exam  Constitutional:       Appearance: She is well-developed.   HENT:      Head: Normocephalic and atraumatic.   Neck:      Musculoskeletal: Neck supple.   Cardiovascular:      Rate and Rhythm: Normal rate and regular rhythm.      Pulses: Normal pulses.      Heart sounds: No friction rub.   Pulmonary:      Breath sounds: Normal breath sounds.   Abdominal:      General: Bowel sounds are normal. There is no distension.      Palpations: Abdomen is soft.      Tenderness: There is no abdominal tenderness.   Musculoskeletal: Normal range of motion.         General: No tenderness.   Skin:     General: Skin is warm.   Neurological:      Mental Status: She is alert and oriented to person, place,  and time.      Deep Tendon Reflexes: Reflexes are normal and symmetric.   Psychiatric:         Mood and Affect: Mood normal.         Behavior: Behavior normal.         Significant Labs:   A1C: No results for input(s): HGBA1C in the last 4320 hours.  Blood Culture: No results for input(s): LABBLOO in the last 48 hours.  CBC:   Recent Labs   Lab 07/19/20 0203 07/20/20  0923   WBC 3.77* 4.42   HGB 11.7* 12.5   HCT 34.9* 37.3   * 158     CMP:   Recent Labs   Lab 07/19/20  0203 07/20/20  0923   * 137   K 4.3 3.7    98   CO2 26 29   * 131*   BUN 10 9   CREATININE 0.7 0.6   CALCIUM 8.2* 8.9   PROT 7.1 7.5   ALBUMIN 3.4* 3.4*   BILITOT 0.4 0.4   ALKPHOS 71 73   AST 47* 30   ALT 56* 48*   ANIONGAP 9 10   EGFRNONAA >60 >60     Coagulation: No results for input(s): PT, INR, APTT in the last 48 hours.  Lactic Acid:   Recent Labs   Lab 07/19/20  0203   LACTATE 0.7     Lipid Panel: No results for input(s): CHOL, HDL, LDLCALC, TRIG, CHOLHDL in the last 48 hours.  Magnesium:   Recent Labs   Lab 07/20/20  0923   MG 1.9     Respiratory Culture: No results for input(s): GSRESP, RESPIRATORYC in the last 48 hours.  TSH: No results for input(s): TSH in the last 4320 hours.  Urine Culture: No results for input(s): LABURIN in the last 48 hours.  Urine Studies: No results for input(s): COLORU, APPEARANCEUA, PHUR, SPECGRAV, PROTEINUA, GLUCUA, KETONESU, BILIRUBINUA, OCCULTUA, NITRITE, UROBILINOGEN, LEUKOCYTESUR, RBCUA, WBCUA, BACTERIA, SQUAMEPITHEL, HYALINECASTS in the last 48 hours.    Invalid input(s): WRIGHTSUR    Significant Imaging: I have reviewed all pertinent imaging results/findings within the past 24 hours.

## 2020-07-20 NOTE — PLAN OF CARE
Care plan reviewed with patient, AAOx4, O2 at 2 L n/c. Denies any pain of discomfort, education provided on medication effect and side effect, voices understanding. Call light within her reach, no apparent distress noted, bed in low position, bed alarm on, educated on the importance of calling as needed, voices understanding, stable at this time.

## 2020-07-20 NOTE — PLAN OF CARE
Covid +  TN spoke to pt via phone; Pt informed Tn she lives with her mother and boyfriend and is independent with adls. Pt stated her boyfriend can provide help at home if needed and will pick her up when d/c. Tn offered to call pt's mother or boyfriend to update but pt declined stating she has been speaking with them.  Tn encouraged pt to call for any needs/concerns.     07/20/20 1410   Discharge Assessment   Assessment Type Discharge Planning Assessment   Confirmed/corrected address and phone number on facesheet? Yes   Assessment information obtained from? Patient   Expected Length of Stay (days) 2   Communicated expected length of stay with patient/caregiver yes   Prior to hospitilization cognitive status: Alert/Oriented   Prior to hospitalization functional status: Independent   Current cognitive status: Alert/Oriented   Current Functional Status: Needs Assistance   Lives With significant other;parent(s)   Able to Return to Prior Arrangements yes   Is patient able to care for self after discharge? Yes   Who are your caregiver(s) and their phone number(s)? [facility] ([facility] [phone]   Patient's perception of discharge disposition home or selfcare   Readmission Within the Last 30 Days no previous admission in last 30 days   Patient currently being followed by outpatient case management? No   Patient currently receives any other outside agency services? No   Equipment Currently Used at Home none   Do you have any problems affording any of your prescribed medications? No   Is the patient taking medications as prescribed? yes   Does the patient have transportation home? Yes   Transportation Anticipated family or friend will provide   Discharge Plan A Home with family   Discharge Plan B Home Health   DME Needed Upon Discharge  none   Patient/Family in Agreement with Plan yes

## 2020-07-20 NOTE — PLAN OF CARE
VN cued into patients room for rounding - Plan of care reviewed with pt. o2 nc intact. Patient is in no acute distress at this time.  Call light within reach. Will continue to monitor closely.  Patient instructed to call immediately for SOB. Pt c/o having intermittent coughing spells. Cough is dry and nonproductive. No prn cough meds noted. ERNESTO Bowers NP notified and bedside nursedidier, notified. Order for consult for case management was  previously placed for assistance with advance directives. Notified by pt that she has not received any information regarding advance directives. Bedside nurse, didier, notified

## 2020-07-20 NOTE — PROGRESS NOTES
Pharmacist Intervention IV to PO Note    Irina Dugan is a 49 y.o. female being treated with IV medication azithromycin    Patient Data:    Vital Signs (Most Recent):  Temp: 96.5 °F (35.8 °C) (07/20/20 0851)  Pulse: 70 (07/20/20 0851)  Resp: 18 (07/20/20 0851)  BP: 136/87 (07/20/20 0851)  SpO2: (!) 92 % (07/20/20 0851)   Vital Signs (72h Range):  Temp:  [96.5 °F (35.8 °C)-99.9 °F (37.7 °C)]   Pulse:  [70-90]   Resp:  [16-22]   BP: (124-142)/(71-90)   SpO2:  [87 %-97 %]      CBC:  Recent Labs   Lab 07/16/20  1455 07/19/20  0203 07/20/20  0923   WBC 3.07* 3.77* 4.42   RBC 4.62 4.13 4.47   HGB 13.0 11.7* 12.5   HCT 38.8 34.9* 37.3   * 107* 158   MCV 84 85 83   MCH 28.1 28.3 28.0   MCHC 33.5 33.5 33.5     CMP:     Recent Labs   Lab 07/16/20  1455 07/19/20  0203 07/20/20  0923   * 112* 131*   CALCIUM 8.9 8.2* 8.9   ALBUMIN 3.9 3.4* 3.4*   PROT 7.9 7.1 7.5   * 135* 137   K 4.1 4.3 3.7   CO2 26 26 29   CL 98 100 98   BUN 10 10 9   CREATININE 0.8 0.7 0.6   ALKPHOS 73 71 73   ALT 65* 56* 48*   AST 43* 47* 30   BILITOT 0.4 0.4 0.4       Dietary Orders:  Diet Orders            Diet [facility]; Vegetarian: Clay starting at 07/19 1351            Based on the following criteria, this patient qualifies for intravenous to oral conversion:  [x] The patients gastrointestinal tract is functioning (tolerating medications via oral or enteral route for 24 hours and tolerating food or enteral feeds for 24 hours).  [x] The patient is hemodynamically stable for 24 hours (heart rate <100 beats per minute, systolic blood pressure >99 mm Hg, and respiratory rate <20 breaths per minute).  [x] The patient shows clinical improvement (afebrile for at least 24 hours and white blood cell count downtrending or normalized). Additionally, the patient must be non-neutropenic (absolute neutrophil count >500 cells/mm3).  [x] For antimicrobials, the patient has received IV therapy for at least 24 hours.    IV  medication azithromycin will be changed to oral medication     Pharmacist's Name: Andry Mendez  Pharmacist's Extension: [phone]

## 2020-07-20 NOTE — PLAN OF CARE
VN entered into the patient's room with permission. The patient was awake and alert. She reports feeling more SOB and having coughing spells today. Medications were reviewed with the patient as well as her POC. RT and the BSN were notified and will adminster PRN meds.

## 2020-07-20 NOTE — PROGRESS NOTES
[facility] Hospital Medicine  Progress Note    Patient Name: Irina Dugan  MRN: [id number]  Patient Class: IP- Inpatient   Admission Date: 7/19/2020  Length of Stay: 1 days  Attending Physician: Ines Wright*  Primary Care Provider: Adrianna Pope DO        Subjective:     Principal Problem:Pneumonia due to COVID-19 virus        HPI:   Irina Dugan is a 50 y/o F with PMH of hypertension, ovarian cyst, presents to the ED presents with 1-week history of progressive SOB that is worse in the past 2 days especially with activities such as walking, with associated nausea, vomiting, myalgias, chills. Patient denies fever, diarrhea, changes of smell or taste, or abdominal pain. Patient tested positive for COVID-19 2 days ago, CXR done, showed bilateral infiltrates. CRP 74.6, , LFT slightly elevated, ferritin, troponin, CK, D-dimer all wnl.     Overview/Hospital Course:  No notes on file    Interval History: awake and alert, stilll feel very SOB, chest pain with deep breath, cough irritated throat.   O2 sat 91% on 2l NC    Review of Systems   Constitutional: Negative for chills, fatigue and fever.   Respiratory: Positive for cough, chest tightness and shortness of breath.    Cardiovascular: Positive for chest pain.   Gastrointestinal: Negative for abdominal distention, abdominal pain, anal bleeding, blood in stool, constipation, diarrhea, nausea and vomiting.   Genitourinary: Negative for dysuria and urgency.   Musculoskeletal: Negative for arthralgias and myalgias.   Neurological: Negative for dizziness and weakness.   Psychiatric/Behavioral: Negative for agitation. The patient is not nervous/anxious.      Objective:     Vital Signs (Most Recent):  Temp: 96.8 °F (36 °C) (07/20/20 1142)  Pulse: 81 (07/20/20 1142)  Resp: 18 (07/20/20 1142)  BP: 127/83 (07/20/20 1142)  SpO2: (!) 91 % (07/20/20 1142) Vital Signs (24h Range):  Temp:  [96.5 °F (35.8 °C)-97.5 °F (36.4 °C)] 96.8  °F (36 °C)  Pulse:  [70-81] 81  Resp:  [16-20] 18  SpO2:  [91 %-96 %] 91 %  BP: (127-139)/(77-90) 127/83     Weight: 129.6 kg (285 lb 11.5 oz)  Body mass index is 46.12 kg/m².  No intake or output data in the 24 hours ending 07/20/20 1331   Physical Exam  Constitutional:       Appearance: She is well-developed.   HENT:      Head: Normocephalic and atraumatic.   Neck:      Musculoskeletal: Neck supple.   Cardiovascular:      Rate and Rhythm: Normal rate and regular rhythm.      Pulses: Normal pulses.      Heart sounds: No friction rub.   Pulmonary:      Breath sounds: Normal breath sounds.   Abdominal:      General: Bowel sounds are normal. There is no distension.      Palpations: Abdomen is soft.      Tenderness: There is no abdominal tenderness.   Musculoskeletal: Normal range of motion.         General: No tenderness.   Skin:     General: Skin is warm.   Neurological:      Mental Status: She is alert and oriented to person, place, and time.      Deep Tendon Reflexes: Reflexes are normal and symmetric.   Psychiatric:         Mood and Affect: Mood normal.         Behavior: Behavior normal.         Significant Labs:   A1C: No results for input(s): HGBA1C in the last 4320 hours.  Blood Culture: No results for input(s): LABBLOO in the last 48 hours.  CBC:   Recent Labs   Lab 07/19/20 0203 07/20/20  0923   WBC 3.77* 4.42   HGB 11.7* 12.5   HCT 34.9* 37.3   * 158     CMP:   Recent Labs   Lab 07/19/20 0203 07/20/20  0923   * 137   K 4.3 3.7    98   CO2 26 29   * 131*   BUN 10 9   CREATININE 0.7 0.6   CALCIUM 8.2* 8.9   PROT 7.1 7.5   ALBUMIN 3.4* 3.4*   BILITOT 0.4 0.4   ALKPHOS 71 73   AST 47* 30   ALT 56* 48*   ANIONGAP 9 10   EGFRNONAA >60 >60     Coagulation: No results for input(s): PT, INR, APTT in the last 48 hours.  Lactic Acid:   Recent Labs   Lab 07/19/20  0203   LACTATE 0.7     Lipid Panel: No results for input(s): CHOL, HDL, LDLCALC, TRIG, CHOLHDL in the last 48 hours.  Magnesium:    Recent Labs   Lab 07/20/20  0923   MG 1.9     Respiratory Culture: No results for input(s): GSRESP, RESPIRATORYC in the last 48 hours.  TSH: No results for input(s): TSH in the last 4320 hours.  Urine Culture: No results for input(s): LABURIN in the last 48 hours.  Urine Studies: No results for input(s): COLORU, APPEARANCEUA, PHUR, SPECGRAV, PROTEINUA, GLUCUA, KETONESU, BILIRUBINUA, OCCULTUA, NITRITE, UROBILINOGEN, LEUKOCYTESUR, RBCUA, WBCUA, BACTERIA, SQUAMEPITHEL, HYALINECASTS in the last 48 hours.    Invalid input(s): WRIGHTSUR    Significant Imaging: I have reviewed all pertinent imaging results/findings within the past 24 hours.      Assessment/Plan:      * Pneumonia due to COVID-19 virus  Elevated Liver enzymes  CXR showed worsening central pulmonary vascular as well as bilateral interstitial and alveolar infiltrates noted.    COVID 19 test positive   CRP 74.6, , LFT slightly elevated, ferritin, troponin, CK, D-dimer all wnl.   Rocephin /Azithromycin  Decadron 6mg   Albuterol inhaler prn  Lovenox bid  Supplemental oxygen  Tylenol   Antiemetics.  Blood cultures pending  Enoxaparin  40mg subq BID  Tylenol prn  Consult Pulm  Consult ID  Consult Remdesivir      Essential hypertension    continue losartan, HCT      VTE Risk Mitigation (From admission, onward)         Ordered     enoxaparin injection 40 mg  Every 24 hours      07/19/20 0512                      Ines Wright MD  Department of Hospital Medicine   [facility]

## 2020-07-20 NOTE — PLAN OF CARE
VN cued into patients room for rounding - Patient is in no acute distress at this time.  Call light within reach. Will continue to monitor closely.  Pt c/o throat being dry and sore. Bedside nurse, didier, notified.

## 2020-07-20 NOTE — PLAN OF CARE
VN cued into patients room for rounding - Patient is in no acute distress at this time.  o2 nc intact. Call light within reach. Will continue to monitor closely.  Patient instructed to call immediately for SOB.

## 2020-07-21 PROBLEM — R74.8 ELEVATED LIVER ENZYMES: Status: RESOLVED | Noted: 2020-07-19 | Resolved: 2020-07-21

## 2020-07-21 LAB
BASOPHILS # BLD AUTO: 0 K/UL (ref 0–0.2)
BASOPHILS NFR BLD: 0 % (ref 0–1.9)
DIFFERENTIAL METHOD: ABNORMAL
EOSINOPHIL # BLD AUTO: 0 K/UL (ref 0–0.5)
EOSINOPHIL NFR BLD: 0 % (ref 0–8)
ERYTHROCYTE [DISTWIDTH] IN BLOOD BY AUTOMATED COUNT: 13.3 % (ref 11.5–14.5)
HCT VFR BLD AUTO: 37.1 % (ref 37–48.5)
HGB BLD-MCNC: 12.3 G/DL (ref 12–16)
IMM GRANULOCYTES # BLD AUTO: 0.02 K/UL (ref 0–0.04)
IMM GRANULOCYTES NFR BLD AUTO: 0.3 % (ref 0–0.5)
LYMPHOCYTES # BLD AUTO: 0.8 K/UL (ref 1–4.8)
LYMPHOCYTES NFR BLD: 13.2 % (ref 18–48)
MAGNESIUM SERPL-MCNC: 2 MG/DL (ref 1.6–2.6)
MCH RBC QN AUTO: 28 PG (ref 27–31)
MCHC RBC AUTO-ENTMCNC: 33.2 G/DL (ref 32–36)
MCV RBC AUTO: 85 FL (ref 82–98)
MONOCYTES # BLD AUTO: 0.4 K/UL (ref 0.3–1)
MONOCYTES NFR BLD: 7.3 % (ref 4–15)
NEUTROPHILS # BLD AUTO: 4.8 K/UL (ref 1.8–7.7)
NEUTROPHILS NFR BLD: 79.2 % (ref 38–73)
NRBC BLD-RTO: 0 /100 WBC
PHOSPHATE SERPL-MCNC: 4 MG/DL (ref 2.7–4.5)
PLATELET # BLD AUTO: 195 K/UL (ref 150–350)
PMV BLD AUTO: 11 FL (ref 9.2–12.9)
RBC # BLD AUTO: 4.39 M/UL (ref 4–5.4)
WBC # BLD AUTO: 6.05 K/UL (ref 3.9–12.7)

## 2020-07-21 PROCEDURE — 85025 COMPLETE CBC W/AUTO DIFF WBC: CPT

## 2020-07-21 PROCEDURE — 25000003 PHARM REV CODE 250: Performed by: FAMILY MEDICINE

## 2020-07-21 PROCEDURE — 84100 ASSAY OF PHOSPHORUS: CPT

## 2020-07-21 PROCEDURE — 11000001 HC ACUTE MED/SURG PRIVATE ROOM

## 2020-07-21 PROCEDURE — 36415 COLL VENOUS BLD VENIPUNCTURE: CPT

## 2020-07-21 PROCEDURE — 27000221 HC OXYGEN, UP TO 24 HOURS

## 2020-07-21 PROCEDURE — 83735 ASSAY OF MAGNESIUM: CPT

## 2020-07-21 PROCEDURE — 99900035 HC TECH TIME PER 15 MIN (STAT)

## 2020-07-21 PROCEDURE — 63600175 PHARM REV CODE 636 W HCPCS: Performed by: FAMILY MEDICINE

## 2020-07-21 RX ADMIN — SODIUM CHLORIDE 200 MG: 9 INJECTION, SOLUTION INTRAVENOUS at 05:07

## 2020-07-21 RX ADMIN — GUAIFENESIN 200 MG: 200 SOLUTION ORAL at 11:07

## 2020-07-21 RX ADMIN — ENOXAPARIN SODIUM 40 MG: 100 INJECTION SUBCUTANEOUS at 05:07

## 2020-07-21 RX ADMIN — DEXAMETHASONE SODIUM PHOSPHATE 6 MG: 4 INJECTION, SOLUTION INTRAMUSCULAR; INTRAVENOUS at 09:07

## 2020-07-21 RX ADMIN — MAGNESIUM OXIDE 400 MG (241.3 MG MAGNESIUM) TABLET 400 MG: TABLET at 09:07

## 2020-07-21 RX ADMIN — THERA TABS 1 TABLET: TAB at 09:07

## 2020-07-21 RX ADMIN — CETIRIZINE HYDROCHLORIDE 10 MG: 10 TABLET, FILM COATED ORAL at 09:07

## 2020-07-21 RX ADMIN — GUAIFENESIN 200 MG: 200 SOLUTION ORAL at 12:07

## 2020-07-21 RX ADMIN — Medication 500 MG: at 09:07

## 2020-07-21 RX ADMIN — HYDROCHLOROTHIAZIDE 12.5 MG: 12.5 TABLET ORAL at 09:07

## 2020-07-21 RX ADMIN — LOSARTAN POTASSIUM 50 MG: 50 TABLET ORAL at 09:07

## 2020-07-21 NOTE — NURSING
Pt awake, alert, and oriented. Skin warm and dry to touch. Resp even and unlabored. O2 @ 2L/NC in progress. Pt started Remdesivir and had a reaction. C/O pain and burning at site. Some swelling noted. Stopped infusion and contacted MD. No other complaints of pain at this time. Pt requested to speak with MD about an alternate treatment. Continue with isolation precautions.

## 2020-07-21 NOTE — NURSING
Pt awake, alert, and oriented. Skin warm and dry to touch. Complaints of SOB. Able to make needs known to staff. Annelise meds well. Call bell in reach. Bed in lowest. Reported off to oncoming nurse.

## 2020-07-21 NOTE — SUBJECTIVE & OBJECTIVE
Interval History: 2 L NC 93%  Review of Systems   Constitutional: Positive for activity change and fatigue. Negative for fever.   HENT: Negative for congestion.    Respiratory: Positive for cough and shortness of breath.    Cardiovascular: Negative for chest pain and palpitations.   Gastrointestinal: Negative for abdominal distention, nausea and vomiting.   Neurological: Negative for dizziness, light-headedness and numbness.     Objective:     Vital Signs (Most Recent):  Temp: 97 °F (36.1 °C) (07/21/20 1218)  Pulse: 67 (07/21/20 1218)  Resp: 17 (07/21/20 0356)  BP: 122/78 (07/21/20 1218)  SpO2: 95 % (07/21/20 1218) Vital Signs (24h Range):  Temp:  [96.6 °F (35.9 °C)-97 °F (36.1 °C)] 97 °F (36.1 °C)  Pulse:  [65-84] 67  Resp:  [17-18] 17  SpO2:  [92 %-96 %] 95 %  BP: (113-134)/(70-88) 122/78     Weight: 129.6 kg (285 lb 11.5 oz)  Body mass index is 46.12 kg/m².    Intake/Output Summary (Last 24 hours) at 7/21/2020 1654  Last data filed at 7/21/2020 0604  Gross per 24 hour   Intake 240 ml   Output --   Net 240 ml      Physical Exam  Vitals signs and nursing note reviewed.   Constitutional:       Appearance: Normal appearance.   HENT:      Head: Normocephalic and atraumatic.   Eyes:      Extraocular Movements: Extraocular movements intact.   Neck:      Musculoskeletal: Normal range of motion.   Cardiovascular:      Rate and Rhythm: Normal rate.   Pulmonary:      Effort: No respiratory distress.   Neurological:      General: No focal deficit present.      Mental Status: She is alert and oriented to person, place, and time.   Psychiatric:         Mood and Affect: Mood normal.         Behavior: Behavior normal.         Thought Content: Thought content normal.         Judgment: Judgment normal.         Significant Labs:   Recent Labs   Lab 07/19/20  0203 07/20/20  0923 07/21/20  0639   WBC 3.77* 4.42 6.05   HGB 11.7* 12.5 12.3   HCT 34.9* 37.3 37.1   * 158 195     Recent Labs   Lab 07/16/20  1455 07/19/20  0207  07/20/20  0923 07/21/20  0639   * 135* 137  --    K 4.1 4.3 3.7  --    CL 98 100 98  --    CO2 26 26 29  --    BUN 10 10 9  --    CREATININE 0.8 0.7 0.6  --    * 112* 131*  --    CALCIUM 8.9 8.2* 8.9  --    MG  --   --  1.9 2.0   PHOS  --   --  3.8 4.0   LIPASE 33  --   --   --      Recent Labs   Lab 07/16/20  1455 07/19/20  0203 07/20/20  0923   ALKPHOS 73 71 73   ALT 65* 56* 48*   AST 43* 47* 30   ALBUMIN 3.9 3.4* 3.4*   PROT 7.9 7.1 7.5   BILITOT 0.4 0.4 0.4      Recent Labs     07/19/20  0203   CPK 34   TROPONINI 0.024     No results for input(s): POCTGLUCOSE in the last 168 hours.  Hemoglobin A1C   Date Value Ref Range Status   09/30/2019 5.6 4.0 - 5.6 % Final     Comment:     ADA Screening Guidelines:  5.7-6.4%  Consistent with prediabetes  >or=6.5%  Consistent with diabetes  High levels of fetal hemoglobin interfere with the HbA1C  assay. Heterozygous hemoglobin variants (HbS, HgC, etc)do  not significantly interfere with this assay.   However, presence of multiple variants may affect accuracy.     09/18/2015 5.7 4.5 - 6.2 % Final       No results for input(s): COLORU, CLARITYU, SPECGRAV, PHUR, PROTEINUA, GLUCOSEU, BLOODU, WBCU, RBCU, BACTERIA, MUCUS in the last 24 hours.    Invalid input(s):  BILIRUBINCON    Microbiology Results (last 7 days)     ** No results found for the last 168 hours. **          Scheduled Meds:   ascorbic acid (vitamin C)  500 mg Oral Daily    cetirizine  10 mg Oral Daily    dexamethasone  6 mg Intravenous Daily    enoxaparin  40 mg Subcutaneous Q24H    hydroCHLOROthiazide  12.5 mg Oral Daily    losartan  50 mg Oral Daily    magnesium oxide  400 mg Oral Daily    multivitamin  1 tablet Oral Daily    [START ON 7/22/2020] EUA remdesivir infusion (NO CHARGE)  100 mg Intravenous Q24H    EUA remdesivir infusion (NO CHARGE)  200 mg Intravenous Once     Continuous Infusions:  As Needed: acetaminophen, albuterol, benzonatate, guaifenesin 100 mg/5 ml,  ondansetron      Significant Imaging:   X-Ray Chest AP Portable  Narrative: EXAMINATION:  XR CHEST AP PORTABLE    CLINICAL HISTORY:  Suspected Covid-19 Virus Infection;    TECHNIQUE:  Single frontal view of the chest was performed.    COMPARISON:  June 16, 2020    FINDINGS:  Single portable chest view is submitted.  There is mild diminished depth of inspiration and mild rotation when accounting for this the cardiomediastinal silhouette appears stable.  There is appearance thought to represent increased prominence of the central pulmonary vascular as well as bilateral pattern interstitial and alveolar infiltrates.  There is no evidence for dense consolidation, significant pleural effusion or pneumothorax.  The osseous structures demonstrate chronic change.  Impression: Prominence of the central pulmonary vascular as well as bilateral interstitial and alveolar infiltrates noted.  The findings demonstrate progression of radiographic findings when compared to the prior exam.    Electronically signed by: Romaine Trejo  Date:    07/19/2020  Time:    01:59

## 2020-07-21 NOTE — PLAN OF CARE
VN cued into patients room for rounding - Patient is in no acute distress at this time.  Call light within reach. Will continue to monitor closely.  Patient instructed to call immediately for SOB.

## 2020-07-21 NOTE — PLAN OF CARE
VN cued into patients room for q2h rounding - Patient sleeping. Patient is in no acute distress at this time.  Call light within reach. Will continue to monitor closely.

## 2020-07-21 NOTE — ASSESSMENT & PLAN NOTE
Elevated Liver enzymes  CXR showed worsening central pulmonary vascular as well as bilateral interstitial and alveolar infiltrates noted.    COVID 19 test positive   CRP 74.6, , LFT slightly elevated, ferritin, troponin, CK, D-dimer all wnl.   Rocephin /Azithromycin  Decadron 6mg   Albuterol inhaler prn  Lovenox bid  Supplemental oxygen  Tylenol   Antiemetics.  Blood cultures pending  Enoxaparin  40mg subq BID  Tylenol prn  Consult Pulm  Consult ID  Consult Remdesivir    7/21 started remdisivir  Some cough, improving SOB

## 2020-07-21 NOTE — PROGRESS NOTES
[facility] Hospital Medicine  Progress Note    Patient Name: Irina Dugan  MRN: [id number]  Patient Class: IP- Inpatient   Admission Date: 7/19/2020  Length of Stay: 2 days  Attending Physician: Thom Ramsey DO  Primary Care Provider: Adrianna Pope DO        Subjective:     Principal Problem:Pneumonia due to COVID-19 virus        HPI:   Irina Dugan is a 48 y/o F with PMH of hypertension, ovarian cyst, presents to the ED presents with 1-week history of progressive SOB that is worse in the past 2 days especially with activities such as walking, with associated nausea, vomiting, myalgias, chills. Patient denies fever, diarrhea, changes of smell or taste, or abdominal pain. Patient tested positive for COVID-19 2 days ago, CXR done, showed bilateral infiltrates. CRP 74.6, , LFT slightly elevated, ferritin, troponin, CK, D-dimer all wnl.     Overview/Hospital Course:  7/21 The patient was evaluated via virtual rounding, discussion with nurses and reviewing VS, notes was part of this evaluation. Evaluation done by technology on site.  The pt is improving slowly, les SOB from this am, some cough, rendisivir had been ordered.      Interval History: 2 L NC 93%  Review of Systems   Constitutional: Positive for activity change and fatigue. Negative for fever.   HENT: Negative for congestion.    Respiratory: Positive for cough and shortness of breath.    Cardiovascular: Negative for chest pain and palpitations.   Gastrointestinal: Negative for abdominal distention, nausea and vomiting.   Neurological: Negative for dizziness, light-headedness and numbness.     Objective:     Vital Signs (Most Recent):  Temp: 97 °F (36.1 °C) (07/21/20 1218)  Pulse: 67 (07/21/20 1218)  Resp: 17 (07/21/20 0356)  BP: 122/78 (07/21/20 1218)  SpO2: 95 % (07/21/20 1218) Vital Signs (24h Range):  Temp:  [96.6 °F (35.9 °C)-97 °F (36.1 °C)] 97 °F (36.1 °C)  Pulse:  [65-84] 67  Resp:  [17-18] 17  SpO2:  [92 %-96  %] 95 %  BP: (113-134)/(70-88) 122/78     Weight: 129.6 kg (285 lb 11.5 oz)  Body mass index is 46.12 kg/m².    Intake/Output Summary (Last 24 hours) at 7/21/2020 1654  Last data filed at 7/21/2020 0604  Gross per 24 hour   Intake 240 ml   Output --   Net 240 ml      Physical Exam  Vitals signs and nursing note reviewed.   Constitutional:       Appearance: Normal appearance.   HENT:      Head: Normocephalic and atraumatic.   Eyes:      Extraocular Movements: Extraocular movements intact.   Neck:      Musculoskeletal: Normal range of motion.   Cardiovascular:      Rate and Rhythm: Normal rate.   Pulmonary:      Effort: No respiratory distress.   Neurological:      General: No focal deficit present.      Mental Status: She is alert and oriented to person, place, and time.   Psychiatric:         Mood and Affect: Mood normal.         Behavior: Behavior normal.         Thought Content: Thought content normal.         Judgment: Judgment normal.         Significant Labs:   Recent Labs   Lab 07/19/20 0203 07/20/20 0923 07/21/20  0639   WBC 3.77* 4.42 6.05   HGB 11.7* 12.5 12.3   HCT 34.9* 37.3 37.1   * 158 195     Recent Labs   Lab 07/16/20  1455 07/19/20 0203 07/20/20  0923 07/21/20  0639   * 135* 137  --    K 4.1 4.3 3.7  --    CL 98 100 98  --    CO2 26 26 29  --    BUN 10 10 9  --    CREATININE 0.8 0.7 0.6  --    * 112* 131*  --    CALCIUM 8.9 8.2* 8.9  --    MG  --   --  1.9 2.0   PHOS  --   --  3.8 4.0   LIPASE 33  --   --   --      Recent Labs   Lab 07/16/20  1455 07/19/20 0203 07/20/20  0923   ALKPHOS 73 71 73   ALT 65* 56* 48*   AST 43* 47* 30   ALBUMIN 3.9 3.4* 3.4*   PROT 7.9 7.1 7.5   BILITOT 0.4 0.4 0.4      Recent Labs     07/19/20 0203   CPK 34   TROPONINI 0.024     No results for input(s): POCTGLUCOSE in the last 168 hours.  Hemoglobin A1C   Date Value Ref Range Status   09/30/2019 5.6 4.0 - 5.6 % Final     Comment:     ADA Screening Guidelines:  5.7-6.4%  Consistent with  prediabetes  >or=6.5%  Consistent with diabetes  High levels of fetal hemoglobin interfere with the HbA1C  assay. Heterozygous hemoglobin variants (HbS, HgC, etc)do  not significantly interfere with this assay.   However, presence of multiple variants may affect accuracy.     09/18/2015 5.7 4.5 - 6.2 % Final       No results for input(s): COLORU, CLARITYU, SPECGRAV, PHUR, PROTEINUA, GLUCOSEU, BLOODU, WBCU, RBCU, BACTERIA, MUCUS in the last 24 hours.    Invalid input(s):  BILIRUBINCON    Microbiology Results (last 7 days)     ** No results found for the last 168 hours. **          Scheduled Meds:   ascorbic acid (vitamin C)  500 mg Oral Daily    cetirizine  10 mg Oral Daily    dexamethasone  6 mg Intravenous Daily    enoxaparin  40 mg Subcutaneous Q24H    hydroCHLOROthiazide  12.5 mg Oral Daily    losartan  50 mg Oral Daily    magnesium oxide  400 mg Oral Daily    multivitamin  1 tablet Oral Daily    [START ON 7/22/2020] EUA remdesivir infusion (NO CHARGE)  100 mg Intravenous Q24H    EUA remdesivir infusion (NO CHARGE)  200 mg Intravenous Once     Continuous Infusions:  As Needed: acetaminophen, albuterol, benzonatate, guaifenesin 100 mg/5 ml, ondansetron      Significant Imaging:   X-Ray Chest AP Portable  Narrative: EXAMINATION:  XR CHEST AP PORTABLE    CLINICAL HISTORY:  Suspected Covid-19 Virus Infection;    TECHNIQUE:  Single frontal view of the chest was performed.    COMPARISON:  June 16, 2020    FINDINGS:  Single portable chest view is submitted.  There is mild diminished depth of inspiration and mild rotation when accounting for this the cardiomediastinal silhouette appears stable.  There is appearance thought to represent increased prominence of the central pulmonary vascular as well as bilateral pattern interstitial and alveolar infiltrates.  There is no evidence for dense consolidation, significant pleural effusion or pneumothorax.  The osseous structures demonstrate chronic change.  Impression:  Prominence of the central pulmonary vascular as well as bilateral interstitial and alveolar infiltrates noted.  The findings demonstrate progression of radiographic findings when compared to the prior exam.    Electronically signed by: Romaine Trejo  Date:    07/19/2020  Time:    01:59          Assessment/Plan:      * Pneumonia due to COVID-19 virus  Elevated Liver enzymes  CXR showed worsening central pulmonary vascular as well as bilateral interstitial and alveolar infiltrates noted.    COVID 19 test positive   CRP 74.6, , LFT slightly elevated, ferritin, troponin, CK, D-dimer all wnl.   Rocephin /Azithromycin  Decadron 6mg   Albuterol inhaler prn  Lovenox bid  Supplemental oxygen  Tylenol   Antiemetics.  Blood cultures pending  Enoxaparin  40mg subq BID  Tylenol prn  Consult Pulm  Consult ID  Consult Remdesivir    7/21 started remdisivir  Some cough, improving SOB    Essential hypertension    continue losartan, HCT      VTE Risk Mitigation (From admission, onward)         Ordered     enoxaparin injection 40 mg  Every 24 hours      07/19/20 0512                      Thom Ramsey DO  Department of Hospital Medicine   [facility]

## 2020-07-21 NOTE — HOSPITAL COURSE
7/21 The patient was evaluated via virtual rounding, discussion with nurses and reviewing VS, notes was part of this evaluation. Evaluation done by technology on site.  7/22 The pt is improving slowly, les SOB from this am, some cough, rendisivir had been ordered.  Had a reaction yesterday mostly related to the IV given the findings and description  7/23 The patient was evaluated via virtual rounding, discussion with nurses and reviewing VS, notes was part of this evaluation.  Evaluation done on site, the pt is improving, less cough and SOB  7/24 pt evaluated, doing ok, improving, had some facial warm after remdisivir, will slow the rate of infusion.  7/25 The patient was evaluated via virtual rounding, discussion with nurses and reviewing VS, notes was part of this evaluation. Evaluation done on site, no acute events, improving, possible dc in am tomorrow  7/26 pt is doing better.  Cough is managed with cough meds  No SOB.  Able to be dc home  F/u with PCP.  Instruction for continue isolation for 1 week was provided

## 2020-07-21 NOTE — PLAN OF CARE
VN cued into patients room for rounding - Patient is in no acute distress at this time.  Call light within reach. Will continue to monitor closely.

## 2020-07-21 NOTE — PLAN OF CARE
VN cued into patient's room. Permission received per patient to turn camera to view patient. Pt awake at this time. O2 in place, no distress noted.  Requesting assistance to use restroom. Informed PCT of pt request.instructed to use call light for further assistance, Patient verbalized understanding.  Will continue to monitor.

## 2020-07-22 LAB
ALBUMIN SERPL BCP-MCNC: 3.2 G/DL (ref 3.5–5.2)
ALP SERPL-CCNC: 69 U/L (ref 55–135)
ALT SERPL W/O P-5'-P-CCNC: 45 U/L (ref 10–44)
ANION GAP SERPL CALC-SCNC: 9 MMOL/L (ref 8–16)
AST SERPL-CCNC: 20 U/L (ref 10–40)
BASOPHILS # BLD AUTO: NORMAL K/UL (ref 0–0.2)
BASOPHILS NFR BLD: 0 % (ref 0–1.9)
BILIRUB SERPL-MCNC: 0.3 MG/DL (ref 0.1–1)
BUN SERPL-MCNC: 19 MG/DL (ref 6–20)
CALCIUM SERPL-MCNC: 8.7 MG/DL (ref 8.7–10.5)
CHLORIDE SERPL-SCNC: 99 MMOL/L (ref 95–110)
CO2 SERPL-SCNC: 31 MMOL/L (ref 23–29)
CREAT SERPL-MCNC: 0.7 MG/DL (ref 0.5–1.4)
DIFFERENTIAL METHOD: NORMAL
EOSINOPHIL # BLD AUTO: NORMAL K/UL (ref 0–0.5)
EOSINOPHIL NFR BLD: 0 % (ref 0–8)
ERYTHROCYTE [DISTWIDTH] IN BLOOD BY AUTOMATED COUNT: 13.2 % (ref 11.5–14.5)
EST. GFR  (AFRICAN AMERICAN): >60 ML/MIN/1.73 M^2
EST. GFR  (NON AFRICAN AMERICAN): >60 ML/MIN/1.73 M^2
GLUCOSE SERPL-MCNC: 93 MG/DL (ref 70–110)
HCT VFR BLD AUTO: 37.6 % (ref 37–48.5)
HGB BLD-MCNC: 12.5 G/DL (ref 12–16)
IMM GRANULOCYTES # BLD AUTO: NORMAL K/UL (ref 0–0.04)
IMM GRANULOCYTES NFR BLD AUTO: NORMAL % (ref 0–0.5)
LYMPHOCYTES # BLD AUTO: NORMAL K/UL (ref 1–4.8)
LYMPHOCYTES NFR BLD: 30 % (ref 18–48)
MAGNESIUM SERPL-MCNC: 2.1 MG/DL (ref 1.6–2.6)
MCH RBC QN AUTO: 28.2 PG (ref 27–31)
MCHC RBC AUTO-ENTMCNC: 33.2 G/DL (ref 32–36)
MCV RBC AUTO: 85 FL (ref 82–98)
MONOCYTES # BLD AUTO: NORMAL K/UL (ref 0.3–1)
MONOCYTES NFR BLD: 8 % (ref 4–15)
NEUTROPHILS NFR BLD: 62 % (ref 38–73)
NRBC BLD-RTO: 0 /100 WBC
PHOSPHATE SERPL-MCNC: 3.5 MG/DL (ref 2.7–4.5)
PLATELET # BLD AUTO: 202 K/UL (ref 150–350)
PLATELET BLD QL SMEAR: NORMAL
PMV BLD AUTO: 10.4 FL (ref 9.2–12.9)
POTASSIUM SERPL-SCNC: 4 MMOL/L (ref 3.5–5.1)
PROT SERPL-MCNC: 6.9 G/DL (ref 6–8.4)
RBC # BLD AUTO: 4.44 M/UL (ref 4–5.4)
SODIUM SERPL-SCNC: 139 MMOL/L (ref 136–145)
WBC # BLD AUTO: 4.52 K/UL (ref 3.9–12.7)

## 2020-07-22 PROCEDURE — 94761 N-INVAS EAR/PLS OXIMETRY MLT: CPT

## 2020-07-22 PROCEDURE — 83735 ASSAY OF MAGNESIUM: CPT

## 2020-07-22 PROCEDURE — 99900035 HC TECH TIME PER 15 MIN (STAT)

## 2020-07-22 PROCEDURE — 84100 ASSAY OF PHOSPHORUS: CPT

## 2020-07-22 PROCEDURE — 25000003 PHARM REV CODE 250: Performed by: FAMILY MEDICINE

## 2020-07-22 PROCEDURE — 25000003 PHARM REV CODE 250: Performed by: NURSE PRACTITIONER

## 2020-07-22 PROCEDURE — 85007 BL SMEAR W/DIFF WBC COUNT: CPT

## 2020-07-22 PROCEDURE — 25000003 PHARM REV CODE 250: Performed by: HOSPITALIST

## 2020-07-22 PROCEDURE — 63600175 PHARM REV CODE 636 W HCPCS: Performed by: FAMILY MEDICINE

## 2020-07-22 PROCEDURE — 11000001 HC ACUTE MED/SURG PRIVATE ROOM

## 2020-07-22 PROCEDURE — 80053 COMPREHEN METABOLIC PANEL: CPT

## 2020-07-22 PROCEDURE — 27000221 HC OXYGEN, UP TO 24 HOURS

## 2020-07-22 PROCEDURE — 85027 COMPLETE CBC AUTOMATED: CPT

## 2020-07-22 RX ADMIN — ACETAMINOPHEN 650 MG: 325 TABLET ORAL at 10:07

## 2020-07-22 RX ADMIN — HYDROCHLOROTHIAZIDE 12.5 MG: 12.5 TABLET ORAL at 09:07

## 2020-07-22 RX ADMIN — ENOXAPARIN SODIUM 40 MG: 100 INJECTION SUBCUTANEOUS at 04:07

## 2020-07-22 RX ADMIN — SODIUM CHLORIDE 100 MG: 9 INJECTION, SOLUTION INTRAVENOUS at 04:07

## 2020-07-22 RX ADMIN — GUAIFENESIN 200 MG: 200 SOLUTION ORAL at 10:07

## 2020-07-22 RX ADMIN — CETIRIZINE HYDROCHLORIDE 10 MG: 10 TABLET, FILM COATED ORAL at 09:07

## 2020-07-22 RX ADMIN — LOSARTAN POTASSIUM 50 MG: 50 TABLET ORAL at 09:07

## 2020-07-22 RX ADMIN — DEXAMETHASONE SODIUM PHOSPHATE 6 MG: 4 INJECTION, SOLUTION INTRAMUSCULAR; INTRAVENOUS at 09:07

## 2020-07-22 RX ADMIN — MAGNESIUM OXIDE 400 MG (241.3 MG MAGNESIUM) TABLET 400 MG: TABLET at 09:07

## 2020-07-22 RX ADMIN — THERA TABS 1 TABLET: TAB at 09:07

## 2020-07-22 RX ADMIN — Medication 500 MG: at 09:07

## 2020-07-22 RX ADMIN — ACETAMINOPHEN 650 MG: 325 TABLET ORAL at 04:07

## 2020-07-22 NOTE — PLAN OF CARE
VN cued into patients room for rounding - Patient is in no acute distress at this time.  She states that her right arm is swollen from an IV that went bad.  Instructed her on using warm compresses to help relieve the pain.  Call light within reach. Will continue to monitor closely.

## 2020-07-22 NOTE — NURSING
Spoke with MD about IV site and informed of the reaction. MD is aware that the infusion was stopped. Was informed to D/C IV due to pain and burning. Pt will discuss other options with MD on tomorrow.

## 2020-07-22 NOTE — NURSING
Moderate size, firm abscess-like knot/swelling observed just above right forearm PIV site. Pt denies c/o pain, burning to site at present. No drainage no redness noted, just a swollen area. Pt and previous nurse stated swelling  appeared after IV viral infusion earlier today and MD did not treat as a reaction. PIV d/c'd now with tip intact. No arm complaints voiced.

## 2020-07-22 NOTE — PROGRESS NOTES
[facility] Hospital Medicine  Progress Note    Patient Name: Irina Dugan  MRN: [id number]  Patient Class: IP- Inpatient   Admission Date: 7/19/2020  Length of Stay: 3 days  Attending Physician: Thom Ramsey DO  Primary Care Provider: Adrianna Pope DO        Subjective:     Principal Problem:Pneumonia due to COVID-19 virus        HPI:   Irina Dugan is a 48 y/o F with PMH of hypertension, ovarian cyst, presents to the ED presents with 1-week history of progressive SOB that is worse in the past 2 days especially with activities such as walking, with associated nausea, vomiting, myalgias, chills. Patient denies fever, diarrhea, changes of smell or taste, or abdominal pain. Patient tested positive for COVID-19 2 days ago, CXR done, showed bilateral infiltrates. CRP 74.6, , LFT slightly elevated, ferritin, troponin, CK, D-dimer all wnl.     Overview/Hospital Course:  7/21 The patient was evaluated via virtual rounding, discussion with nurses and reviewing VS, notes was part of this evaluation. Evaluation done by technology on site.  The pt is improving slowly, les SOB from this am, some cough, rendisivir had been ordered.  Had a reaction yesterday mostly related to the IV given the findings and description      Interval History: slowing   Review of Systems   Constitutional: Positive for activity change and fatigue. Negative for fever.   HENT: Negative for congestion.    Respiratory: Positive for cough and shortness of breath (improving slowly).    Cardiovascular: Negative for chest pain and palpitations.   Gastrointestinal: Negative for abdominal distention, nausea and vomiting.   Neurological: Negative for dizziness, light-headedness and numbness.   Psychiatric/Behavioral: Negative for agitation. The patient is not nervous/anxious and is not hyperactive.      Objective:     Vital Signs (Most Recent):  Temp: 97.3 °F (36.3 °C) (07/22/20 0901)  Pulse: 65 (07/22/20 0901)  Resp:  19 (07/22/20 0624)  BP: (!) 141/92 (07/22/20 0901)  SpO2: 95 % (07/22/20 0901) Vital Signs (24h Range):  Temp:  [96.1 °F (35.6 °C)-98 °F (36.7 °C)] 97.3 °F (36.3 °C)  Pulse:  [60-75] 65  Resp:  [19-28] 19  SpO2:  [93 %-95 %] 95 %  BP: (134-158)/(72-92) 141/92     Weight: 129.6 kg (285 lb 11.5 oz)  Body mass index is 46.12 kg/m².    Intake/Output Summary (Last 24 hours) at 7/22/2020 1522  Last data filed at 7/22/2020 0600  Gross per 24 hour   Intake 120 ml   Output 500 ml   Net -380 ml      Physical Exam  Vitals signs and nursing note reviewed.   Constitutional:       Appearance: Normal appearance.   HENT:      Head: Normocephalic and atraumatic.   Eyes:      Extraocular Movements: Extraocular movements intact.   Neck:      Musculoskeletal: Normal range of motion.   Cardiovascular:      Rate and Rhythm: Normal rate.   Pulmonary:      Effort: No respiratory distress.   Musculoskeletal: Normal range of motion.   Neurological:      General: No focal deficit present.      Mental Status: She is alert and oriented to person, place, and time.   Psychiatric:         Mood and Affect: Mood normal.         Behavior: Behavior normal.         Thought Content: Thought content normal.         Judgment: Judgment normal.         Significant Labs:   Recent Labs   Lab 07/20/20  0923 07/21/20  0639 07/22/20  0823   WBC 4.42 6.05 4.52   HGB 12.5 12.3 12.5   HCT 37.3 37.1 37.6    195 202     Recent Labs   Lab 07/16/20  1455 07/19/20  0203 07/20/20  0923 07/21/20  0639 07/22/20  0823   * 135* 137  --  139   K 4.1 4.3 3.7  --  4.0   CL 98 100 98  --  99   CO2 26 26 29  --  31*   BUN 10 10 9  --  19   CREATININE 0.8 0.7 0.6  --  0.7   * 112* 131*  --  93   CALCIUM 8.9 8.2* 8.9  --  8.7   MG  --   --  1.9 2.0 2.1   PHOS  --   --  3.8 4.0 3.5   LIPASE 33  --   --   --   --      Recent Labs   Lab 07/19/20  0203 07/20/20  0923 07/22/20  0823   ALKPHOS 71 73 69   ALT 56* 48* 45*   AST 47* 30 20   ALBUMIN 3.4* 3.4* 3.2*   PROT  7.1 7.5 6.9   BILITOT 0.4 0.4 0.3      No results for input(s): CPK, CPKMB, MB, TROPONINI in the last 72 hours.  No results for input(s): POCTGLUCOSE in the last 168 hours.  Hemoglobin A1C   Date Value Ref Range Status   09/30/2019 5.6 4.0 - 5.6 % Final     Comment:     ADA Screening Guidelines:  5.7-6.4%  Consistent with prediabetes  >or=6.5%  Consistent with diabetes  High levels of fetal hemoglobin interfere with the HbA1C  assay. Heterozygous hemoglobin variants (HbS, HgC, etc)do  not significantly interfere with this assay.   However, presence of multiple variants may affect accuracy.     09/18/2015 5.7 4.5 - 6.2 % Final       No results for input(s): COLORU, CLARITYU, SPECGRAV, PHUR, PROTEINUA, GLUCOSEU, BLOODU, WBCU, RBCU, BACTERIA, MUCUS in the last 24 hours.    Invalid input(s):  BILIRUBINCON    Microbiology Results (last 7 days)     ** No results found for the last 168 hours. **          Scheduled Meds:   ascorbic acid (vitamin C)  500 mg Oral Daily    cetirizine  10 mg Oral Daily    dexamethasone  6 mg Intravenous Daily    enoxaparin  40 mg Subcutaneous Q24H    hydroCHLOROthiazide  12.5 mg Oral Daily    losartan  50 mg Oral Daily    magnesium oxide  400 mg Oral Daily    multivitamin  1 tablet Oral Daily    EUA remdesivir infusion (NO CHARGE)  100 mg Intravenous Q24H     Continuous Infusions:  As Needed: acetaminophen, albuterol, benzonatate, guaifenesin 100 mg/5 ml, ondansetron      Significant Imaging:   X-Ray Chest AP Portable  Narrative: EXAMINATION:  XR CHEST AP PORTABLE    CLINICAL HISTORY:  Suspected Covid-19 Virus Infection;    TECHNIQUE:  Single frontal view of the chest was performed.    COMPARISON:  June 16, 2020    FINDINGS:  Single portable chest view is submitted.  There is mild diminished depth of inspiration and mild rotation when accounting for this the cardiomediastinal silhouette appears stable.  There is appearance thought to represent increased prominence of the central  pulmonary vascular as well as bilateral pattern interstitial and alveolar infiltrates.  There is no evidence for dense consolidation, significant pleural effusion or pneumothorax.  The osseous structures demonstrate chronic change.  Impression: Prominence of the central pulmonary vascular as well as bilateral interstitial and alveolar infiltrates noted.  The findings demonstrate progression of radiographic findings when compared to the prior exam.    Electronically signed by: Romaine Trejo  Date:    07/19/2020  Time:    01:59          Assessment/Plan:      * Pneumonia due to COVID-19 virus  Elevated Liver enzymes  CXR showed worsening central pulmonary vascular as well as bilateral interstitial and alveolar infiltrates noted.    COVID 19 test positive   CRP 74.6, , LFT slightly elevated, ferritin, troponin, CK, D-dimer all wnl.   Rocephin /Azithromycin  Decadron 6mg   Albuterol inhaler prn  Lovenox bid  Supplemental oxygen  Tylenol   Antiemetics.  Blood cultures pending  Enoxaparin  40mg subq BID  Tylenol prn  Consult Pulm  Consult ID  Consult Remdesivir    7/21 started remdisivir  Some cough, improving SOB  7/22 continue remdisivir,   Monitor for symptoms    Essential hypertension    continue losartan, HCT      VTE Risk Mitigation (From admission, onward)         Ordered     enoxaparin injection 40 mg  Every 24 hours      07/19/20 0512                      Thom Ramsey DO  Department of Hospital Medicine   [facility]

## 2020-07-22 NOTE — PLAN OF CARE
"Covid +  TN spoke to pt via phone and updated on POC. Pt informed Tn still sob but "getting better." Pt concerned about her mother who is also in hospital. Tn gave pt # to call to speak to mother's nurse for update. Tn to continue to follow and encouraged pt to call for any needs/concerns.       07/22/20 1424   Discharge Reassessment   Assessment Type Discharge Planning Reassessment   Provided patient/caregiver education on the expected discharge date and the discharge plan Yes   Do you have any problems affording any of your prescribed medications? No   Discharge Plan A Home   Discharge Plan B Home with family   DME Needed Upon Discharge  other (see comments)  (tbd)   Patient choice form signed by patient/caregiver N/A   Anticipated Discharge Disposition Home   Can the patient/caregiver answer the patient profile reliably? Yes, cognitively intact   How does the patient rate their overall health at the present time? Fair   Describe the patient's ability to walk at the present time. Minor restrictions or changes   How often would a person be available to care for the patient? Often   During the past month, has the patient often been bothered by feeling down, depressed or hopeless? No   During the past month, has the patient often been bothered by little interest or pleasure in doing things? No     "

## 2020-07-22 NOTE — PLAN OF CARE
Cued into the room on patient's request.  She stated that her mother, who is in room 455, had called all upset because of the way she was being treated. Charge nurse notified.

## 2020-07-22 NOTE — SUBJECTIVE & OBJECTIVE
Interval History: slowing   Review of Systems   Constitutional: Positive for activity change and fatigue. Negative for fever.   HENT: Negative for congestion.    Respiratory: Positive for cough and shortness of breath (improving slowly).    Cardiovascular: Negative for chest pain and palpitations.   Gastrointestinal: Negative for abdominal distention, nausea and vomiting.   Neurological: Negative for dizziness, light-headedness and numbness.   Psychiatric/Behavioral: Negative for agitation. The patient is not nervous/anxious and is not hyperactive.      Objective:     Vital Signs (Most Recent):  Temp: 97.3 °F (36.3 °C) (07/22/20 0901)  Pulse: 65 (07/22/20 0901)  Resp: 19 (07/22/20 0624)  BP: (!) 141/92 (07/22/20 0901)  SpO2: 95 % (07/22/20 0901) Vital Signs (24h Range):  Temp:  [96.1 °F (35.6 °C)-98 °F (36.7 °C)] 97.3 °F (36.3 °C)  Pulse:  [60-75] 65  Resp:  [19-28] 19  SpO2:  [93 %-95 %] 95 %  BP: (134-158)/(72-92) 141/92     Weight: 129.6 kg (285 lb 11.5 oz)  Body mass index is 46.12 kg/m².    Intake/Output Summary (Last 24 hours) at 7/22/2020 1522  Last data filed at 7/22/2020 0600  Gross per 24 hour   Intake 120 ml   Output 500 ml   Net -380 ml      Physical Exam  Vitals signs and nursing note reviewed.   Constitutional:       Appearance: Normal appearance.   HENT:      Head: Normocephalic and atraumatic.   Eyes:      Extraocular Movements: Extraocular movements intact.   Neck:      Musculoskeletal: Normal range of motion.   Cardiovascular:      Rate and Rhythm: Normal rate.   Pulmonary:      Effort: No respiratory distress.   Musculoskeletal: Normal range of motion.   Neurological:      General: No focal deficit present.      Mental Status: She is alert and oriented to person, place, and time.   Psychiatric:         Mood and Affect: Mood normal.         Behavior: Behavior normal.         Thought Content: Thought content normal.         Judgment: Judgment normal.         Significant Labs:   Recent Labs   Lab  07/20/20  0923 07/21/20  0639 07/22/20  0823   WBC 4.42 6.05 4.52   HGB 12.5 12.3 12.5   HCT 37.3 37.1 37.6    195 202     Recent Labs   Lab 07/16/20  1455 07/19/20  0203 07/20/20  0923 07/21/20  0639 07/22/20  0823   * 135* 137  --  139   K 4.1 4.3 3.7  --  4.0   CL 98 100 98  --  99   CO2 26 26 29  --  31*   BUN 10 10 9  --  19   CREATININE 0.8 0.7 0.6  --  0.7   * 112* 131*  --  93   CALCIUM 8.9 8.2* 8.9  --  8.7   MG  --   --  1.9 2.0 2.1   PHOS  --   --  3.8 4.0 3.5   LIPASE 33  --   --   --   --      Recent Labs   Lab 07/19/20  0203 07/20/20  0923 07/22/20  0823   ALKPHOS 71 73 69   ALT 56* 48* 45*   AST 47* 30 20   ALBUMIN 3.4* 3.4* 3.2*   PROT 7.1 7.5 6.9   BILITOT 0.4 0.4 0.3      No results for input(s): CPK, CPKMB, MB, TROPONINI in the last 72 hours.  No results for input(s): POCTGLUCOSE in the last 168 hours.  Hemoglobin A1C   Date Value Ref Range Status   09/30/2019 5.6 4.0 - 5.6 % Final     Comment:     ADA Screening Guidelines:  5.7-6.4%  Consistent with prediabetes  >or=6.5%  Consistent with diabetes  High levels of fetal hemoglobin interfere with the HbA1C  assay. Heterozygous hemoglobin variants (HbS, HgC, etc)do  not significantly interfere with this assay.   However, presence of multiple variants may affect accuracy.     09/18/2015 5.7 4.5 - 6.2 % Final       No results for input(s): COLORU, CLARITYU, SPECGRAV, PHUR, PROTEINUA, GLUCOSEU, BLOODU, WBCU, RBCU, BACTERIA, MUCUS in the last 24 hours.    Invalid input(s):  BILIRUBINCON    Microbiology Results (last 7 days)     ** No results found for the last 168 hours. **          Scheduled Meds:   ascorbic acid (vitamin C)  500 mg Oral Daily    cetirizine  10 mg Oral Daily    dexamethasone  6 mg Intravenous Daily    enoxaparin  40 mg Subcutaneous Q24H    hydroCHLOROthiazide  12.5 mg Oral Daily    losartan  50 mg Oral Daily    magnesium oxide  400 mg Oral Daily    multivitamin  1 tablet Oral Daily    EUA remdesivir infusion  (NO CHARGE)  100 mg Intravenous Q24H     Continuous Infusions:  As Needed: acetaminophen, albuterol, benzonatate, guaifenesin 100 mg/5 ml, ondansetron      Significant Imaging:   X-Ray Chest AP Portable  Narrative: EXAMINATION:  XR CHEST AP PORTABLE    CLINICAL HISTORY:  Suspected Covid-19 Virus Infection;    TECHNIQUE:  Single frontal view of the chest was performed.    COMPARISON:  June 16, 2020    FINDINGS:  Single portable chest view is submitted.  There is mild diminished depth of inspiration and mild rotation when accounting for this the cardiomediastinal silhouette appears stable.  There is appearance thought to represent increased prominence of the central pulmonary vascular as well as bilateral pattern interstitial and alveolar infiltrates.  There is no evidence for dense consolidation, significant pleural effusion or pneumothorax.  The osseous structures demonstrate chronic change.  Impression: Prominence of the central pulmonary vascular as well as bilateral interstitial and alveolar infiltrates noted.  The findings demonstrate progression of radiographic findings when compared to the prior exam.    Electronically signed by: Romaine Trejo  Date:    07/19/2020  Time:    01:59

## 2020-07-22 NOTE — PLAN OF CARE
Assessment/Plan   Diagnoses and all orders for this visit:    IUD contraception    Uterine leiomyoma, unspecified location    Irregular menses    Female pelvic pain     1  Symptomatic uterine fibroids-patient with increasing abdominal pressure and discomfort and more irregular/heavy bleeding  Ultrasound today demonstrated uterus 10 6 x 18 3 by 14 9 cm with fibroids measuring 9 8, 5 3, 6 8, and 7 3 all of which were increased substantially from prior ultrasound a year ago  The patient and her  were counseled in detail about the findings  Treatment options were reviewed  She is using Mirena IUD which has helped somewhat with bleeding  She was counseled about the other options including OCP, Lupron, Depo-Provera, and others  She wishes to proceed with definitive surgery with hysterectomy  Risks and benefits of the procedure were discussed and all questions were answered  She was given the ACOG pamphlet on hysterectomy and AVS was printed with hysterectomy literature  Consent was signed, and the patient will be scheduled in the near future  Given the size of the uterus, total abdominal hysterectomy is recommended with possible removal of tubes/ovaries and possible bladder cystoscopy  We will only remove ovaries with suspicious pathology  2  Mirena IUD-placed 7/16/18  It is noted to be in the lower uterus/cervix at this time on ultrasound  The patient and her partner were counseled about this  Likely, the contraception is still quite good but less effective than good intrauterine positioning  Could consider secondary contraception until surgery is done  3  Pelvic cramping/left adnexal tenderness-likely related to myomas as noted above  We will proceed with surgery as planned above  4  Irregular bleeding-noted since IUD was inserted  Generally, bleeding has been at 2-3 week intervals but quite light  Her last menses was incredibly heavy where she bled through clothing in less than 1 hour    She Patient on oxygen with documented flow.  Will attempt to wean per O2 order protocol. Will continue to monitor.     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  She will follow-up 2 weeks postprocedure for exam     Subjective   Patient ID: Kevin Izquierdo is a 38 y o  female  Vitals:    19 1131   BP: 130/88     Patient was seen today for follow-up visit  Please see assessment plan for details        The following portions of the patient's history were reviewed and updated as appropriate: allergies, current medications, past family history, past medical history, past social history, past surgical history and problem list   Past Medical History:   Diagnosis Date    Fibroid     Fullness in ear, left     Resolved [id number]    Hypertension     Resolved [id number]     Past Surgical History:   Procedure Laterality Date    CHOLECYSTECTOMY      TOOTH EXTRACTION       OB History    Para Term  AB Living   0 0 0 0 0 0   SAB TAB Ectopic Multiple Live Births   0 0 0 0 0       Current Outpatient Medications:     Azelastine HCl 137 MCG/SPRAY SOLN, as needed, Disp: , Rfl:     B-D ALLERGY SYRINGE 1CC/28G 28G X 1/2" 1 ML MISC, , Disp: , Rfl:     Cetirizine HCl (ZYRTEC ALLERGY) 10 MG CAPS, Take by mouth, Disp: , Rfl:     EPINEPHrine (EPIPEN) 0 3 mg/0 3 mL SOAJ, as needed, Disp: , Rfl:     escitalopram (LEXAPRO) 20 mg tablet, Take 1 tablet (20 mg total) by mouth daily, Disp: 90 tablet, Rfl: 1    fluticasone (FLONASE) 50 mcg/act nasal spray, 1 spray into each nostril 2 (two) times a day, Disp: 1 Bottle, Rfl: 11    levonorgestrel (MIRENA, 52 MG,) 20 MCG/24HR IUD, 1 each by Intrauterine route once, Disp: , Rfl:     LORazepam (ATIVAN) 0 5 mg tablet, Take 1 tablet (0 5 mg total) by mouth daily as needed for anxiety, Disp: 30 tablet, Rfl: 0    montelukast (SINGULAIR) 10 mg tablet, Take 1 tablet (10 mg total) by mouth daily at bedtime, Disp: 30 tablet, Rfl: 11  No Known Allergies  Social History     Socioeconomic History    Marital status: /Civil Union     Spouse name: None    Number of children: None    Years of education: None    Highest education level: None   Occupational History    None   Social Needs    Financial resource strain: None    Food insecurity:     Worry: None     Inability: None    Transportation needs:     Medical: None     Non-medical: None   Tobacco Use    Smoking status: Never Smoker    Smokeless tobacco: Never Used   Substance and Sexual Activity    Alcohol use: Yes     Comment: occasional    Drug use: No    Sexual activity: Yes     Partners: Male     Birth control/protection: IUD   Lifestyle    Physical activity:     Days per week: None     Minutes per session: None    Stress: None   Relationships    Social connections:     Talks on phone: None     Gets together: None     Attends [employer] service: None     Active member of club or organization: None     Attends meetings of clubs or organizations: None     Relationship status: None    Intimate partner violence:     Fear of current or ex partner: None     Emotionally abused: None     Physically abused: None     Forced sexual activity: None   Other Topics Concern    None   Social History Narrative    Always uses seat belt    Caffeine use    [employer] ([employer])     Family History   Problem Relation Age of Onset    Anxiety disorder Mother     Other Mother         Blood clots    Hypertension Mother     Alcohol abuse Father     Hypertension Brother     Anxiety disorder Maternal Grandmother     Other Maternal Grandmother         Blood clots    Lung cancer Maternal Grandfather     Breast cancer Family     Heart disease Family         cardiac disorder    Cancer Family     Diabetes Family     Cancer Family        Review of Systems   Constitutional: Negative for chills, diaphoresis, fatigue and fever     Respiratory: Negative for apnea, cough, chest tightness, shortness of breath and wheezing  Cardiovascular: Negative for chest pain, palpitations and leg swelling  Gastrointestinal: Negative for abdominal distention, abdominal pain, anal bleeding, constipation, diarrhea, nausea, rectal pain and vomiting  Genitourinary: Positive for menstrual problem and pelvic pain  Negative for difficulty urinating, dyspareunia, dysuria, frequency, hematuria, urgency, vaginal bleeding, vaginal discharge and vaginal pain  Musculoskeletal: Negative for arthralgias, back pain and myalgias  Skin: Negative for color change and rash  Neurological: Negative for dizziness, syncope, light-headedness, numbness and headaches  Hematological: Negative for adenopathy  Does not bruise/bleed easily  Psychiatric/Behavioral: Negative for dysphoric mood and sleep disturbance  The patient is not nervous/anxious  Objective   Physical Exam    Objective      /88 (BP Location: Right arm, Patient Position: Sitting, Cuff Size: Large)   Wt (!) 146 kg (321 lb)   LMP 03/07/2019   BMI 48 81 kg/m²     General:   alert and oriented, in no acute distress   Neck:    Breast:    Heart: regular rate and rhythm, S1, S2 normal, no murmur, click, rub or gallop   Lungs: clear to auscultation bilaterally   Abdomen: soft, non-tender, without masses or organomegaly palpable myomas noted extending to the umbilicus  Vulva: normal   Vagina: Without erythema or lesions or discharge  Normal   Cervix: Scant amount of brown discharge, without lesions or cervicitis    IUD string seen at a length of about 3 cm   Uterus: mid-position, size consistent with Eighteen-20 weeks, Mildly tender on palpation   Adnexa: no mass, fullness, tenderness   Rectum: deferred    Psych:  Normal mood and affect   Skin:  Without obvious lesions   Eyes: symmetric, with normal movements and reactivity   Musculoskeletal:  Normal muscle tone and movements appreciated

## 2020-07-23 LAB
ALBUMIN SERPL BCP-MCNC: 3.4 G/DL (ref 3.5–5.2)
ALP SERPL-CCNC: 71 U/L (ref 55–135)
ALT SERPL W/O P-5'-P-CCNC: 53 U/L (ref 10–44)
ANION GAP SERPL CALC-SCNC: 8 MMOL/L (ref 8–16)
ANISOCYTOSIS BLD QL SMEAR: SLIGHT
AST SERPL-CCNC: 24 U/L (ref 10–40)
BASOPHILS # BLD AUTO: 0.01 K/UL (ref 0–0.2)
BASOPHILS NFR BLD: 0.2 % (ref 0–1.9)
BILIRUB SERPL-MCNC: 0.4 MG/DL (ref 0.1–1)
BUN SERPL-MCNC: 18 MG/DL (ref 6–20)
CALCIUM SERPL-MCNC: 8.9 MG/DL (ref 8.7–10.5)
CHLORIDE SERPL-SCNC: 97 MMOL/L (ref 95–110)
CO2 SERPL-SCNC: 31 MMOL/L (ref 23–29)
CREAT SERPL-MCNC: 0.6 MG/DL (ref 0.5–1.4)
DIFFERENTIAL METHOD: ABNORMAL
EOSINOPHIL # BLD AUTO: 0 K/UL (ref 0–0.5)
EOSINOPHIL NFR BLD: 0.2 % (ref 0–8)
ERYTHROCYTE [DISTWIDTH] IN BLOOD BY AUTOMATED COUNT: 13.1 % (ref 11.5–14.5)
EST. GFR  (AFRICAN AMERICAN): >60 ML/MIN/1.73 M^2
EST. GFR  (NON AFRICAN AMERICAN): >60 ML/MIN/1.73 M^2
GLUCOSE SERPL-MCNC: 87 MG/DL (ref 70–110)
HCT VFR BLD AUTO: 37.6 % (ref 37–48.5)
HGB BLD-MCNC: 12.8 G/DL (ref 12–16)
HYPOCHROMIA BLD QL SMEAR: ABNORMAL
IMM GRANULOCYTES # BLD AUTO: 0.19 K/UL (ref 0–0.04)
IMM GRANULOCYTES NFR BLD AUTO: 3.6 % (ref 0–0.5)
LYMPHOCYTES # BLD AUTO: 1.5 K/UL (ref 1–4.8)
LYMPHOCYTES NFR BLD: 28.4 % (ref 18–48)
MAGNESIUM SERPL-MCNC: 1.9 MG/DL (ref 1.6–2.6)
MCH RBC QN AUTO: 28.5 PG (ref 27–31)
MCHC RBC AUTO-ENTMCNC: 34 G/DL (ref 32–36)
MCV RBC AUTO: 84 FL (ref 82–98)
MONOCYTES # BLD AUTO: 0.5 K/UL (ref 0.3–1)
MONOCYTES NFR BLD: 9.4 % (ref 4–15)
NEUTROPHILS # BLD AUTO: 3 K/UL (ref 1.8–7.7)
NEUTROPHILS NFR BLD: 58.2 % (ref 38–73)
NRBC BLD-RTO: 0 /100 WBC
OVALOCYTES BLD QL SMEAR: ABNORMAL
PHOSPHATE SERPL-MCNC: 3.5 MG/DL (ref 2.7–4.5)
PLATELET # BLD AUTO: 247 K/UL (ref 150–350)
PLATELET BLD QL SMEAR: ABNORMAL
PMV BLD AUTO: 10.5 FL (ref 9.2–12.9)
POIKILOCYTOSIS BLD QL SMEAR: SLIGHT
POTASSIUM SERPL-SCNC: 4.1 MMOL/L (ref 3.5–5.1)
PROT SERPL-MCNC: 7.2 G/DL (ref 6–8.4)
RBC # BLD AUTO: 4.49 M/UL (ref 4–5.4)
SODIUM SERPL-SCNC: 136 MMOL/L (ref 136–145)
STOMATOCYTES BLD QL SMEAR: ABNORMAL
WBC # BLD AUTO: 5.21 K/UL (ref 3.9–12.7)

## 2020-07-23 PROCEDURE — 36415 COLL VENOUS BLD VENIPUNCTURE: CPT

## 2020-07-23 PROCEDURE — 11000001 HC ACUTE MED/SURG PRIVATE ROOM

## 2020-07-23 PROCEDURE — 25000003 PHARM REV CODE 250: Performed by: FAMILY MEDICINE

## 2020-07-23 PROCEDURE — 25000003 PHARM REV CODE 250: Performed by: HOSPITALIST

## 2020-07-23 PROCEDURE — 25000003 PHARM REV CODE 250: Performed by: NURSE PRACTITIONER

## 2020-07-23 PROCEDURE — 63600175 PHARM REV CODE 636 W HCPCS: Performed by: FAMILY MEDICINE

## 2020-07-23 PROCEDURE — 80053 COMPREHEN METABOLIC PANEL: CPT

## 2020-07-23 PROCEDURE — 27000221 HC OXYGEN, UP TO 24 HOURS

## 2020-07-23 PROCEDURE — 85025 COMPLETE CBC W/AUTO DIFF WBC: CPT

## 2020-07-23 PROCEDURE — 84100 ASSAY OF PHOSPHORUS: CPT

## 2020-07-23 PROCEDURE — 94761 N-INVAS EAR/PLS OXIMETRY MLT: CPT

## 2020-07-23 PROCEDURE — 83735 ASSAY OF MAGNESIUM: CPT

## 2020-07-23 RX ORDER — BUTALBITAL, ACETAMINOPHEN AND CAFFEINE 50; 325; 40 MG/1; MG/1; MG/1
1 TABLET ORAL EVERY 6 HOURS PRN
Status: DISCONTINUED | OUTPATIENT
Start: 2020-07-23 | End: 2020-07-26 | Stop reason: HOSPADM

## 2020-07-23 RX ADMIN — BUTALBITAL, ACETAMINOPHEN, AND CAFFEINE 1 TABLET: 50; 325; 40 TABLET ORAL at 01:07

## 2020-07-23 RX ADMIN — LOSARTAN POTASSIUM 50 MG: 50 TABLET ORAL at 09:07

## 2020-07-23 RX ADMIN — CETIRIZINE HYDROCHLORIDE 10 MG: 10 TABLET, FILM COATED ORAL at 09:07

## 2020-07-23 RX ADMIN — MAGNESIUM OXIDE 400 MG (241.3 MG MAGNESIUM) TABLET 400 MG: TABLET at 09:07

## 2020-07-23 RX ADMIN — Medication 500 MG: at 09:07

## 2020-07-23 RX ADMIN — HYDROCHLOROTHIAZIDE 12.5 MG: 12.5 TABLET ORAL at 09:07

## 2020-07-23 RX ADMIN — THERA TABS 1 TABLET: TAB at 09:07

## 2020-07-23 RX ADMIN — DEXAMETHASONE SODIUM PHOSPHATE 6 MG: 4 INJECTION, SOLUTION INTRAMUSCULAR; INTRAVENOUS at 09:07

## 2020-07-23 RX ADMIN — SODIUM CHLORIDE 100 MG: 9 INJECTION, SOLUTION INTRAVENOUS at 04:07

## 2020-07-23 RX ADMIN — ENOXAPARIN SODIUM 40 MG: 100 INJECTION SUBCUTANEOUS at 04:07

## 2020-07-23 NOTE — ASSESSMENT & PLAN NOTE
Elevated Liver enzymes  CXR showed worsening central pulmonary vascular as well as bilateral interstitial and alveolar infiltrates noted.    COVID 19 test positive   CRP 74.6, , LFT slightly elevated, ferritin, troponin, CK, D-dimer all wnl.   Rocephin /Azithromycin  Decadron 6mg   Albuterol inhaler prn  Lovenox bid  Supplemental oxygen  Tylenol   Antiemetics.  Blood cultures pending  Enoxaparin  40mg subq BID  Tylenol prn  Consult Pulm  Consult ID  Consult Remdesivir    7/21 started remdisivir  Some cough, improving SOB  7/22 continue remdisivir,   Monitor for symptoms  7/23 day #2 of Remdisivir.  Improved symptoms  2 L NC o2 sat 96%

## 2020-07-23 NOTE — PROGRESS NOTES
[facility] Hospital Medicine  Progress Note    Patient Name: Irina Dugan  MRN: [id number]  Patient Class: IP- Inpatient   Admission Date: 7/19/2020  Length of Stay: 4 days  Attending Physician: Thom Ramsey DO  Primary Care Provider: Adrianna Pope DO        Subjective:     Principal Problem:Pneumonia due to COVID-19 virus        HPI:   Irina Dugan is a 50 y/o F with PMH of hypertension, ovarian cyst, presents to the ED presents with 1-week history of progressive SOB that is worse in the past 2 days especially with activities such as walking, with associated nausea, vomiting, myalgias, chills. Patient denies fever, diarrhea, changes of smell or taste, or abdominal pain. Patient tested positive for COVID-19 2 days ago, CXR done, showed bilateral infiltrates. CRP 74.6, , LFT slightly elevated, ferritin, troponin, CK, D-dimer all wnl.     Overview/Hospital Course:  7/21 The patient was evaluated via virtual rounding, discussion with nurses and reviewing VS, notes was part of this evaluation. Evaluation done by technology on site.  7/22 The pt is improving slowly, les SOB from this am, some cough, rendisivir had been ordered.  Had a reaction yesterday mostly related to the IV given the findings and description  7/23 The patient was evaluated via virtual rounding, discussion with nurses and reviewing VS, notes was part of this evaluation.  Evaluation done on site, the pt is improving, less cough and SOB    Interval History: no reaction to the remdisivir yesterday, continue current tx.    Review of Systems   Constitutional: Positive for activity change and fatigue. Negative for fever.   HENT: Negative for congestion.    Respiratory: Positive for cough. Negative for shortness of breath (improving slowly).    Cardiovascular: Negative for chest pain and palpitations.   Gastrointestinal: Negative for abdominal distention, nausea and vomiting.   Neurological: Negative for dizziness,  light-headedness and numbness.   Psychiatric/Behavioral: Negative for agitation. The patient is not nervous/anxious and is not hyperactive.      Objective:     Vital Signs (Most Recent):  Temp: 96.4 °F (35.8 °C) (07/23/20 0841)  Pulse: 62 (07/23/20 0841)  Resp: 18 (07/23/20 0841)  BP: 120/85 (07/23/20 0841)  SpO2: 97 % (07/23/20 0841) Vital Signs (24h Range):  Temp:  [96.1 °F (35.6 °C)-97.5 °F (36.4 °C)] 96.4 °F (35.8 °C)  Pulse:  [60-70] 62  Resp:  [18-20] 18  SpO2:  [93 %-97 %] 97 %  BP: (120-141)/(75-85) 120/85     Weight: 129.6 kg (285 lb 11.5 oz)  Body mass index is 46.12 kg/m².    Intake/Output Summary (Last 24 hours) at 7/23/2020 1342  Last data filed at 7/23/2020 0136  Gross per 24 hour   Intake 490 ml   Output --   Net 490 ml      Physical Exam  Vitals signs and nursing note reviewed.   Constitutional:       Appearance: Normal appearance.   HENT:      Head: Normocephalic and atraumatic.   Eyes:      Extraocular Movements: Extraocular movements intact.   Neck:      Musculoskeletal: Normal range of motion.   Cardiovascular:      Rate and Rhythm: Normal rate.   Pulmonary:      Effort: No respiratory distress.   Musculoskeletal: Normal range of motion.   Neurological:      General: No focal deficit present.      Mental Status: She is alert and oriented to person, place, and time.   Psychiatric:         Mood and Affect: Mood normal.         Behavior: Behavior normal.         Thought Content: Thought content normal.         Judgment: Judgment normal.         Significant Labs:   Recent Labs   Lab 07/21/20  0639 07/22/20  0823 07/23/20  0853   WBC 6.05 4.52 5.21   HGB 12.3 12.5 12.8   HCT 37.1 37.6 37.6    202 247     Recent Labs   Lab 07/16/20  1455  07/20/20  0923 07/21/20  0639 07/22/20  0823 07/23/20  0853   *   < > 137  --  139 136   K 4.1   < > 3.7  --  4.0 4.1   CL 98   < > 98  --  99 97   CO2 26   < > 29  --  31* 31*   BUN 10   < > 9  --  19 18   CREATININE 0.8   < > 0.6  --  0.7 0.6   *    < > 131*  --  93 87   CALCIUM 8.9   < > 8.9  --  8.7 8.9   MG  --    < > 1.9 2.0 2.1 1.9   PHOS  --    < > 3.8 4.0 3.5 3.5   LIPASE 33  --   --   --   --   --     < > = values in this interval not displayed.     Recent Labs   Lab 07/20/20  0923 07/22/20  0823 07/23/20  0853   ALKPHOS 73 69 71   ALT 48* 45* 53*   AST 30 20 24   ALBUMIN 3.4* 3.2* 3.4*   PROT 7.5 6.9 7.2   BILITOT 0.4 0.3 0.4      No results for input(s): CPK, CPKMB, MB, TROPONINI in the last 72 hours.  No results for input(s): POCTGLUCOSE in the last 168 hours.  Hemoglobin A1C   Date Value Ref Range Status   09/30/2019 5.6 4.0 - 5.6 % Final     Comment:     ADA Screening Guidelines:  5.7-6.4%  Consistent with prediabetes  >or=6.5%  Consistent with diabetes  High levels of fetal hemoglobin interfere with the HbA1C  assay. Heterozygous hemoglobin variants (HbS, HgC, etc)do  not significantly interfere with this assay.   However, presence of multiple variants may affect accuracy.     09/18/2015 5.7 4.5 - 6.2 % Final       No results for input(s): COLORU, CLARITYU, SPECGRAV, PHUR, PROTEINUA, GLUCOSEU, BLOODU, WBCU, RBCU, BACTERIA, MUCUS in the last 24 hours.    Invalid input(s):  BILIRUBINCON    Microbiology Results (last 7 days)     ** No results found for the last 168 hours. **          Scheduled Meds:   ascorbic acid (vitamin C)  500 mg Oral Daily    cetirizine  10 mg Oral Daily    dexamethasone  6 mg Intravenous Daily    enoxaparin  40 mg Subcutaneous Q24H    hydroCHLOROthiazide  12.5 mg Oral Daily    losartan  50 mg Oral Daily    magnesium oxide  400 mg Oral Daily    multivitamin  1 tablet Oral Daily    EUA remdesivir infusion (NO CHARGE)  100 mg Intravenous Q24H     Continuous Infusions:  As Needed: acetaminophen, albuterol, benzonatate, butalbital-acetaminophen-caffeine -40 mg, guaifenesin 100 mg/5 ml, ondansetron      Significant Imaging:   No new imaging      Assessment/Plan:      * Pneumonia due to COVID-19 virus  Elevated Liver  enzymes  CXR showed worsening central pulmonary vascular as well as bilateral interstitial and alveolar infiltrates noted.    COVID 19 test positive   CRP 74.6, , LFT slightly elevated, ferritin, troponin, CK, D-dimer all wnl.   Rocephin /Azithromycin  Decadron 6mg   Albuterol inhaler prn  Lovenox bid  Supplemental oxygen  Tylenol   Antiemetics.  Blood cultures pending  Enoxaparin  40mg subq BID  Tylenol prn  Consult Pulm  Consult ID  Consult Remdesivir    7/21 started remdisivir  Some cough, improving SOB  7/22 continue remdisivir,   Monitor for symptoms  7/23 day #2 of Remdisivir.  Improved symptoms  2 L NC o2 sat 96%    Essential hypertension    continue losartan, HCT      VTE Risk Mitigation (From admission, onward)         Ordered     enoxaparin injection 40 mg  Every 24 hours      07/19/20 0512                      Thom Ramsey DO  Department of Hospital Medicine   [facility]

## 2020-07-23 NOTE — PLAN OF CARE
VN cued into patients room for q2h rounding. Patient states she feels some shortness of breath, this was reported to bedside nurse Dea Rutledge.  Patient also reports 5/10 headache unrelieved by Tylenol. ELSY Milton NP notified.

## 2020-07-23 NOTE — PLAN OF CARE
POC reviewed with patient. Patient AAOx4 and denies any pain. IV remdesivir administered. COVID precautions maintained. 2L of oxygen via NC in place. Bed alarm on, bed locked and low, side rails up x2, and call bell in reach. Plan is for patient to be discharged home pending clinical improvement. Patient verbalizes clear understanding of POC.

## 2020-07-23 NOTE — SUBJECTIVE & OBJECTIVE
Interval History: no reaction to the remdisivir yesterday, continue current tx.    Review of Systems   Constitutional: Positive for activity change and fatigue. Negative for fever.   HENT: Negative for congestion.    Respiratory: Positive for cough. Negative for shortness of breath (improving slowly).    Cardiovascular: Negative for chest pain and palpitations.   Gastrointestinal: Negative for abdominal distention, nausea and vomiting.   Neurological: Negative for dizziness, light-headedness and numbness.   Psychiatric/Behavioral: Negative for agitation. The patient is not nervous/anxious and is not hyperactive.      Objective:     Vital Signs (Most Recent):  Temp: 96.4 °F (35.8 °C) (07/23/20 0841)  Pulse: 62 (07/23/20 0841)  Resp: 18 (07/23/20 0841)  BP: 120/85 (07/23/20 0841)  SpO2: 97 % (07/23/20 0841) Vital Signs (24h Range):  Temp:  [96.1 °F (35.6 °C)-97.5 °F (36.4 °C)] 96.4 °F (35.8 °C)  Pulse:  [60-70] 62  Resp:  [18-20] 18  SpO2:  [93 %-97 %] 97 %  BP: (120-141)/(75-85) 120/85     Weight: 129.6 kg (285 lb 11.5 oz)  Body mass index is 46.12 kg/m².    Intake/Output Summary (Last 24 hours) at 7/23/2020 1342  Last data filed at 7/23/2020 0136  Gross per 24 hour   Intake 490 ml   Output --   Net 490 ml      Physical Exam  Vitals signs and nursing note reviewed.   Constitutional:       Appearance: Normal appearance.   HENT:      Head: Normocephalic and atraumatic.   Eyes:      Extraocular Movements: Extraocular movements intact.   Neck:      Musculoskeletal: Normal range of motion.   Cardiovascular:      Rate and Rhythm: Normal rate.   Pulmonary:      Effort: No respiratory distress.   Musculoskeletal: Normal range of motion.   Neurological:      General: No focal deficit present.      Mental Status: She is alert and oriented to person, place, and time.   Psychiatric:         Mood and Affect: Mood normal.         Behavior: Behavior normal.         Thought Content: Thought content normal.         Judgment: Judgment  normal.         Significant Labs:   Recent Labs   Lab 07/21/20  0639 07/22/20  0823 07/23/20  0853   WBC 6.05 4.52 5.21   HGB 12.3 12.5 12.8   HCT 37.1 37.6 37.6    202 247     Recent Labs   Lab 07/16/20  1455  07/20/20  0923 07/21/20  0639 07/22/20  0823 07/23/20  0853   *   < > 137  --  139 136   K 4.1   < > 3.7  --  4.0 4.1   CL 98   < > 98  --  99 97   CO2 26   < > 29  --  31* 31*   BUN 10   < > 9  --  19 18   CREATININE 0.8   < > 0.6  --  0.7 0.6   *   < > 131*  --  93 87   CALCIUM 8.9   < > 8.9  --  8.7 8.9   MG  --    < > 1.9 2.0 2.1 1.9   PHOS  --    < > 3.8 4.0 3.5 3.5   LIPASE 33  --   --   --   --   --     < > = values in this interval not displayed.     Recent Labs   Lab 07/20/20  0923 07/22/20  0823 07/23/20  0853   ALKPHOS 73 69 71   ALT 48* 45* 53*   AST 30 20 24   ALBUMIN 3.4* 3.2* 3.4*   PROT 7.5 6.9 7.2   BILITOT 0.4 0.3 0.4      No results for input(s): CPK, CPKMB, MB, TROPONINI in the last 72 hours.  No results for input(s): POCTGLUCOSE in the last 168 hours.  Hemoglobin A1C   Date Value Ref Range Status   09/30/2019 5.6 4.0 - 5.6 % Final     Comment:     ADA Screening Guidelines:  5.7-6.4%  Consistent with prediabetes  >or=6.5%  Consistent with diabetes  High levels of fetal hemoglobin interfere with the HbA1C  assay. Heterozygous hemoglobin variants (HbS, HgC, etc)do  not significantly interfere with this assay.   However, presence of multiple variants may affect accuracy.     09/18/2015 5.7 4.5 - 6.2 % Final       No results for input(s): COLORU, CLARITYU, SPECGRAV, PHUR, PROTEINUA, GLUCOSEU, BLOODU, WBCU, RBCU, BACTERIA, MUCUS in the last 24 hours.    Invalid input(s):  BILIRUBINCON    Microbiology Results (last 7 days)     ** No results found for the last 168 hours. **          Scheduled Meds:   ascorbic acid (vitamin C)  500 mg Oral Daily    cetirizine  10 mg Oral Daily    dexamethasone  6 mg Intravenous Daily    enoxaparin  40 mg Subcutaneous Q24H     hydroCHLOROthiazide  12.5 mg Oral Daily    losartan  50 mg Oral Daily    magnesium oxide  400 mg Oral Daily    multivitamin  1 tablet Oral Daily    EUA remdesivir infusion (NO CHARGE)  100 mg Intravenous Q24H     Continuous Infusions:  As Needed: acetaminophen, albuterol, benzonatate, butalbital-acetaminophen-caffeine -40 mg, guaifenesin 100 mg/5 ml, ondansetron      Significant Imaging:   No new imaging

## 2020-07-24 ENCOUNTER — TELEPHONE (OUTPATIENT)
Dept: INTERNAL MEDICINE | Facility: CLINIC | Age: 50
End: 2020-07-24

## 2020-07-24 LAB
ALBUMIN SERPL BCP-MCNC: 3.4 G/DL (ref 3.5–5.2)
ALP SERPL-CCNC: 70 U/L (ref 55–135)
ALT SERPL W/O P-5'-P-CCNC: 64 U/L (ref 10–44)
ANION GAP SERPL CALC-SCNC: 7 MMOL/L (ref 8–16)
AST SERPL-CCNC: 26 U/L (ref 10–40)
BASOPHILS NFR BLD: 0 % (ref 0–1.9)
BILIRUB SERPL-MCNC: 0.3 MG/DL (ref 0.1–1)
BUN SERPL-MCNC: 19 MG/DL (ref 6–20)
CALCIUM SERPL-MCNC: 9 MG/DL (ref 8.7–10.5)
CHLORIDE SERPL-SCNC: 99 MMOL/L (ref 95–110)
CO2 SERPL-SCNC: 31 MMOL/L (ref 23–29)
CREAT SERPL-MCNC: 0.7 MG/DL (ref 0.5–1.4)
DIFFERENTIAL METHOD: ABNORMAL
EOSINOPHIL NFR BLD: 0 % (ref 0–8)
ERYTHROCYTE [DISTWIDTH] IN BLOOD BY AUTOMATED COUNT: 13.2 % (ref 11.5–14.5)
EST. GFR  (AFRICAN AMERICAN): >60 ML/MIN/1.73 M^2
EST. GFR  (NON AFRICAN AMERICAN): >60 ML/MIN/1.73 M^2
GLUCOSE SERPL-MCNC: 96 MG/DL (ref 70–110)
HCT VFR BLD AUTO: 38.8 % (ref 37–48.5)
HGB BLD-MCNC: 13.2 G/DL (ref 12–16)
IMM GRANULOCYTES # BLD AUTO: ABNORMAL K/UL (ref 0–0.04)
IMM GRANULOCYTES NFR BLD AUTO: ABNORMAL % (ref 0–0.5)
LYMPHOCYTES NFR BLD: 27 % (ref 18–48)
MAGNESIUM SERPL-MCNC: 1.9 MG/DL (ref 1.6–2.6)
MCH RBC QN AUTO: 28.4 PG (ref 27–31)
MCHC RBC AUTO-ENTMCNC: 34 G/DL (ref 32–36)
MCV RBC AUTO: 84 FL (ref 82–98)
MONOCYTES NFR BLD: 3 % (ref 4–15)
MYELOCYTES NFR BLD MANUAL: 3 %
NEUTROPHILS NFR BLD: 66 % (ref 38–73)
NEUTS BAND NFR BLD MANUAL: 1 %
NRBC BLD-RTO: 0 /100 WBC
OVALOCYTES BLD QL SMEAR: ABNORMAL
PHOSPHATE SERPL-MCNC: 3.9 MG/DL (ref 2.7–4.5)
PLATELET # BLD AUTO: 259 K/UL (ref 150–350)
PLATELET BLD QL SMEAR: ABNORMAL
PMV BLD AUTO: 10.5 FL (ref 9.2–12.9)
POIKILOCYTOSIS BLD QL SMEAR: SLIGHT
POTASSIUM SERPL-SCNC: 4.3 MMOL/L (ref 3.5–5.1)
PROT SERPL-MCNC: 6.9 G/DL (ref 6–8.4)
RBC # BLD AUTO: 4.64 M/UL (ref 4–5.4)
SODIUM SERPL-SCNC: 137 MMOL/L (ref 136–145)
WBC # BLD AUTO: 7.22 K/UL (ref 3.9–12.7)

## 2020-07-24 PROCEDURE — 63600175 PHARM REV CODE 636 W HCPCS: Performed by: FAMILY MEDICINE

## 2020-07-24 PROCEDURE — 85027 COMPLETE CBC AUTOMATED: CPT

## 2020-07-24 PROCEDURE — 80053 COMPREHEN METABOLIC PANEL: CPT

## 2020-07-24 PROCEDURE — 83735 ASSAY OF MAGNESIUM: CPT

## 2020-07-24 PROCEDURE — 27000221 HC OXYGEN, UP TO 24 HOURS

## 2020-07-24 PROCEDURE — 11000001 HC ACUTE MED/SURG PRIVATE ROOM

## 2020-07-24 PROCEDURE — 85007 BL SMEAR W/DIFF WBC COUNT: CPT

## 2020-07-24 PROCEDURE — 25000003 PHARM REV CODE 250: Performed by: FAMILY MEDICINE

## 2020-07-24 PROCEDURE — 84100 ASSAY OF PHOSPHORUS: CPT

## 2020-07-24 PROCEDURE — 99900035 HC TECH TIME PER 15 MIN (STAT)

## 2020-07-24 PROCEDURE — 36415 COLL VENOUS BLD VENIPUNCTURE: CPT

## 2020-07-24 PROCEDURE — 25000003 PHARM REV CODE 250: Performed by: HOSPITALIST

## 2020-07-24 RX ADMIN — MAGNESIUM OXIDE 400 MG (241.3 MG MAGNESIUM) TABLET 400 MG: TABLET at 08:07

## 2020-07-24 RX ADMIN — LOSARTAN POTASSIUM 50 MG: 50 TABLET ORAL at 08:07

## 2020-07-24 RX ADMIN — DEXAMETHASONE SODIUM PHOSPHATE 6 MG: 4 INJECTION, SOLUTION INTRAMUSCULAR; INTRAVENOUS at 08:07

## 2020-07-24 RX ADMIN — THERA TABS 1 TABLET: TAB at 08:07

## 2020-07-24 RX ADMIN — SODIUM CHLORIDE 100 MG: 9 INJECTION, SOLUTION INTRAVENOUS at 04:07

## 2020-07-24 RX ADMIN — Medication 500 MG: at 08:07

## 2020-07-24 RX ADMIN — CETIRIZINE HYDROCHLORIDE 10 MG: 10 TABLET, FILM COATED ORAL at 08:07

## 2020-07-24 RX ADMIN — ENOXAPARIN SODIUM 40 MG: 100 INJECTION SUBCUTANEOUS at 04:07

## 2020-07-24 NOTE — TELEPHONE ENCOUNTER
----- Message from Desi Patel sent at 7/24/2020 11:22 AM CDT -----  Regarding: Medicaid Patient Hospital Follow  Contact: Patient @ [phone]  Good Morning  Ada  for [facility] called patient is getting discharge from hospital tomorrow and need a Hosp Follow up appointment. Ada asked for a week. First appointment is in 09/2020 with Dr Sequeira.    Please call patient  Thank you

## 2020-07-24 NOTE — ASSESSMENT & PLAN NOTE
Elevated Liver enzymes  CXR showed worsening central pulmonary vascular as well as bilateral interstitial and alveolar infiltrates noted.    COVID 19 test positive   CRP 74.6, , LFT slightly elevated, ferritin, troponin, CK, D-dimer all wnl.   Rocephin /Azithromycin  Decadron 6mg   Albuterol inhaler prn  Lovenox bid  Supplemental oxygen  Tylenol   Antiemetics.  Blood cultures pending  Enoxaparin  40mg subq BID  Tylenol prn  Consult Pulm  Consult ID  Consult Remdesivir    7/21 started remdisivir  Some cough, improving SOB  7/22 continue remdisivir,   Monitor for symptoms  7/23 day #2 of Remdisivir.  Improved symptoms  2 L NC o2 sat 96%  7/24 doing better, day #3 on remdisivir

## 2020-07-24 NOTE — PROGRESS NOTES
[facility] Hospital Medicine  Progress Note    Patient Name: Irina Dugan  MRN: [id number]  Patient Class: IP- Inpatient   Admission Date: 7/19/2020  Length of Stay: 5 days  Attending Physician: Thom Ramsey DO  Primary Care Provider: Adrianna Pope DO        Subjective:     Principal Problem:Pneumonia due to COVID-19 virus        HPI:   Irina Dugan is a 50 y/o F with PMH of hypertension, ovarian cyst, presents to the ED presents with 1-week history of progressive SOB that is worse in the past 2 days especially with activities such as walking, with associated nausea, vomiting, myalgias, chills. Patient denies fever, diarrhea, changes of smell or taste, or abdominal pain. Patient tested positive for COVID-19 2 days ago, CXR done, showed bilateral infiltrates. CRP 74.6, , LFT slightly elevated, ferritin, troponin, CK, D-dimer all wnl.     Overview/Hospital Course:  7/21 The patient was evaluated via virtual rounding, discussion with nurses and reviewing VS, notes was part of this evaluation. Evaluation done by technology on site.  7/22 The pt is improving slowly, les SOB from this am, some cough, rendisivir had been ordered.  Had a reaction yesterday mostly related to the IV given the findings and description  7/23 The patient was evaluated via virtual rounding, discussion with nurses and reviewing VS, notes was part of this evaluation.  Evaluation done on site, the pt is improving, less cough and SOB  7/24 pt evaluated, doing ok, improving, had some facial warm after remdisivir, will slow the rate of infusion.    Interval History: no reaction to the remdisivir yesterday, continue current tx.    Review of Systems   Constitutional: Positive for activity change and fatigue. Negative for fever.   HENT: Negative for congestion.    Respiratory: Negative for cough and shortness of breath (improving slowly).    Cardiovascular: Negative for chest pain and palpitations.    Gastrointestinal: Negative for abdominal distention, nausea and vomiting.   Neurological: Negative for dizziness, light-headedness and numbness.   Psychiatric/Behavioral: Negative for agitation. The patient is not nervous/anxious and is not hyperactive.      Objective:     Vital Signs (Most Recent):  Temp: 96.9 °F (36.1 °C) (07/24/20 1640)  Pulse: 66 (07/24/20 1640)  Resp: (!) 22 (07/24/20 1640)  BP: (!) 100/59 (07/24/20 1640)  SpO2: 97 % (07/24/20 1640) Vital Signs (24h Range):  Temp:  [96.3 °F (35.7 °C)-97.8 °F (36.6 °C)] 96.9 °F (36.1 °C)  Pulse:  [57-71] 66  Resp:  [18-22] 22  SpO2:  [96 %-98 %] 97 %  BP: (100-141)/(59-83) 100/59     Weight: 129.6 kg (285 lb 11.5 oz)  Body mass index is 46.12 kg/m².    Intake/Output Summary (Last 24 hours) at 7/24/2020 1646  Last data filed at 7/23/2020 2231  Gross per 24 hour   Intake 250 ml   Output --   Net 250 ml      Physical Exam  Vitals signs and nursing note reviewed.   Constitutional:       Appearance: Normal appearance.   HENT:      Head: Normocephalic and atraumatic.   Eyes:      Extraocular Movements: Extraocular movements intact.   Neck:      Musculoskeletal: Normal range of motion.   Cardiovascular:      Rate and Rhythm: Normal rate.   Pulmonary:      Effort: No respiratory distress.   Musculoskeletal: Normal range of motion.   Neurological:      General: No focal deficit present.      Mental Status: She is alert and oriented to person, place, and time.   Psychiatric:         Mood and Affect: Mood normal.         Behavior: Behavior normal.         Thought Content: Thought content normal.         Judgment: Judgment normal.         Significant Labs:   Recent Labs   Lab 07/22/20  0823 07/23/20  0853 07/24/20  0630   WBC 4.52 5.21 7.22   HGB 12.5 12.8 13.2   HCT 37.6 37.6 38.8    247 259     Recent Labs   Lab 07/22/20  0823 07/23/20  0853 07/24/20  0630    136 137   K 4.0 4.1 4.3   CL 99 97 99   CO2 31* 31* 31*   BUN 19 18 19   CREATININE 0.7 0.6 0.7    GLU 93 87 96   CALCIUM 8.7 8.9 9.0   MG 2.1 1.9 1.9   PHOS 3.5 3.5 3.9     Recent Labs   Lab 07/22/20  0823 07/23/20  0853 07/24/20  0630   ALKPHOS 69 71 70   ALT 45* 53* 64*   AST 20 24 26   ALBUMIN 3.2* 3.4* 3.4*   PROT 6.9 7.2 6.9   BILITOT 0.3 0.4 0.3      No results for input(s): CPK, CPKMB, MB, TROPONINI in the last 72 hours.  No results for input(s): POCTGLUCOSE in the last 168 hours.  Hemoglobin A1C   Date Value Ref Range Status   09/30/2019 5.6 4.0 - 5.6 % Final     Comment:     ADA Screening Guidelines:  5.7-6.4%  Consistent with prediabetes  >or=6.5%  Consistent with diabetes  High levels of fetal hemoglobin interfere with the HbA1C  assay. Heterozygous hemoglobin variants (HbS, HgC, etc)do  not significantly interfere with this assay.   However, presence of multiple variants may affect accuracy.     09/18/2015 5.7 4.5 - 6.2 % Final       No results for input(s): COLORU, CLARITYU, SPECGRAV, PHUR, PROTEINUA, GLUCOSEU, BLOODU, WBCU, RBCU, BACTERIA, MUCUS in the last 24 hours.    Invalid input(s):  BILIRUBINCON    Microbiology Results (last 7 days)     ** No results found for the last 168 hours. **          Scheduled Meds:   ascorbic acid (vitamin C)  500 mg Oral Daily    cetirizine  10 mg Oral Daily    enoxaparin  40 mg Subcutaneous Q24H    hydroCHLOROthiazide  12.5 mg Oral Daily    losartan  50 mg Oral Daily    magnesium oxide  400 mg Oral Daily    multivitamin  1 tablet Oral Daily    EUA remdesivir infusion (NO CHARGE)  100 mg Intravenous Q24H     Continuous Infusions:  As Needed: acetaminophen, albuterol, benzonatate, butalbital-acetaminophen-caffeine -40 mg, guaifenesin 100 mg/5 ml, ondansetron      Significant Imaging:   No new imaging      Assessment/Plan:      * Pneumonia due to COVID-19 virus  Elevated Liver enzymes  CXR showed worsening central pulmonary vascular as well as bilateral interstitial and alveolar infiltrates noted.    COVID 19 test positive   CRP 74.6, , LFT  slightly elevated, ferritin, troponin, CK, D-dimer all wnl.   Rocephin /Azithromycin  Decadron 6mg   Albuterol inhaler prn  Lovenox bid  Supplemental oxygen  Tylenol   Antiemetics.  Blood cultures pending  Enoxaparin  40mg subq BID  Tylenol prn  Consult Pulm  Consult ID  Consult Remdesivir    7/21 started remdisivir  Some cough, improving SOB  7/22 continue remdisivir,   Monitor for symptoms  7/23 day #2 of Remdisivir.  Improved symptoms  2 L NC o2 sat 96%  7/24 doing better, day #3 on remdisivir    Essential hypertension    continue losartan, HCT      VTE Risk Mitigation (From admission, onward)         Ordered     enoxaparin injection 40 mg  Every 24 hours      07/19/20 0512                      Thom Ramsey DO  Department of Hospital Medicine   [facility]

## 2020-07-24 NOTE — SUBJECTIVE & OBJECTIVE
Interval History: no reaction to the remdisivir yesterday, continue current tx.    Review of Systems   Constitutional: Positive for activity change and fatigue. Negative for fever.   HENT: Negative for congestion.    Respiratory: Negative for cough and shortness of breath (improving slowly).    Cardiovascular: Negative for chest pain and palpitations.   Gastrointestinal: Negative for abdominal distention, nausea and vomiting.   Neurological: Negative for dizziness, light-headedness and numbness.   Psychiatric/Behavioral: Negative for agitation. The patient is not nervous/anxious and is not hyperactive.      Objective:     Vital Signs (Most Recent):  Temp: 96.9 °F (36.1 °C) (07/24/20 1640)  Pulse: 66 (07/24/20 1640)  Resp: (!) 22 (07/24/20 1640)  BP: (!) 100/59 (07/24/20 1640)  SpO2: 97 % (07/24/20 1640) Vital Signs (24h Range):  Temp:  [96.3 °F (35.7 °C)-97.8 °F (36.6 °C)] 96.9 °F (36.1 °C)  Pulse:  [57-71] 66  Resp:  [18-22] 22  SpO2:  [96 %-98 %] 97 %  BP: (100-141)/(59-83) 100/59     Weight: 129.6 kg (285 lb 11.5 oz)  Body mass index is 46.12 kg/m².    Intake/Output Summary (Last 24 hours) at 7/24/2020 1646  Last data filed at 7/23/2020 2231  Gross per 24 hour   Intake 250 ml   Output --   Net 250 ml      Physical Exam  Vitals signs and nursing note reviewed.   Constitutional:       Appearance: Normal appearance.   HENT:      Head: Normocephalic and atraumatic.   Eyes:      Extraocular Movements: Extraocular movements intact.   Neck:      Musculoskeletal: Normal range of motion.   Cardiovascular:      Rate and Rhythm: Normal rate.   Pulmonary:      Effort: No respiratory distress.   Musculoskeletal: Normal range of motion.   Neurological:      General: No focal deficit present.      Mental Status: She is alert and oriented to person, place, and time.   Psychiatric:         Mood and Affect: Mood normal.         Behavior: Behavior normal.         Thought Content: Thought content normal.         Judgment: Judgment  normal.         Significant Labs:   Recent Labs   Lab 07/22/20  0823 07/23/20  0853 07/24/20  0630   WBC 4.52 5.21 7.22   HGB 12.5 12.8 13.2   HCT 37.6 37.6 38.8    247 259     Recent Labs   Lab 07/22/20  0823 07/23/20  0853 07/24/20  0630    136 137   K 4.0 4.1 4.3   CL 99 97 99   CO2 31* 31* 31*   BUN 19 18 19   CREATININE 0.7 0.6 0.7   GLU 93 87 96   CALCIUM 8.7 8.9 9.0   MG 2.1 1.9 1.9   PHOS 3.5 3.5 3.9     Recent Labs   Lab 07/22/20  0823 07/23/20  0853 07/24/20  0630   ALKPHOS 69 71 70   ALT 45* 53* 64*   AST 20 24 26   ALBUMIN 3.2* 3.4* 3.4*   PROT 6.9 7.2 6.9   BILITOT 0.3 0.4 0.3      No results for input(s): CPK, CPKMB, MB, TROPONINI in the last 72 hours.  No results for input(s): POCTGLUCOSE in the last 168 hours.  Hemoglobin A1C   Date Value Ref Range Status   09/30/2019 5.6 4.0 - 5.6 % Final     Comment:     ADA Screening Guidelines:  5.7-6.4%  Consistent with prediabetes  >or=6.5%  Consistent with diabetes  High levels of fetal hemoglobin interfere with the HbA1C  assay. Heterozygous hemoglobin variants (HbS, HgC, etc)do  not significantly interfere with this assay.   However, presence of multiple variants may affect accuracy.     09/18/2015 5.7 4.5 - 6.2 % Final       No results for input(s): COLORU, CLARITYU, SPECGRAV, PHUR, PROTEINUA, GLUCOSEU, BLOODU, WBCU, RBCU, BACTERIA, MUCUS in the last 24 hours.    Invalid input(s):  BILIRUBINCON    Microbiology Results (last 7 days)     ** No results found for the last 168 hours. **          Scheduled Meds:   ascorbic acid (vitamin C)  500 mg Oral Daily    cetirizine  10 mg Oral Daily    enoxaparin  40 mg Subcutaneous Q24H    hydroCHLOROthiazide  12.5 mg Oral Daily    losartan  50 mg Oral Daily    magnesium oxide  400 mg Oral Daily    multivitamin  1 tablet Oral Daily    EUA remdesivir infusion (NO CHARGE)  100 mg Intravenous Q24H     Continuous Infusions:  As Needed: acetaminophen, albuterol, benzonatate,  butalbital-acetaminophen-caffeine -40 mg, guaifenesin 100 mg/5 ml, ondansetron      Significant Imaging:   No new imaging

## 2020-07-24 NOTE — PLAN OF CARE
1900 reported no pain, no SOB at rest, becomes a bit dyspneic with ambulation, has great appetite, requested several snacks.       No Tele     Bed in lowest position, wheels locked, non skid socks, ID band worn, personal items and call bell with in reach, bed alarm set.

## 2020-07-24 NOTE — PROGRESS NOTES
Per morning report, pt has one more day of Remdisivir. Pt should be able to d/c on tomorrow, Saturday 7/25/20

## 2020-07-25 LAB
ALBUMIN SERPL BCP-MCNC: 3.3 G/DL (ref 3.5–5.2)
ALP SERPL-CCNC: 76 U/L (ref 55–135)
ALT SERPL W/O P-5'-P-CCNC: 67 U/L (ref 10–44)
ANION GAP SERPL CALC-SCNC: 11 MMOL/L (ref 8–16)
AST SERPL-CCNC: 22 U/L (ref 10–40)
BASOPHILS # BLD AUTO: 0.04 K/UL (ref 0–0.2)
BASOPHILS NFR BLD: 0.4 % (ref 0–1.9)
BILIRUB SERPL-MCNC: 0.2 MG/DL (ref 0.1–1)
BUN SERPL-MCNC: 24 MG/DL (ref 6–20)
CALCIUM SERPL-MCNC: 8.8 MG/DL (ref 8.7–10.5)
CHLORIDE SERPL-SCNC: 101 MMOL/L (ref 95–110)
CO2 SERPL-SCNC: 25 MMOL/L (ref 23–29)
CREAT SERPL-MCNC: 0.7 MG/DL (ref 0.5–1.4)
DIFFERENTIAL METHOD: ABNORMAL
EOSINOPHIL # BLD AUTO: 0 K/UL (ref 0–0.5)
EOSINOPHIL NFR BLD: 0.2 % (ref 0–8)
ERYTHROCYTE [DISTWIDTH] IN BLOOD BY AUTOMATED COUNT: 13.3 % (ref 11.5–14.5)
EST. GFR  (AFRICAN AMERICAN): >60 ML/MIN/1.73 M^2
EST. GFR  (NON AFRICAN AMERICAN): >60 ML/MIN/1.73 M^2
GLUCOSE SERPL-MCNC: 104 MG/DL (ref 70–110)
HCT VFR BLD AUTO: 38.5 % (ref 37–48.5)
HGB BLD-MCNC: 12.6 G/DL (ref 12–16)
IMM GRANULOCYTES # BLD AUTO: 0.42 K/UL (ref 0–0.04)
IMM GRANULOCYTES NFR BLD AUTO: 4.7 % (ref 0–0.5)
LYMPHOCYTES # BLD AUTO: 1.7 K/UL (ref 1–4.8)
LYMPHOCYTES NFR BLD: 19.3 % (ref 18–48)
MAGNESIUM SERPL-MCNC: 1.9 MG/DL (ref 1.6–2.6)
MCH RBC QN AUTO: 27.6 PG (ref 27–31)
MCHC RBC AUTO-ENTMCNC: 32.7 G/DL (ref 32–36)
MCV RBC AUTO: 84 FL (ref 82–98)
MONOCYTES # BLD AUTO: 0.7 K/UL (ref 0.3–1)
MONOCYTES NFR BLD: 7.6 % (ref 4–15)
NEUTROPHILS # BLD AUTO: 6 K/UL (ref 1.8–7.7)
NEUTROPHILS NFR BLD: 67.8 % (ref 38–73)
NRBC BLD-RTO: 0 /100 WBC
PHOSPHATE SERPL-MCNC: 4 MG/DL (ref 2.7–4.5)
PLATELET # BLD AUTO: 265 K/UL (ref 150–350)
PMV BLD AUTO: 10.5 FL (ref 9.2–12.9)
POTASSIUM SERPL-SCNC: 4.4 MMOL/L (ref 3.5–5.1)
PROT SERPL-MCNC: 6.9 G/DL (ref 6–8.4)
RBC # BLD AUTO: 4.56 M/UL (ref 4–5.4)
SODIUM SERPL-SCNC: 137 MMOL/L (ref 136–145)
WBC # BLD AUTO: 8.91 K/UL (ref 3.9–12.7)

## 2020-07-25 PROCEDURE — 94761 N-INVAS EAR/PLS OXIMETRY MLT: CPT

## 2020-07-25 PROCEDURE — 11000001 HC ACUTE MED/SURG PRIVATE ROOM

## 2020-07-25 PROCEDURE — 80053 COMPREHEN METABOLIC PANEL: CPT

## 2020-07-25 PROCEDURE — 63600175 PHARM REV CODE 636 W HCPCS: Performed by: FAMILY MEDICINE

## 2020-07-25 PROCEDURE — 25000003 PHARM REV CODE 250: Performed by: FAMILY MEDICINE

## 2020-07-25 PROCEDURE — 36415 COLL VENOUS BLD VENIPUNCTURE: CPT

## 2020-07-25 PROCEDURE — 25000003 PHARM REV CODE 250: Performed by: HOSPITALIST

## 2020-07-25 PROCEDURE — 83735 ASSAY OF MAGNESIUM: CPT

## 2020-07-25 PROCEDURE — 85025 COMPLETE CBC W/AUTO DIFF WBC: CPT

## 2020-07-25 PROCEDURE — 84100 ASSAY OF PHOSPHORUS: CPT

## 2020-07-25 RX ORDER — BISACODYL 10 MG
10 SUPPOSITORY, RECTAL RECTAL DAILY PRN
Status: DISCONTINUED | OUTPATIENT
Start: 2020-07-25 | End: 2020-07-25

## 2020-07-25 RX ORDER — LACTULOSE 10 G/15ML
10 SOLUTION ORAL 3 TIMES DAILY
Status: DISCONTINUED | OUTPATIENT
Start: 2020-07-25 | End: 2020-07-25

## 2020-07-25 RX ADMIN — ENOXAPARIN SODIUM 40 MG: 100 INJECTION SUBCUTANEOUS at 06:07

## 2020-07-25 RX ADMIN — CETIRIZINE HYDROCHLORIDE 10 MG: 10 TABLET, FILM COATED ORAL at 09:07

## 2020-07-25 RX ADMIN — HYDROCHLOROTHIAZIDE 12.5 MG: 12.5 TABLET ORAL at 09:07

## 2020-07-25 RX ADMIN — THERA TABS 1 TABLET: TAB at 09:07

## 2020-07-25 RX ADMIN — SODIUM CHLORIDE 100 MG: 9 INJECTION, SOLUTION INTRAVENOUS at 06:07

## 2020-07-25 RX ADMIN — GUAIFENESIN 200 MG: 200 SOLUTION ORAL at 09:07

## 2020-07-25 RX ADMIN — MAGNESIUM OXIDE 400 MG (241.3 MG MAGNESIUM) TABLET 400 MG: TABLET at 09:07

## 2020-07-25 RX ADMIN — Medication 500 MG: at 09:07

## 2020-07-25 RX ADMIN — LOSARTAN POTASSIUM 50 MG: 50 TABLET ORAL at 09:07

## 2020-07-25 NOTE — ASSESSMENT & PLAN NOTE
Elevated Liver enzymes  CXR showed worsening central pulmonary vascular as well as bilateral interstitial and alveolar infiltrates noted.    COVID 19 test positive   CRP 74.6, , LFT slightly elevated, ferritin, troponin, CK, D-dimer all wnl.   Rocephin /Azithromycin  Decadron 6mg   Albuterol inhaler prn  Lovenox bid  Supplemental oxygen  Tylenol   Antiemetics.  Blood cultures pending  Enoxaparin  40mg subq BID  Tylenol prn  Consult Pulm  Consult ID  Consult Remdesivir    7/21 started remdisivir  Some cough, improving SOB  7/22 continue remdisivir,   Monitor for symptoms  7/23 day #2 of Remdisivir.  Improved symptoms  2 L NC o2 sat 96%  7/24 doing better, day #3 on remdisivir  7/25 last day of remdisivir

## 2020-07-25 NOTE — SUBJECTIVE & OBJECTIVE
Interval History: no reaction to the remdisivir yesterday after slowing the rate, continue current tx. Last dose today, dc home in am    Review of Systems   Constitutional: Negative for activity change, fatigue and fever.   HENT: Negative for congestion.    Respiratory: Negative for cough and shortness of breath (improving slowly).    Cardiovascular: Negative for chest pain and palpitations.   Gastrointestinal: Negative for abdominal distention, nausea and vomiting.   Neurological: Negative for dizziness, light-headedness and numbness.   Psychiatric/Behavioral: Negative for agitation. The patient is not nervous/anxious and is not hyperactive.      Objective:     Vital Signs (Most Recent):  Temp: 96.9 °F (36.1 °C) (07/25/20 0746)  Pulse: 70 (07/25/20 0746)  Resp: 17 (07/25/20 0746)  BP: 119/74 (07/25/20 0746)  SpO2: 95 % (07/25/20 1222) Vital Signs (24h Range):  Temp:  [96.5 °F (35.8 °C)-97.6 °F (36.4 °C)] 96.9 °F (36.1 °C)  Pulse:  [62-76] 70  Resp:  [17-22] 17  SpO2:  [94 %-98 %] 95 %  BP: (100-140)/(59-81) 119/74     Weight: 129.6 kg (285 lb 11.5 oz)  Body mass index is 46.12 kg/m².    Intake/Output Summary (Last 24 hours) at 7/25/2020 1449  Last data filed at 7/25/2020 1300  Gross per 24 hour   Intake 610 ml   Output --   Net 610 ml      Physical Exam  Vitals signs and nursing note reviewed.   Constitutional:       Appearance: Normal appearance.   HENT:      Head: Normocephalic and atraumatic.   Eyes:      Extraocular Movements: Extraocular movements intact.   Neck:      Musculoskeletal: Normal range of motion.   Cardiovascular:      Rate and Rhythm: Normal rate.   Pulmonary:      Effort: No respiratory distress.   Musculoskeletal: Normal range of motion.   Neurological:      General: No focal deficit present.      Mental Status: She is alert and oriented to person, place, and time.   Psychiatric:         Mood and Affect: Mood normal.         Behavior: Behavior normal.         Thought Content: Thought content  normal.         Judgment: Judgment normal.         Significant Labs:   Recent Labs   Lab 07/23/20  0853 07/24/20  0630 07/25/20  0611   WBC 5.21 7.22 8.91   HGB 12.8 13.2 12.6   HCT 37.6 38.8 38.5    259 265     Recent Labs   Lab 07/23/20  0853 07/24/20  0630 07/25/20  0611    137 137   K 4.1 4.3 4.4   CL 97 99 101   CO2 31* 31* 25   BUN 18 19 24*   CREATININE 0.6 0.7 0.7   GLU 87 96 104   CALCIUM 8.9 9.0 8.8   MG 1.9 1.9 1.9   PHOS 3.5 3.9 4.0     Recent Labs   Lab 07/23/20  0853 07/24/20  0630 07/25/20  0611   ALKPHOS 71 70 76   ALT 53* 64* 67*   AST 24 26 22   ALBUMIN 3.4* 3.4* 3.3*   PROT 7.2 6.9 6.9   BILITOT 0.4 0.3 0.2      No results for input(s): CPK, CPKMB, MB, TROPONINI in the last 72 hours.  No results for input(s): POCTGLUCOSE in the last 168 hours.  Hemoglobin A1C   Date Value Ref Range Status   09/30/2019 5.6 4.0 - 5.6 % Final     Comment:     ADA Screening Guidelines:  5.7-6.4%  Consistent with prediabetes  >or=6.5%  Consistent with diabetes  High levels of fetal hemoglobin interfere with the HbA1C  assay. Heterozygous hemoglobin variants (HbS, HgC, etc)do  not significantly interfere with this assay.   However, presence of multiple variants may affect accuracy.     09/18/2015 5.7 4.5 - 6.2 % Final       No results for input(s): COLORU, CLARITYU, SPECGRAV, PHUR, PROTEINUA, GLUCOSEU, BLOODU, WBCU, RBCU, BACTERIA, MUCUS in the last 24 hours.    Invalid input(s):  BILIRUBINCON    Microbiology Results (last 7 days)     ** No results found for the last 168 hours. **          Scheduled Meds:   ascorbic acid (vitamin C)  500 mg Oral Daily    cetirizine  10 mg Oral Daily    enoxaparin  40 mg Subcutaneous Q24H    hydroCHLOROthiazide  12.5 mg Oral Daily    losartan  50 mg Oral Daily    magnesium oxide  400 mg Oral Daily    multivitamin  1 tablet Oral Daily    EUA remdesivir infusion (NO CHARGE)  100 mg Intravenous Q24H     Continuous Infusions:  As Needed: acetaminophen, albuterol,  benzonatate, butalbital-acetaminophen-caffeine -40 mg, guaifenesin 100 mg/5 ml, ondansetron      Significant Imaging:   No new imaging

## 2020-07-25 NOTE — PROGRESS NOTES
[facility] Hospital Medicine  Progress Note    Patient Name: Irina Dugan  MRN: [id number]  Patient Class: IP- Inpatient   Admission Date: 7/19/2020  Length of Stay: 6 days  Attending Physician: Thom Ramsey DO  Primary Care Provider: Adrianna Pope DO        Subjective:     Principal Problem:Pneumonia due to COVID-19 virus        HPI:   Irina Dugan is a 48 y/o F with PMH of hypertension, ovarian cyst, presents to the ED presents with 1-week history of progressive SOB that is worse in the past 2 days especially with activities such as walking, with associated nausea, vomiting, myalgias, chills. Patient denies fever, diarrhea, changes of smell or taste, or abdominal pain. Patient tested positive for COVID-19 2 days ago, CXR done, showed bilateral infiltrates. CRP 74.6, , LFT slightly elevated, ferritin, troponin, CK, D-dimer all wnl.     Overview/Hospital Course:  7/21 The patient was evaluated via virtual rounding, discussion with nurses and reviewing VS, notes was part of this evaluation. Evaluation done by technology on site.  7/22 The pt is improving slowly, les SOB from this am, some cough, rendisivir had been ordered.  Had a reaction yesterday mostly related to the IV given the findings and description  7/23 The patient was evaluated via virtual rounding, discussion with nurses and reviewing VS, notes was part of this evaluation.  Evaluation done on site, the pt is improving, less cough and SOB  7/24 pt evaluated, doing ok, improving, had some facial warm after remdisivir, will slow the rate of infusion.  7/25 The patient was evaluated via virtual rounding, discussion with nurses and reviewing VS, notes was part of this evaluation. Evaluation done on site, no acute events, improving, possible dc in am tomorrow    Interval History: no reaction to the remdisivir yesterday after slowing the rate, continue current tx. Last dose today, dc home in am    Review of Systems    Constitutional: Negative for activity change, fatigue and fever.   HENT: Negative for congestion.    Respiratory: Negative for cough and shortness of breath (improving slowly).    Cardiovascular: Negative for chest pain and palpitations.   Gastrointestinal: Negative for abdominal distention, nausea and vomiting.   Neurological: Negative for dizziness, light-headedness and numbness.   Psychiatric/Behavioral: Negative for agitation. The patient is not nervous/anxious and is not hyperactive.      Objective:     Vital Signs (Most Recent):  Temp: 96.9 °F (36.1 °C) (07/25/20 0746)  Pulse: 70 (07/25/20 0746)  Resp: 17 (07/25/20 0746)  BP: 119/74 (07/25/20 0746)  SpO2: 95 % (07/25/20 1222) Vital Signs (24h Range):  Temp:  [96.5 °F (35.8 °C)-97.6 °F (36.4 °C)] 96.9 °F (36.1 °C)  Pulse:  [62-76] 70  Resp:  [17-22] 17  SpO2:  [94 %-98 %] 95 %  BP: (100-140)/(59-81) 119/74     Weight: 129.6 kg (285 lb 11.5 oz)  Body mass index is 46.12 kg/m².    Intake/Output Summary (Last 24 hours) at 7/25/2020 1449  Last data filed at 7/25/2020 1300  Gross per 24 hour   Intake 610 ml   Output --   Net 610 ml      Physical Exam  Vitals signs and nursing note reviewed.   Constitutional:       Appearance: Normal appearance.   HENT:      Head: Normocephalic and atraumatic.   Eyes:      Extraocular Movements: Extraocular movements intact.   Neck:      Musculoskeletal: Normal range of motion.   Cardiovascular:      Rate and Rhythm: Normal rate.   Pulmonary:      Effort: No respiratory distress.   Musculoskeletal: Normal range of motion.   Neurological:      General: No focal deficit present.      Mental Status: She is alert and oriented to person, place, and time.   Psychiatric:         Mood and Affect: Mood normal.         Behavior: Behavior normal.         Thought Content: Thought content normal.         Judgment: Judgment normal.         Significant Labs:   Recent Labs   Lab 07/23/20  0853 07/24/20  0630 07/25/20  0611   WBC 5.21 7.22 8.91    HGB 12.8 13.2 12.6   HCT 37.6 38.8 38.5    259 265     Recent Labs   Lab 07/23/20  0853 07/24/20  0630 07/25/20  0611    137 137   K 4.1 4.3 4.4   CL 97 99 101   CO2 31* 31* 25   BUN 18 19 24*   CREATININE 0.6 0.7 0.7   GLU 87 96 104   CALCIUM 8.9 9.0 8.8   MG 1.9 1.9 1.9   PHOS 3.5 3.9 4.0     Recent Labs   Lab 07/23/20  0853 07/24/20  0630 07/25/20  0611   ALKPHOS 71 70 76   ALT 53* 64* 67*   AST 24 26 22   ALBUMIN 3.4* 3.4* 3.3*   PROT 7.2 6.9 6.9   BILITOT 0.4 0.3 0.2      No results for input(s): CPK, CPKMB, MB, TROPONINI in the last 72 hours.  No results for input(s): POCTGLUCOSE in the last 168 hours.  Hemoglobin A1C   Date Value Ref Range Status   09/30/2019 5.6 4.0 - 5.6 % Final     Comment:     ADA Screening Guidelines:  5.7-6.4%  Consistent with prediabetes  >or=6.5%  Consistent with diabetes  High levels of fetal hemoglobin interfere with the HbA1C  assay. Heterozygous hemoglobin variants (HbS, HgC, etc)do  not significantly interfere with this assay.   However, presence of multiple variants may affect accuracy.     09/18/2015 5.7 4.5 - 6.2 % Final       No results for input(s): COLORU, CLARITYU, SPECGRAV, PHUR, PROTEINUA, GLUCOSEU, BLOODU, WBCU, RBCU, BACTERIA, MUCUS in the last 24 hours.    Invalid input(s):  BILIRUBINCON    Microbiology Results (last 7 days)     ** No results found for the last 168 hours. **          Scheduled Meds:   ascorbic acid (vitamin C)  500 mg Oral Daily    cetirizine  10 mg Oral Daily    enoxaparin  40 mg Subcutaneous Q24H    hydroCHLOROthiazide  12.5 mg Oral Daily    losartan  50 mg Oral Daily    magnesium oxide  400 mg Oral Daily    multivitamin  1 tablet Oral Daily    EUA remdesivir infusion (NO CHARGE)  100 mg Intravenous Q24H     Continuous Infusions:  As Needed: acetaminophen, albuterol, benzonatate, butalbital-acetaminophen-caffeine -40 mg, guaifenesin 100 mg/5 ml, ondansetron      Significant Imaging:   No new imaging      Assessment/Plan:       * Pneumonia due to COVID-19 virus  Elevated Liver enzymes  CXR showed worsening central pulmonary vascular as well as bilateral interstitial and alveolar infiltrates noted.    COVID 19 test positive   CRP 74.6, , LFT slightly elevated, ferritin, troponin, CK, D-dimer all wnl.   Rocephin /Azithromycin  Decadron 6mg   Albuterol inhaler prn  Lovenox bid  Supplemental oxygen  Tylenol   Antiemetics.  Blood cultures pending  Enoxaparin  40mg subq BID  Tylenol prn  Consult Pulm  Consult ID  Consult Remdesivir    7/21 started remdisivir  Some cough, improving SOB  7/22 continue remdisivir,   Monitor for symptoms  7/23 day #2 of Remdisivir.  Improved symptoms  2 L NC o2 sat 96%  7/24 doing better, day #3 on remdisivir  7/25 last day of remdisivir      Essential hypertension    continue losartan, HCT      VTE Risk Mitigation (From admission, onward)         Ordered     enoxaparin injection 40 mg  Every 24 hours      07/19/20 0512                      Thom Ramsey DO  Department of Hospital Medicine   [facility]

## 2020-07-25 NOTE — PLAN OF CARE
1900 reported no pain or any other issues.  Refused snacks,  brought food.      No Tele     Bed in lowest position, wheels locked, non skid socks, ID band worn, personal items and call bell with in reach, bed alarm set.

## 2020-07-26 VITALS
OXYGEN SATURATION: 95 % | HEART RATE: 71 BPM | DIASTOLIC BLOOD PRESSURE: 63 MMHG | WEIGHT: 285.69 LBS | SYSTOLIC BLOOD PRESSURE: 100 MMHG | RESPIRATION RATE: 17 BRPM | BODY MASS INDEX: 45.91 KG/M2 | HEIGHT: 66 IN | TEMPERATURE: 98 F

## 2020-07-26 LAB
ALBUMIN SERPL BCP-MCNC: 3.2 G/DL (ref 3.5–5.2)
ALP SERPL-CCNC: 70 U/L (ref 55–135)
ALT SERPL W/O P-5'-P-CCNC: 67 U/L (ref 10–44)
ANION GAP SERPL CALC-SCNC: 9 MMOL/L (ref 8–16)
AST SERPL-CCNC: 29 U/L (ref 10–40)
BASOPHILS NFR BLD: 0 % (ref 0–1.9)
BILIRUB SERPL-MCNC: 0.3 MG/DL (ref 0.1–1)
BUN SERPL-MCNC: 25 MG/DL (ref 6–20)
CALCIUM SERPL-MCNC: 8.7 MG/DL (ref 8.7–10.5)
CHLORIDE SERPL-SCNC: 102 MMOL/L (ref 95–110)
CO2 SERPL-SCNC: 27 MMOL/L (ref 23–29)
CREAT SERPL-MCNC: 0.7 MG/DL (ref 0.5–1.4)
DIFFERENTIAL METHOD: ABNORMAL
EOSINOPHIL NFR BLD: 0 % (ref 0–8)
ERYTHROCYTE [DISTWIDTH] IN BLOOD BY AUTOMATED COUNT: 13.7 % (ref 11.5–14.5)
EST. GFR  (AFRICAN AMERICAN): >60 ML/MIN/1.73 M^2
EST. GFR  (NON AFRICAN AMERICAN): >60 ML/MIN/1.73 M^2
GLUCOSE SERPL-MCNC: 84 MG/DL (ref 70–110)
HCT VFR BLD AUTO: 37.3 % (ref 37–48.5)
HGB BLD-MCNC: 12.5 G/DL (ref 12–16)
IMM GRANULOCYTES # BLD AUTO: ABNORMAL K/UL (ref 0–0.04)
IMM GRANULOCYTES NFR BLD AUTO: ABNORMAL % (ref 0–0.5)
LYMPHOCYTES NFR BLD: 28 % (ref 18–48)
MAGNESIUM SERPL-MCNC: 1.9 MG/DL (ref 1.6–2.6)
MCH RBC QN AUTO: 28.5 PG (ref 27–31)
MCHC RBC AUTO-ENTMCNC: 33.5 G/DL (ref 32–36)
MCV RBC AUTO: 85 FL (ref 82–98)
MONOCYTES NFR BLD: 18 % (ref 4–15)
NEUTROPHILS NFR BLD: 54 % (ref 38–73)
NRBC BLD-RTO: 0 /100 WBC
PHOSPHATE SERPL-MCNC: 4.7 MG/DL (ref 2.7–4.5)
PLATELET # BLD AUTO: 293 K/UL (ref 150–350)
PLATELET BLD QL SMEAR: ABNORMAL
PMV BLD AUTO: 10.7 FL (ref 9.2–12.9)
POTASSIUM SERPL-SCNC: 4.6 MMOL/L (ref 3.5–5.1)
PROT SERPL-MCNC: 6.4 G/DL (ref 6–8.4)
RBC # BLD AUTO: 4.38 M/UL (ref 4–5.4)
SODIUM SERPL-SCNC: 138 MMOL/L (ref 136–145)
WBC # BLD AUTO: 7.89 K/UL (ref 3.9–12.7)

## 2020-07-26 PROCEDURE — 83735 ASSAY OF MAGNESIUM: CPT

## 2020-07-26 PROCEDURE — 85007 BL SMEAR W/DIFF WBC COUNT: CPT

## 2020-07-26 PROCEDURE — 84100 ASSAY OF PHOSPHORUS: CPT

## 2020-07-26 PROCEDURE — 80053 COMPREHEN METABOLIC PANEL: CPT

## 2020-07-26 PROCEDURE — 85027 COMPLETE CBC AUTOMATED: CPT

## 2020-07-26 PROCEDURE — 25000003 PHARM REV CODE 250: Performed by: FAMILY MEDICINE

## 2020-07-26 PROCEDURE — 36415 COLL VENOUS BLD VENIPUNCTURE: CPT

## 2020-07-26 RX ORDER — BENZONATATE 200 MG/1
200 CAPSULE ORAL 3 TIMES DAILY PRN
Qty: 21 CAPSULE | Refills: 0 | Status: SHIPPED | OUTPATIENT
Start: 2020-07-26 | End: 2020-08-02

## 2020-07-26 RX ADMIN — LOSARTAN POTASSIUM 50 MG: 50 TABLET ORAL at 09:07

## 2020-07-26 RX ADMIN — HYDROCHLOROTHIAZIDE 12.5 MG: 12.5 TABLET ORAL at 09:07

## 2020-07-26 RX ADMIN — GUAIFENESIN 200 MG: 200 SOLUTION ORAL at 09:07

## 2020-07-26 RX ADMIN — Medication 500 MG: at 09:07

## 2020-07-26 RX ADMIN — MAGNESIUM OXIDE 400 MG (241.3 MG MAGNESIUM) TABLET 400 MG: TABLET at 09:07

## 2020-07-26 RX ADMIN — THERA TABS 1 TABLET: TAB at 09:07

## 2020-07-26 RX ADMIN — CETIRIZINE HYDROCHLORIDE 10 MG: 10 TABLET, FILM COATED ORAL at 09:07

## 2020-07-26 NOTE — PLAN OF CARE
VN reviewed discharge instructions with pt.  AVS printed and handed to pt by bedside nurse.  Reviewed follow-up appointments, medications, diet, and importance of medication compliance.  Reviewed home care instructions, treatment plan, self-management, and when to seek medical attention.  Allowed time for questions.  All questions answered.  Patient verbalized complete understanding of discharge instructions and voices no concerns.     Discharge instructions complete. Pt waiting on ride home. Bedside nurse notified.

## 2020-07-26 NOTE — PLAN OF CARE
Problem: Adult Inpatient Plan of Care  Goal: Plan of Care Review  Description: Care plan reviewed.     Outcome: Ongoing, Progressing     VIRTUAL NURSE:  Labs, notes, orders, and careplan reviewed.

## 2020-07-26 NOTE — DISCHARGE SUMMARY
[facility] Hospital Medicine  Discharge Summary      Patient Name: Irina Dugan  MRN: [id number]  Admission Date: 7/19/2020  Hospital Length of Stay: 7 days  Discharge Date and Time: No discharge date for patient encounter.  Attending Physician: Thom Ramsey DO   Discharging Provider: Thom Ramsey DO  Primary Care Provider: Adrianna Pope DO      HPI:    Irina Dugan is a 50 y/o F with PMH of hypertension, ovarian cyst, presents to the ED presents with 1-week history of progressive SOB that is worse in the past 2 days especially with activities such as walking, with associated nausea, vomiting, myalgias, chills. Patient denies fever, diarrhea, changes of smell or taste, or abdominal pain. Patient tested positive for COVID-19 2 days ago, CXR done, showed bilateral infiltrates. CRP 74.6, , LFT slightly elevated, ferritin, troponin, CK, D-dimer all wnl.     * No surgery found *      Hospital Course:   7/21 The patient was evaluated via virtual rounding, discussion with nurses and reviewing VS, notes was part of this evaluation. Evaluation done by technology on site.  7/22 The pt is improving slowly, les SOB from this am, some cough, rendisivir had been ordered.  Had a reaction yesterday mostly related to the IV given the findings and description  7/23 The patient was evaluated via virtual rounding, discussion with nurses and reviewing VS, notes was part of this evaluation.  Evaluation done on site, the pt is improving, less cough and SOB  7/24 pt evaluated, doing ok, improving, had some facial warm after remdisivir, will slow the rate of infusion.  7/25 The patient was evaluated via virtual rounding, discussion with nurses and reviewing VS, notes was part of this evaluation. Evaluation done on site, no acute events, improving, possible dc in am tomorrow  7/26 pt is doing better.  Cough is managed with cough meds  No SOB.  Able to be dc home  F/u with PCP.  Instruction for  continue isolation for 1 week was provided     Consults:   Consults (From admission, onward)        Status Ordering Provider     Inpatient consult to Infectious Diseases  Once     Provider:  (Not yet assigned)    Completed DENIA BROWN     Inpatient consult to Pulmonology  Once     Provider:  (Not yet assigned)    Completed DENIA BROWN     Pharmacy Remdesivir Consult  Once     Provider:  (Not yet assigned)    Acknowledged DENIA BROWN     Pharmacy Remdesivir Consult  Once     Provider:  (Not yet assigned)    Acknowledged INNOCENT-ITUAHKATIE N.          * Pneumonia due to COVID-19 virus  Elevated Liver enzymes  CXR showed worsening central pulmonary vascular as well as bilateral interstitial and alveolar infiltrates noted.    COVID 19 test positive   CRP 74.6, , LFT slightly elevated, ferritin, troponin, CK, D-dimer all wnl.   Rocephin /Azithromycin  Decadron 6mg   Albuterol inhaler prn  Lovenox bid  Supplemental oxygen  Tylenol   Antiemetics.  Blood cultures pending  Enoxaparin  40mg subq BID  Tylenol prn  Consult Pulm  Consult ID  Consult Remdesivir    7/21 started remdisivir  Some cough, improving SOB  7/22 continue remdisivir,   Monitor for symptoms  7/23 day #2 of Remdisivir.  Improved symptoms  2 L NC o2 sat 96%  7/24 doing better, day #3 on remdisivir  7/25 last day of remdisivir  7/26 pt is doing better.  Cough is managed with cough meds  No SOB.  Able to be dc home  F/u with PCP.  Instruction for continue isolation for 1 week was provided        Final Active Diagnoses:    Diagnosis Date Noted POA    PRINCIPAL PROBLEM:  Pneumonia due to COVID-19 virus [U07.1, J12.89] 07/19/2020 Yes    Class 3 severe obesity due to excess calories with serious comorbidity and body mass index (BMI) of 45.0 to 49.9 in adult [E66.01, Z68.42] 02/15/2020 Not Applicable    Morbid obesity [E66.01] 01/12/2018 Yes    Essential hypertension [I10] 10/12/2012 Yes      Problems Resolved During this Admission:     Diagnosis Date Noted Date Resolved POA    Elevated liver enzymes [R74.8] 07/19/2020 07/21/2020 Yes       Discharged Condition: stable    Disposition: Home or Self Care    Follow Up:  Follow-up Information     Adrianna Pope DO In 1 week.    Specialty: Internal Medicine  Why: Dr Pope on Maternity Leave / Message sent to MDs office for 1 wk follow up , they will contact patient with appointment information  Contact information:  [address] [facility]  [place] [address]  [phone]                 Patient Instructions:      Diet Cardiac     Notify your health care provider if you experience any of the following:  temperature >100.4     Notify your health care provider if you experience any of the following:  persistent nausea and vomiting or diarrhea     Notify your health care provider if you experience any of the following:  severe uncontrolled pain     Notify your health care provider if you experience any of the following:  difficulty breathing or increased cough     Notify your health care provider if you experience any of the following:  severe persistent headache     Notify your health care provider if you experience any of the following:  worsening rash     Notify your health care provider if you experience any of the following:  persistent dizziness, light-headedness, or visual disturbances     Notify your health care provider if you experience any of the following:  increased confusion or weakness     Activity as tolerated       Significant Diagnostic Studies: Labs:   BMP:   Recent Labs   Lab 07/25/20  0611 07/26/20  0753    84    138   K 4.4 4.6    102   CO2 25 27   BUN 24* 25*   CREATININE 0.7 0.7   CALCIUM 8.8 8.7   MG 1.9 1.9    and CBC   Recent Labs   Lab 07/25/20  0611 07/26/20  0753   WBC 8.91 7.89   HGB 12.6 12.5   HCT 38.5 37.3    293       Pending Diagnostic Studies:     None         Medications:  Reconciled Home Medications:      Medication List      START  taking these medications    benzonatate 200 MG capsule  Commonly known as: TESSALON  Take 1 capsule (200 mg total) by mouth 3 (three) times daily as needed for Cough.        CONTINUE taking these medications    albuterol 90 mcg/actuation inhaler  Commonly known as: PROVENTIL/VENTOLIN HFA  Inhale 1-2 puffs into the lungs every 6 (six) hours as needed. Rescue     furosemide 20 MG tablet  Commonly known as: LASIX  TAKE ONE TABLET BY MOUTH DAILY AS NEEDED (SWELLING)     ibuprofen 600 MG tablet  Commonly known as: ADVIL,MOTRIN  Take 1 tablet (600 mg total) by mouth every 6 (six) hours as needed.     olmesartan-hydrochlorothiazide 20-12.5 mg per tablet  Commonly known as: BENICAR HCT  Take 1 tablet by mouth once daily.     ondansetron 4 MG Tbdl  Commonly known as: ZOFRAN-ODT  Take 1 tablet (4 mg total) by mouth every 6 (six) hours as needed.     TylenoL 325 mg Cap  Generic drug: acetaminophen  Take 650 mg by mouth as needed.        STOP taking these medications    azithromycin 250 MG tablet  Commonly known as: Z-HERLINDA     ipratropium 0.03 % nasal spray  Commonly known as: ATROVENT            Indwelling Lines/Drains at time of discharge:   Lines/Drains/Airways     None                 Time spent on the discharge of patient: 48 minutes  Patient was seen and examined on the date of discharge and determined to be suitable for discharge.         Thom Ramsey DO  Department of Hospital Medicine  [facility]

## 2020-07-26 NOTE — PLAN OF CARE
Nurse in room with patient, patient reports coughing up blood. Nurse noted a moderate amount of blood on patient's napkin. Paged Dr. Ramsey.

## 2020-07-26 NOTE — PLAN OF CARE
07/26/20 0914   PRE-TX-O2   O2 Device (Oxygen Therapy) room air   SpO2 95 %   Pulse Oximetry Type Intermittent

## 2020-07-26 NOTE — PLAN OF CARE
Re:  Irina Dugan, WORK / SCHOOL EXCUSE    Date: 07/26/2020           [facility] [address] [place] [address]  Office: [phone]  Fax: [phone]     To whom it may concern:    Ms. Irina Dugan has been hospitalized at the [facility] [place] since 7/19/2020.  Please excuse the patient from duties.  Patient may return on 8/3/2020.  No restrictions.     Please contact me if you have any questions.                __________________________  Thom Ramsey, DO

## 2020-07-26 NOTE — ASSESSMENT & PLAN NOTE
Elevated Liver enzymes  CXR showed worsening central pulmonary vascular as well as bilateral interstitial and alveolar infiltrates noted.    COVID 19 test positive   CRP 74.6, , LFT slightly elevated, ferritin, troponin, CK, D-dimer all wnl.   Rocephin /Azithromycin  Decadron 6mg   Albuterol inhaler prn  Lovenox bid  Supplemental oxygen  Tylenol   Antiemetics.  Blood cultures pending  Enoxaparin  40mg subq BID  Tylenol prn  Consult Pulm  Consult ID  Consult Remdesivir    7/21 started remdisivir  Some cough, improving SOB  7/22 continue remdisivir,   Monitor for symptoms  7/23 day #2 of Remdisivir.  Improved symptoms  2 L NC o2 sat 96%  7/24 doing better, day #3 on remdisivir  7/25 last day of remdisivir  7/26 pt is doing better.  Cough is managed with cough meds  No SOB.  Able to be dc home  F/u with PCP.  Instruction for continue isolation for 1 week was provided

## 2020-07-26 NOTE — PLAN OF CARE
Discharge orders noted, no HH or HME ordered.    Future Appointments   Date Time Provider Department Center   8/3/2020 10:00 AM Jason Irizarry DO [facility]       Pt's nurse will go over medications/signs and symptoms prior to discharge       07/26/20 1236   Final Note   Assessment Type Final Discharge Note   Anticipated Discharge Disposition Home   What phone number can be called within the next 1-3 days to see how you are doing after discharge? [phone]   Hospital Follow Up  Appt(s) scheduled? No  (office will call pt with appt)     Ada Bailey, RN Transitional Navigator  [phone]

## 2020-07-26 NOTE — PLAN OF CARE
Patient is AAO x4. Patient is not on telemetry. Patient is on room air, stating above 93%. Patient can ambulate with assistance. Patient received her last dose of  Remdesivir IV. Patient has Lovenox for VTE. COVID precautions maintained.  Patient bed alarm is on, bed in the lowest position, and call light within reach.

## 2020-07-26 NOTE — DISCHARGE INSTRUCTIONS
Your test was POSITIVE for COVID-19 (coronavirus).       [unfilled]       Sincerely,     Thom Ramsey DO

## 2020-07-26 NOTE — PLAN OF CARE
Home Oxygen Evaluation    Date Performed: 2020    1) Patient's Home O2 Sat on room air, while at rest: 96%        If O2 sats on room air at rest are 88% or below, patient qualifies. No additional testing needed. Document N/A in steps 2 and 3. If 89% or above, complete steps 2.      2) Patient's O2 Sat on room air while exercisin%        If O2 sats on room air while exercising remain 89% or above patient does not qualify, no further testing needed Document N/A in step 3. If O2 sats on room air while exercising are 88% or below, continue to step 3.      3) Patient's O2 Sat while exercising on O2:           (Must show improvement from #2 for patients to qualify)    If O2 sats improve on oxygen, patient qualifies for portable oxygen. If not, the patient does not qualify.

## 2020-07-28 ENCOUNTER — PATIENT OUTREACH (OUTPATIENT)
Dept: ADMINISTRATIVE | Facility: CLINIC | Age: 50
End: 2020-07-28

## 2020-07-28 NOTE — PATIENT INSTRUCTIONS
Instructions for Patients with Confirmed or Suspected COVID-19    If you are awaiting your test result, you will either be called or it will be released to the patient portal.  If you have any questions about your test, please visit [email or web address] or call our COVID-19 information line at [phone].      Instructions for non-hospitalized or discharged patients with confirmed or suspected COVID-19:       Stay home except to get medical care.    Separate yourself from other people and animals in your home.    Call ahead before visiting your doctor.    Wear a face mask.    Cover your coughs and sneezes.    Clean your hands often.    Avoid sharing personal household items.    Clean all high-touch surfaces every day.    Monitor your symptoms. Seek prompt medical attention if your illness is worsening (e.g., difficulty breathing). Before seeking care, call your healthcare provider.    If you have a medical emergency and must call 911, notify the dispatcher that you have or are being evaluated for COVID-19. If possible, put on a face mask before emergency medical services arrive.    Use the following symptom-based strategy to return to normal activity following a suspected or confirmed case of COVID-19. Continue isolation until:   o At least 3 days (72 hours) have passed since recovery defined as resolution of fever without the use of fever-reducing medications and improvement in respiratory symptoms (e.g. cough, shortness of breath), and   o At least 10 days have passed since the first positive test.       As one of the next steps, you will receive a call or text from the [place] Department of Health ([facility]) COVID-19 Tracing Team. See the contact information below so you know not to ignore the health departments call. It is important that you contact them back immediately so they can help.     Contact Tracer Number:  [phone]  Caller ID for most carriers: [place] Dept [facility]    What is  contact tracing?   Contact tracing is a process that helps identify everyone who has been in close contact with an infected person. Contact tracers let those people know they may have been exposed and guide them on next steps. Confidentiality is important for everyone; no one will be told who may have exposed them to the virus.   Your involvement is important. The more we know about where and how this virus is spreading, the better chance we have at stopping it from spreading further.  What does exposure mean?   Exposure means you have been within 6 feet for more than 15 minutes with a person who has or had COVID-19.  What kind of questions do the contact tracers ask?   A contact tracer will confirm your basic contact information including name, address, phone number, and next of kin, as well as asking about any symptoms you may have had. Theyll also ask you how you think you may have gotten sick, such as places where you may have been exposed to the virus, and people you were with. Those names will never be shared with anyone outside of that call, and will only be used to help trace and stop the spread of the virus.   I have privacy concerns. How will the state use my information?   Your privacy about your health is important. All calls are completed using call centers that use the appropriate health privacy protection measures (HIPAA compliance), meaning that your patient information is safe. No one will ever ask you any questions related to immigration status. Your health comes first.   Do I have to participate?   You do not have to participate, but we strongly encourage you to. Contact tracing can help us catch and control new outbreaks as theyre developing to keep your friends and family safe.   What if I dont hear from anyone?   If you dont receive a call within 24 hours, you can call the number above right away to inquire about your status. That line is open from 8:00 am - 8:00 p.m., 7 days a  week.  Contact tracing saves lives! Together, we have the power to beat this virus and keep our loved ones and neighbors safe.       Instructions for household members, intimate partners and caregivers in a non-healthcare setting of a patient with confirmed or suspected COVID-19:         Close contacts should monitor their health and call their healthcare provider right away if they develop symptoms suggestive of COVID-19 (e.g., fever, cough, shortness of breath).    Stay home except to get medical care. Separate yourself from other people and animals in the home.   Monitor the patients symptoms. If the patient is getting sicker, call his or her healthcare provider. If the patient has a medical emergency and you need to call 911, notify the dispatch personnel that the patient has or is being evaluated for COVID-19.    Wear a facemask when around other people such as sharing a room or vehicle and before entering a healthcare provider's office.   Cover coughs and sneezes with a tissue. Throw used tissues in a lined trash can immediately and wash hands.   Clean hands often with soap and water for at least 20 seconds or with an alcohol-based hand , rubbing hands together until they feel dry. Avoid touching your eyes, nose, and mouth with unwashed hands.   Clean all high-touch; surfaces every day, including counters, tabletops, doorknobs, bathroom fixtures, toilets, phones, keyboards, tablets, bedside tables, etc. Use a household cleaning spray or wipe according to label instructions.   Avoid sharing personal household items such as dishes, drinking glasses, cups, towels, bedding, etc. After these items are used, they should be washed thoroughly with soap and water.   Continue isolation until:   At least 3 days (72 hours) have passed since recovery defined as resolution of fever without the use of fever-reducing medications and improvement in respiratory symptoms (e.g. cough, shortness of breath),  and    At least 10 days have passed since the patients first positive test.    https://www.cdc.gov/coronavirus/2019-ncov/your-health/index.htm

## 2020-07-29 ENCOUNTER — TELEPHONE (OUTPATIENT)
Dept: PRIMARY CARE CLINIC | Facility: CLINIC | Age: 50
End: 2020-07-29

## 2020-07-29 ENCOUNTER — OUTPATIENT CASE MANAGEMENT (OUTPATIENT)
Dept: ADMINISTRATIVE | Facility: OTHER | Age: 50
End: 2020-07-29

## 2020-07-29 NOTE — TELEPHONE ENCOUNTER
Patient was in the hospital with COVID. Since she has been home she has been SOB. Patient is scheduled with Dr. Irizarry on Monday 8/3. However, would like Dr. Stuart to call her something into pharmacy before Monday. LOV with Dr. Stuart 2/15/2020

## 2020-07-29 NOTE — PROGRESS NOTES
Outpatient Care Management  COVID-19 Patient Assessment    Patient: Irina Dugan  MRN: [id number]  Date of Service: 07/29/2020  Completed by: Suzette Renteria RN  Program: COVID-19    Reason for Visit   Patient presents with    COVID-19 Concerns     7/29/20    Initial Assessment     7/29/20    PHQ-9     7/29/20    Plan Of Care     7/29/20       Brief Summary: Chart reviewed. Pt is a 49yr old female who was hospitalized 7/19/20-7/26/20 for Covid19. Pt reports she was not on a ventilator when she was in the hospital. Pt reports she still has a cough and sob on exertion only. Pt denies fever since she was in the hospital. 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 Pt reports she is having trouble breating at night and would like to see if Dr. Avi Stuart can call her in something. Pt reports she has an appt with Dr. Irizarry on 8/3/20 but does not want to wait until the appt. Pt reports she last saw Dr. Stuart in 2/2020. In basket sent to PCP- Dr. Avi Stuart.  Pt reports her mother was staying with her when Pt became ill with COVID19 and her and her mother were both hospitalized with it. Pt reports her  and son also caught Covid19 but were not hospitalized. Pt reports she sent her mother home to her mothers husbands house due to her still being in the hospital.  Pt's reports she is isolating to her bedroom and her  and Son all stay in different rooms or another bedroom. Pt, her son and  all wear masks in the home. Discussed pt should be on isolation for 14 days after discharge.  Pt reports she was told at discharge how long she needed to maintain social distancing of 6 feet and isolation. Pt is knowledgeable of handwashing with soap and water for 20 seconds, using hand  with at least 60% alcohol if no soap and water available. If Pt coughs or sneezes she does it into a Kleenex or the inside of her  elbow and avoids touching her eyes, mouth or nose. Caregiver denies Pt having any new issues at this time. Medication reconciliation performed, no discrepancies noted.     Education on COVID 19 provided to patient during call. Instructed patient to call [facility] first if experiencing fever greater than 100.4, coughing and SOB. Provided patient [facility] phone number [phone] as well as [facility] COVID [facility] hotline [phone], verbalized understanding. Informed patient that they may contact their PCP for further guidance as well. Reviewed with patient precautions to take to help prevent the spread of this respiratory virus: Wash hands often (for 20 seconds singing Happy Birthday), cover your cough or sneeze into your elbow or a tissue, stay away from people who are sick, don't touch your eyes, nose or mouth with unwashed hands. Provided Instruction to stay home as directed by local government officials and only make trips that are of essential needs (Ex:grocery, pharmacy, medical appointments). Reviewed with patient supplies to have on hand while staying at home (food, water, medications, medical supplies and other essentials).      Assessment Documentation     COVID-19 Assessment    Nurse Assessment via Chart Review:  Was patient on a ventilator while hospitalized?: No  Nurse Assessment with Patient:  Are you currently experiencing any of the following symptoms?: Coughing, Difficulty breathing  For Fever:  Are you taking any medications to reduce your fever?: No  For Coughing:  Are you taking any medications to treat your cough?: Yes  On a scale of 1-10, how bad is your Cough?: 5  For Difficulty Breathing:  Are you taking any medications to help with your breathing?: Yes  If yes, which medications are you taking?: Albuterol, Prescription (use comment) (Comment: Tessalon 200mg)  On a scale of 1-10 how difficult is it to breathe normally?: 4  Any medical equipment in use?: No  Oxygen?:  No  Nebulizer?: No  Other? (use comment): No  On a normal day, what is your energy level?: 9  What is your current energy level?: 5  Psycho/Social Assessment:  Do you have a support person/caregiver?: Yes  Are you and/or your caregiver able to access food?: Yes  Are you and/or your caregiver able to access medications?: Yes  Are you able to isolate yourself from other family members?: Yes  Is anyone else in your home ill with COVID-19?: Yes  Does patient need any mental health/emotional support resources?: No  Were you working prior to the COVID-19 illness?: Yes  Are you still employed?: Yes  Are you able to work from home?: No  Are you experiencing financial difficulties related to the COVID-19 pandemic?: No  Do you need access to Community Resources?: No  COVID-19 Patient Assessment Complete (nereida Yes only if assessment is finished): Yes         Problem List and History     Patient Active Problem List   Diagnosis    Sciatica    Essential hypertension    Carpal tunnel syndrome    Other affections of shoulder region, not elsewhere classified    Pain in joint, lower leg    SK (seborrheic keratosis)    Morbid obesity    Class 3 severe obesity due to excess calories with serious comorbidity and body mass index (BMI) of 45.0 to 49.9 in adult    Chronic allergic rhinitis    Pneumonia due to COVID-19 virus       Reviewed Active Problem List with patient and/or Caregiver. The following were identified as areas of need: COVID19    Medical History:  Reviewed medical history with patient and/or caregiver    Social History:  Reviewed social history with patient and/or caregiver    Complex Care Plan    Care plan was discussed and completed today with input from patient and/or caregiver.    Patient Instructions     Instructions were provided via the [employer] patient resources and are available for the patient to view on the patient portal, if active.    Next steps:   Covid19 educations  New issues  Assess for cough,  fever, son  Follow up in about 1 week (around 8/5/2020) for RN Follow.    Todays OPCM Self-Management Care Plan was developed with the patients/caregivers input and was based on identified barriers from todays assessment.  Goals were written today with the patient/caregiver and the patient has agreed to work towards these goals to improve his/her overall well-being. Patient verbalized understanding of the care plan, goals, and all of today's instructions. Encouraged patient/caregiver to communicate with his/her physician and health care team about health conditions and the treatment plan.  Provided my contact information today and encouraged patient/caregiver to call me with any questions as needed.

## 2020-07-30 NOTE — TELEPHONE ENCOUNTER
Patient informed, verbalized understanding. Patient offered sooner appointment, patient declined and states she will keep appointment she has for 8/3.

## 2020-08-03 ENCOUNTER — OFFICE VISIT (OUTPATIENT)
Dept: INTERNAL MEDICINE | Facility: CLINIC | Age: 50
End: 2020-08-03
Payer: MEDICAID

## 2020-08-03 VITALS
BODY MASS INDEX: 47.09 KG/M2 | TEMPERATURE: 98 F | HEIGHT: 66 IN | SYSTOLIC BLOOD PRESSURE: 128 MMHG | WEIGHT: 293 LBS | RESPIRATION RATE: 16 BRPM | HEART RATE: 92 BPM | OXYGEN SATURATION: 98 % | DIASTOLIC BLOOD PRESSURE: 88 MMHG

## 2020-08-03 DIAGNOSIS — I10 ESSENTIAL HYPERTENSION: ICD-10-CM

## 2020-08-03 DIAGNOSIS — U07.1 PNEUMONIA DUE TO COVID-19 VIRUS: Primary | ICD-10-CM

## 2020-08-03 DIAGNOSIS — J12.82 PNEUMONIA DUE TO COVID-19 VIRUS: Primary | ICD-10-CM

## 2020-08-03 DIAGNOSIS — E66.01 CLASS 3 SEVERE OBESITY DUE TO EXCESS CALORIES WITH SERIOUS COMORBIDITY AND BODY MASS INDEX (BMI) OF 45.0 TO 49.9 IN ADULT: ICD-10-CM

## 2020-08-03 PROCEDURE — 99215 PR OFFICE/OUTPT VISIT, EST, LEVL V, 40-54 MIN: ICD-10-PCS | Mod: S$PBB,,, | Performed by: INTERNAL MEDICINE

## 2020-08-03 PROCEDURE — 99215 OFFICE O/P EST HI 40 MIN: CPT | Mod: S$PBB,,, | Performed by: INTERNAL MEDICINE

## 2020-08-03 PROCEDURE — 99999 PR PBB SHADOW E&M-EST. PATIENT-LVL III: CPT | Mod: PBBFAC,,, | Performed by: INTERNAL MEDICINE

## 2020-08-03 PROCEDURE — 99999 PR PBB SHADOW E&M-EST. PATIENT-LVL III: ICD-10-PCS | Mod: PBBFAC,,, | Performed by: INTERNAL MEDICINE

## 2020-08-03 PROCEDURE — 99213 OFFICE O/P EST LOW 20 MIN: CPT | Mod: PBBFAC,PO | Performed by: INTERNAL MEDICINE

## 2020-08-03 RX ORDER — CODEINE PHOSPHATE AND GUAIFENESIN 10; 100 MG/5ML; MG/5ML
5 SOLUTION ORAL 3 TIMES DAILY PRN
Qty: 236 ML | Refills: 0 | Status: SHIPPED | OUTPATIENT
Start: 2020-08-03 | End: 2020-08-13

## 2020-08-03 RX ORDER — MELOXICAM 15 MG/1
15 TABLET ORAL DAILY
Qty: 30 TABLET | Refills: 2 | Status: SHIPPED | OUTPATIENT
Start: 2020-08-03 | End: 2022-06-29

## 2020-08-03 NOTE — PROGRESS NOTES
"Subjective:       Patient ID: Irina Dugan is a 49 y.o. female.    Chief Complaint: Hospital Follow Up    HPI   Pt with HTN, Morbid Obesity is here for hospital f/u from 7/19/20-7/26/20 for COVID 19. Per records, "Pt presents with 1-week history of progressive SOB that is worse in the past 2 days especially with activities such as walking, with associated nausea, vomiting, myalgias, chills. Patient denies fever, diarrhea, changes of smell or taste, or abdominal pain. Patient tested positive for COVID-19 2 days ago, CXR done, showed bilateral infiltrates. CRP 74.6, , LFT slightly elevated, ferritin, troponin, CK, D-dimer all wnl.     Hospital Course:   7/21 The patient was evaluated via virtual rounding, discussion with nurses and reviewing VS, notes was part of this evaluation. Evaluation done by technology on site.  7/22 The pt is improving slowly, les SOB from this am, some cough, rendisivir had been ordered.  Had a reaction yesterday mostly related to the IV given the findings and description  7/23 The patient was evaluated via virtual rounding, discussion with nurses and reviewing VS, notes was part of this evaluation.  Evaluation done on site, the pt is improving, less cough and SOB  7/24 pt evaluated, doing ok, improving, had some facial warm after remdisivir, will slow the rate of infusion.  7/25 The patient was evaluated via virtual rounding, discussion with nurses and reviewing VS, notes was part of this evaluation. Evaluation done on site, no acute events, improving, possible dc in am tomorrow  7/26 pt is doing better.  Cough is managed with cough meds  No SOB. "    Since discharge pt has been felling much better. She still is c/o mild SOB, fatigue and a dry cough. No fevers/chills.   Review of Systems   Constitutional: Negative for activity change, appetite change, chills, diaphoresis, fatigue, fever and unexpected weight change.   HENT: Negative for nasal congestion, mouth sores, postnasal " drip, rhinorrhea, sinus pressure/congestion, sneezing, sore throat, trouble swallowing and voice change.    Eyes: Negative for pain, discharge and visual disturbance.   Respiratory: Positive for cough and shortness of breath. Negative for wheezing.    Cardiovascular: Negative for chest pain, palpitations and leg swelling.   Gastrointestinal: Negative for abdominal pain, blood in stool, constipation, diarrhea, nausea and vomiting.   Endocrine: Negative for cold intolerance and heat intolerance.   Genitourinary: Negative for difficulty urinating, dysuria, frequency, hematuria and urgency.   Musculoskeletal: Negative for arthralgias and myalgias.   Integumentary:  Negative for rash and wound.   Allergic/Immunologic: Negative for environmental allergies and food allergies.   Neurological: Negative for dizziness, tremors, seizures, syncope, weakness, light-headedness and headaches.   Hematological: Negative for adenopathy. Does not bruise/bleed easily.   Psychiatric/Behavioral: Negative for confusion and sleep disturbance. The patient is not nervous/anxious.          Objective:      Physical Exam  Vitals signs and nursing note reviewed.   Constitutional:       General: She is not in acute distress.     Appearance: She is well-developed. She is not diaphoretic.   HENT:      Head: Normocephalic and atraumatic.      Right Ear: External ear normal.      Left Ear: External ear normal.      Nose: Nose normal.      Mouth/Throat:      Pharynx: No oropharyngeal exudate.   Eyes:      General: No scleral icterus.        Right eye: No discharge.         Left eye: No discharge.      Conjunctiva/sclera: Conjunctivae normal.      Pupils: Pupils are equal, round, and reactive to light.   Neck:      Musculoskeletal: Neck supple.      Thyroid: No thyromegaly.      Vascular: No JVD.   Cardiovascular:      Rate and Rhythm: Normal rate and regular rhythm.      Heart sounds: Normal heart sounds. No murmur.   Pulmonary:      Effort: Pulmonary  effort is normal. No respiratory distress.      Breath sounds: Normal breath sounds. No wheezing or rales.   Chest:      Chest wall: No tenderness.   Abdominal:      General: Bowel sounds are normal. There is no distension.      Palpations: Abdomen is soft.      Tenderness: There is no abdominal tenderness. There is no guarding.   Lymphadenopathy:      Cervical: No cervical adenopathy.   Skin:     General: Skin is warm and dry.      Coloration: Skin is not pale.      Findings: No rash.   Neurological:      Mental Status: She is alert and oriented to person, place, and time.   Psychiatric:         Judgment: Judgment normal.         Assessment:       1. Pneumonia due to COVID-19 virus    2. Essential hypertension    3. Class 3 severe obesity due to excess calories with serious comorbidity and body mass index (BMI) of 45.0 to 49.9 in adult        Plan:    1. Pt clinically improving, ED precautions discussed with pt       Rx Cheratussin AC TID PRN   2. Controlled   3. Diet/exercise stressed       Over 1/2 of 40 minute visit spent reviewing pt's medical records, education/discussion of pt's medical conditions and medical management

## 2020-08-14 ENCOUNTER — OUTPATIENT CASE MANAGEMENT (OUTPATIENT)
Dept: ADMINISTRATIVE | Facility: OTHER | Age: 50
End: 2020-08-14

## 2020-08-14 NOTE — PROGRESS NOTES
"Outpatient Care Management  Plan of Care Follow Up Visit    Patient: Irina Dugan  MRN: [id number]  Date of Service: 08/14/2020  Completed by: Suzette Renteria RN  Referral Date:   Program:     Reason for Visit   Patient presents with    Update Plan Of Care     8/14/20       Brief Summary: Spoke to Pt who reports she is doing okay but having some "jittery feelings" Pt reports she has a lot going on as she cares for her mother, step father and has an adult Son that has been having GI issues for some time. Pt reports she has brought him to the hospital numerous times and he is Covid negative. Pt reports she is getting a referral to GI for him next. Pt tearful at times and reports she brought her estranged father to the [place] to live with her half sister. Pt reports her father was then diagnosed with stage 4 lymphoma. Pt reports they have no relationship and he has told her in the past she should do for him, that he is owed. Pt tearful at times, offered verbal support to Pt. Discussed with Pt seeing a therapist to discuss her family dynamic issues and caregiver stress. Pt in agreement and stated she will be calling [facility] today to see what providers are available in her area. Pt reports she has the number. Educated Pt on red flags of COVID and ways to reduce the spread of COVID as well as when to contact her Provider. Pt asked for information regarding acquiring a Handicap placard for her vehicle since she takes her Mother everywhere. Notified caregiver she would have to get the form from [employer] then bring to PCP to complete medical part. Pt denies pt having cough, sob or fever since we last spoke. Pt denies new issues at this time. Will call back in a week, Caregiver in agreement.     Patient Summary     Involvement of Care:  Do I have permission to speak with other family members about your care?   No    Patient Reported Labs & Vitals:  1.  Any Patient Reported Labs & Vitals?     2.  Patient Reported Blood " Pressure:     3.  Patient Reported Pulse:     4.  Patient Reported Weight (Kg):     5.  Patient Reported Blood Glucose (mg/dl):       Medical and social history was reviewed with patient and/or caregiver.     Clinical Assessment     Reviewed and provided basic information on available community resources for mental health, transportation, wellness resources, and palliative care programs with patient and/or caregiver.     Complex Care Plan     Care plan was discussed and completed today with input from patient and/or caregiver.    Patient Instructions     Instructions were provided via the [employer] patient resources and are available for the patient to view on the patient portal.    Next Steps:   Covid education  Did pt make appt with SW or therapist     Follow up in about 1 week (around 8/21/2020) for RN Follow.    Todays OPCM Self-Management Care Plan was developed with the patients/caregivers input and was based on identified barriers from todays assessment.  Goals were written today with the patient/caregiver and the patient has agreed to work towards these goals to improve his/her overall well-being. Patient verbalized understanding of the care plan, goals, and all of today's instructions. Encouraged patient/caregiver to communicate with his/her physician and health care team about health conditions and the treatment plan.  Provided my contact information today and encouraged patient/caregiver to call me with any questions as needed.

## 2020-08-21 ENCOUNTER — TELEPHONE (OUTPATIENT)
Dept: INTERNAL MEDICINE | Facility: CLINIC | Age: 50
End: 2020-08-21

## 2020-08-21 NOTE — TELEPHONE ENCOUNTER
----- Message from Karen Liu sent at 8/21/2020  8:44 AM CDT -----  Contact: Patient [phone]  Requesting an earlier appt date or time    Next available appt: 09/01/2020    Nature of the appt: corpal tunnel    Does patient have appt scheduled?: No    Please contact patient to schedule earlier appt if available but notify patient either way.    Thank You

## 2020-08-22 ENCOUNTER — OFFICE VISIT (OUTPATIENT)
Dept: INTERNAL MEDICINE | Facility: CLINIC | Age: 50
End: 2020-08-22
Payer: MEDICAID

## 2020-08-22 VITALS
WEIGHT: 293 LBS | HEIGHT: 66 IN | OXYGEN SATURATION: 95 % | DIASTOLIC BLOOD PRESSURE: 82 MMHG | SYSTOLIC BLOOD PRESSURE: 136 MMHG | HEART RATE: 95 BPM | TEMPERATURE: 98 F | RESPIRATION RATE: 20 BRPM | BODY MASS INDEX: 47.09 KG/M2

## 2020-08-22 DIAGNOSIS — G56.03 BILATERAL CARPAL TUNNEL SYNDROME: Primary | ICD-10-CM

## 2020-08-22 DIAGNOSIS — I10 ESSENTIAL HYPERTENSION: ICD-10-CM

## 2020-08-22 PROCEDURE — 99214 PR OFFICE/OUTPT VISIT, EST, LEVL IV, 30-39 MIN: ICD-10-PCS | Mod: S$PBB,,, | Performed by: INTERNAL MEDICINE

## 2020-08-22 PROCEDURE — 99214 OFFICE O/P EST MOD 30 MIN: CPT | Mod: S$PBB,,, | Performed by: INTERNAL MEDICINE

## 2020-08-22 PROCEDURE — 99214 OFFICE O/P EST MOD 30 MIN: CPT | Mod: PBBFAC,PO | Performed by: INTERNAL MEDICINE

## 2020-08-22 PROCEDURE — 99999 PR PBB SHADOW E&M-EST. PATIENT-LVL IV: ICD-10-PCS | Mod: PBBFAC,,, | Performed by: INTERNAL MEDICINE

## 2020-08-22 PROCEDURE — 99999 PR PBB SHADOW E&M-EST. PATIENT-LVL IV: CPT | Mod: PBBFAC,,, | Performed by: INTERNAL MEDICINE

## 2020-08-22 RX ORDER — METHYLPREDNISOLONE 4 MG/1
TABLET ORAL
Qty: 1 PACKAGE | Refills: 0 | Status: SHIPPED | OUTPATIENT
Start: 2020-08-22 | End: 2020-09-21

## 2020-08-22 RX ORDER — OLMESARTAN MEDOXOMIL AND HYDROCHLOROTHIAZIDE 20/12.5 20; 12.5 MG/1; MG/1
1 TABLET ORAL DAILY
Qty: 90 TABLET | Refills: 0 | Status: SHIPPED | OUTPATIENT
Start: 2020-08-22 | End: 2021-04-26

## 2020-08-22 NOTE — PROGRESS NOTES
"Subjective:       Patient ID: Irina Dugan is a 49 y.o. female.    Chief Complaint: Carpal Tunnel    HPI    She reports a long history of carpal tunnel syndrome. She was supposed to have surgery but didn't. The symptoms are worsening. She has numbness, pain, and weakness. She started to wear her brace since symptoms have been worsening, but only has one.    She is taking meloxicam.    She is also requesting refill of her blood pressure medication. Blood pressure is controlled.      Review of Systems   Constitutional: Negative for fever.   Musculoskeletal: Positive for arthralgias and joint swelling.   Skin: Negative for wound.   Neurological: Positive for weakness and numbness.   All other systems reviewed and are negative.      Objective:        Vitals:    08/22/20 0805   BP: 136/82   BP Location: Left arm   Patient Position: Sitting   BP Method: Large (Manual)   Pulse: 95   Resp: 20   Temp: 97.7 °F (36.5 °C)   TempSrc: Other (see comments)   SpO2: 95%   Weight: (!) 136.3 kg (300 lb 7.8 oz)   Height: 5' 6" (1.676 m)       Body mass index is 48.5 kg/m².    Physical Exam  Constitutional:       General: She is not in acute distress.     Appearance: She is well-developed.   HENT:      Head: Normocephalic and atraumatic.      Right Ear: External ear normal.      Left Ear: External ear normal.   Eyes:      Conjunctiva/sclera: Conjunctivae normal.   Cardiovascular:      Rate and Rhythm: Normal rate.   Pulmonary:      Effort: Pulmonary effort is normal.   Musculoskeletal:      Right wrist: She exhibits tenderness.      Left wrist: She exhibits tenderness.      Right hand: She exhibits normal capillary refill. Decreased sensation noted. Decreased strength noted.      Left hand: She exhibits normal capillary refill. Decreased sensation noted. Decreased strength noted.   Skin:     General: Skin is warm and dry.      Findings: No rash.   Neurological:      Mental Status: She is alert and oriented to person, place, and " time.   Psychiatric:         Behavior: Behavior normal.         Assessment:     1. Bilateral carpal tunnel syndrome    2. Essential hypertension           Plan:         1. Bilateral carpal tunnel syndrome  - previous EMG/NCS in 4/2018 w/ moderate-severe carpal tunnel syndrome- surgery was recommend at that time d/t risk of permanent symptoms/ muscle weakness. For now, wear brace bilaterally, rest hands, apply ice, continue meloxicam. Add short term steroids for symptom improvement. Ultimately, needs to see Ortho again for re-eval and likely surgery.   - Ambulatory referral/consult to Orthopedics; Future  - methylPREDNISolone (MEDROL DOSEPACK) 4 mg tablet; use as directed  Dispense: 1 Package; Refill: 0    2. Essential hypertension  - stable, continue current meds  - olmesartan-hydrochlorothiazide (BENICAR HCT) 20-12.5 mg per tablet; Take 1 tablet by mouth once daily.  Dispense: 90 tablet; Refill: 0           Patient note was created using MModal Dictation.  Any errors in syntax or even information may not have been identified and edited on initial review prior to signing this note.

## 2020-08-24 ENCOUNTER — TELEPHONE (OUTPATIENT)
Dept: ORTHOPEDICS | Facility: CLINIC | Age: 50
End: 2020-08-24

## 2020-08-24 ENCOUNTER — OUTPATIENT CASE MANAGEMENT (OUTPATIENT)
Dept: ADMINISTRATIVE | Facility: OTHER | Age: 50
End: 2020-08-24

## 2020-08-24 NOTE — TELEPHONE ENCOUNTER
Spoke c pt. Informed her we received referral from Dr. Givens's office. Advised that at this time, Dr. Hughes is not longer accepting adult medicaid. She was previously seen in 2018 c commercial insurance & CTR was recommended. Informed her that Dr. Bundy is seeing medicaid at the [facility]. She is interested in seeing him as she would like an injection until she is able to have surgery. Informed her that I would send a message to Dr Hennessy's staff for scheduling. I also provided her with the Medicaid Escalation Line [phone]. Pt expressed understanding & was thankful.

## 2020-08-24 NOTE — LETTER
September 4, 2020    Irina Dugan  [address]  [place] [address]             [facility] [address] [place] [address] Dear Mrs. Dugan,    I work with [facility] Outpatient Case Management Department. I have been unsuccessful at reaching you to follow-up to see how you have been doing. Please call me back at your earliest convenience to discuss your health care needs.      I can be reached at [phone] from 8:00AM to 4:30 PM on Monday thru Friday. [facility] also has a program where a nurse is available 24/7 to answer questions or provide medical advice, their number is [phone].    Thanks,        Suzette Renteria RN  Outpatient Case Management/Disease Management  [phone]

## 2020-08-24 NOTE — TELEPHONE ENCOUNTER
----- Message from Afsaneh August sent at 8/24/2020 10:52 AM CDT -----  Good morning -     Pt has previously seen Dr. Hughes for R/L CTS in 2018. She now has adult medicaid. I informed pt that I would reach out to you gutiburcio for scheduling in [facility].     Thank you in advance.     Afsaneh August, MS, OTC  Clinical/OR Assistant to Miryam Albarado MD  [facility]  [phone]    ----- Message -----  From: Max Alaniz  Sent: 8/24/2020   9:39 AM CDT  To: Per Diego    Good Morning,      placed an order for  to follow up with [facility] for her hands, She's already been seen before by  for the same reason the referral was placed but I wasn't ab;e to find any openings to get the patient scheduled. If you can assist with scheduling it would be greatly appreciated. Thank you in advanced.     Bilateral carpal tunnel syndrome [G56.03]

## 2020-08-24 NOTE — TELEPHONE ENCOUNTER
----- Message from Max Alaniz sent at 8/24/2020  9:39 AM CDT -----  Good Morning,      placed an order for  to follow up with [facility] for her hands, She's already been seen before by  for the same reason the referral was placed but I wasn't ab;e to find any openings to get the patient scheduled. If you can assist with scheduling it would be greatly appreciated. Thank you in advanced.     Bilateral carpal tunnel syndrome [G56.03]

## 2020-08-25 ENCOUNTER — PATIENT OUTREACH (OUTPATIENT)
Dept: ADMINISTRATIVE | Facility: OTHER | Age: 50
End: 2020-08-25

## 2020-08-25 DIAGNOSIS — M79.642 BILATERAL HAND PAIN: Primary | ICD-10-CM

## 2020-08-25 DIAGNOSIS — M79.641 BILATERAL HAND PAIN: Primary | ICD-10-CM

## 2020-08-25 NOTE — PROGRESS NOTES
Subjective:      Patient ID: Irina Dugan is a 49 y.o. female.    Chief Complaint: No chief complaint on file.      HPI  (French)    Last seen by Dr. Hughes for bilateral carpal tunnel syndrome. EMG 4/12/18 shows moderately severe right carpal tunnel, mild-moderately severe left carpal tunnel syndromes. Had bilateral carpal tunnel injections on 10/16/15.     Tested positive for COVID 7/11/20 and was in hospital for a week.     She continues with constant numbness/tingling and pain in both hands, right = left hand. She is right hand dominant. 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 She has weakness in both hands. She rates her pain as a 10 on a scale of 1-10. Pain can be sharp in nature. Pain is worse at night and with using the hands. Not much relief with carpal tunnel splints.     She was given medrol dose pack by PCP on 8/22/20- she never started it. No OT. Had injections back in 2015 with improvement.        Past Medical History:   Diagnosis Date    Colitis, nonspecific 11/6/2012    Hypertension 10/12/2012    Ovarian cyst     Tubal ectopic pregnancy          Current Outpatient Medications:     acetaminophen (TYLENOL) 325 mg Cap, Take 650 mg by mouth as needed., Disp: , Rfl:     albuterol (PROVENTIL/VENTOLIN HFA) 90 mcg/actuation inhaler, Inhale 1-2 puffs into the lungs every 6 (six) hours as needed. Rescue, Disp: 8 g, Rfl: 0    furosemide (LASIX) 20 MG tablet, TAKE ONE TABLET BY MOUTH DAILY AS NEEDED (SWELLING), Disp: 90 tablet, Rfl: 1    ibuprofen (ADVIL,MOTRIN) 600 MG tablet, Take 1 tablet (600 mg total) by mouth every 6 (six) hours as needed., Disp: 20 tablet, Rfl: 0    meloxicam (MOBIC) 15 MG tablet, Take 1 tablet (15 mg total) by mouth once daily., Disp: 30 tablet, Rfl: 2    methylPREDNISolone (MEDROL DOSEPACK) 4 mg tablet, use as directed, Disp: 1 Package, Rfl: 0    olmesartan-hydrochlorothiazide (BENICAR HCT) 20-12.5 mg per tablet, Take 1 tablet by mouth once  daily., Disp: 90 tablet, Rfl: 0    ondansetron (ZOFRAN-ODT) 4 MG TbDL, Take 1 tablet (4 mg total) by mouth every 6 (six) hours as needed., Disp: 30 tablet, Rfl: 0    Review of patient's allergies indicates:  No Known Allergies    Review of Systems   Constitution: Negative for chills, fever, night sweats and weight gain.   Musculoskeletal:        Positive for swelling.    Gastrointestinal: Negative for bowel incontinence, nausea and vomiting.   Genitourinary: Negative for bladder incontinence.   Neurological: Positive for numbness and paresthesias. Negative for disturbances in coordination and loss of balance.   Psychiatric/Behavioral: Positive for depression. The patient is nervous/anxious.          Objective:        /72   Pulse 70   Resp 18   Wt 135.2 kg (298 lb)   BMI 48.10 kg/m²     Ortho/SPM Exam    RIGHT Hand/Wrist Examination:    Observation/Inspection:  Swelling  none    Deformity  none  Discoloration  none     Scars   none    Atrophy  none    HAND/WRIST EXAMINATION:  Finkelstein's Test   Neg  WHAT Test    Neg  Snuff box tenderness   Neg  Hook of Hamate Tenderness  Neg  CMC grind    Neg    Neurovascular Exam:  Digits WWP, brisk CR < 3s throughout  NVI motor/LTS to M/R/U nerves, radial pulse 2+  Tinel's Test - Carpal Tunnel  positive  Tinel's Test - Cubital Tunnel  negative  Phalen's Test    positive  Median Nerve Compression Test positive    ROM hand/wrist/elbow full, painless      LEFT Hand/Wrist Examination:    Observation/Inspection:  Swelling  none    Deformity  none  Discoloration  none     Scars   none    Atrophy  none    HAND/WRIST EXAMINATION:  Finkelstein's Test   Neg  WHAT Test    Neg  Snuff box tenderness   Neg  Hook of Hamate Tenderness  Neg  CMC grind    Neg    Neurovascular Exam:  Digits WWP, brisk CR < 3s throughout  NVI motor/LTS to M/R/U nerves, radial pulse 2+  Tinel's Test - Carpal Tunnel  positive  Tinel's Test - Cubital Tunnel  Neg  Phalen's Test    positive  Median Nerve  Compression Test positive    ROM hand/wrist/elbow full, painless    Strength Testing of Bilateral UEs shows  Right :  5/5    Left :  5/5  Right deltoid:  +5/5   Left deltoid:  +5/5  Right biceps:  +5/5   Left biceps:  +5/5  Right triceps:  +5/5   Left triceps:  +5/5  Right wrist extension:  +5/5  Left wrist extension:  +5/5  Right wrist flexion:  +5/5  Left wrist flexion:  +5/5  Right interosseus:  +5/5  Left interosseus:  +5/5      XRAY INTERPRETATION:  X-rays of bilateral hands dated 8/26/20 are personally reviewed and show no fracture, dislocations, or bony erosions.        Assessment:       Encounter Diagnosis   Name Primary?    Bilateral carpal tunnel syndrome           Plan:       Diagnoses and all orders for this visit:    Bilateral carpal tunnel syndrome  -     Ambulatory referral/consult to Orthopedics      Constant numbness/tingling and pain in both hands, right = left hand. She is right hand dominant. 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     EMG 4/12/18 shows moderately severe right carpal tunnel, mild-moderately severe left carpal tunnel syndromes. XRs of both hands are normal. Exam is consistent with CTS.     She is ready to discuss surgery options. Treatment options reviewed with patient along with above hand xrays. Following plan made:     - Continue with carpal tunnel splints at night and during the day prn.   - Can start medrol dose pack from PCP. Recommend she double check with PCP if this is okay from pulmonary standpoint.   - Will review with Dr. Zuluaga and see if we can her surgery on the schedule prior to her visit with him on 9/23/20.   - Does not need updated EMG per Dr. Zuluaga.   - Keep follow up with Dr. Zuluaga on 9/23/20 for now.   - Of note, she tested positive for COVID on 7/11/20. She will NOT need covid test preop.     Follow up in about 4 weeks (around 9/23/2020) for evaluation with Dr. Zuluaga.

## 2020-08-25 NOTE — PROGRESS NOTES
Health Maintenance Due   Topic Date Due    Pneumococcal Vaccine (Medium Risk) (1 of 1 - PPSV23) 12/08/1991    Cervical Cancer Screening  03/17/2018     Updates were requested from care everywhere.  Chart was reviewed for overdue Proactive [facility] Encounters (JANEL) topics (CRS, Breast Cancer Screening, Eye exam)  Health Maintenance has been updated.  LINKS immunization registry triggered.  Immunizations were reconciled.

## 2020-08-26 ENCOUNTER — OFFICE VISIT (OUTPATIENT)
Dept: ORTHOPEDICS | Facility: CLINIC | Age: 50
End: 2020-08-26
Payer: MEDICAID

## 2020-08-26 ENCOUNTER — TELEPHONE (OUTPATIENT)
Dept: ORTHOPEDICS | Facility: CLINIC | Age: 50
End: 2020-08-26

## 2020-08-26 VITALS
WEIGHT: 293 LBS | HEART RATE: 70 BPM | BODY MASS INDEX: 48.1 KG/M2 | DIASTOLIC BLOOD PRESSURE: 72 MMHG | SYSTOLIC BLOOD PRESSURE: 112 MMHG | RESPIRATION RATE: 18 BRPM

## 2020-08-26 DIAGNOSIS — G56.03 BILATERAL CARPAL TUNNEL SYNDROME: ICD-10-CM

## 2020-08-26 DIAGNOSIS — G56.03 CARPAL TUNNEL SYNDROME ON BOTH SIDES: ICD-10-CM

## 2020-08-26 DIAGNOSIS — G56.00 CARPAL TUNNEL SYNDROME, UNSPECIFIED LATERALITY: Primary | ICD-10-CM

## 2020-08-26 PROCEDURE — 99999 PR PBB SHADOW E&M-EST. PATIENT-LVL IV: CPT | Mod: PBBFAC,,, | Performed by: PHYSICIAN ASSISTANT

## 2020-08-26 PROCEDURE — 99214 PR OFFICE/OUTPT VISIT, EST, LEVL IV, 30-39 MIN: ICD-10-PCS | Mod: S$PBB,,, | Performed by: PHYSICIAN ASSISTANT

## 2020-08-26 PROCEDURE — 99214 OFFICE O/P EST MOD 30 MIN: CPT | Mod: PBBFAC,PN | Performed by: PHYSICIAN ASSISTANT

## 2020-08-26 PROCEDURE — 99214 OFFICE O/P EST MOD 30 MIN: CPT | Mod: S$PBB,,, | Performed by: PHYSICIAN ASSISTANT

## 2020-08-26 PROCEDURE — 99999 PR PBB SHADOW E&M-EST. PATIENT-LVL IV: ICD-10-PCS | Mod: PBBFAC,,, | Performed by: PHYSICIAN ASSISTANT

## 2020-08-26 NOTE — LETTER
August 26, 2020      Che Givens MD  [address] [place] [id number]           [facility]  [address] , [facility] [address]  [facility] [address]  Phone: [phone]  Fax: [phone]          Patient: Irina Dugan   MR Number: [id number]   YOB: 1970   Date of Visit: 8/26/2020       Dear Dr. Che Givens:    Thank you for referring Irina Dugan to me for evaluation. Attached you will find relevant portions of my assessment and plan of care.    If you have questions, please do not hesitate to call me. I look forward to following Irina Dugan along with you.    Sincerely,    Caridad Hutchison PA-C    Enclosure  CC:  No Recipients    If you would like to receive this communication electronically, please contact [email or web address] or [phone] to request more information on [employer] access.    For providers and/or their staff who would like to refer a patient to [facility], please contact us through our one-stop-shop provider referral line, [facility], at [phone].    If you feel you have received this communication in error or would no longer like to receive these types of communications, please e-mail [email or web address]

## 2020-08-26 NOTE — PATIENT INSTRUCTIONS
It was nice to meet you today! I am sorry that you are hurting so much.     You have carpal tunnel syndrome in both hands.     I will talk with Dr. Zuluaga to see if we can get you penciled in for a surgery date now and you can discuss further with him at your appointment on 9/23/20.     You should hear from his staff in next few days, call me if you don't.     You can continue to wear the braces as needed.     Please stay in touch and call me if you need anything. You can also send me a message in [facility].     Caridad   [phone]

## 2020-09-03 NOTE — PROGRESS NOTES
9/3/20- Spoke to Pt who asked that I call her tomorrow at anytime, will follow up tomorrow. KASSIDY Renteria RN  9/4/20- Attempted to contact pt, left voicemail, letter mailed. KASSIDY Renteria RN  9/15/20- attempted to contact pt, left voicemail  9/23/20- attempted to contact pt, left voicemail, close case

## 2020-09-09 ENCOUNTER — OFFICE VISIT (OUTPATIENT)
Dept: ORTHOPEDICS | Facility: CLINIC | Age: 50
End: 2020-09-09
Payer: MEDICAID

## 2020-09-09 ENCOUNTER — TELEPHONE (OUTPATIENT)
Dept: ORTHOPEDICS | Facility: CLINIC | Age: 50
End: 2020-09-09

## 2020-09-09 VITALS — BODY MASS INDEX: 47.09 KG/M2 | HEIGHT: 66 IN | WEIGHT: 293 LBS

## 2020-09-09 DIAGNOSIS — Z41.9 ELECTIVE SURGERY: ICD-10-CM

## 2020-09-09 DIAGNOSIS — G56.03 BILATERAL CARPAL TUNNEL SYNDROME: Primary | ICD-10-CM

## 2020-09-09 DIAGNOSIS — M79.673 PAIN OF FOOT, UNSPECIFIED LATERALITY: ICD-10-CM

## 2020-09-09 PROCEDURE — 99213 OFFICE O/P EST LOW 20 MIN: CPT | Mod: PBBFAC,PN | Performed by: ORTHOPAEDIC SURGERY

## 2020-09-09 PROCEDURE — 99999 PR PBB SHADOW E&M-EST. PATIENT-LVL III: ICD-10-PCS | Mod: PBBFAC,,, | Performed by: ORTHOPAEDIC SURGERY

## 2020-09-09 PROCEDURE — 99999 PR PBB SHADOW E&M-EST. PATIENT-LVL III: CPT | Mod: PBBFAC,,, | Performed by: ORTHOPAEDIC SURGERY

## 2020-09-09 PROCEDURE — 99214 PR OFFICE/OUTPT VISIT, EST, LEVL IV, 30-39 MIN: ICD-10-PCS | Mod: S$PBB,,, | Performed by: ORTHOPAEDIC SURGERY

## 2020-09-09 PROCEDURE — 99214 OFFICE O/P EST MOD 30 MIN: CPT | Mod: S$PBB,,, | Performed by: ORTHOPAEDIC SURGERY

## 2020-09-09 NOTE — PROGRESS NOTES
CC:  Bilateral carpal tunnel syndrome right worse than left        HPI:  Irina Dugan is a very pleasant 49 y.o.  Female with ongoing symptoms both hands for the past year to   She describes numbness tingling in both hands usually worse at night and in the morning  She is having difficulty using hand especially the right head and due to symptoms  Having some symptoms during the day as well  Previous nerve conduction study showed evidence of bilateral carpal tunnel syndrome right worse than left  No history of trauma            PAST MEDICAL HISTORY:   Past Medical History:   Diagnosis Date    Colitis, nonspecific 2012    Hypertension 10/12/2012    Ovarian cyst     Tubal ectopic pregnancy      PAST SURGICAL HISTORY:   Past Surgical History:   Procedure Laterality Date     SECTION, CLASSIC      CHOLECYSTECTOMY      ECTOPIC PREGNANCY SURGERY  age 25    TUBAL LIGATION       FAMILY HISTORY:   Family History   Problem Relation Age of Onset    Hypertension Unknown     Cancer Unknown     Ovarian cysts Unknown     Breast cancer Mother     Colon cancer Neg Hx     Ovarian cancer Neg Hx     Allergic rhinitis Neg Hx     Angioedema Neg Hx     Atopy Neg Hx     Immunodeficiency Neg Hx     Rhinitis Neg Hx     Urticaria Neg Hx     Eczema Neg Hx     Asthma Neg Hx     Allergies Neg Hx      SOCIAL HISTORY:   Social History     Socioeconomic History    Marital status: Single     Spouse name: Not on file    Number of children: 2    Years of education: 15    Highest education level: Not on file   Occupational History    Occupation: Dental assistant      Employer: [employer]   Social Needs    Financial resource strain: Not on file    Food insecurity     Worry: Not on file     Inability: Not on file    Transportation needs     Medical: Not on file     Non-medical: Not on file   Tobacco Use    Smoking status: Never Smoker    Smokeless tobacco: Never Used   Substance and Sexual  Activity    Alcohol use: Yes     Alcohol/week: 0.0 standard drinks     Comment: socially    Drug use: No    Sexual activity: Yes     Partners: Male     Birth control/protection: OCP   Lifestyle    Physical activity     Days per week: Not on file     Minutes per session: Not on file    Stress: Not on file   Relationships    Social connections     Talks on phone: Not on file     Gets together: Not on file     Attends Latter-day service: Not on file     Active member of club or organization: Not on file     Attends meetings of clubs or organizations: Not on file     Relationship status: Not on file   Other Topics Concern    Are you pregnant or think you may be? Not Asked    Breast-feeding Not Asked   Social History Narrative    Single mom of 2 children, 19 & yr old.       MEDICATIONS:   Current Outpatient Medications:     acetaminophen (TYLENOL) 325 mg Cap, Take 650 mg by mouth as needed., Disp: , Rfl:     ascorbic acid (VITAMIN C ORAL), Take by mouth., Disp: , Rfl:     calcium carbonate/vitamin D3 (VITAMIN D-3 ORAL), Take by mouth., Disp: , Rfl:     furosemide (LASIX) 20 MG tablet, TAKE ONE TABLET BY MOUTH DAILY AS NEEDED (SWELLING), Disp: 90 tablet, Rfl: 1    guaifen/phenyleph/acetaminophn (MUCINEX COLD AND SINUS ORAL), Take by mouth., Disp: , Rfl:     ibuprofen (ADVIL,MOTRIN) 600 MG tablet, Take 1 tablet (600 mg total) by mouth every 6 (six) hours as needed., Disp: 20 tablet, Rfl: 0    meloxicam (MOBIC) 15 MG tablet, Take 1 tablet (15 mg total) by mouth once daily., Disp: 30 tablet, Rfl: 2    methylPREDNISolone (MEDROL DOSEPACK) 4 mg tablet, use as directed, Disp: 1 Package, Rfl: 0    olmesartan-hydrochlorothiazide (BENICAR HCT) 20-12.5 mg per tablet, Take 1 tablet by mouth once daily., Disp: 90 tablet, Rfl: 0    ZINC ORAL, Take by mouth., Disp: , Rfl:     albuterol (PROVENTIL/VENTOLIN HFA) 90 mcg/actuation inhaler, Inhale 1-2 puffs into the lungs every 6 (six) hours as needed. Rescue (Patient not  "taking: Reported on 9/9/2020), Disp: 8 g, Rfl: 0    ondansetron (ZOFRAN-ODT) 4 MG TbDL, Take 1 tablet (4 mg total) by mouth every 6 (six) hours as needed. (Patient not taking: Reported on 9/9/2020), Disp: 30 tablet, Rfl: 0  ALLERGIES: Review of patient's allergies indicates:  No Known Allergies    Review of Systems:  Constitutional: no fever or chills  ENT: no nasal congestion or sore throat  Respiratory: no cough or shortness of breath  Cardiovascular: no chest pain or palpitations  Gastrointestinal: no nausea or vomiting, PUD, GERD, NSAID intolerance  Genitourinary: no hematuria or dysuria  Integument/Breast: no rash or pruritis  Hematologic/Lymphatic: no easy bruising or lymphadenopathy  Musculoskeletal: see HPI  Neurological: no seizures or tremors  Behavioral/Psych: no auditory or visual hallucinations      Physical Exam   Vitals:    09/09/20 1407   Weight: 135.2 kg (298 lb)   Height: 5' 6" (1.676 m)   PainSc:   4       Constitutional: Oriented to person, place, and time. Appears well-developed and well-nourished.   HENT:   Head: Normocephalic and atraumatic.   Nose: Nose normal.   Eyes: No scleral icterus.   Neck: Normal range of motion.   Cardiovascular: Normal rate and regular rhythm.    Pulses:       Radial pulses are 2+ on the right side, and 2+ on the left side.   Pulmonary/Chest: Effort normal and breath sounds normal.   Abdominal: Soft.   Neurological: Alert and oriented to person, place, and time.   Skin: Skin is warm.   Psychiatric: Normal mood and affect.     Examination hand demoerally  Positive Phalen's test on the right negative on the left  Range of motion fingers.    Sensation intact all digits   nstrates mild swelling bilaterally  Positive Tinel's sign bilatMUSCULOSKELETAL UPPER EXTREMITY:  No evidence of atrophy either hand   Sensation intact to the tips of the fingers          of bilateral carpal tunnel syndrome right worse than left  D evidence  Previous nerve conduction study " "martha  Diagnostic Studies:  Bilateral carpal tunnel syndrome        Assessment:  Bilateral carpal tunnel syndrome right worse than left    Plan:  Patient would like to set up surgery for right carpal tunnel release as an outpatient surgery  The risks and benefits of surgery explained to the patient she understands      The risks and benefits of surgery were discussed with the patient today and they understand.  The consent was signed in the office for surgery.      Aakash Zuluaga MD (Jay)  [facility]  Orthopedic Upper Extremity Surgery      "

## 2020-09-14 ENCOUNTER — TELEPHONE (OUTPATIENT)
Dept: PODIATRY | Facility: CLINIC | Age: 50
End: 2020-09-14

## 2020-09-14 NOTE — TELEPHONE ENCOUNTER
----- Message from Hansa Valencia sent at 9/14/2020 10:23 AM CDT -----  Type:  Sooner Appointment Request     Name of Caller:  patient  Symptoms or Reason: referral  Would the patient rather a call back or a response via [facility]? Call back  Best Call Back Number: [phone]  Additional Information: n/a

## 2020-09-20 ENCOUNTER — CLINICAL SUPPORT (OUTPATIENT)
Dept: URGENT CARE | Facility: CLINIC | Age: 50
End: 2020-09-20
Payer: MEDICAID

## 2020-09-20 DIAGNOSIS — Z41.9 ELECTIVE SURGERY: ICD-10-CM

## 2020-09-20 PROCEDURE — U0003 INFECTIOUS AGENT DETECTION BY NUCLEIC ACID (DNA OR RNA); SEVERE ACUTE RESPIRATORY SYNDROME CORONAVIRUS 2 (SARS-COV-2) (CORONAVIRUS DISEASE [COVID-19]), AMPLIFIED PROBE TECHNIQUE, MAKING USE OF HIGH THROUGHPUT TECHNOLOGIES AS DESCRIBED BY CMS-2020-01-R: HCPCS

## 2020-09-21 LAB — SARS-COV-2 RNA RESP QL NAA+PROBE: DETECTED

## 2020-09-23 PROBLEM — G56.03 CARPAL TUNNEL SYNDROME ON BOTH SIDES: Status: ACTIVE | Noted: 2020-09-23

## 2020-09-28 NOTE — PROGRESS NOTES
Subjective:      Patient ID: Irina Dugan is a 49 y.o. female.    Chief Complaint: No chief complaint on file.      HPI: (French)    Irina Dugan is here for a postop visit.     She is s/p LEFT carpal tunnel release by Dr. Zuluaga on 9/23/20. She is doing well. Pt reports pain has been controlled.  Numbness and tingling is gone since a few days after the surgery. She has no complaints.     Past Medical History:   Diagnosis Date    Colitis, nonspecific 11/6/2012    Hypertension 10/12/2012    Ovarian cyst     Tubal ectopic pregnancy        Current Outpatient Medications:     acetaminophen (TYLENOL) 325 mg Cap, Take 650 mg by mouth as needed., Disp: , Rfl:     albuterol (PROVENTIL/VENTOLIN HFA) 90 mcg/actuation inhaler, Inhale 1-2 puffs into the lungs every 6 (six) hours as needed. Rescue (Patient not taking: Reported on 9/9/2020), Disp: 8 g, Rfl: 0    ascorbic acid (VITAMIN C ORAL), Take 2 tablets by mouth once daily. , Disp: , Rfl:     ascorbic acid, vitamin C, (VITAMIN C) 1000 MG tablet, Take 500 mg by mouth once daily., Disp: , Rfl:     cholecalciferol, vitamin D3, (VITAMIN D3) 50 mcg (2,000 unit) Cap, Take 1 capsule by mouth once daily., Disp: , Rfl:     furosemide (LASIX) 20 MG tablet, TAKE ONE TABLET BY MOUTH DAILY AS NEEDED (SWELLING), Disp: 90 tablet, Rfl: 1    guaifen/phenyleph/acetaminophn (MUCINEX COLD AND SINUS ORAL), Take by mouth., Disp: , Rfl:     HYDROcodone-acetaminophen (NORCO) 5-325 mg per tablet, Take 1 tablet by mouth every 4 (four) hours as needed for Pain., Disp: 20 tablet, Rfl: 0    ibuprofen (ADVIL,MOTRIN) 600 MG tablet, Take 1 tablet (600 mg total) by mouth every 6 (six) hours as needed., Disp: 20 tablet, Rfl: 0    meloxicam (MOBIC) 15 MG tablet, Take 1 tablet (15 mg total) by mouth once daily. (Patient taking differently: Take 15 mg by mouth daily as needed. ), Disp: 30 tablet, Rfl: 2    olmesartan-hydrochlorothiazide (BENICAR HCT) 20-12.5 mg per tablet, Take 1  tablet by mouth once daily., Disp: 90 tablet, Rfl: 0    ondansetron (ZOFRAN-ODT) 4 MG TbDL, Take 1 tablet (4 mg total) by mouth every 6 (six) hours as needed. (Patient not taking: Reported on 9/9/2020), Disp: 30 tablet, Rfl: 0    ZINC ORAL, Take 60 mg by mouth once daily. , Disp: , Rfl:   Review of patient's allergies indicates:  No Known Allergies    There were no vitals taken for this visit.    Review of Systems   Constitution: Negative for chills, fever, night sweats and weight gain.   Gastrointestinal: Negative for bowel incontinence, nausea and vomiting.   Genitourinary: Negative for bladder incontinence.   Neurological: Negative for disturbances in coordination and loss of balance.         Objective:    Ortho Exam     On exam of LEFT hand:  Significant for carpal tunnel incision. Wound margins are well approximated and healing nicely. No redness, warmth, drainage, or other signs of infection. mild volar wrist swelling. ROM wrist and fingers is full.   strength is good.  Sensation intact. Pulses present.        Assessment:     Imaging:  None        1. Bilateral carpal tunnel syndrome    2. S/p bilateral carpal tunnel release          Plan:         She is doing well s/p above surgery. Treatment options reviewed with patient and following plan made:     - Regular wound care explained with soap and water.  - Okay to start using hand for light activities.   - Avoid pressure to the palm of the hand.  - She would like to schedule her right CTR release with Dr. Zuluaga. Message sent to his staff. She tested postive for covid 7/11/20 and may not need preop test done.      No follow-ups on file.

## 2020-09-29 ENCOUNTER — PATIENT OUTREACH (OUTPATIENT)
Dept: ADMINISTRATIVE | Facility: OTHER | Age: 50
End: 2020-09-29

## 2020-09-29 ENCOUNTER — TELEPHONE (OUTPATIENT)
Dept: FAMILY MEDICINE | Facility: CLINIC | Age: 50
End: 2020-09-29

## 2020-09-29 DIAGNOSIS — R52 PAIN AGGRAVATED BY WALKING: Primary | ICD-10-CM

## 2020-09-29 NOTE — PROGRESS NOTES
Health Maintenance Due   Topic Date Due    Pneumococcal Vaccine (Medium Risk) (1 of 1 - PPSV23) 12/08/1991    Cervical Cancer Screening  03/17/2018    Influenza Vaccine (1) 08/01/2020     Updates were requested from care everywhere.  Chart was reviewed for overdue Proactive [facility] Encounters (JANEL) topics (CRS, Breast Cancer Screening, Eye exam)  Health Maintenance has been updated.  LINKS immunization registry triggered.  Immunizations were reconciled.

## 2020-09-29 NOTE — TELEPHONE ENCOUNTER
LVM for pt to call back to ask about pain to see if she will need xrays before appointment tomorrow

## 2020-09-30 ENCOUNTER — OFFICE VISIT (OUTPATIENT)
Dept: PODIATRY | Facility: CLINIC | Age: 50
End: 2020-09-30
Payer: MEDICAID

## 2020-09-30 VITALS
SYSTOLIC BLOOD PRESSURE: 102 MMHG | BODY MASS INDEX: 47.09 KG/M2 | HEART RATE: 72 BPM | HEIGHT: 66 IN | WEIGHT: 293 LBS | DIASTOLIC BLOOD PRESSURE: 78 MMHG

## 2020-09-30 DIAGNOSIS — M72.2 PLANTAR FASCIITIS: Primary | ICD-10-CM

## 2020-09-30 DIAGNOSIS — M24.573 EQUINUS CONTRACTURE OF ANKLE: ICD-10-CM

## 2020-09-30 DIAGNOSIS — E66.9 OBESITY, UNSPECIFIED CLASSIFICATION, UNSPECIFIED OBESITY TYPE, UNSPECIFIED WHETHER SERIOUS COMORBIDITY PRESENT: ICD-10-CM

## 2020-09-30 DIAGNOSIS — M79.673 PAIN OF FOOT, UNSPECIFIED LATERALITY: ICD-10-CM

## 2020-09-30 PROCEDURE — 20550 NJX 1 TENDON SHEATH/LIGAMENT: CPT | Mod: 50,PBBFAC,PN | Performed by: PODIATRIST

## 2020-09-30 PROCEDURE — 99203 PR OFFICE/OUTPT VISIT, NEW, LEVL III, 30-44 MIN: ICD-10-PCS | Mod: 25,S$PBB,, | Performed by: PODIATRIST

## 2020-09-30 PROCEDURE — 20550 NJX 1 TENDON SHEATH/LIGAMENT: CPT | Mod: 50,S$PBB,, | Performed by: PODIATRIST

## 2020-09-30 PROCEDURE — 20550 PR INJECT TENDON SHEATH/LIGAMENT: ICD-10-PCS | Mod: 50,S$PBB,, | Performed by: PODIATRIST

## 2020-09-30 PROCEDURE — 99215 OFFICE O/P EST HI 40 MIN: CPT | Mod: PBBFAC,PN | Performed by: PODIATRIST

## 2020-09-30 PROCEDURE — 99999 PR PBB SHADOW E&M-EST. PATIENT-LVL V: CPT | Mod: PBBFAC,,, | Performed by: PODIATRIST

## 2020-09-30 PROCEDURE — 90686 IIV4 VACC NO PRSV 0.5 ML IM: CPT | Mod: PBBFAC,PN

## 2020-09-30 PROCEDURE — 99203 OFFICE O/P NEW LOW 30 MIN: CPT | Mod: 25,S$PBB,, | Performed by: PODIATRIST

## 2020-09-30 PROCEDURE — 99999 PR PBB SHADOW E&M-EST. PATIENT-LVL V: ICD-10-PCS | Mod: PBBFAC,,, | Performed by: PODIATRIST

## 2020-09-30 RX ORDER — TRIAMCINOLONE ACETONIDE 40 MG/ML
40 INJECTION, SUSPENSION INTRA-ARTICULAR; INTRAMUSCULAR
Status: COMPLETED | OUTPATIENT
Start: 2020-09-30 | End: 2020-09-30

## 2020-09-30 RX ADMIN — TRIAMCINOLONE ACETONIDE 40 MG: 40 INJECTION, SUSPENSION INTRA-ARTICULAR; INTRAMUSCULAR at 12:09

## 2020-09-30 NOTE — LETTER
September 30, 2020      Aakash Zuluaga Jr., MD  [address]  [facility] [address]           [facility]  [address]  [facility] [address]  Phone: [phone]  Fax: [phone]          Patient: Irina Dugan   MR Number: [id number]   YOB: 1970   Date of Visit: 9/30/2020       Dear Dr. Aakash Zuluaga Jr.:    Thank you for referring Irina Dugan to me for evaluation. Attached you will find relevant portions of my assessment and plan of care.    If you have questions, please do not hesitate to call me. I look forward to following Irina Dugan along with you.    Sincerely,    Morena Sánchez, CADEN    Enclosure  CC:  No Recipients    If you would like to receive this communication electronically, please contact [email or web address] or [phone] to request more information on [facility] Link access.    For providers and/or their staff who would like to refer a patient to [facility], please contact us through our one-stop-shop provider referral line, [facility], at [phone].    If you feel you have received this communication in error or would no longer like to receive these types of communications, please e-mail [email or web address]

## 2020-09-30 NOTE — PROGRESS NOTES
Subjective:      Patient ID: Irina Dugan is a 49 y.o. female.    Chief Complaint: Foot Pain (B/l) and PCP Visit (Dr. Crespo last visit 2020)      49 y.o. female presenting with bilateral heel pain.  Points plantar aspect of the heel for pain.  Describes pain as combination of sharp and throbbing during ambulation and standing.  Patient has been dealing with pain since 2017.  No history of injection however be consistent with water bottle exercise which initially helped with symptoms.  Left foot is worse than the right foot.  Reports pain is worse in the morning when she comes out of her bed.  He recently had per tunnel release surgery on the right wrist.       Review of Systems   Constitution: Negative for chills, decreased appetite, fever and malaise/fatigue.   HENT: Negative for congestion, ear discharge and sore throat.    Eyes: Negative for discharge and pain.   Cardiovascular: Negative for chest pain, claudication and leg swelling.   Respiratory: Negative for cough and shortness of breath.    Skin: Negative for color change, nail changes and rash.   Musculoskeletal: Positive for back pain and stiffness. Negative for arthritis, joint pain, joint swelling and muscle weakness.        Bilateral heel pain   Gastrointestinal: Negative for bloating, abdominal pain, diarrhea, nausea and vomiting.   Genitourinary: Negative for flank pain and hematuria.   Neurological: Negative for headaches, numbness and weakness.   Psychiatric/Behavioral: Negative for altered mental status.             Past Medical History:   Diagnosis Date    Colitis, nonspecific 2012    Hypertension 10/12/2012    Ovarian cyst     Tubal ectopic pregnancy        Past Surgical History:   Procedure Laterality Date    CARPAL TUNNEL RELEASE Bilateral 2020    Procedure: RELEASE, CARPAL TUNNEL;  Surgeon: Aakash Zuluaga Jr., MD;  Location: [facility];  Service: Orthopedics;  Laterality: Bilateral;     SECTION, CLASSIC       CHOLECYSTECTOMY      ECTOPIC PREGNANCY SURGERY  age 25    TUBAL LIGATION         Family History   Problem Relation Age of Onset    Hypertension Unknown     Cancer Unknown     Ovarian cysts Unknown     Breast cancer Mother     Colon cancer Neg Hx     Ovarian cancer Neg Hx     Allergic rhinitis Neg Hx     Angioedema Neg Hx     Atopy Neg Hx     Immunodeficiency Neg Hx     Rhinitis Neg Hx     Urticaria Neg Hx     Eczema Neg Hx     Asthma Neg Hx     Allergies Neg Hx        Social History     Socioeconomic History    Marital status: Single     Spouse name: Not on file    Number of children: 2    Years of education: 15    Highest education level: Not on file   Occupational History    Occupation: Dental assistant      Employer: [employer]   Social Needs    Financial resource strain: Not on file    Food insecurity     Worry: Not on file     Inability: Not on file    Transportation needs     Medical: Not on file     Non-medical: Not on file   Tobacco Use    Smoking status: Never Smoker    Smokeless tobacco: Never Used   Substance and Sexual Activity    Alcohol use: Yes     Alcohol/week: 0.0 standard drinks     Comment: socially    Drug use: No    Sexual activity: Yes     Partners: Male     Birth control/protection: OCP   Lifestyle    Physical activity     Days per week: Not on file     Minutes per session: Not on file    Stress: Not on file   Relationships    Social connections     Talks on phone: Not on file     Gets together: Not on file     Attends [employer] service: Not on file     Active member of club or organization: Not on file     Attends meetings of clubs or organizations: Not on file     Relationship status: Not on file   Other Topics Concern    Are you pregnant or think you may be? Not Asked    Breast-feeding Not Asked   Social History Narrative    Single mom of 2 children, 19 & yr old.       Current Outpatient Medications   Medication Sig Dispense Refill     "acetaminophen (TYLENOL) 325 mg Cap Take 650 mg by mouth as needed.      ascorbic acid (VITAMIN C ORAL) Take 2 tablets by mouth once daily.       ascorbic acid, vitamin C, (VITAMIN C) 1000 MG tablet Take 500 mg by mouth once daily.      cholecalciferol, vitamin D3, (VITAMIN D3) 50 mcg (2,000 unit) Cap Take 1 capsule by mouth once daily.      furosemide (LASIX) 20 MG tablet TAKE ONE TABLET BY MOUTH DAILY AS NEEDED (SWELLING) 90 tablet 1    guaifen/phenyleph/acetaminophn (MUCINEX COLD AND SINUS ORAL) Take by mouth.      HYDROcodone-acetaminophen (NORCO) 5-325 mg per tablet Take 1 tablet by mouth every 4 (four) hours as needed for Pain. 20 tablet 0    ibuprofen (ADVIL,MOTRIN) 600 MG tablet Take 1 tablet (600 mg total) by mouth every 6 (six) hours as needed. 20 tablet 0    meloxicam (MOBIC) 15 MG tablet Take 1 tablet (15 mg total) by mouth once daily. (Patient taking differently: Take 15 mg by mouth daily as needed. ) 30 tablet 2    olmesartan-hydrochlorothiazide (BENICAR HCT) 20-12.5 mg per tablet Take 1 tablet by mouth once daily. 90 tablet 0    ZINC ORAL Take 60 mg by mouth once daily.       albuterol (PROVENTIL/VENTOLIN HFA) 90 mcg/actuation inhaler Inhale 1-2 puffs into the lungs every 6 (six) hours as needed. Rescue (Patient not taking: Reported on 9/9/2020) 8 g 0    ondansetron (ZOFRAN-ODT) 4 MG TbDL Take 1 tablet (4 mg total) by mouth every 6 (six) hours as needed. (Patient not taking: Reported on 9/9/2020) 30 tablet 0     No current facility-administered medications for this visit.        Review of patient's allergies indicates:  No Known Allergies    Vitals:    09/30/20 1020   BP: 102/78   Pulse: 72   Weight: 136 kg (299 lb 12.8 oz)   Height: 5' 6" (1.676 m)   PainSc:   5       Objective:      Physical Exam  Constitutional:       General: She is not in acute distress.     Appearance: She is well-developed.   HENT:      Nose: Nose normal.   Eyes:      Conjunctiva/sclera: Conjunctivae normal.   Neck: "      Musculoskeletal: Normal range of motion.   Pulmonary:      Effort: Pulmonary effort is normal.   Chest:      Chest wall: No tenderness.   Abdominal:      Tenderness: There is no abdominal tenderness.   Neurological:      Mental Status: She is alert and oriented to person, place, and time.   Psychiatric:         Behavior: Behavior normal.         Vascular: Distal DP/PT pulses palpable 2/4. CRT < 3 sec to tips of toes. No vericosities noted to LEs. Hair growth present LE, warm to touch LE, No edema noted to LE.    Dermatologic: No open lesions, lacerations or wounds. Interdigital spaces clean, dry and intact. No erythema, rubor, calor noted LE    Musculoskeletal: MMT 5/5 in DF/PF/Inv/Ev resistance with no reproduction of pain in any direction. Passive range of motion of ankle and pedal joints is painless. No calf tenderness LE, Compartments soft/compressible. ROM of ankles with < 10 deg DF is noted bilateral  Bilateral feet:  Tender to touch to plantar heel at plantar fascia insertion and over medial tubercle of the calcaneus. No calcaneus pain upon medial and lateral squeezing     Neurological: Light touch, proprioception, and sharp/dull sensation are all intact. Protective threshold with the Melvin-Wienstein monofilament is intact. Vibratory sensation intact.           Assessment:       Encounter Diagnoses   Name Primary?    Pain of foot, unspecified laterality     Plantar fasciitis Yes    Obesity, unspecified classification, unspecified obesity type, unspecified whether serious comorbidity present     Equinus contracture of ankle          Plan:       Irina was seen today for foot pain and pcp visit.    Diagnoses and all orders for this visit:    Plantar fasciitis  -     Ambulatory referral/consult to Physical/Occupational Therapy; Future    Pain of foot, unspecified laterality  -     Ambulatory referral/consult to Podiatry    Obesity, unspecified classification, unspecified obesity type, unspecified  whether serious comorbidity present    Equinus contracture of ankle    Other orders  -     Influenza - Quadrivalent (PF)      I counseled the patient on her conditions, their implications and medical management.    49 y.o. female with bilateral heel pain consistent with plantar fasciitis, equinus.     -Rx physical therapy  -b/l foot xrays reviewed. No bony abnormalities pertain to area of interest.   -s/p steroid injection. Pt tolerated well. Injection consisted of total of 2 cc combination of Kenalog 40 with 0.5% Marcaine plain injected into the plantar medial bilateral heel. Skin was prepped with alcohol and ethyl chloride spray. Patient tolerated well with no complications and related relief following injection. Bandaid applied to injection site. Patient is to use combination of conservative treatment and return for follow up/reassessment at that time as needed. Timeout was performed with pt in the room.    -c/w stretching and water bottle exercise. Instructions for both given to patient.  -Discussed different treatment options for heel pain. including conservative and surgical.  I gave written and verbal instructions on heel cord stretching and this was demonstrated for the patient. Patient expressed understanding. Discussed wearing appropriate shoe gear and avoiding flats, slippers, sandals, barefoot, and sockfeet. Recommended arch supports. My recommendation for OTC supports is Spenco polysorb replacement insoles or patient may elect more aggressive treatment with prescription arch supports.  -The nature of the condition, options for management, as well as potential risks and complications were discussed in detail with patient. Patient was amenable to my recommendations and left my office fully informed and will follow up as instructed or sooner if necessary.    -Patient was advised of signs and symptoms of infection including redness, drainage, purulence, odor, streaking, fever, chills and I advised patient  to seek medical attention (ER or urgent care) if these symptoms arise.   -Discussed reducing caloric intake, increase physical activity, weight loss, exercise, low salt diet, which may include blood sugar control to help with foot and ankle complications.  -f/u 2 months after PT.     Note dictated with voice recognition software, please excuse any grammatical errors.

## 2020-10-05 ENCOUNTER — OFFICE VISIT (OUTPATIENT)
Dept: ORTHOPEDICS | Facility: CLINIC | Age: 50
End: 2020-10-05
Payer: MEDICAID

## 2020-10-05 VITALS
HEART RATE: 74 BPM | WEIGHT: 293 LBS | DIASTOLIC BLOOD PRESSURE: 84 MMHG | SYSTOLIC BLOOD PRESSURE: 124 MMHG | RESPIRATION RATE: 18 BRPM | BODY MASS INDEX: 47.94 KG/M2

## 2020-10-05 DIAGNOSIS — G56.02 LEFT CARPAL TUNNEL SYNDROME: ICD-10-CM

## 2020-10-05 DIAGNOSIS — Z98.890 S/P CARPAL TUNNEL RELEASE: Primary | ICD-10-CM

## 2020-10-05 PROCEDURE — 99024 POSTOP FOLLOW-UP VISIT: CPT | Mod: ,,, | Performed by: PHYSICIAN ASSISTANT

## 2020-10-05 PROCEDURE — 99214 OFFICE O/P EST MOD 30 MIN: CPT | Mod: PBBFAC,PN | Performed by: PHYSICIAN ASSISTANT

## 2020-10-05 PROCEDURE — 99999 PR PBB SHADOW E&M-EST. PATIENT-LVL IV: CPT | Mod: PBBFAC,,, | Performed by: PHYSICIAN ASSISTANT

## 2020-10-05 PROCEDURE — 99024 PR POST-OP FOLLOW-UP VISIT: ICD-10-PCS | Mod: ,,, | Performed by: PHYSICIAN ASSISTANT

## 2020-10-05 PROCEDURE — 99999 PR PBB SHADOW E&M-EST. PATIENT-LVL IV: ICD-10-PCS | Mod: PBBFAC,,, | Performed by: PHYSICIAN ASSISTANT

## 2020-10-13 ENCOUNTER — TELEPHONE (OUTPATIENT)
Dept: ORTHOPEDICS | Facility: CLINIC | Age: 50
End: 2020-10-13

## 2020-10-13 NOTE — TELEPHONE ENCOUNTER
----- Message from Saskia Salinas sent at 10/13/2020  9:14 AM CDT -----  Contact: [phone]/Self  Type: Requesting to speak with nurse    Who Called: Pt  Regarding: questions on surgery date    Would the patient rather a call back or a response via [facility]? Call back  Best Call Back Number: [phone]  Additional Information:

## 2020-10-14 ENCOUNTER — CLINICAL SUPPORT (OUTPATIENT)
Dept: REHABILITATION | Facility: HOSPITAL | Age: 50
End: 2020-10-14
Attending: PODIATRIST
Payer: MEDICAID

## 2020-10-14 DIAGNOSIS — M72.2 PLANTAR FASCIITIS: ICD-10-CM

## 2020-10-14 DIAGNOSIS — M62.81 GENERALIZED MUSCLE WEAKNESS: ICD-10-CM

## 2020-10-14 DIAGNOSIS — R26.89 BALANCE PROBLEM: ICD-10-CM

## 2020-10-14 PROCEDURE — 97110 THERAPEUTIC EXERCISES: CPT | Mod: PN

## 2020-10-14 PROCEDURE — 97161 PT EVAL LOW COMPLEX 20 MIN: CPT | Mod: PN

## 2020-10-14 NOTE — PLAN OF CARE
[facility]  Physical Therapy Initial Evaluation    Date: 10/14/2020   Name: Irina Dugan  Clinic Number: [id number]    Therapy Diagnosis:   Encounter Diagnoses   Name Primary?    Plantar fasciitis     Generalized muscle weakness      Physician: Morena Sánchez DPM    Physician Orders: PT Eval and Treat   Medical Diagnosis from Referral: M72.2 (ICD-10-CM) - Plantar fasciitis  Evaluation Date: 10/14/2020  Authorization Period Expiration: 20  Plan of Care Expiration: 20  Visit # / Visits authorized:     Time In:   Time Out:   Total Appointment Time (timed & untimed codes): 50 minutes    Precautions: Standard    Subjective   Date of onset: 20  History of current condition - Irina reports: About 3 years ago pt was going to school and felt every time she took a step it felt like pins and needles. Her teacher had the same issue and since she was going to school for estition at [employer]. She had her foot massaged, was rolling her foot over a frozen water bottle and taking ibuprofen. For the last year the pain has gotten bad again. Pt is constantly on her feet for work. She has tried new shoes, new inserts, and it has always hurt and not gotten better. Pt received an injection for both feet to help with the plantar fascitis on 20. The pain has been pretty non-existent but yesterday when she was on her feet all day yesterday she felt some sharp pain.      Medical History:   Past Medical History:   Diagnosis Date    Colitis, nonspecific 2012    Hypertension 10/12/2012    Ovarian cyst     Tubal ectopic pregnancy        Surgical History:   Irina Dugan  has a past surgical history that includes Cholecystectomy;  section, classic; Ectopic pregnancy surgery (age 25); Tubal ligation; and Carpal tunnel release (Bilateral, 2020).    Medications:   Irina has a current medication list which includes the following prescription(s):  acetaminophen, albuterol, ascorbic acid, ascorbic acid (vitamin c), cholecalciferol (vitamin d3), furosemide, guaifen/phenyleph/acetaminophn, hydrocodone-acetaminophen, ibuprofen, meloxicam, olmesartan-hydrochlorothiazide, ondansetron, and zinc.    Allergies:   Review of patient's allergies indicates:  No Known Allergies     Imaging, please see imaging    Prior Therapy: For both knees about 4-5 years ago.   Social History:  lives with her fiance and her children, in a 1 story home with 1 step leading to the front door.   Occupation:    Prior Level of Function: Independent with all ADL's, driving, and working   Current Level of Function: Pt needs moderate assistance for household chores and duties, but otherwise independent with extended time, drives, and works.     Pain:  Current 0/10, worst 10/10, best 0/10   Location: bilateral feet    Description: Dull and Deep  Aggravating Factors: Standing, Walking and Getting out of bed/chair  Easing Factors: massage, pain medication and injection    Patients goals: To return to PLOF pain free and start exercising again.     Objective     ANKLE    Impaired Ankle: Left and Right        Palpation:      Palpation tenderness to:         ROM       Ankle AROM AROM Status Comment    Left Right            Dorsiflexion 10 8     Plantarflexion 50 60     INversion 15 15     EVersion 30 30                  MMT      Ankle   Status Comment    Left Right            Dorsiflexion 4+/5 4+/5     Plantarflexion 4+/5 4+/5     INversion 3+/5 4+/5     EVersion 4+/5 4+/5                             GIRTH:        Ankle   Status Comment    Left Right            Figure 8 57 cm 58 cm                          FUNCTIONAL MOBILITY        Balance: Good on RLE with 60 sec SLS without UE support, Poor+ on LLE with 20 sec SLS without UE support          Limitation/Restriction for FOTO Foot Survey    Therapist reviewed FOTO scores for Irina Dugan on 10/14/2020.   FOTO documents entered into  "EPIC - see Media section.    Limitation Score: 38%         TREATMENT   Treatment Time In: 1720  Treatment Time Out: 1730    Irina received therapeutic exercises to develop strength, endurance, ROM and flexibility for 10 minutes including:  Ankle ABC x1  Gastroc Stretch 3x30"  Soleus Stretch 3x30"  Ankle 4-way 3x10  Plantar Fascia roll 3'     Home Exercises and Patient Education Provided    Education provided:   - HEP  - Pt POC    Written Home Exercises Provided: yes.  Exercises were reviewed and Irina was able to demonstrate them prior to the end of the session.  Irina demonstrated good  understanding of the education provided.     See EMR under Patient Instructions for exercises provided 10/14/2020.    Assessment   Irina is a 49 y.o. female referred to outpatient Physical Therapy with a medical diagnosis of plantar fascitis. Patient presents with difficulty standing/walking for extended periods of time, getting out of bed or a chair, performing household chores/duties, and participating in recreational activities due to decreased strength, decreased balance, increased pain, increased swelling, and overall decreased functional mobility.     Patient prognosis is Good.   Patientt will benefit from skilled outpatient Physical Therapy to address the deficits stated above and in the chart below, provide patient /family education, and to maximize patientt's level of independence.     Plan of care discussed with patient: Yes  Patient's spiritual, cultural and educational needs considered and patient is agreeable to the plan of care and goals as stated below:     Anticipated Barriers for therapy: obesity    Medical Necessity is demonstrated by the following  History  Co-morbidities and personal factors that may impact the plan of care Co-morbidities:   anxiety, depression, difficulty sleeping, high BMI and HTN    Personal Factors:   no deficits     low   Examination  Body Structures and Functions, activity limitations " and participation restrictions that may impact the plan of care Body Regions:   lower extremities  feet    Body Systems:    gross symmetry  ROM  strength  gross coordinated movement  balance  gait  transfers  transitions    Participation Restrictions:   Household chores/duties and recreational activities    Activity limitations:   Learning and applying knowledge  no deficits    General Tasks and Commands  no deficits    Communication  no deficits    Mobility  lifting and carrying objects  walking    Self care  no deficits    Domestic Life  shopping  cooking  doing house work (cleaning house, washing dishes, laundry)    Interactions/Relationships  no deficits    Life Areas  no deficits    Community and Social Life  no deficits         low   Clinical Presentation stable and uncomplicated low   Decision Making/ Complexity Score: low     Goals:  Short Term Goals (3 Weeks):   1. Pt will be independent with HEP to supplement PT in improving functional mobility  2. Pt will improve R DF ROM to 7 degrees in short sitting to improve gait mechanics.  3. Pt will perform SLS on right foot to greater than or equal to 30 seconds to improve strength of ankle intrinsics     5. Pt will walk greater than or equal to 500 feet on level ground, without pain, to return to community ambulation safely.      Long Term Goals (6 Weeks):   1. Pt will be independent with updated HEP to supplement PT in improving functional mobility.  2. Pt will improve FOTO score to </= 28% limited to decrease perceived limitation with mobility  2. Pt will improve impaired LE strength to >/= 5/5 to improve strength for functional tasks.  3. Pt will improve B DF ROM with knee extended to WFL deg to improve gait mechanics.  4. Pt will perform SLS on right foot to greater than or equal to 1 minute to improve strength of ankle intrinsics  5. Pt will walk greater than or equal to 1,000 feet without AD, on level ground, without pain, to return to community ambulation  safely.       Plan   Plan of care Certification: 10/14/2020 to 11/30/20.    Outpatient Physical Therapy 2 times weekly for 8 visits to include the following interventions: Electrical Stimulation -, Gait Training, Manual Therapy, Moist Heat/ Ice, Neuromuscular Re-ed, Patient Education, Therapeutic Activites, Therapeutic Exercise and Ultrasound.     Xavi Larios, PT

## 2020-10-15 ENCOUNTER — TELEPHONE (OUTPATIENT)
Dept: PODIATRY | Facility: CLINIC | Age: 50
End: 2020-10-15

## 2020-10-15 NOTE — TELEPHONE ENCOUNTER
Patient stated yesterday she went to the first physical therapy appointment and she has been doing her exercises. However she has been having issues with her knee. She wants to know if you can refer her to an orthopedic for her knee.       Also the therapist wanted to know how long the injection in her foot lasts?

## 2020-10-15 NOTE — TELEPHONE ENCOUNTER
----- Message from Lisa Armendariz sent at 10/15/2020 10:19 AM CDT -----  Contact: pt, [phone] (M)  Patient requests to speak with you regarding shot she got for her plantar fascitis. Please advise.

## 2020-10-16 ENCOUNTER — PATIENT OUTREACH (OUTPATIENT)
Dept: INTERNAL MEDICINE | Facility: CLINIC | Age: 50
End: 2020-10-16

## 2020-10-21 ENCOUNTER — PATIENT MESSAGE (OUTPATIENT)
Dept: ORTHOPEDICS | Facility: CLINIC | Age: 50
End: 2020-10-21

## 2020-10-22 ENCOUNTER — PATIENT MESSAGE (OUTPATIENT)
Dept: ORTHOPEDICS | Facility: CLINIC | Age: 50
End: 2020-10-22

## 2020-10-23 ENCOUNTER — CLINICAL SUPPORT (OUTPATIENT)
Dept: REHABILITATION | Facility: HOSPITAL | Age: 50
End: 2020-10-23
Payer: MEDICAID

## 2020-10-23 DIAGNOSIS — R26.89 BALANCE PROBLEM: ICD-10-CM

## 2020-10-23 DIAGNOSIS — M62.81 GENERALIZED MUSCLE WEAKNESS: ICD-10-CM

## 2020-10-23 PROCEDURE — 97110 THERAPEUTIC EXERCISES: CPT | Mod: PN

## 2020-10-23 NOTE — PROGRESS NOTES
"  Physical Therapy Treatment Note     Name: Irina Dugan  Clinic Number: [id number]    Therapy Diagnosis:   Encounter Diagnoses   Name Primary?    Generalized muscle weakness     Balance problem      Physician: Morena Sánchez DPM    Visit Date: 10/23/2020    Physician Orders: PT Eval and Treat  Medical Diagnosis: M72.2 (ICD-10-CM) - Plantar fasciitis   Evaluation Date: 10/14/20  Authorization Period Expiration: 12/31/20  Plan of Care Certification Period: 10/14/2020 to 11/30/20  Visit #/Visits authorized: 2/ 50     Time In: 1305  Time Out: 1345  Total Billable Time: 40 minutes    Precautions: Standard    Subjective     Pt reports: Injections starting to wear off, is having pain in both knees  She was compliant with home exercise program.  Response to previous treatment: 1st tx session  Functional change: No Change     Pain: 2/10  Location: bilateral feet  , 6/10 B knees    Objective     Irina received therapeutic exercises to develop strength, endurance, ROM and flexibility for 40 minutes including:    Aerobic Activity:  Nu-Step  5', L1    Seated Exercises:  Ankle ABC   X1, B  Gastroc Stretch  3x30", B  Soleus Stretch  3x30", B  Ankle 4-way   3x10, B  Plantar Fascia roll  3', B  Towel Scrunches 2', B  Toe Yoga  30x, B  Reverse Toe Yoga 30x, B  [place]  2', B          Home Exercises Provided and Patient Education Provided     Education provided:   - HEP    Written Home Exercises Provided: Patient instructed to cont prior HEP.  Exercises were reviewed and Irina was able to demonstrate them prior to the end of the session.  Irina demonstrated good  understanding of the education provided.     See EMR under Patient Instructions for exercises provided prior visit.    Assessment     Pt was able to tolerate all exercises during her first visit without any c/o increased pain or discomfort. Pt progressed with towel scrunches, toe/reverse toe yoga, and marble . PT educated pt on ankle 4-way as she was " unable to perform inversion and eversion. Pt verbalized and demonstrated understanding. Pt was late to therapy due to weather.     Irina is progressing well towards her goals.   Pt prognosis is Good.     Pt will continue to benefit from skilled outpatient physical therapy to address the deficits listed in the problem list box on initial evaluation, provide pt/family education and to maximize pt's level of independence in the home and community environment.     Pt's spiritual, cultural and educational needs considered and pt agreeable to plan of care and goals.     Anticipated barriers to physical therapy: obesity    Goals:   Short Term Goals (3 Weeks):   1. Pt will be independent with HEP to supplement PT in improving functional mobility  2. Pt will improve R DF ROM to 7 degrees in short sitting to improve gait mechanics.  3. Pt will perform SLS on right foot to greater than or equal to 30 seconds to improve strength of ankle intrinsics     5. Pt will walk greater than or equal to 500 feet on level ground, without pain, to return to community ambulation safely.      Long Term Goals (6 Weeks):   1. Pt will be independent with updated HEP to supplement PT in improving functional mobility.  2. Pt will improve FOTO score to </= 28% limited to decrease perceived limitation with mobility  2. Pt will improve impaired LE strength to >/= 5/5 to improve strength for functional tasks.  3. Pt will improve B DF ROM with knee extended to WFL deg to improve gait mechanics.  4. Pt will perform SLS on right foot to greater than or equal to 1 minute to improve strength of ankle intrinsics  5. Pt will walk greater than or equal to 1,000 feet without AD, on level ground, without pain, to return to community ambulation safely.          Plan     Continue POC as tolerated     Xavi Larios, PT

## 2020-10-26 ENCOUNTER — TELEPHONE (OUTPATIENT)
Dept: INTERNAL MEDICINE | Facility: CLINIC | Age: 50
End: 2020-10-26

## 2020-10-26 NOTE — TELEPHONE ENCOUNTER
----- Message from Maia Sparrow sent at 10/26/2020  7:44 AM CDT -----  Contact: self/[phone]  pt called in regards to seeing someone on today due to  having sinus infection, congested, dizziness. I tried to schedule but due to her insurance (medicaid) no time came up. Pt would like a starr back,     Please advise

## 2020-10-30 ENCOUNTER — CLINICAL SUPPORT (OUTPATIENT)
Dept: REHABILITATION | Facility: HOSPITAL | Age: 50
End: 2020-10-30
Payer: MEDICAID

## 2020-10-30 DIAGNOSIS — R26.89 BALANCE PROBLEM: ICD-10-CM

## 2020-10-30 DIAGNOSIS — M62.81 GENERALIZED MUSCLE WEAKNESS: ICD-10-CM

## 2020-10-30 PROCEDURE — 97110 THERAPEUTIC EXERCISES: CPT | Mod: PN

## 2020-10-30 NOTE — PROGRESS NOTES
"  Physical Therapy Treatment Note     Name: Irina Dguan  Clinic Number: [id number]    Therapy Diagnosis:   Encounter Diagnoses   Name Primary?    Generalized muscle weakness     Balance problem      Physician: Morena Sánchez DPM    Visit Date: 10/30/2020    Physician Orders: PT Eval and Treat  Medical Diagnosis: M72.2 (ICD-10-CM) - Plantar fasciitis   Evaluation Date: 10/14/20  Authorization Period Expiration: 12/31/20  Plan of Care Certification Period: 10/14/2020 to 11/30/20  Visit #/Visits authorized: 3/ 50     Time In: 1217  Time Out: 1317  Total Billable Time: 55 minutes    Precautions: Standard    Subjective     Pt reports: Injections starting to wear off, is having pain in both knees  She was compliant with home exercise program.  Response to previous treatment: 1st tx session  Functional change: No Change     Pain: 1/10  Location: bilateral feet  , 5/10 B knees    Objective     Irina received therapeutic exercises to develop strength, endurance, ROM and flexibility for 55 minutes including:    Aerobic Activity:  Nu-Step  8', L1    Seated Exercises:  Ankle ABC   X1, B  Gastroc Stretch  3x30", B   Soleus Stretch  3x30", B  Ankle 4-way   3x10, B, GTB  Plantar Fascia roll  3', B  Towel Scrunches 2', B  Toe Yoga  30x, B  Reverse Toe Yoga 30x, B  Martin   2', B  Heel/Toe Raises 3x10    Standing Exercises:  Heel/Toe Raises  3x10  Gastroc/Soleus Stretch 3x30" on fitter  Marches   2x10  Mini Squats   15x  Hip Flexor Stretch  3x20" on stairs          Home Exercises Provided and Patient Education Provided     Education provided:   - HEP    Written Home Exercises Provided: Patient instructed to cont prior HEP.  Exercises were reviewed and Irina was able to demonstrate them prior to the end of the session.  Irina demonstrated good  understanding of the education provided.     See EMR under Patient Instructions for exercises provided prior visit.    Assessment     Pt was able to tolerate all exercises " without any c/o increased pain in her foot, but had some mild discomfort in bilateral knees. Pt progressed with resistance for ankle 4-way and was able to demonstrate proper form of exercise to PT. PT gave pt green theraband to take home for HEP use. Pt also progressed with standing exercises for stretches, mini squats, and marches.     Irina is progressing well towards her goals.   Pt prognosis is Good.     Pt will continue to benefit from skilled outpatient physical therapy to address the deficits listed in the problem list box on initial evaluation, provide pt/family education and to maximize pt's level of independence in the home and community environment.     Pt's spiritual, cultural and educational needs considered and pt agreeable to plan of care and goals.     Anticipated barriers to physical therapy: obesity    Goals:   Short Term Goals (3 Weeks):   1. Pt will be independent with HEP to supplement PT in improving functional mobility  2. Pt will improve R DF ROM to 7 degrees in short sitting to improve gait mechanics.  3. Pt will perform SLS on right foot to greater than or equal to 30 seconds to improve strength of ankle intrinsics     5. Pt will walk greater than or equal to 500 feet on level ground, without pain, to return to community ambulation safely.      Long Term Goals (6 Weeks):   1. Pt will be independent with updated HEP to supplement PT in improving functional mobility.  2. Pt will improve FOTO score to </= 28% limited to decrease perceived limitation with mobility  2. Pt will improve impaired LE strength to >/= 5/5 to improve strength for functional tasks.  3. Pt will improve B DF ROM with knee extended to WFL deg to improve gait mechanics.  4. Pt will perform SLS on right foot to greater than or equal to 1 minute to improve strength of ankle intrinsics  5. Pt will walk greater than or equal to 1,000 feet without AD, on level ground, without pain, to return to community ambulation safely.           Plan     Continue POC as tolerated     Xavi Larios, PT

## 2020-11-03 ENCOUNTER — CLINICAL SUPPORT (OUTPATIENT)
Dept: REHABILITATION | Facility: HOSPITAL | Age: 50
End: 2020-11-03
Payer: MEDICAID

## 2020-11-03 DIAGNOSIS — M62.81 GENERALIZED MUSCLE WEAKNESS: ICD-10-CM

## 2020-11-03 DIAGNOSIS — R26.89 BALANCE PROBLEM: ICD-10-CM

## 2020-11-03 PROCEDURE — 97110 THERAPEUTIC EXERCISES: CPT | Mod: PN,CQ

## 2020-11-03 PROCEDURE — 97140 MANUAL THERAPY 1/> REGIONS: CPT | Mod: PN,CQ

## 2020-11-03 NOTE — PROGRESS NOTES
"  Physical Therapy Treatment Note     Name: Irina Dugan  Clinic Number: [id number]    Therapy Diagnosis:   Encounter Diagnoses   Name Primary?    Generalized muscle weakness     Balance problem      Physician: Morena Sánchez DPM    Visit Date: 11/3/2020    Physician Orders: PT Eval and Treat  Medical Diagnosis: M72.2 (ICD-10-CM) - Plantar fasciitis   Evaluation Date: 10/14/20  Authorization Period Expiration: 12/31/20  Plan of Care Certification Period: 10/14/2020 to 11/30/20  Visit #/Visits authorized: 3/ 50     Time In: 1145  Time Out: 1235  Total Billable Time: 50 minutes    Precautions: Standard    Subjective     Pt reports: Injections starting to wear off, is having pain in both knees  She was compliant with home exercise program.  Response to previous treatment: 1st tx session  Functional change: No Change     Pain: 1/10  Location: bilateral feet  , 410 B knees    Objective     Irina received therapeutic exercises to develop strength, endurance, ROM and flexibility for 40  minutes including:    Aerobic Activity:  Nu-Step  8', L4.0    Seated Exercises:  Ankle ABC   X1, B  Gastroc Stretch  3x30", B   Soleus Stretch  3x30", B  Ankle 4-way   3x10, B, GTB  Plantar Fascia roll  3', B  Towel Scrunches 2', B  Toe Yoga  30x, B  Reverse Toe Yoga 30x, B  Standing Exercises:  [place]   2', B  Heel/Toe Raises 3x10    Heel/Toe Raises  3x10  Gastroc/Soleus Stretch 3x30" on edge of step  Marches   2x10  Mini Squats   15x  Hip Flexor Stretch  3x20" on stairs    Irina received the following manual therapy techniques: Joint mobilizations, Myofacial release, Soft tissue Mobilization and Friction Massage were applied to the:  for 10  minutes, including:  [place]ps  To plantar B feet and calves, achilles       Home Exercises Provided and Patient Education Provided     Education provided:   - HEP    Written Home Exercises Provided: Patient instructed to cont prior HEP.  Exercises were reviewed and Irina was able to " demonstrate them prior to the end of the session.  Irina demonstrated good  understanding of the education provided.     See EMR under Patient Instructions for exercises provided prior visit.    Assessment     Pt was able to tolerate all exercises without any c/o increased pain in her foot, but had some mild discomfort in bilateral 4/10 knee pain today. Pt progressed with resistance for ankle 4-way and was able to demonstrate proper form of exercise to PT. Good HEP compliance. Pt also progressed with standing exercises for stretches, mini squats, and marches.     Irina is progressing well towards her goals.   Pt prognosis is Good.     Pt will continue to benefit from skilled outpatient physical therapy to address the deficits listed in the problem list box on initial evaluation, provide pt/family education and to maximize pt's level of independence in the home and community environment.     Pt's spiritual, cultural and educational needs considered and pt agreeable to plan of care and goals.     Anticipated barriers to physical therapy: obesity    Goals:   Short Term Goals (3 Weeks):   1. Pt will be independent with HEP to supplement PT in improving functional mobility  2. Pt will improve R DF ROM to 7 degrees in short sitting to improve gait mechanics.  3. Pt will perform SLS on right foot to greater than or equal to 30 seconds to improve strength of ankle intrinsics     5. Pt will walk greater than or equal to 500 feet on level ground, without pain, to return to community ambulation safely.      Long Term Goals (6 Weeks):   1. Pt will be independent with updated HEP to supplement PT in improving functional mobility.  2. Pt will improve FOTO score to </= 28% limited to decrease perceived limitation with mobility  2. Pt will improve impaired LE strength to >/= 5/5 to improve strength for functional tasks.  3. Pt will improve B DF ROM with knee extended to WFL deg to improve gait mechanics.  4. Pt will perform SLS  on right foot to greater than or equal to 1 minute to improve strength of ankle intrinsics  5. Pt will walk greater than or equal to 1,000 feet without AD, on level ground, without pain, to return to community ambulation safely.          Plan     Continue POC as tolerated     Oswaldo Rutledge, HELLEN

## 2020-11-05 ENCOUNTER — CLINICAL SUPPORT (OUTPATIENT)
Dept: REHABILITATION | Facility: HOSPITAL | Age: 50
End: 2020-11-05
Payer: MEDICAID

## 2020-11-05 DIAGNOSIS — R26.89 BALANCE PROBLEM: ICD-10-CM

## 2020-11-05 DIAGNOSIS — M62.81 GENERALIZED MUSCLE WEAKNESS: ICD-10-CM

## 2020-11-05 PROCEDURE — 97110 THERAPEUTIC EXERCISES: CPT | Mod: PN,CQ

## 2020-11-05 NOTE — PROGRESS NOTES
"  Physical Therapy Treatment Note     Name: Irina Dugan  Clinic Number: [id number]    Therapy Diagnosis:   Encounter Diagnoses   Name Primary?    Generalized muscle weakness     Balance problem      Physician: Morena Sánchez DPM    Visit Date: 11/5/2020    Physician Orders: PT Eval and Treat  Medical Diagnosis: M72.2 (ICD-10-CM) - Plantar fasciitis   Evaluation Date: 10/14/20  Authorization Period Expiration: 12/31/20  Plan of Care Certification Period: 10/14/2020 to 11/30/20  Visit #/Visits authorized: 5/ 50     Time In: 0835  Time Out: 0920  Total Billable Time: 45 minutes (3TE)    Precautions: Standard    Subjective     Pt reports: Patient received injections in early October and since then has not felt much pain in feet. Only time she feels pain is after standing for long periods of time and after therapy for a brief period time.  She was compliant with home exercise program.  Response to previous treatment: Less knee pain  Functional change: No Change     Pain: 1/10  Location: bilateral feet  , 3/10 B knees    Objective     Irina received therapeutic exercises to develop strength, endurance, ROM and flexibility for 45  minutes including:    Aerobic Activity:  Nu-Step  5', L4.0    Seated Exercises:  Ankle ABC   X1, B  Ankle 4-way   3x10, B, GTB  Plantar Fascia roll  3', B  Towel Scrunches 2', B  Toe Yoga  30x, B  Reverse Toe Yoga 30x, B  Standing Exercises:  Parachute   2', B (not today)  Heel/Toe Raises  3x10  Gastroc/Soleus Stretch 3x30" on fitter  Marches   2x10 (not today)  Mini Squats   15x (not today)  Hip Flexor Stretch  3x20" on stairs (not today    Irina received the following manual therapy techniques: Joint mobilizations, Myofacial release, Soft tissue Mobilization and Friction Massage were applied to the:  for 0  minutes, including:  Hawkgrips  To plantar B feet and calves, achilles       Home Exercises Provided and Patient Education Provided     Education provided:   - HEP    Written " Home Exercises Provided: Patient instructed to cont prior HEP.  Exercises were reviewed and Irina was able to demonstrate them prior to the end of the session.  Irina demonstrated good  understanding of the education provided.     See EMR under Patient Instructions for exercises provided 10/14/2020.    Assessment     Pt was able to tolerate all exercises without any increased pain in her foot but minimal increased discomfort in her R knee during soleus stretch on fitter. Patient stated that ever since she got injections in her foot in early October that she has been pain free in her feet with the exception of when she stands for long periods of time. Patient stated that her knees have become more of an issue now than her feet and wants to speak to her doctor about a referral to therapy for her knees.     Irina is progressing well towards her goals.   Pt prognosis is Good.     Pt will continue to benefit from skilled outpatient physical therapy to address the deficits listed in the problem list box on initial evaluation, provide pt/family education and to maximize pt's level of independence in the home and community environment.     Pt's spiritual, cultural and educational needs considered and pt agreeable to plan of care and goals.     Anticipated barriers to physical therapy: obesity    Goals:   Short Term Goals (3 Weeks):   1. Pt will be independent with HEP to supplement PT in improving functional mobility  2. Pt will improve R DF ROM to 7 degrees in short sitting to improve gait mechanics.  3. Pt will perform SLS on right foot to greater than or equal to 30 seconds to improve strength of ankle intrinsics     5. Pt will walk greater than or equal to 500 feet on level ground, without pain, to return to community ambulation safely.      Long Term Goals (6 Weeks):   1. Pt will be independent with updated HEP to supplement PT in improving functional mobility.  2. Pt will improve FOTO score to </= 28% limited to  decrease perceived limitation with mobility  2. Pt will improve impaired LE strength to >/= 5/5 to improve strength for functional tasks.  3. Pt will improve B DF ROM with knee extended to WFL deg to improve gait mechanics.  4. Pt will perform SLS on right foot to greater than or equal to 1 minute to improve strength of ankle intrinsics  5. Pt will walk greater than or equal to 1,000 feet without AD, on level ground, without pain, to return to community ambulation safely.          Plan     Continue POC as tolerated     Cesia Amato, PTA

## 2020-11-11 ENCOUNTER — CLINICAL SUPPORT (OUTPATIENT)
Dept: REHABILITATION | Facility: HOSPITAL | Age: 50
End: 2020-11-11
Payer: MEDICAID

## 2020-11-11 DIAGNOSIS — M62.81 GENERALIZED MUSCLE WEAKNESS: ICD-10-CM

## 2020-11-11 DIAGNOSIS — R26.89 BALANCE PROBLEM: ICD-10-CM

## 2020-11-11 PROCEDURE — 97110 THERAPEUTIC EXERCISES: CPT | Mod: PN

## 2020-11-11 NOTE — PROGRESS NOTES
"  Physical Therapy Treatment Note     Name: Irina Dugan  Clinic Number: [id number]    Therapy Diagnosis:   Encounter Diagnoses   Name Primary?   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  She was compliant with home exercise program.  Response to previous treatment: no soreness, Less knee pain  Functional change: No Change     Pain: 1/10  Location: bilateral feet  , 3/10 B knees    Objective     Irina received therapeutic exercises to develop strength, endurance, ROM and flexibility for 45  minutes including:    Aerobic Activity:  Nu-Step  5', L4.0    Seated Exercises:  Ankle ABC   X1, B  Ankle 4-way   3x10, B, GTB  Plantar Fascia roll  3', B  Towel Scrunches 2', B  Toe Yoga  30x, B  Reverse Toe Yoga 30x, B  Standing Exercises:  Spring   2', B (not today)  Heel raises  3/3/3" x10 with towel  Toe Raises  3x10 NT  Gastroc/Soleus Stretch 3x30" on fitter  Marches   2x10 (not today)  Mini Squats   15x (not today)  Hip Flexor Stretch  3x20" on stairs (not today    Next: SL balance    Irina received the following manual therapy techniques: Joint mobilizations, Myofacial release, Soft tissue Mobilization and Friction Massage were applied to the:  for 0  minutes, including:  Hawkgrips  To plantar B feet and calves, achilles       Home Exercises Provided and Patient Education Provided     Education provided:   - HEP    Written Home Exercises Provided: Patient instructed to cont " prior HEP.  Exercises were reviewed and Irina was able to demonstrate them prior to the end of the session.  Irina demonstrated good  understanding of the education provided.     See EMR under Patient Instructions for exercises provided 10/14/2020.    Assessment     Pt tolerated today's treatment well without increase in foot pain. Pt request to hold several exercises this visit due to knee pain. Cont to progress with calf strengthening and building tolerance for load of plantar fascia. Pt will benefit from continued progression of closed chain exercises in upcoming visits.     Irina is progressing well towards her goals.   Pt prognosis is Good.     Pt will continue to benefit from skilled outpatient physical therapy to address the deficits listed in the problem list box on initial evaluation, provide pt/family education and to maximize pt's level of independence in the home and community environment.     Pt's spiritual, cultural and educational needs considered and pt agreeable to plan of care and goals.     Anticipated barriers to physical therapy: obesity    Goals:   Short Term Goals (3 Weeks):   1. Pt will be independent with HEP to supplement PT in improving functional mobility  2. Pt will improve R DF ROM to 7 degrees in short sitting to improve gait mechanics.  3. Pt will perform SLS on right foot to greater than or equal to 30 seconds to improve strength of ankle intrinsics     5. Pt will walk greater than or equal to 500 feet on level ground, without pain, to return to community ambulation safely.      Long Term Goals (6 Weeks):   1. Pt will be independent with updated HEP to supplement PT in improving functional mobility.  2. Pt will improve FOTO score to </= 28% limited to decrease perceived limitation with mobility  2. Pt will improve impaired LE strength to >/= 5/5 to improve strength for functional tasks.  3. Pt will improve B DF ROM with knee extended to WFL deg to improve gait mechanics.  4. Pt  will perform SLS on right foot to greater than or equal to 1 minute to improve strength of ankle intrinsics  5. Pt will walk greater than or equal to 1,000 feet without AD, on level ground, without pain, to return to community ambulation safely.          Plan     Continue POC as tolerated     MALU MORALEZ, PT

## 2020-11-13 ENCOUNTER — CLINICAL SUPPORT (OUTPATIENT)
Dept: REHABILITATION | Facility: HOSPITAL | Age: 50
End: 2020-11-13
Payer: MEDICAID

## 2020-11-13 DIAGNOSIS — R26.89 BALANCE PROBLEM: ICD-10-CM

## 2020-11-13 DIAGNOSIS — M62.81 GENERALIZED MUSCLE WEAKNESS: ICD-10-CM

## 2020-11-13 PROCEDURE — 97110 THERAPEUTIC EXERCISES: CPT | Mod: PN

## 2020-11-13 NOTE — PROGRESS NOTES
Physical Therapy Treatment/ discharge Note     Name: Irina Dugan  Clinic Number: [id number]    Therapy Diagnosis:   Encounter Diagnoses   Name Primary?    Generalized muscle weakness     Balance problem      Physician: Morena Sánchez DPM    Visit Date: 11/13/2020    Physician Orders: PT Eval and Treat  Medical Diagnosis: M72.2 (ICD-10-CM) - Plantar fasciitis   Evaluation Date: 10/14/20  Authorization Period Expiration: 12/31/20  Plan of Care Certification Period: 10/14/2020 to 11/30/20  Visit #/Visits authorized: 6/ 50     Time In: 1:03  Time Out: 1:45  Total Billable Time: 42 minutes (3TE)    Precautions: Standard    Subjective     Pt reports: Pt reports she has been feeling good despite working long hours on her feet. Her knees have become a bigger source of pain than the feet and she would like to discharge to [facility] and will speak to MD about requesting a referral for her knees.    She was compliant with home exercise program.  Response to previous treatment: no soreness, Less knee pain  Functional change: No Change     Pain: 1/10  Location: bilateral feet  , 3/10 B knees    Objective     Palpation:   Palpation tenderness to: minimal ttp at mid plantar fascia and insertion     ROM                   Ankle AROM AROM Status Comment     Left Right                   Dorsiflexion 10 8       Plantarflexion 50 60       INversion 20 17       EVersion 30 30                            MMT        Ankle     Status Comment     Left Right                   Dorsiflexion 5/5 5/5       Plantarflexion 4+/5 4+/5       INversion 4+/5 4+/5       EVersion 4+/5 5/5                                                          GIRTH:           Ankle     Status Comment     Left Right                   Figure 8 57 cm 58 cm     NT                                  FUNCTIONAL MOBILITY    Balance: Good on RLE with 60 sec SLS without UE support (R knee pain), Poor+ on LLE with 40 sec SLS without UE support (limited by knee pain)    Irina  "received therapeutic exercises to develop strength, endurance, ROM and flexibility for 45  minutes including:    Aerobic Activity:  Nu-Step  5', L4.0    Seated Exercises:  Ankle ABC   X1, B NT  Ankle 4-way   3x10, B, GTB  Plantar Fascia roll  3', B  Towel Scrunches 2', B  Toe Yoga  30x, B  Reverse Toe Yoga 30x, B  Standing Exercises:  [place]   2', B (not today)  Heel raises  3/3/3" x5 with towel  Heel raises DL up SL down x15  Toe Raises  3x10 NT  Gastroc/Soleus Stretch 3x30" on fitter (NT)  Marches   2x10 (not today)  Mini Squats   15x (not today)  Hip Flexor Stretch  3x20" on stairs (not today)  SL balance   3x30"    Irina received the following manual therapy techniques: Joint mobilizations, Myofacial release, Soft tissue Mobilization and Friction Massage were applied to the:  for 0  minutes, including:  Hawkgrips  To plantar B feet and calves, achilles       Home Exercises Provided and Patient Education Provided     Education provided:   - HEP    Written Home Exercises Provided: Patient instructed to cont prior HEP.  Exercises were reviewed and Irina was able to demonstrate them prior to the end of the session.  Irina demonstrated good  understanding of the education provided.     See EMR under Patient Instructions for exercises provided 10/14/2020.    Assessment     Pt tolerated today's treatment well without increase in foot pain. Pt requests d/c to [facility] this visit as she has seen good improvement in B foot pain with the exception of standing long periods of time occasionally.  Upon reassessment, Pt demos improvement in ankle strength and ROM and balance. Pt demos independence with HEP and provided an advanced HEP to continue to progress independently.   Irina has met/partially met goals and is progressing well towards her remaining goals with confidence to continue to progress independently. Pt is discharged to [facility] at this time and will contact her MD about possible referral for knee pain.   Pt " prognosis is Good.     Pt will continue to benefit from skilled outpatient physical therapy to address the deficits listed in the problem list box on initial evaluation, provide pt/family education and to maximize pt's level of independence in the home and community environment.     Pt's spiritual, cultural and educational needs considered and pt agreeable to plan of care and goals.     Anticipated barriers to physical therapy: obesity    Goals:   Short Term Goals (3 Weeks):   1. Pt will be independent with HEP to supplement PT in improving functional mobility- met  2. Pt will improve R DF ROM to 7 degrees in short sitting to improve gait mechanics.Met  3. Pt will perform SLS on right foot to greater than or equal to 30 seconds to improve strength of ankle intrinsics- Met     5. Pt will walk greater than or equal to 500 feet on level ground, without pain, to return to community ambulation safely.- Met     Long Term Goals (6 Weeks):   1. Pt will be independent with updated HEP to supplement PT in improving functional mobility.- Met  2. Pt will improve FOTO score to </= 28% limited to decrease perceived limitation with mobility- progressing (33% 11/13/20)  2. Pt will improve impaired LE strength to >/= 5/5 to improve strength for functional tasks.- progressing, partially met  3. Pt will improve B DF ROM with knee extended to WFL deg to improve gait mechanics. Progressing, not met  4. Pt will perform SLS on right foot to greater than or equal to 1 minute to improve strength of ankle intrinsics -partially met, progressing  5. Pt will walk greater than or equal to 1,000 feet without AD, on level ground, without pain, to return to community ambulation safely.  -met         Plan     D/c to HEP    MALU MORALEZ PT

## 2020-11-16 ENCOUNTER — PATIENT MESSAGE (OUTPATIENT)
Dept: PODIATRY | Facility: CLINIC | Age: 50
End: 2020-11-16

## 2020-11-19 ENCOUNTER — PATIENT OUTREACH (OUTPATIENT)
Dept: ADMINISTRATIVE | Facility: OTHER | Age: 50
End: 2020-11-19

## 2020-11-19 NOTE — PROGRESS NOTES
Updates were requested from care everywhere.  Chart was reviewed for overdue Proactive [facility] Encounters (JANEL) topics (CRS, Breast Cancer Screening, Eye exam)  Health Maintenance has been updated.  LINKS not responding.

## 2020-11-23 ENCOUNTER — OFFICE VISIT (OUTPATIENT)
Dept: ORTHOPEDICS | Facility: CLINIC | Age: 50
End: 2020-11-23
Payer: MEDICAID

## 2020-11-23 ENCOUNTER — TELEPHONE (OUTPATIENT)
Dept: ORTHOPEDICS | Facility: CLINIC | Age: 50
End: 2020-11-23

## 2020-11-23 VITALS — WEIGHT: 293 LBS | HEIGHT: 66 IN | BODY MASS INDEX: 47.09 KG/M2

## 2020-11-23 DIAGNOSIS — G56.03 CARPAL TUNNEL SYNDROME ON BOTH SIDES: ICD-10-CM

## 2020-11-23 DIAGNOSIS — M25.569 KNEE PAIN, UNSPECIFIED CHRONICITY, UNSPECIFIED LATERALITY: Primary | ICD-10-CM

## 2020-11-23 DIAGNOSIS — Z41.9 ELECTIVE SURGERY: Primary | ICD-10-CM

## 2020-11-23 PROCEDURE — 99214 PR OFFICE/OUTPT VISIT, EST, LEVL IV, 30-39 MIN: ICD-10-PCS | Mod: 24,S$PBB,, | Performed by: ORTHOPAEDIC SURGERY

## 2020-11-23 PROCEDURE — 99214 OFFICE O/P EST MOD 30 MIN: CPT | Mod: PBBFAC,PN | Performed by: ORTHOPAEDIC SURGERY

## 2020-11-23 PROCEDURE — 99999 PR PBB SHADOW E&M-EST. PATIENT-LVL IV: ICD-10-PCS | Mod: PBBFAC,,, | Performed by: ORTHOPAEDIC SURGERY

## 2020-11-23 PROCEDURE — 99214 OFFICE O/P EST MOD 30 MIN: CPT | Mod: 24,S$PBB,, | Performed by: ORTHOPAEDIC SURGERY

## 2020-11-23 PROCEDURE — 99999 PR PBB SHADOW E&M-EST. PATIENT-LVL IV: CPT | Mod: PBBFAC,,, | Performed by: ORTHOPAEDIC SURGERY

## 2020-11-23 RX ORDER — FLUCONAZOLE 150 MG/1
150 TABLET ORAL DAILY
COMMUNITY
Start: 2020-11-05 | End: 2020-12-15 | Stop reason: SDUPTHER

## 2020-11-23 RX ORDER — NITROFURANTOIN 25; 75 MG/1; MG/1
100 CAPSULE ORAL 2 TIMES DAILY
COMMUNITY
Start: 2020-10-09 | End: 2021-05-31

## 2020-11-23 RX ORDER — DICLOFENAC SODIUM 10 MG/G
2 GEL TOPICAL 3 TIMES DAILY
Qty: 1 TUBE | Refills: 3 | Status: SHIPPED | OUTPATIENT
Start: 2020-11-23 | End: 2021-05-31

## 2020-11-23 RX ORDER — OLMESARTAN MEDOXOMIL 20 MG/1
12.5 TABLET ORAL
COMMUNITY
Start: 2020-10-05 | End: 2021-05-24 | Stop reason: SDUPTHER

## 2020-11-23 RX ORDER — CLOTRIMAZOLE AND BETAMETHASONE DIPROPIONATE 10; .64 MG/G; MG/G
CREAM TOPICAL
COMMUNITY
Start: 2020-10-09 | End: 2021-05-31

## 2020-11-23 NOTE — H&P (VIEW-ONLY)
CC:  Left carpal tunnel syndrome        HPI:  Irina Dugan is a very pleasant 49 y.o. female with ongoing symptoms left hand related to carpal tunnel  She is still recovering from right carpal tunnel release several months ago  Still have a little bit of soreness in the right hand but no numbness or tingling reported         PAST MEDICAL HISTORY:   Past Medical History:   Diagnosis Date    Colitis, nonspecific 2012    Hypertension 10/12/2012    Ovarian cyst     Tubal ectopic pregnancy      PAST SURGICAL HISTORY:   Past Surgical History:   Procedure Laterality Date    CARPAL TUNNEL RELEASE Bilateral 2020    Procedure: RELEASE, CARPAL TUNNEL;  Surgeon: Aakash Zuluaga Jr., MD;  [facility];  Service: Orthopedics;  Laterality: Bilateral;     SECTION, CLASSIC      CHOLECYSTECTOMY      ECTOPIC PREGNANCY SURGERY  age 25    TUBAL LIGATION       FAMILY HISTORY:   Family History   Problem Relation Age of Onset    Hypertension Unknown     Cancer Unknown     Ovarian cysts Unknown     Breast cancer Mother     Colon cancer Neg Hx     Ovarian cancer Neg Hx     Allergic rhinitis Neg Hx     Angioedema Neg Hx     Atopy Neg Hx     Immunodeficiency Neg Hx     Rhinitis Neg Hx     Urticaria Neg Hx     Eczema Neg Hx     Asthma Neg Hx     Allergies Neg Hx      SOCIAL HISTORY:   Social History     Socioeconomic History    Marital status: Single     Spouse name: Not on file    Number of children: 2    Years of education: 15    Highest education level: Not on file   Occupational History    Occupation: Dental assistant      Employer: [employer]   Social Needs    Financial resource strain: Not on file    Food insecurity     Worry: Not on file     Inability: Not on file    Transportation needs     Medical: Not on file     Non-medical: Not on file   Tobacco Use    Smoking status: Never Smoker    Smokeless tobacco: Never Used   Substance and Sexual Activity    Alcohol use:  Yes     Alcohol/week: 0.0 standard drinks     Comment: socially    Drug use: No    Sexual activity: Yes     Partners: Male     Birth control/protection: OCP   Lifestyle    Physical activity     Days per week: Not on file     Minutes per session: Not on file    Stress: Not on file   Relationships    Social connections     Talks on phone: Not on file     Gets together: Not on file     Attends Taoist service: Not on file     Active member of club or organization: Not on file     Attends meetings of clubs or organizations: Not on file     Relationship status: Not on file   Other Topics Concern    Are you pregnant or think you may be? Not Asked    Breast-feeding Not Asked   Social History Narrative    Single mom of 2 children, 19 & yr old.       MEDICATIONS:   Current Outpatient Medications:     acetaminophen (TYLENOL) 325 mg Cap, Take 650 mg by mouth as needed., Disp: , Rfl:     albuterol (PROVENTIL/VENTOLIN HFA) 90 mcg/actuation inhaler, Inhale 1-2 puffs into the lungs every 6 (six) hours as needed. Rescue, Disp: 8 g, Rfl: 0    ascorbic acid (VITAMIN C ORAL), Take 2 tablets by mouth once daily. , Disp: , Rfl:     ascorbic acid, vitamin C, (VITAMIN C) 1000 MG tablet, Take 500 mg by mouth once daily., Disp: , Rfl:     cholecalciferol, vitamin D3, (VITAMIN D3) 50 mcg (2,000 unit) Cap, Take 1 capsule by mouth once daily., Disp: , Rfl:     clotrimazole-betamethasone 1-0.05% (LOTRISONE) cream, APPLY CREAM TO AFFECTED AREA TWICE DAILY, Disp: , Rfl:     fluconazole (DIFLUCAN) 150 MG Tab, Take 150 mg by mouth once daily., Disp: , Rfl:     furosemide (LASIX) 20 MG tablet, TAKE ONE TABLET BY MOUTH DAILY AS NEEDED (SWELLING), Disp: 90 tablet, Rfl: 1    guaifen/phenyleph/acetaminophn (MUCINEX COLD AND SINUS ORAL), Take by mouth., Disp: , Rfl:     HYDROcodone-acetaminophen (NORCO) 5-325 mg per tablet, Take 1 tablet by mouth every 4 (four) hours as needed for Pain., Disp: 20 tablet, Rfl: 0    ibuprofen  "(ADVIL,MOTRIN) 600 MG tablet, Take 1 tablet (600 mg total) by mouth every 6 (six) hours as needed., Disp: 20 tablet, Rfl: 0    meloxicam (MOBIC) 15 MG tablet, Take 1 tablet (15 mg total) by mouth once daily. (Patient taking differently: Take 15 mg by mouth daily as needed. ), Disp: 30 tablet, Rfl: 2    nitrofurantoin, macrocrystal-monohydrate, (MACROBID) 100 MG capsule, Take 100 mg by mouth 2 (two) times daily., Disp: , Rfl:     olmesartan (BENICAR) 20 MG tablet, Take 12.5 mg by mouth., Disp: , Rfl:     olmesartan-hydrochlorothiazide (BENICAR HCT) 20-12.5 mg per tablet, Take 1 tablet by mouth once daily., Disp: 90 tablet, Rfl: 0    ondansetron (ZOFRAN-ODT) 4 MG TbDL, Take 1 tablet (4 mg total) by mouth every 6 (six) hours as needed., Disp: 30 tablet, Rfl: 0    ZINC ORAL, Take 60 mg by mouth once daily. , Disp: , Rfl:     diclofenac sodium (VOLTAREN) 1 % Gel, Apply 2 g topically 3 (three) times daily., Disp: 1 Tube, Rfl: 3  ALLERGIES: Review of patient's allergies indicates:  No Known Allergies    Review of Systems:  Constitutional: no fever or chills  ENT: no nasal congestion or sore throat  Respiratory: no cough or shortness of breath  Cardiovascular: no chest pain or palpitations  Gastrointestinal: no nausea or vomiting, PUD, GERD, NSAID intolerance  Genitourinary: no hematuria or dysuria  Integument/Breast: no rash or pruritis  Hematologic/Lymphatic: no easy bruising or lymphadenopathy  Musculoskeletal: see HPI  Neurological: no seizures or tremors  Behavioral/Psych: no auditory or visual hallucinations      Physical Exam   Vitals:    11/23/20 1454   Weight: 134.7 kg (297 lb)   Height: 5' 6" (1.676 m)   PainSc:   2       Constitutional: Oriented to person, place, and time. Appears well-developed and well-nourished.   HENT:   Head: Normocephalic and atraumatic.   Nose: Nose normal.   Eyes: No scleral icterus.   Neck: Normal range of motion.   Cardiovascular: Normal rate and regular rhythm.    Pulses:       " "Radial pulses are 2+ on the right side, and 2+ on the left side.   Pulmonary/Chest: Effort normal and breath sounds normal.   Abdominal: Soft.   Neurological: Alert and oriented to person, place, and time.   Skin: Skin is warm.   Psychiatric: Normal mood and affect.     MUSCULOSKELETAL UPPER EXTREMITY:  Examination right hand the incision is well healed slight swelling minimal tenderness range of motion fingers full   strength improving but still little bit weak  Sensation intact  Examination opposite left hand demonstrates a positive Tinel sign positive Phalen's test  Slight swelling  No atrophy              Diagnostic Studies:  Previous nerve test showed evidence of left carpal tunnel syndrome        Assessment:  1.  Status post right carpal tunnel release improving.  2.  Left carpal tunnel syndrome    Plan:  For the right hand I recommended some strengthening exercises Voltaren gel topical and increase activities as tolerated  For the left hand she would like to go and set up surgery next month for left carpal tunnel release as an outpatient  The risks and benefits explained she understands      The risks and benefits of surgery were discussed with the patient today and they understand.  The consent was signed in the office for surgery.      Aakash Zuluaga MD (Jay)  [facility]  Orthopedic Upper Extremity Surgery      "

## 2020-11-23 NOTE — PROGRESS NOTES
CC:  Left carpal tunnel syndrome        HPI:  Irina Dugan is a very pleasant 49 y.o. female with ongoing symptoms left hand related to carpal tunnel  She is still recovering from right carpal tunnel release several months ago  Still have a little bit of soreness in the right hand but no numbness or tingling reported         PAST MEDICAL HISTORY:   Past Medical History:   Diagnosis Date    Colitis, nonspecific 2012    Hypertension 10/12/2012    Ovarian cyst     Tubal ectopic pregnancy      PAST SURGICAL HISTORY:   Past Surgical History:   Procedure Laterality Date    CARPAL TUNNEL RELEASE Bilateral 2020    Procedure: RELEASE, CARPAL TUNNEL;  Surgeon: Aakash Zuluaga Jr., MD;  Location: [facility];  Service: Orthopedics;  Laterality: Bilateral;     SECTION, CLASSIC      CHOLECYSTECTOMY      ECTOPIC PREGNANCY SURGERY  age 25    TUBAL LIGATION       FAMILY HISTORY:   Family History   Problem Relation Age of Onset    Hypertension Unknown     Cancer Unknown     Ovarian cysts Unknown     Breast cancer Mother     Colon cancer Neg Hx     Ovarian cancer Neg Hx     Allergic rhinitis Neg Hx     Angioedema Neg Hx     Atopy Neg Hx     Immunodeficiency Neg Hx     Rhinitis Neg Hx     Urticaria Neg Hx     Eczema Neg Hx     Asthma Neg Hx     Allergies Neg Hx      SOCIAL HISTORY:   Social History     Socioeconomic History    Marital status: Single     Spouse name: Not on file    Number of children: 2    Years of education: 15    Highest education level: Not on file   Occupational History    Occupation: Dental assistant      Employer: [employer]   Social Needs    Financial resource strain: Not on file    Food insecurity     Worry: Not on file     Inability: Not on file    Transportation needs     Medical: Not on file     Non-medical: Not on file   Tobacco Use    Smoking status: Never Smoker    Smokeless tobacco: Never Used   Substance and Sexual Activity    Alcohol use:  Yes     Alcohol/week: 0.0 standard drinks     Comment: socially    Drug use: No    Sexual activity: Yes     Partners: Male     Birth control/protection: OCP   Lifestyle    Physical activity     Days per week: Not on file     Minutes per session: Not on file    Stress: Not on file   Relationships    Social connections     Talks on phone: Not on file     Gets together: Not on file     Attends Druze service: Not on file     Active member of club or organization: Not on file     Attends meetings of clubs or organizations: Not on file     Relationship status: Not on file   Other Topics Concern    Are you pregnant or think you may be? Not Asked    Breast-feeding Not Asked   Social History Narrative    Single mom of 2 children, 19 & yr old.       MEDICATIONS:   Current Outpatient Medications:     acetaminophen (TYLENOL) 325 mg Cap, Take 650 mg by mouth as needed., Disp: , Rfl:     albuterol (PROVENTIL/VENTOLIN HFA) 90 mcg/actuation inhaler, Inhale 1-2 puffs into the lungs every 6 (six) hours as needed. Rescue, Disp: 8 g, Rfl: 0    ascorbic acid (VITAMIN C ORAL), Take 2 tablets by mouth once daily. , Disp: , Rfl:     ascorbic acid, vitamin C, (VITAMIN C) 1000 MG tablet, Take 500 mg by mouth once daily., Disp: , Rfl:     cholecalciferol, vitamin D3, (VITAMIN D3) 50 mcg (2,000 unit) Cap, Take 1 capsule by mouth once daily., Disp: , Rfl:     clotrimazole-betamethasone 1-0.05% (LOTRISONE) cream, APPLY CREAM TO AFFECTED AREA TWICE DAILY, Disp: , Rfl:     fluconazole (DIFLUCAN) 150 MG Tab, Take 150 mg by mouth once daily., Disp: , Rfl:     furosemide (LASIX) 20 MG tablet, TAKE ONE TABLET BY MOUTH DAILY AS NEEDED (SWELLING), Disp: 90 tablet, Rfl: 1    guaifen/phenyleph/acetaminophn (MUCINEX COLD AND SINUS ORAL), Take by mouth., Disp: , Rfl:     HYDROcodone-acetaminophen (NORCO) 5-325 mg per tablet, Take 1 tablet by mouth every 4 (four) hours as needed for Pain., Disp: 20 tablet, Rfl: 0    ibuprofen  "(ADVIL,MOTRIN) 600 MG tablet, Take 1 tablet (600 mg total) by mouth every 6 (six) hours as needed., Disp: 20 tablet, Rfl: 0    meloxicam (MOBIC) 15 MG tablet, Take 1 tablet (15 mg total) by mouth once daily. (Patient taking differently: Take 15 mg by mouth daily as needed. ), Disp: 30 tablet, Rfl: 2    nitrofurantoin, macrocrystal-monohydrate, (MACROBID) 100 MG capsule, Take 100 mg by mouth 2 (two) times daily., Disp: , Rfl:     olmesartan (BENICAR) 20 MG tablet, Take 12.5 mg by mouth., Disp: , Rfl:     olmesartan-hydrochlorothiazide (BENICAR HCT) 20-12.5 mg per tablet, Take 1 tablet by mouth once daily., Disp: 90 tablet, Rfl: 0    ondansetron (ZOFRAN-ODT) 4 MG TbDL, Take 1 tablet (4 mg total) by mouth every 6 (six) hours as needed., Disp: 30 tablet, Rfl: 0    ZINC ORAL, Take 60 mg by mouth once daily. , Disp: , Rfl:     diclofenac sodium (VOLTAREN) 1 % Gel, Apply 2 g topically 3 (three) times daily., Disp: 1 Tube, Rfl: 3  ALLERGIES: Review of patient's allergies indicates:  No Known Allergies    Review of Systems:  Constitutional: no fever or chills  ENT: no nasal congestion or sore throat  Respiratory: no cough or shortness of breath  Cardiovascular: no chest pain or palpitations  Gastrointestinal: no nausea or vomiting, PUD, GERD, NSAID intolerance  Genitourinary: no hematuria or dysuria  Integument/Breast: no rash or pruritis  Hematologic/Lymphatic: no easy bruising or lymphadenopathy  Musculoskeletal: see HPI  Neurological: no seizures or tremors  Behavioral/Psych: no auditory or visual hallucinations      Physical Exam   Vitals:    11/23/20 1454   Weight: 134.7 kg (297 lb)   Height: 5' 6" (1.676 m)   PainSc:   2       Constitutional: Oriented to person, place, and time. Appears well-developed and well-nourished.   HENT:   Head: Normocephalic and atraumatic.   Nose: Nose normal.   Eyes: No scleral icterus.   Neck: Normal range of motion.   Cardiovascular: Normal rate and regular rhythm.    Pulses:       " "Radial pulses are 2+ on the right side, and 2+ on the left side.   Pulmonary/Chest: Effort normal and breath sounds normal.   Abdominal: Soft.   Neurological: Alert and oriented to person, place, and time.   Skin: Skin is warm.   Psychiatric: Normal mood and affect.     MUSCULOSKELETAL UPPER EXTREMITY:  Examination right hand the incision is well healed slight swelling minimal tenderness range of motion fingers full   strength improving but still little bit weak  Sensation intact  Examination opposite left hand demonstrates a positive Tinel sign positive Phalen's test  Slight swelling  No atrophy              Diagnostic Studies:  Previous nerve test showed evidence of left carpal tunnel syndrome        Assessment:  1.  Status post right carpal tunnel release improving.  2.  Left carpal tunnel syndrome    Plan:  For the right hand I recommended some strengthening exercises Voltaren gel topical and increase activities as tolerated  For the left hand she would like to go and set up surgery next month for left carpal tunnel release as an outpatient  The risks and benefits explained she understands      The risks and benefits of surgery were discussed with the patient today and they understand.  The consent was signed in the office for surgery.      Aakash Zuluaga MD (Jay)  [facility]  Orthopedic Upper Extremity Surgery      "

## 2020-11-25 ENCOUNTER — TELEPHONE (OUTPATIENT)
Dept: INTERNAL MEDICINE | Facility: CLINIC | Age: 50
End: 2020-11-25

## 2020-11-25 NOTE — TELEPHONE ENCOUNTER
----- Message from Varsha Mccray MA sent at 11/25/2020 10:36 AM CST -----  Contact: [phone]  Would like to get medical advice.  Symptoms (please be specific):  hair loss  and headaches  How long has patient had these symptoms:  over a month  Pharmacy name and phone # (copy from chart):  [facility] [address] [place] [address] [address] [place] [address] [phone] (Phone)  [phone] (Fax)  Comments:

## 2020-11-25 NOTE — TELEPHONE ENCOUNTER
Spoke to pt and she states she had Covid in July and since has had many health issues but the most recent is headaches and hair loss.     I made her an apt to come in to see dr roy

## 2020-12-01 ENCOUNTER — ANESTHESIA EVENT (OUTPATIENT)
Dept: SURGERY | Facility: HOSPITAL | Age: 50
End: 2020-12-01
Payer: MEDICAID

## 2020-12-03 ENCOUNTER — PATIENT MESSAGE (OUTPATIENT)
Dept: PODIATRY | Facility: CLINIC | Age: 50
End: 2020-12-03

## 2020-12-04 ENCOUNTER — TELEPHONE (OUTPATIENT)
Dept: PODIATRY | Facility: CLINIC | Age: 50
End: 2020-12-04

## 2020-12-11 ENCOUNTER — PATIENT MESSAGE (OUTPATIENT)
Dept: OTHER | Facility: OTHER | Age: 50
End: 2020-12-11

## 2020-12-14 ENCOUNTER — TELEPHONE (OUTPATIENT)
Dept: ORTHOPEDICS | Facility: CLINIC | Age: 50
End: 2020-12-14

## 2020-12-14 DIAGNOSIS — M25.569 KNEE PAIN, UNSPECIFIED CHRONICITY, UNSPECIFIED LATERALITY: Primary | ICD-10-CM

## 2020-12-15 ENCOUNTER — OFFICE VISIT (OUTPATIENT)
Dept: ORTHOPEDICS | Facility: CLINIC | Age: 50
End: 2020-12-15
Payer: MEDICAID

## 2020-12-15 ENCOUNTER — OFFICE VISIT (OUTPATIENT)
Dept: INTERNAL MEDICINE | Facility: CLINIC | Age: 50
End: 2020-12-15
Payer: MEDICAID

## 2020-12-15 VITALS
WEIGHT: 293 LBS | HEIGHT: 66 IN | DIASTOLIC BLOOD PRESSURE: 86 MMHG | BODY MASS INDEX: 47.09 KG/M2 | HEART RATE: 72 BPM | TEMPERATURE: 98 F | SYSTOLIC BLOOD PRESSURE: 130 MMHG

## 2020-12-15 VITALS — WEIGHT: 293 LBS | BODY MASS INDEX: 47.09 KG/M2 | HEIGHT: 66 IN

## 2020-12-15 DIAGNOSIS — J01.90 ACUTE SINUSITIS, RECURRENCE NOT SPECIFIED, UNSPECIFIED LOCATION: ICD-10-CM

## 2020-12-15 DIAGNOSIS — M17.0 PRIMARY OSTEOARTHRITIS OF BOTH KNEES: Primary | ICD-10-CM

## 2020-12-15 DIAGNOSIS — M25.569 KNEE PAIN, UNSPECIFIED CHRONICITY, UNSPECIFIED LATERALITY: ICD-10-CM

## 2020-12-15 DIAGNOSIS — I10 ESSENTIAL HYPERTENSION: Primary | ICD-10-CM

## 2020-12-15 PROCEDURE — 99213 PR OFFICE/OUTPT VISIT, EST, LEVL III, 20-29 MIN: ICD-10-PCS | Mod: S$PBB,,, | Performed by: INTERNAL MEDICINE

## 2020-12-15 PROCEDURE — 99214 OFFICE O/P EST MOD 30 MIN: CPT | Mod: PBBFAC,27,PO | Performed by: INTERNAL MEDICINE

## 2020-12-15 PROCEDURE — 99214 OFFICE O/P EST MOD 30 MIN: CPT | Mod: S$PBB,,, | Performed by: ORTHOPAEDIC SURGERY

## 2020-12-15 PROCEDURE — 99214 PR OFFICE/OUTPT VISIT, EST, LEVL IV, 30-39 MIN: ICD-10-PCS | Mod: S$PBB,,, | Performed by: ORTHOPAEDIC SURGERY

## 2020-12-15 PROCEDURE — 99999 PR PBB SHADOW E&M-EST. PATIENT-LVL IV: ICD-10-PCS | Mod: PBBFAC,,, | Performed by: INTERNAL MEDICINE

## 2020-12-15 PROCEDURE — 99999 PR PBB SHADOW E&M-EST. PATIENT-LVL IV: CPT | Mod: PBBFAC,,, | Performed by: INTERNAL MEDICINE

## 2020-12-15 PROCEDURE — 99213 OFFICE O/P EST LOW 20 MIN: CPT | Mod: S$PBB,,, | Performed by: INTERNAL MEDICINE

## 2020-12-15 PROCEDURE — 99214 OFFICE O/P EST MOD 30 MIN: CPT | Mod: PBBFAC,PN | Performed by: ORTHOPAEDIC SURGERY

## 2020-12-15 PROCEDURE — 99999 PR PBB SHADOW E&M-EST. PATIENT-LVL IV: CPT | Mod: PBBFAC,,, | Performed by: ORTHOPAEDIC SURGERY

## 2020-12-15 PROCEDURE — 99999 PR PBB SHADOW E&M-EST. PATIENT-LVL IV: ICD-10-PCS | Mod: PBBFAC,,, | Performed by: ORTHOPAEDIC SURGERY

## 2020-12-15 RX ORDER — AZITHROMYCIN 250 MG/1
TABLET, FILM COATED ORAL
Qty: 6 TABLET | Refills: 0 | Status: SHIPPED | OUTPATIENT
Start: 2020-12-15 | End: 2020-12-20

## 2020-12-15 RX ORDER — FLUCONAZOLE 150 MG/1
150 TABLET ORAL DAILY
Qty: 1 TABLET | Refills: 0 | Status: SHIPPED | OUTPATIENT
Start: 2020-12-15 | End: 2021-04-26

## 2020-12-15 RX ORDER — TRIAMCINOLONE ACETONIDE 40 MG/ML
80 INJECTION, SUSPENSION INTRA-ARTICULAR; INTRAMUSCULAR
Status: DISCONTINUED | OUTPATIENT
Start: 2020-12-15 | End: 2020-12-15 | Stop reason: HOSPADM

## 2020-12-15 RX ADMIN — TRIAMCINOLONE ACETONIDE 80 MG: 40 INJECTION, SUSPENSION INTRA-ARTICULAR; INTRAMUSCULAR at 01:12

## 2020-12-15 NOTE — LETTER
December 15, 2020      Aakash Zuluaga Jr., MD  [address]  [facility] [address]           [facility]  [address]  [facility] [address]  Phone: [phone]  Fax: [phone]          Patient: Irina Dugan   MR Number: [id number]   YOB: 1970   Date of Visit: 12/15/2020       Dear Dr. Aakash Zuluaga Jr.:    Thank you for referring Irina Dugan to me for evaluation. Attached you will find relevant portions of my assessment and plan of care.    If you have questions, please do not hesitate to call me. I look forward to following Irina Dugan along with you.    Sincerely,    Nick Jessica MD    Enclosure  CC:  No Recipients    If you would like to receive this communication electronically, please contact [email or web address] or [phone] to request more information on [facility] [employer] access.    For providers and/or their staff who would like to refer a patient to [facility], please contact us through our one-stop-shop provider referral line, [facility], at [phone].    If you feel you have received this communication in error or would no longer like to receive these types of communications, please e-mail [email or web address]

## 2020-12-15 NOTE — PROCEDURES
Large Joint Aspiration/Injection    Date/Time: 12/15/2020 1:30 PM  Performed by: Nick Jessica MD  Authorized by: Nick Jessica MD     Medications:  80 mg triamcinolone acetonide 40 mg/mL     After obtaining verbal informed consent, both of the patient's knees were prepped aseptically and injected through an inferior lateral approach using 40 mg of triamcinolone and 1 cc of 1% plain Xylocaine.  The patient was warned about postinjection flare and how to manage it with ice, rest and over-the-counter analgesics.  They're advised to contact me for any severe, uncontrolled pain.

## 2020-12-15 NOTE — PROGRESS NOTES
CC:  Hair loss  HPI:  The patient is a 50 y.o. year old female who presents to the office for hair loss.  She reports onset of symptoms for over 2 months.  She has been using crisan hair strengthener, which has essential oils.  She also complains of itching of her posterior hair line intermittently.  She did have COVID in July.  She was hospitalized.  She had been shaking and feeling depressed and anxious.  Her symptoms have since improved.  The patient complains of headaches and changes in vision.  Headaches occur almost daily.  She also complains of nasal congestion.  She had a recent COVID 19 test, which caused facial pain that radiates to ears.      PAST MEDICAL HISTORY:  Past Medical History:   Diagnosis Date    Colitis, nonspecific 2012    Hypertension 10/12/2012    Ovarian cyst     Tubal ectopic pregnancy        SURGICAL HISTORY:  Past Surgical History:   Procedure Laterality Date    CARPAL TUNNEL RELEASE Bilateral 2020    Procedure: RELEASE, CARPAL TUNNEL;  Surgeon: Aakash Zuluaga Jr., MD;  Location: [facility];  Service: Orthopedics;  Laterality: Bilateral;     SECTION, CLASSIC      CHOLECYSTECTOMY      ECTOPIC PREGNANCY SURGERY  age 25    TUBAL LIGATION         MEDS:  Medcard reviewed and updated    ALLERGIES: Allergy Card reviewed and updated    SOCIAL HISTORY:   The patient is a nonsmoker.    PE:   APPEARANCE: Well nourished, well developed, in no acute distress.    EYES: Sclerae anicteric. PERRL. EOMI.      EARS: TM's intact. No retraction or perforation.    NOSE: Mucosa pink. Airway clear.  MOUTH & THROAT: No tonsillar enlargement. No pharyngeal erythema or exudate. No stridor.  CHEST: Lungs clear to auscultation with unlabored respirations.  CARDIOVASCULAR: Normal S1, S2. No murmurs. No carotid bruits. No pedal edema.  ABDOMEN: Bowel sounds normal. Not distended. Soft. No tenderness or masses.   NEUROLOGIC: Cranial Nerves: Intact.  PSYCHIATRIC: The patient is oriented to  person, place, and time and has a pleasant affect.        ASSESSMENT/PLAN:  Irina was seen today for hair loss.    Diagnoses and all orders for this visit:    Essential hypertension  -     blood pressure is controlled    Acute sinusitis, recurrence not specified, unspecified location  -     prescribe Z-Herlinda    Other orders  -     azithromycin (Z-HERLINDA) 250 MG tablet; Take 2 tablets by mouth on day 1; Take 1 tablet by mouth on days 2-5  -     fluconazole (DIFLUCAN) 150 MG Tab; Take 1 tablet (150 mg total) by mouth once daily.

## 2020-12-15 NOTE — PROGRESS NOTES
Subjective:      Patient ID: Irina Dugan is a 50 y.o. female.    Chief Complaint: Consult and Knee Pain (bilateral)    HPI     They have experienced problems with their bilateral knee over the past Several years. The patient denies relevant history of injury/aggravation. Pain is located medially Associated symptoms include pseudolocking and gelling.  Symptoms are aggravated by flexion of. They have been treated with past injections with some benefit.   Symptoms have recently stayed the same. Ambulation reportedly has been impaired. Self care ADLs are not painful.     Review of Systems   Constitution: Negative for fever and weight loss.   HENT: Negative for congestion.    Eyes: Negative for visual disturbance.   Cardiovascular: Negative for chest pain.   Respiratory: Negative for shortness of breath.    Hematologic/Lymphatic: Negative for bleeding problem. Does not bruise/bleed easily.   Skin: Negative for poor wound healing.   Musculoskeletal: Positive for joint pain.   Gastrointestinal: Negative for abdominal pain.   Genitourinary: Negative for dysuria.   Neurological: Negative for seizures.   Psychiatric/Behavioral: Negative for altered mental status.   Allergic/Immunologic: Negative for persistent infections.         Objective:      Ortho/SPM Exam      Right knee    [unfilled]    The patient is not in acute distress.   Sclerae normal  Body habitus is obese.  Respiratory distress:  none   The patient walks without a limp.  Hip irritability  negative.   The skin over the knee is intact.  Knee effusion trace  Tendernes is located medially  Range of motion- Flexion 110 deg, Extension 0 deg,   Ligament laxity exam:   MCL 1+   Lachman 0   Post sag  0    LCL 0  Patellar apprehension negative.  Popliteal cyst negative  Patellar crepitation present.  Meniscal irritability not applicable  Pulses DP present, PT present.  Motor normal 5/5 strength in all tested muscle groups.   Sensory normal.    Left is identical    I  reviewed the relevant imaging for the patient's condition:  Radiographs of both knees show moderate medial narrowing with osteophytes and patellofemoral DJD    Assessment:       Encounter Diagnoses   Name Primary?    Knee pain, unspecified chronicity, unspecified laterality     Primary osteoarthritis of both knees Yes          Condition is structurally moderate aggravated by obesity.  Patient's gait is not decompensated  Plan:       Irina was seen today for consult and knee pain.    Diagnoses and all orders for this visit:    Primary osteoarthritis of both knees  -     Large Joint Aspiration/Injection    Knee pain, unspecified chronicity, unspecified laterality  -     Ambulatory referral/consult to Orthopedics      I explained my diagnostic impression and the reasoning behind it in detail, using layman's terms.  Models and/or pictures were used to help in the explanation.    Weight loss recommended and consent given    Appropriate home exercises explained for gelling    Injections recommended and consent given    I explained the potential role of surgery in the treatment of this condition to the patient.  They understand that if nonsurgical measures do not adequately control symptoms, surgery will be considered in the future.      This could only be considered after significant preoperative weight loss.  I explained this.

## 2020-12-16 ENCOUNTER — PATIENT MESSAGE (OUTPATIENT)
Dept: PODIATRY | Facility: CLINIC | Age: 50
End: 2020-12-16

## 2020-12-18 ENCOUNTER — PATIENT MESSAGE (OUTPATIENT)
Dept: SURGERY | Facility: HOSPITAL | Age: 50
End: 2020-12-18

## 2020-12-19 ENCOUNTER — LAB VISIT (OUTPATIENT)
Dept: INTERNAL MEDICINE | Facility: CLINIC | Age: 50
End: 2020-12-19
Payer: MEDICAID

## 2020-12-19 DIAGNOSIS — Z41.9 ELECTIVE SURGERY: ICD-10-CM

## 2020-12-19 PROCEDURE — U0003 INFECTIOUS AGENT DETECTION BY NUCLEIC ACID (DNA OR RNA); SEVERE ACUTE RESPIRATORY SYNDROME CORONAVIRUS 2 (SARS-COV-2) (CORONAVIRUS DISEASE [COVID-19]), AMPLIFIED PROBE TECHNIQUE, MAKING USE OF HIGH THROUGHPUT TECHNOLOGIES AS DESCRIBED BY CMS-2020-01-R: HCPCS

## 2020-12-20 LAB — SARS-COV-2 RNA RESP QL NAA+PROBE: NOT DETECTED

## 2020-12-21 ENCOUNTER — PATIENT MESSAGE (OUTPATIENT)
Dept: SURGERY | Facility: HOSPITAL | Age: 50
End: 2020-12-21

## 2020-12-22 ENCOUNTER — HOSPITAL ENCOUNTER (OUTPATIENT)
Facility: HOSPITAL | Age: 50
Discharge: HOME | End: 2020-12-22
Attending: ORTHOPAEDIC SURGERY | Admitting: ORTHOPAEDIC SURGERY
Payer: MEDICAID

## 2020-12-22 ENCOUNTER — ANESTHESIA (OUTPATIENT)
Dept: SURGERY | Facility: HOSPITAL | Age: 50
End: 2020-12-22
Payer: MEDICAID

## 2020-12-22 VITALS
SYSTOLIC BLOOD PRESSURE: 128 MMHG | HEIGHT: 66 IN | RESPIRATION RATE: 17 BRPM | BODY MASS INDEX: 46.61 KG/M2 | DIASTOLIC BLOOD PRESSURE: 82 MMHG | OXYGEN SATURATION: 97 % | HEART RATE: 75 BPM | WEIGHT: 290 LBS | TEMPERATURE: 98 F

## 2020-12-22 DIAGNOSIS — G56.03 CARPAL TUNNEL SYNDROME ON BOTH SIDES: ICD-10-CM

## 2020-12-22 DIAGNOSIS — M25.569 KNEE PAIN, UNSPECIFIED CHRONICITY, UNSPECIFIED LATERALITY: ICD-10-CM

## 2020-12-22 LAB
ANION GAP SERPL CALC-SCNC: 9 MMOL/L (ref 8–16)
B-HCG UR QL: NEGATIVE
BUN SERPL-MCNC: 17 MG/DL (ref 6–20)
CALCIUM SERPL-MCNC: 9 MG/DL (ref 8.7–10.5)
CHLORIDE SERPL-SCNC: 105 MMOL/L (ref 95–110)
CO2 SERPL-SCNC: 26 MMOL/L (ref 23–29)
CREAT SERPL-MCNC: 0.8 MG/DL (ref 0.5–1.4)
CTP QC/QA: YES
EST. GFR  (AFRICAN AMERICAN): >60 ML/MIN/1.73 M^2
EST. GFR  (NON AFRICAN AMERICAN): >60 ML/MIN/1.73 M^2
GLUCOSE SERPL-MCNC: 94 MG/DL (ref 70–110)
POTASSIUM SERPL-SCNC: 4 MMOL/L (ref 3.5–5.1)
SODIUM SERPL-SCNC: 140 MMOL/L (ref 136–145)

## 2020-12-22 PROCEDURE — 64721 CARPAL TUNNEL SURGERY: CPT | Mod: 79,LT,, | Performed by: ORTHOPAEDIC SURGERY

## 2020-12-22 PROCEDURE — 25000003 PHARM REV CODE 250: Performed by: STUDENT IN AN ORGANIZED HEALTH CARE EDUCATION/TRAINING PROGRAM

## 2020-12-22 PROCEDURE — 25000003 PHARM REV CODE 250: Performed by: ORTHOPAEDIC SURGERY

## 2020-12-22 PROCEDURE — 63600175 PHARM REV CODE 636 W HCPCS: Performed by: ORTHOPAEDIC SURGERY

## 2020-12-22 PROCEDURE — 80048 BASIC METABOLIC PNL TOTAL CA: CPT

## 2020-12-22 PROCEDURE — 36415 COLL VENOUS BLD VENIPUNCTURE: CPT

## 2020-12-22 PROCEDURE — 37000008 HC ANESTHESIA 1ST 15 MINUTES: Performed by: ORTHOPAEDIC SURGERY

## 2020-12-22 PROCEDURE — 64721 PR REVISE MEDIAN N/CARPAL TUNNEL SURG: ICD-10-PCS | Mod: 79,LT,, | Performed by: ORTHOPAEDIC SURGERY

## 2020-12-22 PROCEDURE — 63600175 PHARM REV CODE 636 W HCPCS: Performed by: STUDENT IN AN ORGANIZED HEALTH CARE EDUCATION/TRAINING PROGRAM

## 2020-12-22 PROCEDURE — 37000009 HC ANESTHESIA EA ADD 15 MINS: Performed by: ORTHOPAEDIC SURGERY

## 2020-12-22 PROCEDURE — 01810 ANES PX NRV MUSC F/ARM WRST: CPT | Performed by: ORTHOPAEDIC SURGERY

## 2020-12-22 PROCEDURE — 36000707: Performed by: ORTHOPAEDIC SURGERY

## 2020-12-22 PROCEDURE — 71000015 HC POSTOP RECOV 1ST HR: Performed by: ORTHOPAEDIC SURGERY

## 2020-12-22 PROCEDURE — 36000706: Performed by: ORTHOPAEDIC SURGERY

## 2020-12-22 PROCEDURE — 71000016 HC POSTOP RECOV ADDL HR: Performed by: ORTHOPAEDIC SURGERY

## 2020-12-22 RX ORDER — SODIUM CHLORIDE, SODIUM LACTATE, POTASSIUM CHLORIDE, CALCIUM CHLORIDE 600; 310; 30; 20 MG/100ML; MG/100ML; MG/100ML; MG/100ML
INJECTION, SOLUTION INTRAVENOUS CONTINUOUS PRN
Status: DISCONTINUED | OUTPATIENT
Start: 2020-12-22 | End: 2020-12-22

## 2020-12-22 RX ORDER — KETOROLAC TROMETHAMINE 30 MG/ML
30 INJECTION, SOLUTION INTRAMUSCULAR; INTRAVENOUS ONCE
Status: COMPLETED | OUTPATIENT
Start: 2020-12-22 | End: 2020-12-22

## 2020-12-22 RX ORDER — ONDANSETRON 2 MG/ML
4 INJECTION INTRAMUSCULAR; INTRAVENOUS DAILY PRN
Status: DISCONTINUED | OUTPATIENT
Start: 2020-12-22 | End: 2020-12-22 | Stop reason: HOSPADM

## 2020-12-22 RX ORDER — CEFAZOLIN SODIUM 1 G/3ML
INJECTION, POWDER, FOR SOLUTION INTRAMUSCULAR; INTRAVENOUS
Status: DISCONTINUED | OUTPATIENT
Start: 2020-12-22 | End: 2020-12-22

## 2020-12-22 RX ORDER — PROPOFOL 10 MG/ML
VIAL (ML) INTRAVENOUS CONTINUOUS PRN
Status: DISCONTINUED | OUTPATIENT
Start: 2020-12-22 | End: 2020-12-22

## 2020-12-22 RX ORDER — SODIUM CHLORIDE 0.9 % (FLUSH) 0.9 %
10 SYRINGE (ML) INJECTION
Status: DISCONTINUED | OUTPATIENT
Start: 2020-12-22 | End: 2020-12-22 | Stop reason: HOSPADM

## 2020-12-22 RX ORDER — LIDOCAINE HYDROCHLORIDE 10 MG/ML
INJECTION, SOLUTION EPIDURAL; INFILTRATION; INTRACAUDAL; PERINEURAL
Status: DISCONTINUED | OUTPATIENT
Start: 2020-12-22 | End: 2020-12-22 | Stop reason: HOSPADM

## 2020-12-22 RX ORDER — ONDANSETRON 2 MG/ML
INJECTION INTRAMUSCULAR; INTRAVENOUS
Status: DISCONTINUED | OUTPATIENT
Start: 2020-12-22 | End: 2020-12-22

## 2020-12-22 RX ORDER — BUPIVACAINE HYDROCHLORIDE 5 MG/ML
INJECTION, SOLUTION EPIDURAL; INTRACAUDAL
Status: DISCONTINUED | OUTPATIENT
Start: 2020-12-22 | End: 2020-12-22 | Stop reason: HOSPADM

## 2020-12-22 RX ORDER — VANCOMYCIN HYDROCHLORIDE 1 G/20ML
INJECTION, POWDER, LYOPHILIZED, FOR SOLUTION INTRAVENOUS
Status: DISCONTINUED | OUTPATIENT
Start: 2020-12-22 | End: 2020-12-22 | Stop reason: HOSPADM

## 2020-12-22 RX ORDER — OXYCODONE HYDROCHLORIDE 5 MG/1
10 TABLET ORAL EVERY 4 HOURS PRN
Status: DISCONTINUED | OUTPATIENT
Start: 2020-12-22 | End: 2020-12-22 | Stop reason: HOSPADM

## 2020-12-22 RX ORDER — LIDOCAINE HYDROCHLORIDE 20 MG/ML
INJECTION INTRAVENOUS
Status: DISCONTINUED | OUTPATIENT
Start: 2020-12-22 | End: 2020-12-22

## 2020-12-22 RX ORDER — FENTANYL CITRATE 50 UG/ML
INJECTION, SOLUTION INTRAMUSCULAR; INTRAVENOUS
Status: DISCONTINUED | OUTPATIENT
Start: 2020-12-22 | End: 2020-12-22

## 2020-12-22 RX ORDER — ONDANSETRON 8 MG/1
8 TABLET, ORALLY DISINTEGRATING ORAL EVERY 8 HOURS PRN
Status: DISCONTINUED | OUTPATIENT
Start: 2020-12-22 | End: 2020-12-22 | Stop reason: HOSPADM

## 2020-12-22 RX ORDER — ACETAMINOPHEN 325 MG/1
650 TABLET ORAL EVERY 4 HOURS PRN
Status: DISCONTINUED | OUTPATIENT
Start: 2020-12-22 | End: 2020-12-22 | Stop reason: HOSPADM

## 2020-12-22 RX ORDER — CEFAZOLIN SODIUM 2 G/50ML
2 SOLUTION INTRAVENOUS
Status: DISCONTINUED | OUTPATIENT
Start: 2020-12-22 | End: 2020-12-22 | Stop reason: HOSPADM

## 2020-12-22 RX ORDER — HYDROMORPHONE HYDROCHLORIDE 2 MG/ML
0.5 INJECTION, SOLUTION INTRAMUSCULAR; INTRAVENOUS; SUBCUTANEOUS EVERY 5 MIN PRN
Status: DISCONTINUED | OUTPATIENT
Start: 2020-12-22 | End: 2020-12-22 | Stop reason: HOSPADM

## 2020-12-22 RX ORDER — PROPOFOL 10 MG/ML
VIAL (ML) INTRAVENOUS
Status: DISCONTINUED | OUTPATIENT
Start: 2020-12-22 | End: 2020-12-22

## 2020-12-22 RX ORDER — MIDAZOLAM HYDROCHLORIDE 1 MG/ML
INJECTION INTRAMUSCULAR; INTRAVENOUS
Status: DISCONTINUED | OUTPATIENT
Start: 2020-12-22 | End: 2020-12-22

## 2020-12-22 RX ORDER — HYDROCODONE BITARTRATE AND ACETAMINOPHEN 5; 325 MG/1; MG/1
1 TABLET ORAL EVERY 4 HOURS PRN
Qty: 20 TABLET | Refills: 0 | Status: SHIPPED | OUTPATIENT
Start: 2020-12-22 | End: 2021-05-31

## 2020-12-22 RX ADMIN — MIDAZOLAM HYDROCHLORIDE 2 MG: 1 INJECTION, SOLUTION INTRAMUSCULAR; INTRAVENOUS at 11:12

## 2020-12-22 RX ADMIN — PROPOFOL 20 MG: 10 INJECTION, EMULSION INTRAVENOUS at 11:12

## 2020-12-22 RX ADMIN — FENTANYL CITRATE 25 MCG: 50 INJECTION, SOLUTION INTRAMUSCULAR; INTRAVENOUS at 11:12

## 2020-12-22 RX ADMIN — CEFAZOLIN 2 G: 330 INJECTION, POWDER, FOR SOLUTION INTRAMUSCULAR; INTRAVENOUS at 11:12

## 2020-12-22 RX ADMIN — KETOROLAC TROMETHAMINE 30 MG: 30 INJECTION, SOLUTION INTRAMUSCULAR at 01:12

## 2020-12-22 RX ADMIN — SODIUM CHLORIDE, SODIUM LACTATE, POTASSIUM CHLORIDE, AND CALCIUM CHLORIDE: .6; .31; .03; .02 INJECTION, SOLUTION INTRAVENOUS at 11:12

## 2020-12-22 RX ADMIN — PROPOFOL 100 MCG/KG/MIN: 10 INJECTION, EMULSION INTRAVENOUS at 11:12

## 2020-12-22 RX ADMIN — LIDOCAINE HYDROCHLORIDE 100 MG: 20 INJECTION, SOLUTION INTRAVENOUS at 11:12

## 2020-12-22 RX ADMIN — ONDANSETRON 4 MG: 2 INJECTION, SOLUTION INTRAMUSCULAR; INTRAVENOUS at 12:12

## 2020-12-22 NOTE — ANESTHESIA PREPROCEDURE EVALUATION
2020  Irina Dugan is a 50 y.o., female. Scheduled for L CTR.        Patient Active Problem List   Diagnosis    Sciatica    Essential hypertension    Carpal tunnel syndrome    Other affections of shoulder region, not elsewhere classified    Pain in joint, lower leg    SK (seborrheic keratosis)    Morbid obesity    Class 3 severe obesity due to excess calories with serious comorbidity and body mass index (BMI) of 45.0 to 49.9 in adult    Chronic allergic rhinitis    Pneumonia due to COVID-19 virus    Carpal tunnel syndrome on both sides     Past Surgical History:   Procedure Laterality Date    CARPAL TUNNEL RELEASE Bilateral 2020    Procedure: RELEASE, CARPAL TUNNEL;  Surgeon: Aakash Zuluaga Jr., MD;  Location: [facility];  Service: Orthopedics;  Laterality: Bilateral;     SECTION, CLASSIC      CHOLECYSTECTOMY      ECTOPIC PREGNANCY SURGERY  age 25    TUBAL LIGATION       Medication List with Changes/Refills   Current Medications    ACETAMINOPHEN (TYLENOL) 325 MG CAP    Take 650 mg by mouth as needed.    ALBUTEROL (PROVENTIL/VENTOLIN HFA) 90 MCG/ACTUATION INHALER    Inhale 1-2 puffs into the lungs every 6 (six) hours as needed. Rescue    ASCORBIC ACID (VITAMIN C ORAL)    Take 2 tablets by mouth once daily.     ASCORBIC ACID, VITAMIN C, (VITAMIN C) 1000 MG TABLET    Take 500 mg by mouth once daily.    CHOLECALCIFEROL, VITAMIN D3, (VITAMIN D3) 50 MCG (2,000 UNIT) CAP    Take 1 capsule by mouth once daily.    CLOTRIMAZOLE-BETAMETHASONE 1-0.05% (LOTRISONE) CREAM    APPLY CREAM TO AFFECTED AREA TWICE DAILY    DICLOFENAC SODIUM (VOLTAREN) 1 % GEL    Apply 2 g topically 3 (three) times daily.    FUROSEMIDE (LASIX) 20 MG TABLET    TAKE ONE TABLET BY MOUTH DAILY AS NEEDED (SWELLING)    GUAIFEN/PHENYLEPH/ACETAMINOPHN (MUCINEX COLD AND SINUS ORAL)    Take by mouth.     HYDROCODONE-ACETAMINOPHEN (NORCO) 5-325 MG PER TABLET    Take 1 tablet by mouth every 4 (four) hours as needed for Pain.    IBUPROFEN (ADVIL,MOTRIN) 600 MG TABLET    Take 1 tablet (600 mg total) by mouth every 6 (six) hours as needed.    MELOXICAM (MOBIC) 15 MG TABLET    Take 1 tablet (15 mg total) by mouth once daily.    NITROFURANTOIN, MACROCRYSTAL-MONOHYDRATE, (MACROBID) 100 MG CAPSULE    Take 100 mg by mouth 2 (two) times daily.    OLMESARTAN (BENICAR) 20 MG TABLET    Take 12.5 mg by mouth.    OLMESARTAN-HYDROCHLOROTHIAZIDE (BENICAR HCT) 20-12.5 MG PER TABLET    Take 1 tablet by mouth once daily.    ONDANSETRON (ZOFRAN-ODT) 4 MG TBDL    Take 1 tablet (4 mg total) by mouth every 6 (six) hours as needed.    ZINC ORAL    Take 60 mg by mouth once daily.    Changed and/or Refilled Medications    Modified Medication Previous Medication    FLUCONAZOLE (DIFLUCAN) 150 MG TAB fluconazole (DIFLUCAN) 150 MG Tab       Take 1 tablet (150 mg total) by mouth once daily.    Take 150 mg by mouth once daily.         Pre-op Assessment    I have reviewed the Patient Summary Reports.     I have reviewed the Nursing Notes. I have reviewed the NPO Status.      Review of Systems  Anesthesia Hx:  No problems with previous Anesthesia Denies Hx of Anesthetic complications  History of prior surgery of interest to airway management or planning: Denies Family Hx of Anesthesia complications.   Denies Personal Hx of Anesthesia complications.   Social:  Non-Smoker, Social Alcohol Use    Hematology/Oncology:  Hematology Normal        EENT/Dental:EENT/Dental Normal   Cardiovascular:   Hypertension    Pulmonary:   Pneumonia (2/2 to COIVD in 07/2020) Denies COPD.  Denies Asthma.    Renal/:  Renal/ Normal     Hepatic/GI:  Hepatic/GI Normal H/o cholecystectomy   Musculoskeletal:  Musculoskeletal General/Symptoms:        Physical Exam  General:  Morbid Obesity    Airway/Jaw/Neck:  Airway Findings: Mallampati: II Improves to I with phonation.  TM  Distance: Normal, at least 6 cm      Dental:  Dental Findings:    Chest/Lungs:  Chest/Lungs Findings: Clear to auscultation     Heart/Vascular:  Heart Findings: Rate: Normal  Rhythm: Regular Rhythm        Mental Status:  Mental Status Findings:  Cooperative, Alert and Oriented         Anesthesia Plan  Type of Anesthesia, risks & benefits discussed:  Anesthesia Type:  general, MAC  Patient's Preference:   Intra-op Monitoring Plan: standard ASA monitors  Intra-op Monitoring Plan Comments:   Post Op Pain Control Plan: multimodal analgesia and per primary service following discharge from PACU  Post Op Pain Control Plan Comments:   Induction:   IV  Beta Blocker:  Patient is not currently on a Beta-Blocker (No further documentation required).       Informed Consent: Patient understands risks and agrees with Anesthesia plan.  Questions answered. Anesthesia consent signed with patient.  ASA Score: 2     Day of Surgery Review of History & Physical:    H&P update referred to the surgeon.         Ready For Surgery From Anesthesia Perspective.

## 2020-12-22 NOTE — OP NOTE
[facility]  Operative Note      Date of Procedure: 12/22/2020     Procedure: Procedure(s) (LRB):  RELEASE, CARPAL TUNNEL (Left)     Surgeon(s) and Role:     * Aakash Zuluaga Jr., MD - Primary    Assisting Surgeon: None    Pre-Operative Diagnosis: Carpal tunnel syndrome on both sides [G56.03]    Post-Operative Diagnosis: Post-Op Diagnosis Codes:     * Carpal tunnel syndrome on both sides [G56.03]    Anesthesia: Local MAC    Technical Procedures Used: CTR    Description of the Findings of the Procedure: DATE OF PROCEDURE:   12/22/2020     PREOPERATIVE DIAGNOSIS:  left carpal tunnel syndrome.     POSTOPERATIVE DIAGNOSIS: leftt carpal tunnel syndrome.     OPERATIVE PROCEDURE: left carpal tunnel release.     SURGEON:  Aakash Zuluaga Jr., MRENETTA.     ANESTHESIA:  MAC.     ESTIMATED BLOOD LOSS:  Minimal.     COMPLICATIONS:  None.     SPECIMENS:  None.     BRIEF INDICATIONS:  A 50 y.o.-year-old female with carpal tunnel syndrome, unresponsive to conservative treatment, taken to surgery for the above   procedure.     OPERATIVE PROCEDURE IN DETAIL:  After operative consent was obtained, the   patient was brought to the Operating Room, placed supine on the operating room   table.  Anesthesia by IV sedation followed by injection of Xylocaine and   Marcaine combination into the operative site in the palm.  Following this,   tourniquet applied to the arm.  The arm was then prepped and draped out in the   normal sterile fashion.  The Esmarch used to exsanguinate the limb and the   tourniquet inflated to 225 mmHg.     Following this, a curved incision was made thenar in the crease of palm with a   #15 blade, 2.5 cm in length.  Palmar fascia divided, deep retractors placed.    Transverse carpal ligament identified, carefully divided with the [place]. Bernard blade.    The median nerve protected with the Henderson elevator and division of ligament   continued proximally and distally under direct visualization.  After complete    release of the median nerve, the contents of the carpal canal were inspected and   noted to be intact including the recurrent branch.  The wound was irrigated and   closed with interrupted 4-0 Monocryl in the subcutaneous layer.  Dermabond on   the skin.  Sterile dressing applied followed by light wrap and the tourniquet   deflated.  The patient was brought to the Recovery Room in stable condition.    All sponge and needle counts reported as correct.  No complications.           Significant Surgical Tasks Conducted by the Assistant(s), if Applicable: retraction    Complications: No    Estimated Blood Loss (EBL): * No values recorded between 12/22/2020 12:14 PM and 12/22/2020 12:44 PM *           Implants: * No implants in log *    Specimens:   Specimen (12h ago, onward)    None                  Condition: Good    Disposition: PACU - hemodynamically stable.    Attestation: I was present and scrubbed for the entire procedure.    Discharge Note    OUTCOME: Patient tolerated treatment/procedure well without complication and is now ready for discharge.    DISPOSITION: Home or Self Care    FINAL DIAGNOSIS:  Carpal tunnel syndrome on both sides    FOLLOWUP: In clinic    DISCHARGE INSTRUCTIONS:    Discharge Procedure Orders   Diet general     Call MD for:  temperature >100.4     Call MD for:  persistent nausea and vomiting     Call MD for:  severe uncontrolled pain     Keep surgical extremity elevated     Remove dressing in 24 hours

## 2020-12-22 NOTE — OP NOTE
Certification of Assistant at Surgery       Surgery Date: 12/22/2020     Participating Surgeons:  Surgeon(s) and Role:     * Aakash Zuluaga Jr., MD - Primary    Procedures:  Procedure(s) (LRB):  RELEASE, CARPAL TUNNEL (Left)    Assistant Surgeon's Certification of Necessity:  I understand that section 1842 (b) (6) (d) of the Social Security Act generally prohibits Medicare Part B reasonable charge payment for the services of assistants at surgery in teaching hospitals when qualified residents are available to furnish such services. I certify that the services for which payment is claimed were medically necessary, and that no qualified resident was available to perform the services. I further understand that these services are subject to post-payment review by the Medicare carrier.      Deepak Lilly PA-C    12/22/2020  12:35 PM

## 2020-12-22 NOTE — TRANSFER OF CARE
"Anesthesia Transfer of Care Note    Patient: Irina Dugan    Procedure(s) Performed: Procedure(s) (LRB):  RELEASE, CARPAL TUNNEL (Left)    Patient location: OPS    Anesthesia Type: MAC    Transport from OR: Transported from OR on 2-3 L/min O2 by NC with adequate spontaneous ventilation    Post pain: adequate analgesia    Post assessment: no apparent anesthetic complications and tolerated procedure well    Post vital signs: stable    Level of consciousness: awake and alert    Nausea/Vomiting: no nausea/vomiting    Complications: none    Transfer of care protocol was followed      Last vitals:   Visit Vitals  BP  115/88(BP Location: Left arm, Patient Position: Lying)   Pulse 70   Temp (96.8 °F) (Oral)   Resp 18   Ht 5' 6" (1.676 m)   Wt 131.5 kg (290 lb)   LMP 10/13/2020 (Approximate)   SpO2 100%   Breastfeeding No   BMI 46.81 kg/m²     "

## 2020-12-22 NOTE — DISCHARGE INSTRUCTIONS
Discharge Instructions for Carpal Tunnel Release  You had a carpal tunnel release procedure to help relieve the symptoms of carpal tunnel syndrome. In carpal tunnel syndrome, a nerve in the wrist is compressed and irritated. This causes numbness and pain in the fingers and hand. Carpal tunnel release relieves the compression of the nerve. Here are instructions that will help you care for your arm and wrist when you are at home.  Home care  · Avoid gripping objects tightly or lifting with your affected arm.  · Wear your bandage, splint, or cast as directed by your doctor.  · Always keep the dressing, splint, or cast dry and clean.  · When showering, cover your hand and  wrist with plastic and use tape or rubberbands to keep the dressing, splint, or cast dry. Shower as necessary.    · Use an ice pack or bag of frozen peas -- or something similar -- wrapped in a thin towel on your wrist to reduce swelling for the first 48 hours. Leave the ice pack on for 20 minutes; then take it off for 20 minutes. Repeat as needed.  · Keep your arm elevated above your heart for 24 to 48 hours after surgery.  · Do the exercises you learned in the hospital, or as instructed by your doctor.  · Take pain medicine as directed.  · Dont drive until your doctor says its OK. Never drive while you are taking opioid pain medicine.  · Ask your doctor when you can return to work. If your job requires heavy lifting, you may not be able to begin working again for several weeks.  Follow-up care  Make a follow-up appointment as directed by your doctor.     When to seek medical care  Call 911 right away if you have any of the following:  · Chest pain  · Shortness of breath  Otherwise, call your doctor immediately if you have any of the following:  · A splint, cast, or dressing that has gotten wet  · Increased bleeding or drainage from the incision (cut)  · Opening of the incision  · Fever above 100.4°F (38.9°C) taken by mouth, or shaking  chills  · Any new numbness in the fingers or thumb  · Blue hand or fingers  · Increased pain with or without activity  · Increased redness, tenderness, or swelling of the incision   Date Last Reviewed: 11/15/2015  © 2401-9568 [facility]. [address] [place] [address]. All rights reserved. This information is not intended as a substitute for professional medical care. Always follow your healthcare professional's instructions.  ANESTHESIA  -For the first 24 hours after surgery:  Do not drive, use heavy equipment, make important decisions, or drink alcohol  -It is normal to feel sleepy for several hours.  Rest until you are more awake.  -Have someone stay with you, if needed.  They can watch for problems and help keep you safe.  -Some possible post anesthesia side effects include: nausea and vomiting, sore throat and hoarseness, sleepiness, and dizziness.    PAIN  -If you have pain after surgery, pain medicine will help you feel better.  Take it as directed, before pain becomes severe.  Most pain relievers taken by mouth need at least 20-30 minutes to start working.  -Do not drive or drink alcohol while taking pain medicine.  -Pain medication can upset your stomach.  Taking them with a little food may help.  -Other ways to help control pain: elevation, ice, and relaxation  -Call your surgeon if still having unmanageable pain an hour after taking pain medicine.  -Pain medicine can cause constipation.  Taking an over-the counter stool softener while on prescription pain medicine and drinking plenty of fluids can prevent this side effect.  -Call your surgeon if you have severe side effects like: breathing problems, trouble waking up, dizziness, confusion, or severe constipation.    NAUSEA  -Some people have nausea after surgery.  This is often because of anesthesia, pain, pain medicine, or the stress of surgery.  -Do not push yourself to eat.  Start off with clear liquids and soup.  Slowly move  to solid foods.  Don't eat fatty, rich, spicy foods at first.  Eat smaller amounts.  -If you develop persistent nausea and vomiting please notify your surgeon immediately.    BLEEDING  -Different types of surgery require different types of care and dressing changes.  It is important to follow all instructions and advice from your surgeon.  Change dressing as directed.  Call your surgeon for any concerns regarding postop bleeding.    SIGNS OF INFECTION  -Signs of infection include: fever, swelling, drainage, and redness  -Notify your surgeon if you have a fever of 100.4 F (38.0 C) or higher.  -Notify your surgeon if you notice redness, swelling, increased pain, pus, or a foul smell at the incision site.    Acetaminophen; Hydrocodone tablets or capsules  What is this medicine?  ACETAMINOPHEN; HYDROCODONE (a set a FARHANA theresa fen; kellee droe KOE done) is a pain reliever. It is used to treat moderate to severe pain.  How should I use this medicine?  Take this medicine by mouth with a glass of water. Follow the directions on the prescription label. You can take it with or without food. If it upsets your stomach, take it with food. Do not take your medicine more often than directed.  A special MedGuide will be given to you by the pharmacist with each prescription and refill. Be sure to read this information carefully each time.  Talk to your pediatrician regarding the use of this medicine in children. Special care may be needed.  What side effects may I notice from receiving this medicine?  Side effects that you should report to your doctor or health care professional as soon as possible:  · allergic reactions like skin rash, itching or hives, swelling of the face, lips, or tongue  · breathing problems  · confusion  · redness, blistering, peeling or loosening of the skin, including inside the mouth  · signs and symptoms of low blood pressure like dizziness; feeling faint or lightheaded, falls; unusually weak or tired  · trouble  passing urine or change in the amount of urine  · yellowing of the eyes or skin  Side effects that usually do not require medical attention (report to your doctor or health care professional if they continue or are bothersome):  · constipation  · dry mouth  · nausea, vomiting  · tiredness  What may interact with this medicine?  This medicine may interact with the following medications:  · alcohol  · antiviral medicines for HIV or AIDS  · atropine  · antihistamines for allergy, cough and cold  · certain antibiotics like erythromycin, clarithromycin  · certain medicines for anxiety or sleep  · certain medicines for bladder problems like oxybutynin, tolterodine  · certain medicines for depression like amitriptyline, fluoxetine, sertraline  · certain medicines for fungal infections like ketoconazole and itraconazole  · certain medicines for Parkinson's disease like benztropine, trihexyphenidyl  · certain medicines for seizures like carbamazepine, phenobarbital, phenytoin, primidone  · certain medicines for stomach problems like dicyclomine, hyoscyamine  · certain medicines for travel sickness like scopolamine  · general anesthetics like halothane, isoflurane, methoxyflurane, propofol  · ipratropium  · local anesthetics like lidocaine, pramoxine, tetracaine  · MAOIs like Carbex, Eldepryl, Marplan, Nardil, and Parnate  · medicines that relax muscles for surgery  · other medicines with acetaminophen  · other narcotic medicines for pain or cough  · phenothiazines like chlorpromazine, mesoridazine, prochlorperazine, thioridazine  · rifampin  What if I miss a dose?  If you miss a dose, take it as soon as you can. If it is almost time for your next dose, take only that dose. Do not take double or extra doses.  Where should I keep my medicine?  Keep out of the reach of children. This medicine can be abused. Keep your medicine in a safe place to protect it from theft. Do not share this medicine with anyone. Selling or giving  away this medicine is dangerous and against the law.  This medicine may cause accidental overdose and death if it taken by other adults, children, or pets. Mix any unused medicine with a substance like cat litter or coffee grounds. Then throw the medicine away in a sealed container like a sealed bag or a coffee can with a lid. Do not use the medicine after the expiration date.  Store at room temperature between 15 and 30 degrees C (59 and 86 degrees F).  What should I tell my health care provider before I take this medicine?  They need to know if you have any of these conditions:  · brain tumor  · Crohn's disease, inflammatory bowel disease, or ulcerative colitis  · drug abuse or addiction  · head injury  · heart or circulation problems  · if you often drink alcohol  · kidney disease or problems going to the bathroom  · liver disease  · lung disease, asthma, or breathing problems  · an unusual or allergic reaction to acetaminophen, hydrocodone, other opioid analgesics, other medicines, foods, dyes, or preservatives  · pregnant or trying to get pregnant  · breast-feeding  What should I watch for while using this medicine?  Tell your doctor or health care professional if your pain does not go away, if it gets worse, or if you have new or a different type of pain. You may develop tolerance to the medicine. Tolerance means that you will need a higher dose of the medicine for pain relief. Tolerance is normal and is expected if you take the medicine for a long time.  Do not suddenly stop taking your medicine because you may develop a severe reaction. Your body becomes used to the medicine. This does NOT mean you are addicted. Addiction is a behavior related to getting and using a drug for a non-medical reason. If you have pain, you have a medical reason to take pain medicine. Your doctor will tell you how much medicine to take. If your doctor wants you to stop the medicine, the dose will be slowly lowered over time to  avoid any side effects.  There are different types of narcotic medicines (opiates). If you take more than one type at the same time or if you are taking another medicine that also causes drowsiness, you may have more side effects. Give your health care provider a list of all medicines you use. Your doctor will tell you how much medicine to take. Do not take more medicine than directed. Call emergency for help if you have problems breathing or unusual sleepiness.  Do not take other medicines that contain acetaminophen with this medicine. Always read labels carefully. If you have questions, ask your doctor or pharmacist.  If you take too much acetaminophen get medical help right away. Too much acetaminophen can be very dangerous and cause liver damage. Even if you do not have symptoms, it is important to get help right away.  You may get drowsy or dizzy. Do not drive, use machinery, or do anything that needs mental alertness until you know how this medicine affects you. Do not stand or sit up quickly, especially if you are an older patient. This reduces the risk of dizzy or fainting spells. Alcohol may interfere with the effect of this medicine. Avoid alcoholic drinks.  The medicine will cause constipation. Try to have a bowel movement at least every 2 to 3 days. If you do not have a bowel movement for 3 days, call your doctor or health care professional.  Your mouth may get dry. Chewing sugarless gum or sucking hard candy, and drinking plenty of water may help. Contact your doctor if the problem does not go away or is severe.  NOTE:This sheet is a summary. It may not cover all possible information. If you have questions about this medicine, talk to your doctor, pharmacist, or health care provider. Copyright© 2017 [employer]

## 2020-12-23 NOTE — ANESTHESIA POSTPROCEDURE EVALUATION
Anesthesia Post Evaluation    Patient: Irina Dugan    Procedure(s) Performed: Procedure(s) (LRB):  RELEASE, CARPAL TUNNEL (Left)    Final Anesthesia Type: MAC      Patient location during evaluation: OPS  Patient participation: Yes- Able to Participate  Level of consciousness: awake and alert and oriented  Post-procedure vital signs: reviewed and stable  Pain management: adequate  Airway patency: patent    PONV status at discharge: No PONV  Anesthetic complications: no      Cardiovascular status: blood pressure returned to baseline  Respiratory status: unassisted  Hydration status: euvolemic  Follow-up not needed.          Vitals Value Taken Time   /82 12/22/20 1345   Temp 36.6 °C (97.9 °F) 12/22/20 1345   Pulse 75 12/22/20 1345   Resp 17 12/22/20 1345   SpO2 97 % 12/22/20 1345         No case tracking events are documented in the log.      Pain/Sudhakar Score: Pain Rating Prior to Med Admin: 2 (12/22/2020  1:06 PM)  Pain Rating Post Med Admin: 1 (12/22/2020  2:08 PM)  Sudhakar Score: 10 (12/22/2020  2:08 PM)

## 2021-01-04 ENCOUNTER — PATIENT MESSAGE (OUTPATIENT)
Dept: ADMINISTRATIVE | Facility: HOSPITAL | Age: 51
End: 2021-01-04

## 2021-01-04 ENCOUNTER — OFFICE VISIT (OUTPATIENT)
Dept: ORTHOPEDICS | Facility: CLINIC | Age: 51
End: 2021-01-04
Payer: MEDICAID

## 2021-01-04 VITALS
SYSTOLIC BLOOD PRESSURE: 149 MMHG | DIASTOLIC BLOOD PRESSURE: 91 MMHG | WEIGHT: 289.88 LBS | BODY MASS INDEX: 46.59 KG/M2 | HEART RATE: 86 BPM | HEIGHT: 66 IN

## 2021-01-04 DIAGNOSIS — Z98.890 S/P CARPAL TUNNEL RELEASE: Primary | ICD-10-CM

## 2021-01-04 PROCEDURE — 99214 OFFICE O/P EST MOD 30 MIN: CPT | Mod: PBBFAC,PN | Performed by: ORTHOPAEDIC SURGERY

## 2021-01-04 PROCEDURE — 99024 POSTOP FOLLOW-UP VISIT: CPT | Mod: ,,, | Performed by: ORTHOPAEDIC SURGERY

## 2021-01-04 PROCEDURE — 99999 PR PBB SHADOW E&M-EST. PATIENT-LVL IV: CPT | Mod: PBBFAC,,, | Performed by: ORTHOPAEDIC SURGERY

## 2021-01-04 PROCEDURE — 99024 PR POST-OP FOLLOW-UP VISIT: ICD-10-PCS | Mod: ,,, | Performed by: ORTHOPAEDIC SURGERY

## 2021-01-04 PROCEDURE — 99999 PR PBB SHADOW E&M-EST. PATIENT-LVL IV: ICD-10-PCS | Mod: PBBFAC,,, | Performed by: ORTHOPAEDIC SURGERY

## 2021-01-21 ENCOUNTER — PATIENT OUTREACH (OUTPATIENT)
Dept: ADMINISTRATIVE | Facility: OTHER | Age: 51
End: 2021-01-21

## 2021-01-22 ENCOUNTER — OFFICE VISIT (OUTPATIENT)
Dept: PODIATRY | Facility: CLINIC | Age: 51
End: 2021-01-22
Payer: MEDICAID

## 2021-01-22 VITALS — HEIGHT: 66 IN | WEIGHT: 293 LBS | BODY MASS INDEX: 47.09 KG/M2

## 2021-01-22 DIAGNOSIS — M24.573 EQUINUS CONTRACTURE OF ANKLE: ICD-10-CM

## 2021-01-22 DIAGNOSIS — E66.9 OBESITY, UNSPECIFIED CLASSIFICATION, UNSPECIFIED OBESITY TYPE, UNSPECIFIED WHETHER SERIOUS COMORBIDITY PRESENT: ICD-10-CM

## 2021-01-22 DIAGNOSIS — M72.2 PLANTAR FASCIITIS: Primary | ICD-10-CM

## 2021-01-22 PROCEDURE — 99999 PR PBB SHADOW E&M-EST. PATIENT-LVL III: ICD-10-PCS | Mod: PBBFAC,,, | Performed by: PODIATRIST

## 2021-01-22 PROCEDURE — 99213 OFFICE O/P EST LOW 20 MIN: CPT | Mod: PBBFAC,PN,25 | Performed by: PODIATRIST

## 2021-01-22 PROCEDURE — 99999 PR PBB SHADOW E&M-EST. PATIENT-LVL III: CPT | Mod: PBBFAC,,, | Performed by: PODIATRIST

## 2021-01-22 PROCEDURE — 99213 PR OFFICE/OUTPT VISIT, EST, LEVL III, 20-29 MIN: ICD-10-PCS | Mod: 25,S$PBB,, | Performed by: PODIATRIST

## 2021-01-22 PROCEDURE — 20550 NJX 1 TENDON SHEATH/LIGAMENT: CPT | Mod: 50,S$PBB,, | Performed by: PODIATRIST

## 2021-01-22 PROCEDURE — 20550 PR INJECT TENDON SHEATH/LIGAMENT: ICD-10-PCS | Mod: 50,S$PBB,, | Performed by: PODIATRIST

## 2021-01-22 PROCEDURE — 99213 OFFICE O/P EST LOW 20 MIN: CPT | Mod: 25,S$PBB,, | Performed by: PODIATRIST

## 2021-01-22 PROCEDURE — 20550 NJX 1 TENDON SHEATH/LIGAMENT: CPT | Mod: PBBFAC,PN | Performed by: PODIATRIST

## 2021-01-22 RX ORDER — TRIAMCINOLONE ACETONIDE 40 MG/ML
40 INJECTION, SUSPENSION INTRA-ARTICULAR; INTRAMUSCULAR
Status: COMPLETED | OUTPATIENT
Start: 2021-01-22 | End: 2021-01-22

## 2021-01-22 RX ADMIN — TRIAMCINOLONE ACETONIDE 40 MG: 40 INJECTION, SUSPENSION INTRA-ARTICULAR; INTRAMUSCULAR at 01:01

## 2021-02-08 ENCOUNTER — PATIENT OUTREACH (OUTPATIENT)
Dept: ADMINISTRATIVE | Facility: HOSPITAL | Age: 51
End: 2021-02-08

## 2021-04-15 ENCOUNTER — PATIENT MESSAGE (OUTPATIENT)
Dept: RESEARCH | Facility: HOSPITAL | Age: 51
End: 2021-04-15

## 2021-04-18 ENCOUNTER — IMMUNIZATION (OUTPATIENT)
Dept: PRIMARY CARE CLINIC | Facility: CLINIC | Age: 51
End: 2021-04-18
Payer: MEDICAID

## 2021-04-18 DIAGNOSIS — Z23 NEED FOR VACCINATION: Primary | ICD-10-CM

## 2021-04-18 PROCEDURE — 0001A PR IMMUNIZ ADMIN, SARS-COV-2 COVID-19 VACC, 30MCG/0.3ML, 1ST DOSE: ICD-10-PCS | Mod: CV19,S$GLB,, | Performed by: INTERNAL MEDICINE

## 2021-04-18 PROCEDURE — 91300 PR SARS-COV- 2 COVID-19 VACCINE, NO PRSV, 30MCG/0.3ML, IM: ICD-10-PCS | Mod: S$GLB,,, | Performed by: INTERNAL MEDICINE

## 2021-04-18 PROCEDURE — 91300 PR SARS-COV- 2 COVID-19 VACCINE, NO PRSV, 30MCG/0.3ML, IM: CPT | Mod: S$GLB,,, | Performed by: INTERNAL MEDICINE

## 2021-04-18 PROCEDURE — 0001A PR IMMUNIZ ADMIN, SARS-COV-2 COVID-19 VACC, 30MCG/0.3ML, 1ST DOSE: CPT | Mod: CV19,S$GLB,, | Performed by: INTERNAL MEDICINE

## 2021-04-18 RX ADMIN — Medication 0.3 ML: at 01:04

## 2021-05-08 ENCOUNTER — IMMUNIZATION (OUTPATIENT)
Dept: PRIMARY CARE CLINIC | Facility: CLINIC | Age: 51
End: 2021-05-08
Payer: MEDICAID

## 2021-05-08 DIAGNOSIS — Z23 NEED FOR VACCINATION: Primary | ICD-10-CM

## 2021-05-08 PROCEDURE — 0002A PR IMMUNIZ ADMIN, SARS-COV-2 COVID-19 VACC, 30MCG/0.3ML, 2ND DOSE: CPT | Mod: PBBFAC | Performed by: INTERNAL MEDICINE

## 2021-05-08 PROCEDURE — 91300 PR SARS-COV- 2 COVID-19 VACCINE, NO PRSV, 30MCG/0.3ML, IM: CPT | Mod: PBBFAC | Performed by: INTERNAL MEDICINE

## 2021-05-08 RX ADMIN — RNA INGREDIENT BNT-162B2 0.3 ML: 0.23 INJECTION, SUSPENSION INTRAMUSCULAR at 09:05

## 2021-05-24 DIAGNOSIS — I10 ESSENTIAL HYPERTENSION: Primary | ICD-10-CM

## 2021-05-24 RX ORDER — OLMESARTAN MEDOXOMIL 20 MG/1
20 TABLET ORAL DAILY
Qty: 90 TABLET | Refills: 3 | Status: SHIPPED | OUTPATIENT
Start: 2021-05-24 | End: 2022-10-21 | Stop reason: SDUPTHER

## 2021-05-24 RX ORDER — HYDROCHLOROTHIAZIDE 12.5 MG/1
12.5 TABLET ORAL DAILY
Qty: 90 TABLET | Refills: 3 | Status: SHIPPED | OUTPATIENT
Start: 2021-05-24 | End: 2022-06-01

## 2021-05-31 ENCOUNTER — TELEPHONE (OUTPATIENT)
Dept: INTERNAL MEDICINE | Facility: CLINIC | Age: 51
End: 2021-05-31

## 2021-05-31 ENCOUNTER — OFFICE VISIT (OUTPATIENT)
Dept: INTERNAL MEDICINE | Facility: CLINIC | Age: 51
End: 2021-05-31
Payer: MEDICAID

## 2021-05-31 VITALS
WEIGHT: 293 LBS | BODY MASS INDEX: 47.09 KG/M2 | HEART RATE: 94 BPM | HEIGHT: 66 IN | OXYGEN SATURATION: 98 % | TEMPERATURE: 97 F | SYSTOLIC BLOOD PRESSURE: 120 MMHG | DIASTOLIC BLOOD PRESSURE: 72 MMHG

## 2021-05-31 DIAGNOSIS — G47.00 INSOMNIA, UNSPECIFIED TYPE: Primary | ICD-10-CM

## 2021-05-31 DIAGNOSIS — Z00.00 ROUTINE MEDICAL EXAM: Primary | ICD-10-CM

## 2021-05-31 DIAGNOSIS — Z86.16 HISTORY OF COVID-19: ICD-10-CM

## 2021-05-31 PROCEDURE — 99214 OFFICE O/P EST MOD 30 MIN: CPT | Mod: PBBFAC,PO | Performed by: INTERNAL MEDICINE

## 2021-05-31 PROCEDURE — 99999 PR PBB SHADOW E&M-EST. PATIENT-LVL IV: CPT | Mod: PBBFAC,,, | Performed by: INTERNAL MEDICINE

## 2021-05-31 PROCEDURE — 99999 PR PBB SHADOW E&M-EST. PATIENT-LVL IV: ICD-10-PCS | Mod: PBBFAC,,, | Performed by: INTERNAL MEDICINE

## 2021-05-31 PROCEDURE — 99396 PREV VISIT EST AGE 40-64: CPT | Mod: S$PBB,,, | Performed by: INTERNAL MEDICINE

## 2021-05-31 PROCEDURE — 99396 PR PREVENTIVE VISIT,EST,40-64: ICD-10-PCS | Mod: S$PBB,,, | Performed by: INTERNAL MEDICINE

## 2021-05-31 RX ORDER — FLUCONAZOLE 150 MG/1
150 TABLET ORAL DAILY
Qty: 2 TABLET | Refills: 3 | Status: SHIPPED | OUTPATIENT
Start: 2021-05-31 | End: 2021-09-22 | Stop reason: SDUPTHER

## 2021-06-01 ENCOUNTER — PATIENT MESSAGE (OUTPATIENT)
Dept: PODIATRY | Facility: CLINIC | Age: 51
End: 2021-06-01

## 2021-06-02 ENCOUNTER — LAB VISIT (OUTPATIENT)
Dept: LAB | Facility: HOSPITAL | Age: 51
End: 2021-06-02
Attending: INTERNAL MEDICINE
Payer: MEDICAID

## 2021-06-02 DIAGNOSIS — Z00.00 ROUTINE MEDICAL EXAM: ICD-10-CM

## 2021-06-02 LAB
BASOPHILS # BLD AUTO: 0.06 K/UL (ref 0–0.2)
BASOPHILS NFR BLD: 1.2 % (ref 0–1.9)
DIFFERENTIAL METHOD: NORMAL
EOSINOPHIL # BLD AUTO: 0.1 K/UL (ref 0–0.5)
EOSINOPHIL NFR BLD: 2.2 % (ref 0–8)
ERYTHROCYTE [DISTWIDTH] IN BLOOD BY AUTOMATED COUNT: 13.8 % (ref 11.5–14.5)
ESTIMATED AVG GLUCOSE: 123 MG/DL (ref 68–131)
HBA1C MFR BLD: 5.9 % (ref 4–5.6)
HCT VFR BLD AUTO: 40.4 % (ref 37–48.5)
HGB BLD-MCNC: 13 G/DL (ref 12–16)
IMM GRANULOCYTES # BLD AUTO: 0.01 K/UL (ref 0–0.04)
IMM GRANULOCYTES NFR BLD AUTO: 0.2 % (ref 0–0.5)
INR PPP: 1 (ref 0.8–1.2)
LYMPHOCYTES # BLD AUTO: 1.9 K/UL (ref 1–4.8)
LYMPHOCYTES NFR BLD: 38 % (ref 18–48)
MCH RBC QN AUTO: 28.5 PG (ref 27–31)
MCHC RBC AUTO-ENTMCNC: 32.2 G/DL (ref 32–36)
MCV RBC AUTO: 89 FL (ref 82–98)
MONOCYTES # BLD AUTO: 0.4 K/UL (ref 0.3–1)
MONOCYTES NFR BLD: 8.1 % (ref 4–15)
NEUTROPHILS # BLD AUTO: 2.5 K/UL (ref 1.8–7.7)
NEUTROPHILS NFR BLD: 50.3 % (ref 38–73)
NRBC BLD-RTO: 0 /100 WBC
PLATELET # BLD AUTO: 193 K/UL (ref 150–450)
PLATELET BLD QL SMEAR: NORMAL
PMV BLD AUTO: 12 FL (ref 9.2–12.9)
PROTHROMBIN TIME: 10.5 SEC (ref 9–12.5)
RBC # BLD AUTO: 4.56 M/UL (ref 4–5.4)
WBC # BLD AUTO: 4.92 K/UL (ref 3.9–12.7)

## 2021-06-02 PROCEDURE — 83036 HEMOGLOBIN GLYCOSYLATED A1C: CPT | Performed by: INTERNAL MEDICINE

## 2021-06-02 PROCEDURE — 36415 COLL VENOUS BLD VENIPUNCTURE: CPT | Mod: PO | Performed by: INTERNAL MEDICINE

## 2021-06-02 PROCEDURE — 80053 COMPREHEN METABOLIC PANEL: CPT | Performed by: INTERNAL MEDICINE

## 2021-06-02 PROCEDURE — 82670 ASSAY OF TOTAL ESTRADIOL: CPT | Performed by: INTERNAL MEDICINE

## 2021-06-02 PROCEDURE — 85610 PROTHROMBIN TIME: CPT | Performed by: INTERNAL MEDICINE

## 2021-06-02 PROCEDURE — 85025 COMPLETE CBC W/AUTO DIFF WBC: CPT | Performed by: INTERNAL MEDICINE

## 2021-06-02 PROCEDURE — 82306 VITAMIN D 25 HYDROXY: CPT | Performed by: INTERNAL MEDICINE

## 2021-06-02 PROCEDURE — 80061 LIPID PANEL: CPT | Performed by: INTERNAL MEDICINE

## 2021-06-02 PROCEDURE — 83002 ASSAY OF GONADOTROPIN (LH): CPT | Performed by: INTERNAL MEDICINE

## 2021-06-02 PROCEDURE — 83001 ASSAY OF GONADOTROPIN (FSH): CPT | Performed by: INTERNAL MEDICINE

## 2021-06-02 PROCEDURE — 84443 ASSAY THYROID STIM HORMONE: CPT | Performed by: INTERNAL MEDICINE

## 2021-06-03 LAB
25(OH)D3+25(OH)D2 SERPL-MCNC: 21 NG/ML (ref 30–96)
ALBUMIN SERPL BCP-MCNC: 3.9 G/DL (ref 3.5–5.2)
ALP SERPL-CCNC: 79 U/L (ref 55–135)
ALT SERPL W/O P-5'-P-CCNC: 41 U/L (ref 10–44)
ANION GAP SERPL CALC-SCNC: 11 MMOL/L (ref 8–16)
AST SERPL-CCNC: 28 U/L (ref 10–40)
BILIRUB SERPL-MCNC: 0.4 MG/DL (ref 0.1–1)
BUN SERPL-MCNC: 16 MG/DL (ref 6–20)
CALCIUM SERPL-MCNC: 9.6 MG/DL (ref 8.7–10.5)
CHLORIDE SERPL-SCNC: 103 MMOL/L (ref 95–110)
CHOLEST SERPL-MCNC: 164 MG/DL (ref 120–199)
CHOLEST/HDLC SERPL: 3 {RATIO} (ref 2–5)
CO2 SERPL-SCNC: 24 MMOL/L (ref 23–29)
CREAT SERPL-MCNC: 0.8 MG/DL (ref 0.5–1.4)
EST. GFR  (AFRICAN AMERICAN): >60 ML/MIN/1.73 M^2
EST. GFR  (NON AFRICAN AMERICAN): >60 ML/MIN/1.73 M^2
ESTRADIOL SERPL-MCNC: 22 PG/ML
FSH SERPL-ACNC: 76.8 MIU/ML
GLUCOSE SERPL-MCNC: 83 MG/DL (ref 70–110)
HDLC SERPL-MCNC: 55 MG/DL (ref 40–75)
HDLC SERPL: 33.5 % (ref 20–50)
LDLC SERPL CALC-MCNC: 87.6 MG/DL (ref 63–159)
LH SERPL-ACNC: 35.9 MIU/ML
NONHDLC SERPL-MCNC: 109 MG/DL
POTASSIUM SERPL-SCNC: 3.9 MMOL/L (ref 3.5–5.1)
PROT SERPL-MCNC: 7.6 G/DL (ref 6–8.4)
SODIUM SERPL-SCNC: 138 MMOL/L (ref 136–145)
TRIGL SERPL-MCNC: 107 MG/DL (ref 30–150)
TSH SERPL DL<=0.005 MIU/L-ACNC: 1.43 UIU/ML (ref 0.4–4)

## 2021-06-08 ENCOUNTER — TELEPHONE (OUTPATIENT)
Dept: SLEEP MEDICINE | Facility: CLINIC | Age: 51
End: 2021-06-08

## 2021-06-09 ENCOUNTER — OFFICE VISIT (OUTPATIENT)
Dept: SLEEP MEDICINE | Facility: CLINIC | Age: 51
End: 2021-06-09
Payer: MEDICAID

## 2021-06-09 VITALS
SYSTOLIC BLOOD PRESSURE: 128 MMHG | HEART RATE: 80 BPM | BODY MASS INDEX: 47.09 KG/M2 | DIASTOLIC BLOOD PRESSURE: 90 MMHG | WEIGHT: 293 LBS | HEIGHT: 66 IN

## 2021-06-09 DIAGNOSIS — R06.83 SNORING: Primary | ICD-10-CM

## 2021-06-09 DIAGNOSIS — R53.83 FATIGUE, UNSPECIFIED TYPE: ICD-10-CM

## 2021-06-09 DIAGNOSIS — G47.00 INSOMNIA, UNSPECIFIED TYPE: ICD-10-CM

## 2021-06-09 PROCEDURE — 99999 PR PBB SHADOW E&M-EST. PATIENT-LVL III: CPT | Mod: PBBFAC,,, | Performed by: INTERNAL MEDICINE

## 2021-06-09 PROCEDURE — 99999 PR PBB SHADOW E&M-EST. PATIENT-LVL III: ICD-10-PCS | Mod: PBBFAC,,, | Performed by: INTERNAL MEDICINE

## 2021-06-09 PROCEDURE — 99204 PR OFFICE/OUTPT VISIT, NEW, LEVL IV, 45-59 MIN: ICD-10-PCS | Mod: S$PBB,,, | Performed by: INTERNAL MEDICINE

## 2021-06-09 PROCEDURE — 99204 OFFICE O/P NEW MOD 45 MIN: CPT | Mod: S$PBB,,, | Performed by: INTERNAL MEDICINE

## 2021-06-09 PROCEDURE — 99213 OFFICE O/P EST LOW 20 MIN: CPT | Mod: PBBFAC | Performed by: INTERNAL MEDICINE

## 2021-06-10 ENCOUNTER — TELEPHONE (OUTPATIENT)
Dept: SLEEP MEDICINE | Facility: OTHER | Age: 51
End: 2021-06-10

## 2021-06-11 ENCOUNTER — TELEPHONE (OUTPATIENT)
Dept: SLEEP MEDICINE | Facility: CLINIC | Age: 51
End: 2021-06-11

## 2021-06-11 ENCOUNTER — HOSPITAL ENCOUNTER (OUTPATIENT)
Dept: SLEEP MEDICINE | Facility: OTHER | Age: 51
Discharge: HOME | End: 2021-06-11
Attending: INTERNAL MEDICINE
Payer: MEDICAID

## 2021-06-11 DIAGNOSIS — G47.00 INSOMNIA, UNSPECIFIED TYPE: ICD-10-CM

## 2021-06-11 DIAGNOSIS — R06.83 SNORING: ICD-10-CM

## 2021-06-11 DIAGNOSIS — R53.83 FATIGUE, UNSPECIFIED TYPE: ICD-10-CM

## 2021-06-11 PROCEDURE — 95800 PR SLEEP STUDY, UNATTENDED, RECORD HEART RATE/O2 SAT/RESP ANAL/SLEEP TIME: ICD-10-PCS | Mod: 26,52,, | Performed by: INTERNAL MEDICINE

## 2021-06-11 PROCEDURE — 95800 SLP STDY UNATTENDED: CPT | Mod: 26,52,, | Performed by: INTERNAL MEDICINE

## 2021-06-11 PROCEDURE — 95800 SLP STDY UNATTENDED: CPT | Mod: 52

## 2021-06-18 ENCOUNTER — OFFICE VISIT (OUTPATIENT)
Dept: PODIATRY | Facility: CLINIC | Age: 51
End: 2021-06-18
Payer: MEDICAID

## 2021-06-18 VITALS
OXYGEN SATURATION: 97 % | HEIGHT: 66 IN | HEART RATE: 84 BPM | BODY MASS INDEX: 47.09 KG/M2 | DIASTOLIC BLOOD PRESSURE: 88 MMHG | WEIGHT: 293 LBS | SYSTOLIC BLOOD PRESSURE: 132 MMHG

## 2021-06-18 DIAGNOSIS — E66.01 MORBID OBESITY: ICD-10-CM

## 2021-06-18 DIAGNOSIS — M72.2 PLANTAR FASCIITIS: Primary | ICD-10-CM

## 2021-06-18 DIAGNOSIS — M24.573 EQUINUS CONTRACTURE OF ANKLE: ICD-10-CM

## 2021-06-18 PROCEDURE — 99214 OFFICE O/P EST MOD 30 MIN: CPT | Mod: PBBFAC,PN | Performed by: PODIATRIST

## 2021-06-18 PROCEDURE — 99214 PR OFFICE/OUTPT VISIT, EST, LEVL IV, 30-39 MIN: ICD-10-PCS | Mod: S$PBB,,, | Performed by: PODIATRIST

## 2021-06-18 PROCEDURE — 99214 OFFICE O/P EST MOD 30 MIN: CPT | Mod: S$PBB,,, | Performed by: PODIATRIST

## 2021-06-18 PROCEDURE — 99999 PR PBB SHADOW E&M-EST. PATIENT-LVL IV: CPT | Mod: PBBFAC,,, | Performed by: PODIATRIST

## 2021-06-18 PROCEDURE — 99999 PR PBB SHADOW E&M-EST. PATIENT-LVL IV: ICD-10-PCS | Mod: PBBFAC,,, | Performed by: PODIATRIST

## 2021-06-21 ENCOUNTER — PATIENT MESSAGE (OUTPATIENT)
Dept: INTERNAL MEDICINE | Facility: CLINIC | Age: 51
End: 2021-06-21

## 2021-06-22 ENCOUNTER — PATIENT MESSAGE (OUTPATIENT)
Dept: SLEEP MEDICINE | Facility: CLINIC | Age: 51
End: 2021-06-22

## 2021-06-22 DIAGNOSIS — G47.33 OSA (OBSTRUCTIVE SLEEP APNEA): Primary | ICD-10-CM

## 2021-06-24 ENCOUNTER — PATIENT MESSAGE (OUTPATIENT)
Dept: INTERNAL MEDICINE | Facility: CLINIC | Age: 51
End: 2021-06-24

## 2021-06-24 ENCOUNTER — OFFICE VISIT (OUTPATIENT)
Dept: INTERNAL MEDICINE | Facility: CLINIC | Age: 51
End: 2021-06-24
Payer: MEDICAID

## 2021-06-24 ENCOUNTER — PATIENT MESSAGE (OUTPATIENT)
Dept: SLEEP MEDICINE | Facility: CLINIC | Age: 51
End: 2021-06-24

## 2021-06-24 VITALS
BODY MASS INDEX: 50.01 KG/M2 | HEART RATE: 84 BPM | SYSTOLIC BLOOD PRESSURE: 123 MMHG | DIASTOLIC BLOOD PRESSURE: 86 MMHG | WEIGHT: 293 LBS | TEMPERATURE: 98 F

## 2021-06-24 DIAGNOSIS — I10 ESSENTIAL HYPERTENSION: ICD-10-CM

## 2021-06-24 DIAGNOSIS — Z86.16 HISTORY OF COVID-19: ICD-10-CM

## 2021-06-24 DIAGNOSIS — E66.01 CLASS 3 SEVERE OBESITY DUE TO EXCESS CALORIES WITH SERIOUS COMORBIDITY AND BODY MASS INDEX (BMI) OF 45.0 TO 49.9 IN ADULT: ICD-10-CM

## 2021-06-24 DIAGNOSIS — F32.9 REACTIVE DEPRESSION: Primary | ICD-10-CM

## 2021-06-24 PROCEDURE — 99999 PR PBB SHADOW E&M-EST. PATIENT-LVL IV: CPT | Mod: PBBFAC,,, | Performed by: INTERNAL MEDICINE

## 2021-06-24 PROCEDURE — 99214 OFFICE O/P EST MOD 30 MIN: CPT | Mod: PBBFAC | Performed by: INTERNAL MEDICINE

## 2021-06-24 PROCEDURE — 99999 PR PBB SHADOW E&M-EST. PATIENT-LVL IV: ICD-10-PCS | Mod: PBBFAC,,, | Performed by: INTERNAL MEDICINE

## 2021-06-24 PROCEDURE — 99214 OFFICE O/P EST MOD 30 MIN: CPT | Mod: S$PBB,,, | Performed by: INTERNAL MEDICINE

## 2021-06-24 PROCEDURE — 99214 PR OFFICE/OUTPT VISIT, EST, LEVL IV, 30-39 MIN: ICD-10-PCS | Mod: S$PBB,,, | Performed by: INTERNAL MEDICINE

## 2021-06-28 ENCOUNTER — CLINICAL SUPPORT (OUTPATIENT)
Dept: REHABILITATION | Facility: HOSPITAL | Age: 51
End: 2021-06-28
Attending: PODIATRIST
Payer: MEDICAID

## 2021-06-28 DIAGNOSIS — M79.671 HEEL PAIN, BILATERAL: ICD-10-CM

## 2021-06-28 DIAGNOSIS — M24.573 EQUINUS CONTRACTURE OF ANKLE: ICD-10-CM

## 2021-06-28 DIAGNOSIS — R26.89 IMPAIRED GAIT AND MOBILITY: ICD-10-CM

## 2021-06-28 DIAGNOSIS — M79.672 HEEL PAIN, BILATERAL: ICD-10-CM

## 2021-06-28 DIAGNOSIS — M72.2 PLANTAR FASCIITIS: ICD-10-CM

## 2021-06-28 PROCEDURE — 97161 PT EVAL LOW COMPLEX 20 MIN: CPT | Mod: PN

## 2021-06-28 PROCEDURE — 97140 MANUAL THERAPY 1/> REGIONS: CPT | Mod: PN

## 2021-06-29 PROBLEM — M79.672 HEEL PAIN, BILATERAL: Status: ACTIVE | Noted: 2021-06-29

## 2021-06-29 PROBLEM — R26.89 IMPAIRED GAIT AND MOBILITY: Status: ACTIVE | Noted: 2021-06-29

## 2021-06-29 PROBLEM — M79.671 HEEL PAIN, BILATERAL: Status: ACTIVE | Noted: 2021-06-29

## 2021-07-14 ENCOUNTER — CLINICAL SUPPORT (OUTPATIENT)
Dept: REHABILITATION | Facility: HOSPITAL | Age: 51
End: 2021-07-14
Attending: PODIATRIST
Payer: MEDICAID

## 2021-07-14 DIAGNOSIS — M79.671 HEEL PAIN, BILATERAL: ICD-10-CM

## 2021-07-14 DIAGNOSIS — M79.672 HEEL PAIN, BILATERAL: ICD-10-CM

## 2021-07-14 DIAGNOSIS — R26.89 IMPAIRED GAIT AND MOBILITY: ICD-10-CM

## 2021-07-14 PROCEDURE — 97110 THERAPEUTIC EXERCISES: CPT | Mod: PN

## 2021-07-15 ENCOUNTER — PATIENT MESSAGE (OUTPATIENT)
Dept: INTERNAL MEDICINE | Facility: CLINIC | Age: 51
End: 2021-07-15

## 2021-07-28 ENCOUNTER — CLINICAL SUPPORT (OUTPATIENT)
Dept: REHABILITATION | Facility: HOSPITAL | Age: 51
End: 2021-07-28
Payer: MEDICAID

## 2021-07-28 DIAGNOSIS — R26.89 IMPAIRED GAIT AND MOBILITY: ICD-10-CM

## 2021-07-28 DIAGNOSIS — M79.671 HEEL PAIN, BILATERAL: ICD-10-CM

## 2021-07-28 DIAGNOSIS — M79.672 HEEL PAIN, BILATERAL: ICD-10-CM

## 2021-07-28 PROCEDURE — 97110 THERAPEUTIC EXERCISES: CPT | Mod: PN

## 2021-08-04 ENCOUNTER — CLINICAL SUPPORT (OUTPATIENT)
Dept: REHABILITATION | Facility: HOSPITAL | Age: 51
End: 2021-08-04
Payer: MEDICAID

## 2021-08-04 DIAGNOSIS — M79.671 HEEL PAIN, BILATERAL: ICD-10-CM

## 2021-08-04 DIAGNOSIS — M79.672 HEEL PAIN, BILATERAL: ICD-10-CM

## 2021-08-04 DIAGNOSIS — R26.89 IMPAIRED GAIT AND MOBILITY: ICD-10-CM

## 2021-08-04 PROCEDURE — 97110 THERAPEUTIC EXERCISES: CPT | Mod: PN

## 2021-08-10 ENCOUNTER — CLINICAL SUPPORT (OUTPATIENT)
Dept: REHABILITATION | Facility: HOSPITAL | Age: 51
End: 2021-08-10
Payer: MEDICAID

## 2021-08-10 DIAGNOSIS — M79.671 HEEL PAIN, BILATERAL: ICD-10-CM

## 2021-08-10 DIAGNOSIS — R26.89 IMPAIRED GAIT AND MOBILITY: ICD-10-CM

## 2021-08-10 DIAGNOSIS — M79.672 HEEL PAIN, BILATERAL: ICD-10-CM

## 2021-08-10 PROCEDURE — 97110 THERAPEUTIC EXERCISES: CPT | Mod: PN

## 2021-08-18 ENCOUNTER — CLINICAL SUPPORT (OUTPATIENT)
Dept: REHABILITATION | Facility: HOSPITAL | Age: 51
End: 2021-08-18
Payer: MEDICAID

## 2021-08-18 DIAGNOSIS — M79.671 HEEL PAIN, BILATERAL: ICD-10-CM

## 2021-08-18 DIAGNOSIS — R26.89 IMPAIRED GAIT AND MOBILITY: ICD-10-CM

## 2021-08-18 DIAGNOSIS — M79.672 HEEL PAIN, BILATERAL: ICD-10-CM

## 2021-08-18 PROCEDURE — 97110 THERAPEUTIC EXERCISES: CPT | Mod: PN

## 2021-08-23 ENCOUNTER — CLINICAL SUPPORT (OUTPATIENT)
Dept: REHABILITATION | Facility: HOSPITAL | Age: 51
End: 2021-08-23
Payer: MEDICAID

## 2021-08-23 DIAGNOSIS — M79.672 HEEL PAIN, BILATERAL: ICD-10-CM

## 2021-08-23 DIAGNOSIS — R26.89 IMPAIRED GAIT AND MOBILITY: ICD-10-CM

## 2021-08-23 DIAGNOSIS — M79.671 HEEL PAIN, BILATERAL: ICD-10-CM

## 2021-08-23 PROCEDURE — 97110 THERAPEUTIC EXERCISES: CPT | Mod: PN

## 2021-09-15 ENCOUNTER — TELEPHONE (OUTPATIENT)
Dept: REHABILITATION | Facility: HOSPITAL | Age: 51
End: 2021-09-15

## 2021-09-20 ENCOUNTER — CLINICAL SUPPORT (OUTPATIENT)
Dept: REHABILITATION | Facility: HOSPITAL | Age: 51
End: 2021-09-20
Payer: MEDICAID

## 2021-09-20 DIAGNOSIS — M79.672 HEEL PAIN, BILATERAL: ICD-10-CM

## 2021-09-20 DIAGNOSIS — R26.89 IMPAIRED GAIT AND MOBILITY: ICD-10-CM

## 2021-09-20 DIAGNOSIS — M79.671 HEEL PAIN, BILATERAL: ICD-10-CM

## 2021-09-20 PROCEDURE — 97110 THERAPEUTIC EXERCISES: CPT | Mod: PN

## 2021-09-22 ENCOUNTER — TELEPHONE (OUTPATIENT)
Dept: INTERNAL MEDICINE | Facility: CLINIC | Age: 51
End: 2021-09-22

## 2021-09-22 ENCOUNTER — LAB VISIT (OUTPATIENT)
Dept: PRIMARY CARE CLINIC | Facility: OTHER | Age: 51
End: 2021-09-22
Attending: INTERNAL MEDICINE
Payer: MEDICAID

## 2021-09-22 ENCOUNTER — CLINICAL SUPPORT (OUTPATIENT)
Dept: REHABILITATION | Facility: HOSPITAL | Age: 51
End: 2021-09-22
Payer: MEDICAID

## 2021-09-22 DIAGNOSIS — R26.89 IMPAIRED GAIT AND MOBILITY: ICD-10-CM

## 2021-09-22 DIAGNOSIS — M79.672 HEEL PAIN, BILATERAL: ICD-10-CM

## 2021-09-22 DIAGNOSIS — R30.0 DYSURIA: ICD-10-CM

## 2021-09-22 DIAGNOSIS — J01.90 ACUTE SINUSITIS, RECURRENCE NOT SPECIFIED, UNSPECIFIED LOCATION: ICD-10-CM

## 2021-09-22 DIAGNOSIS — Z20.822 ENCOUNTER FOR LABORATORY TESTING FOR COVID-19 VIRUS: ICD-10-CM

## 2021-09-22 DIAGNOSIS — M79.671 HEEL PAIN, BILATERAL: ICD-10-CM

## 2021-09-22 LAB
SARS-COV-2 RNA RESP QL NAA+PROBE: NOT DETECTED
SARS-COV-2- CYCLE NUMBER: NORMAL

## 2021-09-22 PROCEDURE — 97110 THERAPEUTIC EXERCISES: CPT | Mod: PN

## 2021-09-22 PROCEDURE — U0003 INFECTIOUS AGENT DETECTION BY NUCLEIC ACID (DNA OR RNA); SEVERE ACUTE RESPIRATORY SYNDROME CORONAVIRUS 2 (SARS-COV-2) (CORONAVIRUS DISEASE [COVID-19]), AMPLIFIED PROBE TECHNIQUE, MAKING USE OF HIGH THROUGHPUT TECHNOLOGIES AS DESCRIBED BY CMS-2020-01-R: HCPCS | Performed by: INTERNAL MEDICINE

## 2021-09-22 RX ORDER — AMOXICILLIN AND CLAVULANATE POTASSIUM 875; 125 MG/1; MG/1
1 TABLET, FILM COATED ORAL 2 TIMES DAILY
Qty: 14 TABLET | Refills: 0 | Status: SHIPPED | OUTPATIENT
Start: 2021-09-22 | End: 2021-09-29

## 2021-09-22 RX ORDER — FLUCONAZOLE 150 MG/1
150 TABLET ORAL DAILY
Qty: 2 TABLET | Refills: 3 | Status: SHIPPED | OUTPATIENT
Start: 2021-09-22 | End: 2022-06-01 | Stop reason: SDUPTHER

## 2021-09-23 ENCOUNTER — PATIENT OUTREACH (OUTPATIENT)
Dept: ADMINISTRATIVE | Facility: OTHER | Age: 51
End: 2021-09-23

## 2021-09-23 PROBLEM — M79.672 HEEL PAIN, BILATERAL: Status: RESOLVED | Noted: 2021-06-29 | Resolved: 2021-09-23

## 2021-09-23 PROBLEM — R26.89 IMPAIRED GAIT AND MOBILITY: Status: RESOLVED | Noted: 2021-06-29 | Resolved: 2021-09-23

## 2021-09-23 PROBLEM — M79.671 HEEL PAIN, BILATERAL: Status: RESOLVED | Noted: 2021-06-29 | Resolved: 2021-09-23

## 2021-09-27 ENCOUNTER — OFFICE VISIT (OUTPATIENT)
Dept: PODIATRY | Facility: CLINIC | Age: 51
End: 2021-09-27
Payer: MEDICAID

## 2021-09-27 VITALS
HEART RATE: 85 BPM | DIASTOLIC BLOOD PRESSURE: 84 MMHG | OXYGEN SATURATION: 98 % | BODY MASS INDEX: 47.09 KG/M2 | HEIGHT: 66 IN | WEIGHT: 293 LBS | SYSTOLIC BLOOD PRESSURE: 130 MMHG

## 2021-09-27 DIAGNOSIS — E66.01 MORBID OBESITY: ICD-10-CM

## 2021-09-27 DIAGNOSIS — M72.2 PLANTAR FASCIITIS: Primary | ICD-10-CM

## 2021-09-27 DIAGNOSIS — B35.1 ONYCHOMYCOSIS DUE TO DERMATOPHYTE: ICD-10-CM

## 2021-09-27 DIAGNOSIS — M24.573 EQUINUS CONTRACTURE OF ANKLE: ICD-10-CM

## 2021-09-27 PROCEDURE — 20550 PR INJECT TENDON SHEATH/LIGAMENT: ICD-10-PCS | Mod: 50,S$PBB,, | Performed by: PODIATRIST

## 2021-09-27 PROCEDURE — 20550 NJX 1 TENDON SHEATH/LIGAMENT: CPT | Mod: 50,S$PBB,, | Performed by: PODIATRIST

## 2021-09-27 PROCEDURE — 99999 PR PBB SHADOW E&M-EST. PATIENT-LVL III: ICD-10-PCS | Mod: PBBFAC,,, | Performed by: PODIATRIST

## 2021-09-27 PROCEDURE — 20550 NJX 1 TENDON SHEATH/LIGAMENT: CPT | Mod: PBBFAC,PN | Performed by: PODIATRIST

## 2021-09-27 PROCEDURE — 99213 PR OFFICE/OUTPT VISIT, EST, LEVL III, 20-29 MIN: ICD-10-PCS | Mod: 25,S$PBB,, | Performed by: PODIATRIST

## 2021-09-27 PROCEDURE — 99999 PR PBB SHADOW E&M-EST. PATIENT-LVL III: CPT | Mod: PBBFAC,,, | Performed by: PODIATRIST

## 2021-09-27 PROCEDURE — 99213 OFFICE O/P EST LOW 20 MIN: CPT | Mod: 25,S$PBB,, | Performed by: PODIATRIST

## 2021-09-27 PROCEDURE — 99213 OFFICE O/P EST LOW 20 MIN: CPT | Mod: PBBFAC,PN,25 | Performed by: PODIATRIST

## 2021-09-27 RX ORDER — TRIAMCINOLONE ACETONIDE 40 MG/ML
40 INJECTION, SUSPENSION INTRA-ARTICULAR; INTRAMUSCULAR
Status: COMPLETED | OUTPATIENT
Start: 2021-09-27 | End: 2021-09-27

## 2021-09-27 RX ORDER — CICLOPIROX 80 MG/ML
SOLUTION TOPICAL NIGHTLY
Qty: 6.6 ML | Refills: 3 | Status: SHIPPED | OUTPATIENT
Start: 2021-09-27 | End: 2022-06-29

## 2021-09-27 RX ADMIN — TRIAMCINOLONE ACETONIDE 40 MG: 40 INJECTION, SUSPENSION INTRA-ARTICULAR; INTRAMUSCULAR at 08:09

## 2021-10-13 ENCOUNTER — TELEPHONE (OUTPATIENT)
Dept: INTERNAL MEDICINE | Facility: CLINIC | Age: 51
End: 2021-10-13

## 2021-10-13 RX ORDER — AZITHROMYCIN 250 MG/1
TABLET, FILM COATED ORAL
Qty: 6 TABLET | Refills: 0 | Status: SHIPPED | OUTPATIENT
Start: 2021-10-13 | End: 2021-10-18

## 2022-01-28 ENCOUNTER — TELEPHONE (OUTPATIENT)
Dept: INTERNAL MEDICINE | Facility: CLINIC | Age: 52
End: 2022-01-28
Payer: MEDICAID

## 2022-01-28 ENCOUNTER — PATIENT MESSAGE (OUTPATIENT)
Dept: INTERNAL MEDICINE | Facility: CLINIC | Age: 52
End: 2022-01-28
Payer: MEDICAID

## 2022-01-28 DIAGNOSIS — H35.9 MACULAR DISORDER: ICD-10-CM

## 2022-01-28 DIAGNOSIS — H35.9 RETINA DISORDER: Primary | ICD-10-CM

## 2022-01-28 NOTE — TELEPHONE ENCOUNTER
"Pt was seen out of town for eye exam. "Freckles" were found behind the right eye and due to pt's family hx of cancer eye doctor is recommending pt see Opthalmology. Pt has HMO and referral is required through PCP so she is requesting you enter one to:     [facility] [address] # [phone]   Fax # [phone]     She is unable to schedule appt until referral in entered and approved through [facility] pre service and she has authorization number.  She is concerned b/c she has been changing glasses more often and has large changes in vision. Her dx is called Retina/macula. She will be in [place] for 5 weeks      I called her and explained the external referral process  She VU    "

## 2022-01-28 NOTE — TELEPHONE ENCOUNTER
"----- Message from Hali Mcguire sent at 1/28/2022 12:13 PM CST -----  Contact: [phone]  Pt was seen out of town for eye exam. "Freckles" were found behind the right eye and due to pt's family hx of cancer eye doctor is recommending pt see Opthalmology. Pt has HMO and referral is required through PCP so she is requesting you enter one to:    [facility] [address] # [phone]   Fax # [phone]    She is unable to schedule appt until referral in entered and approved through [facility] pre service and she has authorization number. Can you please call her to discuss or when completed. She is concerned b/c she has been changing glasses more often and has large changes in vision. Her dx is called Retina/macula. She will be in [place] for 5 weeks.     "

## 2022-01-28 NOTE — TELEPHONE ENCOUNTER
See email.  Do we refer her to opthamalogist ?    Not sure if seeing someone in [place] or here in 5 weeks.  I was going to let her know what kind of dr she needs to see and she can get with medicaid on who is covered.

## 2022-02-02 NOTE — TELEPHONE ENCOUNTER
Pt called and asked about a authorization for a referral. The referral was faxed to [facility] [place] on 01/31/2022. Faxed to [phone].     Referral: [id number] (Authorized)       Expires: 2/28/2023       Gave pt these numbers, as she needed the referral number to schedule appt. And her insurance needed this to proceed.     Pt states this is important because she has freckles behind her eye

## 2022-02-16 ENCOUNTER — PATIENT MESSAGE (OUTPATIENT)
Dept: RESEARCH | Facility: HOSPITAL | Age: 52
End: 2022-02-16
Payer: MEDICAID

## 2022-02-25 ENCOUNTER — PATIENT OUTREACH (OUTPATIENT)
Dept: ADMINISTRATIVE | Facility: HOSPITAL | Age: 52
End: 2022-02-25
Payer: MEDICAID

## 2022-02-25 ENCOUNTER — PATIENT MESSAGE (OUTPATIENT)
Dept: ADMINISTRATIVE | Facility: HOSPITAL | Age: 52
End: 2022-02-25
Payer: MEDICAID

## 2022-03-07 ENCOUNTER — TELEPHONE (OUTPATIENT)
Dept: INTERNAL MEDICINE | Facility: CLINIC | Age: 52
End: 2022-03-07
Payer: MEDICAID

## 2022-03-07 DIAGNOSIS — H35.9 RETINA DISORDER: Primary | ICD-10-CM

## 2022-03-07 DIAGNOSIS — H35.9 MACULAR DISORDER: ICD-10-CM

## 2022-03-07 NOTE — TELEPHONE ENCOUNTER
Called pt and scheduled annual appt for 06/01/2022 at 10:30 am in office with     Pt also requested referral  for Optometrist . She stated that she was told she has freckles behind her eye and needs to get it checked out.

## 2022-03-07 NOTE — TELEPHONE ENCOUNTER
----- Message from Fidelia Rollins sent at 3/7/2022  9:08 AM CST -----  Contact: Pt [phone]  No blue slot available to schedule an appointment for the patient.  Patient is established with which PCP: [facility]  Reason for the visit: Annual   Would the patient like a call back, or a response through their [facility] portal?:  Call Back

## 2022-03-07 NOTE — TELEPHONE ENCOUNTER
Ophthalmology referral has been ordered.  Please confirm referral patient requires.  Optometry referral was requested.

## 2022-03-07 NOTE — TELEPHONE ENCOUNTER
----- Message from Fidelia Rollins sent at 3/7/2022  9:06 AM CST -----  Contact: Pt [phone]  Pt calling to a referral for Optometrist . She stated that she was told she has freckles behind her eye and needs to get it checked out.     Please call and advise

## 2022-03-08 ENCOUNTER — TELEPHONE (OUTPATIENT)
Dept: INTERNAL MEDICINE | Facility: CLINIC | Age: 52
End: 2022-03-08
Payer: MEDICAID

## 2022-03-08 NOTE — TELEPHONE ENCOUNTER
[facility]     Optometrists examine, diagnose, and treat patients' eyes. Ophthalmologists are eye doctors who perform medical and surgical treatments for eye conditions.     Referral for opthalmology sent to referral team

## 2022-03-08 NOTE — TELEPHONE ENCOUNTER
Pt called and was informed of referral that was ordered, and that our referral team will call to schedule. Pt asked if we could fax the referral to [phone] so that physician can know it was ordered. Not sure the reasoning, this is what the pt asked.     Referral faxed on  03/08/2022

## 2022-03-14 ENCOUNTER — PATIENT MESSAGE (OUTPATIENT)
Dept: ADMINISTRATIVE | Facility: HOSPITAL | Age: 52
End: 2022-03-14
Payer: MEDICAID

## 2022-03-14 DIAGNOSIS — Z12.31 OTHER SCREENING MAMMOGRAM: ICD-10-CM

## 2022-03-15 ENCOUNTER — PATIENT OUTREACH (OUTPATIENT)
Dept: ADMINISTRATIVE | Facility: HOSPITAL | Age: 52
End: 2022-03-15
Payer: MEDICAID

## 2022-03-15 DIAGNOSIS — Z12.11 COLON CANCER SCREENING: Primary | ICD-10-CM

## 2022-03-25 ENCOUNTER — OFFICE VISIT (OUTPATIENT)
Dept: OPHTHALMOLOGY | Facility: CLINIC | Age: 52
End: 2022-03-25
Payer: MEDICAID

## 2022-03-25 DIAGNOSIS — H04.123 DRY EYE SYNDROME OF BOTH EYES: ICD-10-CM

## 2022-03-25 DIAGNOSIS — H35.9 RETINA DISORDER: ICD-10-CM

## 2022-03-25 DIAGNOSIS — H43.823 VITREOMACULAR ADHESION OF BOTH EYES: ICD-10-CM

## 2022-03-25 DIAGNOSIS — D31.31 CHOROIDAL NEVUS, RIGHT EYE: Primary | ICD-10-CM

## 2022-03-25 DIAGNOSIS — H35.9 MACULAR DISORDER: ICD-10-CM

## 2022-03-25 PROCEDURE — 99999 PR PBB SHADOW E&M-EST. PATIENT-LVL III: CPT | Mod: PBBFAC,,, | Performed by: OPHTHALMOLOGY

## 2022-03-25 PROCEDURE — 1159F MED LIST DOCD IN RCRD: CPT | Mod: CPTII,,, | Performed by: OPHTHALMOLOGY

## 2022-03-25 PROCEDURE — 1160F PR REVIEW ALL MEDS BY PRESCRIBER/CLIN PHARMACIST DOCUMENTED: ICD-10-PCS | Mod: CPTII,,, | Performed by: OPHTHALMOLOGY

## 2022-03-25 PROCEDURE — 2023F PR DILATED RETINAL EXAM W/O EVID OF RETINOPATHY: ICD-10-PCS | Mod: CPTII,,, | Performed by: OPHTHALMOLOGY

## 2022-03-25 PROCEDURE — 99204 OFFICE O/P NEW MOD 45 MIN: CPT | Mod: S$PBB,,, | Performed by: OPHTHALMOLOGY

## 2022-03-25 PROCEDURE — 1159F PR MEDICATION LIST DOCUMENTED IN MEDICAL RECORD: ICD-10-PCS | Mod: CPTII,,, | Performed by: OPHTHALMOLOGY

## 2022-03-25 PROCEDURE — 99999 PR PBB SHADOW E&M-EST. PATIENT-LVL III: ICD-10-PCS | Mod: PBBFAC,,, | Performed by: OPHTHALMOLOGY

## 2022-03-25 PROCEDURE — 92134 OCT, RETINA - OU - BOTH EYES: ICD-10-PCS | Mod: 26,S$PBB,, | Performed by: OPHTHALMOLOGY

## 2022-03-25 PROCEDURE — 1160F RVW MEDS BY RX/DR IN RCRD: CPT | Mod: CPTII,,, | Performed by: OPHTHALMOLOGY

## 2022-03-25 PROCEDURE — 92134 CPTRZ OPH DX IMG PST SGM RTA: CPT | Mod: PBBFAC | Performed by: OPHTHALMOLOGY

## 2022-03-25 PROCEDURE — 2023F DILAT RTA XM W/O RTNOPTHY: CPT | Mod: CPTII,,, | Performed by: OPHTHALMOLOGY

## 2022-03-25 PROCEDURE — 99204 PR OFFICE/OUTPT VISIT, NEW, LEVL IV, 45-59 MIN: ICD-10-PCS | Mod: S$PBB,,, | Performed by: OPHTHALMOLOGY

## 2022-03-25 PROCEDURE — 92250 FUNDUS PHOTOGRAPHY W/I&R: CPT | Mod: PBBFAC | Performed by: OPHTHALMOLOGY

## 2022-03-25 PROCEDURE — 99213 OFFICE O/P EST LOW 20 MIN: CPT | Mod: PBBFAC | Performed by: OPHTHALMOLOGY

## 2022-03-25 NOTE — PROGRESS NOTES
"HPI     Eye concerns due to Freckle behind eye seen by OD in [place]   DELFIN-     Pt sts Provider told her she needed to be seen soon for eval for the   freckle behind OD. She also noticed at the exam for glasses she could not   focus well in OD during the MRX check. RX went from 1.75 to 2.25 less than   1 year. Had covid w/ pneumonia in July 2020 and had to change glasses 3   times since then. When closing eyes at night time see's a "bright light   like a flash light"  She admits to having some pain/irritation possibly from dryness &   allergies   (?)Flashes (-)Floaters  (-)Photophobia  (-)Glare     EYEMEDS:   NONE     No past eye sx     Cancer runs in the family   No eye disease     Last edited by Sonia Spencer on 3/25/2022  9:08 AM. (History)         A/P    ICD-10-CM ICD-9-CM   1. Choroidal nevus, right eye  D31.31 224.6   2. Vitreomacular adhesion of both eyes  H43.823 379.27   3. Dry eye syndrome of both eyes  H04.123 375.15       1. Choroidal nevus, right eye  Prev noted to have nevus in [place]  No symptoms, small 1DD flat nevus but inftemp to fovea, no IRF/SRF no heme, has drusen  Plan: Observation for now, will recheck 6 months, will obtain baseline photos today     2. Vitreomacular adhesion of both eyes  VMA OU, no RT/RD  Plan: Observation     3. Dry eye syndrome of both eyes  Mild dry eye, mild pterygium  Rec ATs prn    RTC 6 months DFE/OCTm OU    I saw and examined the patient and reviewed in detail the findings documented. The final examination findings, image interpretations, and plan as documented in the record represent my personal judgment and conclusions.    Marc Tinoco MD  Vitreoretinal Surgery   [facility]  "

## 2022-04-14 ENCOUNTER — PATIENT MESSAGE (OUTPATIENT)
Dept: ADMINISTRATIVE | Facility: HOSPITAL | Age: 52
End: 2022-04-14
Payer: MEDICAID

## 2022-04-26 ENCOUNTER — PATIENT MESSAGE (OUTPATIENT)
Dept: ADMINISTRATIVE | Facility: HOSPITAL | Age: 52
End: 2022-04-26
Payer: MEDICAID

## 2022-04-30 LAB — NONINV COLON CA DNA+OCC BLD SCRN STL QL: NEGATIVE

## 2022-05-30 ENCOUNTER — PATIENT MESSAGE (OUTPATIENT)
Dept: ADMINISTRATIVE | Facility: HOSPITAL | Age: 52
End: 2022-05-30
Payer: MEDICAID

## 2022-06-01 ENCOUNTER — OFFICE VISIT (OUTPATIENT)
Dept: INTERNAL MEDICINE | Facility: CLINIC | Age: 52
End: 2022-06-01
Payer: MEDICAID

## 2022-06-01 ENCOUNTER — LAB VISIT (OUTPATIENT)
Dept: LAB | Facility: HOSPITAL | Age: 52
End: 2022-06-01
Attending: INTERNAL MEDICINE
Payer: MEDICAID

## 2022-06-01 VITALS
HEIGHT: 66 IN | WEIGHT: 293 LBS | RESPIRATION RATE: 21 BRPM | TEMPERATURE: 97 F | DIASTOLIC BLOOD PRESSURE: 96 MMHG | SYSTOLIC BLOOD PRESSURE: 130 MMHG | HEART RATE: 80 BPM | OXYGEN SATURATION: 98 % | BODY MASS INDEX: 47.09 KG/M2

## 2022-06-01 DIAGNOSIS — Z00.00 ROUTINE MEDICAL EXAM: ICD-10-CM

## 2022-06-01 DIAGNOSIS — Z00.00 ROUTINE MEDICAL EXAM: Primary | ICD-10-CM

## 2022-06-01 DIAGNOSIS — I89.0 LYMPHEDEMA: Primary | ICD-10-CM

## 2022-06-01 DIAGNOSIS — E66.01 CLASS 3 SEVERE OBESITY WITH BODY MASS INDEX (BMI) OF 45.0 TO 49.9 IN ADULT, UNSPECIFIED OBESITY TYPE, UNSPECIFIED WHETHER SERIOUS COMORBIDITY PRESENT: ICD-10-CM

## 2022-06-01 LAB
ALBUMIN SERPL BCP-MCNC: 4.2 G/DL (ref 3.5–5.2)
ALP SERPL-CCNC: 99 U/L (ref 55–135)
ALT SERPL W/O P-5'-P-CCNC: 53 U/L (ref 10–44)
ANION GAP SERPL CALC-SCNC: 10 MMOL/L (ref 8–16)
AST SERPL-CCNC: 29 U/L (ref 10–40)
BASOPHILS # BLD AUTO: 0.05 K/UL (ref 0–0.2)
BASOPHILS NFR BLD: 0.9 % (ref 0–1.9)
BILIRUB SERPL-MCNC: 0.5 MG/DL (ref 0.1–1)
BUN SERPL-MCNC: 16 MG/DL (ref 6–20)
CALCIUM SERPL-MCNC: 9.6 MG/DL (ref 8.7–10.5)
CHLORIDE SERPL-SCNC: 101 MMOL/L (ref 95–110)
CHOLEST SERPL-MCNC: 175 MG/DL (ref 120–199)
CHOLEST/HDLC SERPL: 3.1 {RATIO} (ref 2–5)
CO2 SERPL-SCNC: 27 MMOL/L (ref 23–29)
CREAT SERPL-MCNC: 0.6 MG/DL (ref 0.5–1.4)
DIFFERENTIAL METHOD: NORMAL
EOSINOPHIL # BLD AUTO: 0.1 K/UL (ref 0–0.5)
EOSINOPHIL NFR BLD: 2 % (ref 0–8)
ERYTHROCYTE [DISTWIDTH] IN BLOOD BY AUTOMATED COUNT: 14.3 % (ref 11.5–14.5)
EST. GFR  (AFRICAN AMERICAN): >60 ML/MIN/1.73 M^2
EST. GFR  (NON AFRICAN AMERICAN): >60 ML/MIN/1.73 M^2
ESTIMATED AVG GLUCOSE: 120 MG/DL (ref 68–131)
GLUCOSE SERPL-MCNC: 93 MG/DL (ref 70–110)
HBA1C MFR BLD: 5.8 % (ref 4–5.6)
HCT VFR BLD AUTO: 39.1 % (ref 37–48.5)
HDLC SERPL-MCNC: 56 MG/DL (ref 40–75)
HDLC SERPL: 32 % (ref 20–50)
HGB BLD-MCNC: 12.6 G/DL (ref 12–16)
IMM GRANULOCYTES # BLD AUTO: 0.02 K/UL (ref 0–0.04)
IMM GRANULOCYTES NFR BLD AUTO: 0.4 % (ref 0–0.5)
LDLC SERPL CALC-MCNC: 101.4 MG/DL (ref 63–159)
LYMPHOCYTES # BLD AUTO: 1.8 K/UL (ref 1–4.8)
LYMPHOCYTES NFR BLD: 32.5 % (ref 18–48)
MCH RBC QN AUTO: 27.9 PG (ref 27–31)
MCHC RBC AUTO-ENTMCNC: 32.2 G/DL (ref 32–36)
MCV RBC AUTO: 87 FL (ref 82–98)
MONOCYTES # BLD AUTO: 0.4 K/UL (ref 0.3–1)
MONOCYTES NFR BLD: 7.8 % (ref 4–15)
NEUTROPHILS # BLD AUTO: 3 K/UL (ref 1.8–7.7)
NEUTROPHILS NFR BLD: 56.4 % (ref 38–73)
NONHDLC SERPL-MCNC: 119 MG/DL
NRBC BLD-RTO: 0 /100 WBC
PLATELET # BLD AUTO: 195 K/UL (ref 150–450)
PMV BLD AUTO: 12 FL (ref 9.2–12.9)
POTASSIUM SERPL-SCNC: 3.8 MMOL/L (ref 3.5–5.1)
PROT SERPL-MCNC: 7.4 G/DL (ref 6–8.4)
RBC # BLD AUTO: 4.51 M/UL (ref 4–5.4)
SODIUM SERPL-SCNC: 138 MMOL/L (ref 136–145)
TRIGL SERPL-MCNC: 88 MG/DL (ref 30–150)
TSH SERPL DL<=0.005 MIU/L-ACNC: 1.17 UIU/ML (ref 0.4–4)
WBC # BLD AUTO: 5.39 K/UL (ref 3.9–12.7)

## 2022-06-01 PROCEDURE — 99215 OFFICE O/P EST HI 40 MIN: CPT | Mod: PBBFAC,PO | Performed by: INTERNAL MEDICINE

## 2022-06-01 PROCEDURE — 99396 PREV VISIT EST AGE 40-64: CPT | Mod: S$PBB,,, | Performed by: INTERNAL MEDICINE

## 2022-06-01 PROCEDURE — 99999 PR PBB SHADOW E&M-EST. PATIENT-LVL V: CPT | Mod: PBBFAC,,, | Performed by: INTERNAL MEDICINE

## 2022-06-01 PROCEDURE — 3075F SYST BP GE 130 - 139MM HG: CPT | Mod: CPTII,,, | Performed by: INTERNAL MEDICINE

## 2022-06-01 PROCEDURE — 3080F DIAST BP >= 90 MM HG: CPT | Mod: CPTII,,, | Performed by: INTERNAL MEDICINE

## 2022-06-01 PROCEDURE — 83036 HEMOGLOBIN GLYCOSYLATED A1C: CPT | Performed by: INTERNAL MEDICINE

## 2022-06-01 PROCEDURE — 84443 ASSAY THYROID STIM HORMONE: CPT | Performed by: INTERNAL MEDICINE

## 2022-06-01 PROCEDURE — 3008F PR BODY MASS INDEX (BMI) DOCUMENTED: ICD-10-PCS | Mod: CPTII,,, | Performed by: INTERNAL MEDICINE

## 2022-06-01 PROCEDURE — 3080F PR MOST RECENT DIASTOLIC BLOOD PRESSURE >= 90 MM HG: ICD-10-PCS | Mod: CPTII,,, | Performed by: INTERNAL MEDICINE

## 2022-06-01 PROCEDURE — 36415 COLL VENOUS BLD VENIPUNCTURE: CPT | Mod: PO | Performed by: INTERNAL MEDICINE

## 2022-06-01 PROCEDURE — 99396 PR PREVENTIVE VISIT,EST,40-64: ICD-10-PCS | Mod: S$PBB,,, | Performed by: INTERNAL MEDICINE

## 2022-06-01 PROCEDURE — 3044F PR MOST RECENT HEMOGLOBIN A1C LEVEL <7.0%: ICD-10-PCS | Mod: CPTII,,, | Performed by: INTERNAL MEDICINE

## 2022-06-01 PROCEDURE — 3008F BODY MASS INDEX DOCD: CPT | Mod: CPTII,,, | Performed by: INTERNAL MEDICINE

## 2022-06-01 PROCEDURE — 85025 COMPLETE CBC W/AUTO DIFF WBC: CPT | Performed by: INTERNAL MEDICINE

## 2022-06-01 PROCEDURE — 99999 PR PBB SHADOW E&M-EST. PATIENT-LVL V: ICD-10-PCS | Mod: PBBFAC,,, | Performed by: INTERNAL MEDICINE

## 2022-06-01 PROCEDURE — 80061 LIPID PANEL: CPT | Performed by: INTERNAL MEDICINE

## 2022-06-01 PROCEDURE — 4010F ACE/ARB THERAPY RXD/TAKEN: CPT | Mod: CPTII,,, | Performed by: INTERNAL MEDICINE

## 2022-06-01 PROCEDURE — 80053 COMPREHEN METABOLIC PANEL: CPT | Performed by: INTERNAL MEDICINE

## 2022-06-01 PROCEDURE — 3075F PR MOST RECENT SYSTOLIC BLOOD PRESS GE 130-139MM HG: ICD-10-PCS | Mod: CPTII,,, | Performed by: INTERNAL MEDICINE

## 2022-06-01 PROCEDURE — 4010F PR ACE/ARB THEARPY RXD/TAKEN: ICD-10-PCS | Mod: CPTII,,, | Performed by: INTERNAL MEDICINE

## 2022-06-01 PROCEDURE — 3044F HG A1C LEVEL LT 7.0%: CPT | Mod: CPTII,,, | Performed by: INTERNAL MEDICINE

## 2022-06-01 RX ORDER — FLUCONAZOLE 150 MG/1
150 TABLET ORAL DAILY
Qty: 2 TABLET | Refills: 3 | Status: SHIPPED | OUTPATIENT
Start: 2022-06-01 | End: 2023-12-06 | Stop reason: SDUPTHER

## 2022-06-01 RX ORDER — NYSTATIN 100000 [USP'U]/G
POWDER TOPICAL 2 TIMES DAILY
Qty: 60 G | Refills: 3 | Status: SHIPPED | OUTPATIENT
Start: 2022-06-01 | End: 2023-12-06 | Stop reason: SDUPTHER

## 2022-06-01 RX ORDER — HYDROCHLOROTHIAZIDE 25 MG/1
25 TABLET ORAL DAILY
Qty: 90 TABLET | Refills: 3 | Status: SHIPPED | OUTPATIENT
Start: 2022-06-01 | End: 2022-10-21 | Stop reason: SDUPTHER

## 2022-06-01 NOTE — PROGRESS NOTES
The patient is a 51 y.o. old female who presents to the office for a physical.    PAST MEDICAL HISTORY  Past Medical History:   Diagnosis Date    Colitis, nonspecific 2012    Hypertension 10/12/2012    Ovarian cyst     Tubal ectopic pregnancy        SURGICAL HISTORY:  Past Surgical History:   Procedure Laterality Date    CARPAL TUNNEL RELEASE Bilateral 2020    Procedure: RELEASE, CARPAL TUNNEL;  Surgeon: Aakash Zuluaga Jr., MD;  Location: [facility] OR;  Service: Orthopedics;  Laterality: Bilateral;    CARPAL TUNNEL RELEASE Left 2020    Procedure: RELEASE, CARPAL TUNNEL;  Surgeon: Aakash Zuluaga Jr., MD;  Location: [facility] OR;  Service: Orthopedics;  Laterality: Left;     SECTION, CLASSIC      CHOLECYSTECTOMY      ECTOPIC PREGNANCY SURGERY  age 25    TUBAL LIGATION           MEDS:  Medcard reviewed and updated    ALLERGIES: Allergy Card reviewed and updated    SOCIAL HISTORY:   The patient is a nonsmoker, denies alcohol or illicit drug use.    ROS:  GENERAL: No fever, chills, fatigability or weight loss.  SKIN: No rashes.  HEAD: No headaches or recent head trauma.  EYES: No photophobia, ocular pain or diplopia.  EARS: Denies ear pain, discharge or vertigo.  NOSE: No epistaxis.  Positive postnasal drip.  MOUTH & THROAT: No hoarseness or change in voice.   NODES: Denies swollen glands.  CHEST: Denies shortness of breath or wheezing.  Positive cough and sputum production.  CARDIOVASCULAR: Denies chest pain or palpitations.  ABDOMEN: Appetite fine. Denies diarrhea, constipation or blood in stool.  Positive periumbilical abdominal pain, no association with meals.  Pain is intermittent, last episode a couple of weeks ago.  URINARY: No dysuria or hematuria.  Positive incontinence.  MUSCULOSKELETAL: Positive knee pain bilaterally.  Positive plantar fasciitis. Denies back pain.  NEUROLOGIC: No history of seizures.  Poor balance.  ENDOCRINE: Denies polyuria or polydipsia.  PSYCHIATRIC: Denies mood  swings, depression, homicidal or suicidal thoughts.  Positive anxiety.    SCREENINGS:  Last cholesterol: 2021  Last colonoscopy: 2022  Last mammogram: 2020  Last Pap smear: 2020  Last tetanus: 2012  Last Pneumovax: none  Last eye exam: 2022  Last bone density: none  Last menstrual period: about a year ago    PE:   Vitals:  Vitals:    06/01/22 1100   BP: (!) 130/96   Pulse: 80   Resp: (!) 21   Temp: 97.2 °F (36.2 °C)       APPEARANCE: Obese, well developed, in no acute distress.    EYES: Sclerae anicteric. PERRL. EOMI.      EARS: TM's intact. No retraction or perforation.    NOSE: Mucosa pink. Airway clear.  MOUTH & THROAT: No tonsillar enlargement. No pharyngeal erythema or exudate. No stridor.  NECK: Supple, no thyromegaly.  NODES: No cervical, axillary or inguinal lymph node enlargement.  CHEST: Lungs clear to auscultation with unlabored respirations.  CARDIOVASCULAR: Normal S1, S2. No murmurs. No carotid bruits. No pedal edema.  ABDOMEN: Bowel sounds normal. Not distended. Soft. No tenderness or masses.   MUSCULOSKELETAL:  Normal gait, no cyanosis or clubbing.   SKIN: Normal skin turgor, warm and dry.  NEUROLOGIC: Cranial Nerves: Intact.  PSYCHIATRIC: The patient is oriented to person, place, and time and has a pleasant affect.        ASSESSMENT/PLAN:  Irina was seen today for annual exam.    Diagnoses and all orders for this visit:    Routine medical exam  -     CBC Auto Differential; Future  -     Comprehensive Metabolic Panel; Future  -     Lipid Panel; Future  -     TSH; Future  -     Hemoglobin A1C; Future  -     Urinalysis; Future  -     Urine culture; Future  -     Ambulatory referral/consult to Obstetrics / Gynecology; Future    Other orders  -     nystatin (MYCOSTATIN) powder; Apply topically 2 (two) times daily.  -     hydroCHLOROthiazide (HYDRODIURIL) 25 MG tablet; Take 1 tablet (25 mg total) by mouth once daily.  -     fluconazole (DIFLUCAN) 150 MG Tab; Take 1 tablet (150 mg total) by mouth once  daily.

## 2022-06-02 ENCOUNTER — TELEPHONE (OUTPATIENT)
Dept: BARIATRICS | Facility: CLINIC | Age: 52
End: 2022-06-02
Payer: MEDICAID

## 2022-06-02 ENCOUNTER — HOSPITAL ENCOUNTER (OUTPATIENT)
Dept: RADIOLOGY | Facility: HOSPITAL | Age: 52
Discharge: HOME | End: 2022-06-02
Attending: INTERNAL MEDICINE
Payer: MEDICAID

## 2022-06-02 DIAGNOSIS — Z12.31 OTHER SCREENING MAMMOGRAM: ICD-10-CM

## 2022-06-02 PROCEDURE — 77063 BREAST TOMOSYNTHESIS BI: CPT | Mod: 26,,, | Performed by: RADIOLOGY

## 2022-06-02 PROCEDURE — 77067 SCR MAMMO BI INCL CAD: CPT | Mod: 26,,, | Performed by: RADIOLOGY

## 2022-06-02 PROCEDURE — 77067 MAMMO DIGITAL SCREENING BILAT WITH TOMO: ICD-10-PCS | Mod: 26,,, | Performed by: RADIOLOGY

## 2022-06-02 PROCEDURE — 77067 SCR MAMMO BI INCL CAD: CPT | Mod: TC

## 2022-06-02 PROCEDURE — 77063 MAMMO DIGITAL SCREENING BILAT WITH TOMO: ICD-10-PCS | Mod: 26,,, | Performed by: RADIOLOGY

## 2022-06-13 ENCOUNTER — TELEPHONE (OUTPATIENT)
Dept: INTERNAL MEDICINE | Facility: CLINIC | Age: 52
End: 2022-06-13
Payer: MEDICAID

## 2022-06-13 RX ORDER — CIPROFLOXACIN 500 MG/1
500 TABLET ORAL EVERY 12 HOURS
Qty: 14 TABLET | Refills: 0 | Status: SHIPPED | OUTPATIENT
Start: 2022-06-13 | End: 2022-09-13 | Stop reason: SDUPTHER

## 2022-06-13 NOTE — TELEPHONE ENCOUNTER
Please inform patient that labs are significant for positive urinary tract infection.  A prescription for Cipro has been sent to the pharmacy electronically.  Also, hemoglobin A1c is mildly elevated, consistent with pre diabetes.  Recommend healthy diet and regular exercise.

## 2022-06-14 ENCOUNTER — PATIENT MESSAGE (OUTPATIENT)
Dept: INTERNAL MEDICINE | Facility: CLINIC | Age: 52
End: 2022-06-14
Payer: MEDICAID

## 2022-06-24 ENCOUNTER — PATIENT OUTREACH (OUTPATIENT)
Dept: ADMINISTRATIVE | Facility: HOSPITAL | Age: 52
End: 2022-06-24
Payer: MEDICAID

## 2022-06-24 ENCOUNTER — PATIENT MESSAGE (OUTPATIENT)
Dept: ADMINISTRATIVE | Facility: HOSPITAL | Age: 52
End: 2022-06-24
Payer: MEDICAID

## 2022-06-29 ENCOUNTER — OFFICE VISIT (OUTPATIENT)
Dept: OBSTETRICS AND GYNECOLOGY | Facility: CLINIC | Age: 52
End: 2022-06-29
Payer: MEDICAID

## 2022-06-29 VITALS
WEIGHT: 293 LBS | DIASTOLIC BLOOD PRESSURE: 88 MMHG | HEIGHT: 66 IN | SYSTOLIC BLOOD PRESSURE: 130 MMHG | BODY MASS INDEX: 47.09 KG/M2

## 2022-06-29 DIAGNOSIS — Z01.419 ENCOUNTER FOR GYNECOLOGICAL EXAMINATION WITHOUT ABNORMAL FINDING: Primary | ICD-10-CM

## 2022-06-29 DIAGNOSIS — Z78.0 POSTMENOPAUSAL: ICD-10-CM

## 2022-06-29 DIAGNOSIS — N95.2 VAGINAL ATROPHY: ICD-10-CM

## 2022-06-29 DIAGNOSIS — Z00.00 ROUTINE MEDICAL EXAM: ICD-10-CM

## 2022-06-29 PROCEDURE — 4010F ACE/ARB THERAPY RXD/TAKEN: CPT | Mod: CPTII,,, | Performed by: OBSTETRICS & GYNECOLOGY

## 2022-06-29 PROCEDURE — 1159F MED LIST DOCD IN RCRD: CPT | Mod: CPTII,,, | Performed by: OBSTETRICS & GYNECOLOGY

## 2022-06-29 PROCEDURE — 88175 CYTOPATH C/V AUTO FLUID REDO: CPT | Performed by: OBSTETRICS & GYNECOLOGY

## 2022-06-29 PROCEDURE — 99999 PR PBB SHADOW E&M-EST. PATIENT-LVL III: ICD-10-PCS | Mod: PBBFAC,,, | Performed by: OBSTETRICS & GYNECOLOGY

## 2022-06-29 PROCEDURE — 99386 PR PREVENTIVE VISIT,NEW,40-64: ICD-10-PCS | Mod: S$PBB,,, | Performed by: OBSTETRICS & GYNECOLOGY

## 2022-06-29 PROCEDURE — 3075F SYST BP GE 130 - 139MM HG: CPT | Mod: CPTII,,, | Performed by: OBSTETRICS & GYNECOLOGY

## 2022-06-29 PROCEDURE — 99999 PR PBB SHADOW E&M-EST. PATIENT-LVL III: CPT | Mod: PBBFAC,,, | Performed by: OBSTETRICS & GYNECOLOGY

## 2022-06-29 PROCEDURE — 3044F PR MOST RECENT HEMOGLOBIN A1C LEVEL <7.0%: ICD-10-PCS | Mod: CPTII,,, | Performed by: OBSTETRICS & GYNECOLOGY

## 2022-06-29 PROCEDURE — 3044F HG A1C LEVEL LT 7.0%: CPT | Mod: CPTII,,, | Performed by: OBSTETRICS & GYNECOLOGY

## 2022-06-29 PROCEDURE — 3075F PR MOST RECENT SYSTOLIC BLOOD PRESS GE 130-139MM HG: ICD-10-PCS | Mod: CPTII,,, | Performed by: OBSTETRICS & GYNECOLOGY

## 2022-06-29 PROCEDURE — 3008F BODY MASS INDEX DOCD: CPT | Mod: CPTII,,, | Performed by: OBSTETRICS & GYNECOLOGY

## 2022-06-29 PROCEDURE — 99386 PREV VISIT NEW AGE 40-64: CPT | Mod: S$PBB,,, | Performed by: OBSTETRICS & GYNECOLOGY

## 2022-06-29 PROCEDURE — 3079F PR MOST RECENT DIASTOLIC BLOOD PRESSURE 80-89 MM HG: ICD-10-PCS | Mod: CPTII,,, | Performed by: OBSTETRICS & GYNECOLOGY

## 2022-06-29 PROCEDURE — 3079F DIAST BP 80-89 MM HG: CPT | Mod: CPTII,,, | Performed by: OBSTETRICS & GYNECOLOGY

## 2022-06-29 PROCEDURE — 99213 OFFICE O/P EST LOW 20 MIN: CPT | Mod: PBBFAC | Performed by: OBSTETRICS & GYNECOLOGY

## 2022-06-29 PROCEDURE — 4010F PR ACE/ARB THEARPY RXD/TAKEN: ICD-10-PCS | Mod: CPTII,,, | Performed by: OBSTETRICS & GYNECOLOGY

## 2022-06-29 PROCEDURE — 3008F PR BODY MASS INDEX (BMI) DOCUMENTED: ICD-10-PCS | Mod: CPTII,,, | Performed by: OBSTETRICS & GYNECOLOGY

## 2022-06-29 PROCEDURE — 1159F PR MEDICATION LIST DOCUMENTED IN MEDICAL RECORD: ICD-10-PCS | Mod: CPTII,,, | Performed by: OBSTETRICS & GYNECOLOGY

## 2022-06-29 RX ORDER — OSPEMIFENE 60 MG/1
60 TABLET, FILM COATED ORAL DAILY
Qty: 30 TABLET | Refills: 12 | Status: SHIPPED | OUTPATIENT
Start: 2022-06-29 | End: 2023-12-06

## 2022-06-29 NOTE — PROGRESS NOTES
CC: Well woman exam    Irina Dugan is a 51 y.o. female  presents for a well woman exam.  LMP: No LMP recorded (lmp unknown). Patient is perimenopausal..  No cycle for one year  Insomnia.  Tylenol PM makes her sleepy  Last FSH one year ago was 78  Some hot flashes  Vaginal dryness.   Vaginal options can cause yeast infections    Past Medical History:   Diagnosis Date    Colitis, nonspecific 2012    Hypertension 10/12/2012    Ovarian cyst     Tubal ectopic pregnancy      Past Surgical History:   Procedure Laterality Date    CARPAL TUNNEL RELEASE Bilateral 2020    Procedure: RELEASE, CARPAL TUNNEL;  Surgeon: Aakash Zuluaga Jr., MD;  Location: [facility] OR;  Service: Orthopedics;  Laterality: Bilateral;    CARPAL TUNNEL RELEASE Left 2020    Procedure: RELEASE, CARPAL TUNNEL;  Surgeon: Aakash Zuluaga Jr., MD;  Location: [facility] OR;  Service: Orthopedics;  Laterality: Left;     SECTION, CLASSIC      CHOLECYSTECTOMY      ECTOPIC PREGNANCY SURGERY  age 25    TUBAL LIGATION       Social History     Socioeconomic History    Marital status: Single    Number of children: 2    Years of education: 15   Occupational History    Occupation: Dental assistant      Employer: [employer]   Tobacco Use    Smoking status: Never Smoker    Smokeless tobacco: Never Used   Substance and Sexual Activity    Alcohol use: Yes     Alcohol/week: 0.0 standard drinks     Comment: socially    Drug use: No    Sexual activity: Yes     Partners: Male     Birth control/protection: None   Social History Narrative    Single mom of 2 children, 19 & yr old.     Family History   Problem Relation Age of Onset    Hypertension Unknown     Cancer Unknown     Ovarian cysts Unknown     Breast cancer Mother     Colon cancer Neg Hx     Ovarian cancer Neg Hx     Allergic rhinitis Neg Hx     Angioedema Neg Hx     Atopy Neg Hx     Immunodeficiency Neg Hx     Rhinitis Neg Hx     Urticaria Neg Hx      "Eczema Neg Hx     Asthma Neg Hx     Allergies Neg Hx      OB History        3    Para   2    Term   2            AB   1    Living           SAB        IAB        Ectopic   1    Multiple        Live Births                     /88   Ht 5' 6" (1.676 m)   Wt (!) 137.3 kg (302 lb 11.1 oz)   LMP  (LMP Unknown) Comment: 2021  BMI 48.86 kg/m²       ROS:    ROS:  GENERAL: Denies weight gain or weight loss. Feeling well overall.   SKIN: Denies rash or lesions.   HEAD: Denies head injury or headache.   NODES: Denies enlarged lymph nodes.   CHEST: Denies chest pain or shortness of breath.   CARDIOVASCULAR: Denies palpitations or left sided chest pain.   ABDOMEN: No abdominal pain, constipation, diarrhea, nausea, vomiting or rectal bleeding.   URINARY: No frequency, dysuria, hematuria, or burning on urination.  REPRODUCTIVE: See HPI.   BREASTS: The patient performs breast self-examination and denies pain, lumps, or nipple discharge.   HEMATOLOGIC: No easy bruisability or excessive bleeding.   MUSCULOSKELETAL: Denies joint pain or swelling.   NEUROLOGIC: Denies syncope or weakness.   PSYCHIATRIC: Denies depression, anxiety or mood swings.    PHYSICAL EXAM:    APPEARANCE: Well nourished, well developed, in no acute distress.  AFFECT: WNL, alert and oriented x 3  SKIN: No acne or hirsutism  NECK: Neck symmetric without masses or thyromegaly  NODES: No inguinal, cervical, axillary, or femoral lymph node enlargement  CHEST: Good respiratory effect  ABDOMEN: Soft.  No tenderness or masses.  No hepatosplenomegaly.  No hernias.  BREASTS: Symmetrical, no skin changes or visible lesions.  No palpable masses, nipple discharge bilaterally.  PELVIC: Normal external genitalia without lesions.  Normal hair distribution.  Adequate perineal body, normal urethral meatus.  Vagina moist and well rugated without lesions or discharge.  Cervix pink, without lesions, discharge or tenderness.  No significant cystocele or " rectocele.  Bimanual exam shows uterus to be normal size, regular, mobile and nontender.  Adnexa without masses or tenderness.    EXTREMITIES: No edema.    Diagnosis       ICD-10-CM ICD-9-CM    1. Encounter for gynecological examination without abnormal finding  Z01.419 V72.31 Liquid-Based Pap Smear, Screening   2. Routine medical exam  Z00.00 V70.0 Ambulatory referral/consult to Obstetrics / Gynecology   3. Postmenopausal  Z78.0 V49.81 ospemifene (OSPHENA) 60 mg Tab   4. Vaginal atrophy  N95.2 627.3 ospemifene (OSPHENA) 60 mg Tab       A full discussion of the benefit-risk ratio of hormonal replacement therapy was carried out. Improvement in vasomotor and other climacteric symptoms is discussed, including possible improvements in sleep and mood. Reduction of risk for osteoporosis was explained. We discussed the study data showing increased risk of thrombo-embolic events such as myocardial infarction, stroke and also possibly breast cancer with estrogen replacement, and how this might affect her. The range of side effects such as breast tenderness, weight gain and including possible increases in lifetime risk of breast cancer and possible thrombotic complications was discussed. We also discussed ACOG's recommendation to use hormone replacement therapy for the relief of hot flashes alone and to be on the lowest dose possible for the shortest amount of time.  Alternative such as herbal and soy-based products were reviewed. All of her questions about this therapy were answered.    Patient was counseled today on A.C.S. Pap guidelines and recommendations for yearly pelvic exams, mammograms and monthly self breast exams; to see her PCP for other health maintenance.     Follow up in about 1 year (around 6/29/2023), or if symptoms worsen or fail to improve.

## 2022-07-11 DIAGNOSIS — I89.0 LYMPHEDEMA: Primary | ICD-10-CM

## 2022-08-12 ENCOUNTER — TELEPHONE (OUTPATIENT)
Dept: CARDIOLOGY | Facility: CLINIC | Age: 52
End: 2022-08-12
Payer: MEDICAID

## 2022-08-12 ENCOUNTER — HOSPITAL ENCOUNTER (OUTPATIENT)
Dept: CARDIOLOGY | Facility: HOSPITAL | Age: 52
Discharge: HOME | End: 2022-08-12
Attending: INTERNAL MEDICINE
Payer: MEDICAID

## 2022-08-12 ENCOUNTER — OFFICE VISIT (OUTPATIENT)
Dept: CARDIOLOGY | Facility: CLINIC | Age: 52
End: 2022-08-12
Payer: MEDICAID

## 2022-08-12 VITALS
DIASTOLIC BLOOD PRESSURE: 77 MMHG | SYSTOLIC BLOOD PRESSURE: 124 MMHG | HEIGHT: 66 IN | HEART RATE: 77 BPM | BODY MASS INDEX: 47.09 KG/M2 | OXYGEN SATURATION: 95 % | WEIGHT: 293 LBS

## 2022-08-12 DIAGNOSIS — I10 ESSENTIAL HYPERTENSION: ICD-10-CM

## 2022-08-12 DIAGNOSIS — I87.2 VENOUS STASIS DERMATITIS OF BOTH LOWER EXTREMITIES: ICD-10-CM

## 2022-08-12 DIAGNOSIS — I89.0 LYMPHEDEMA OF BOTH LOWER EXTREMITIES: Primary | ICD-10-CM

## 2022-08-12 DIAGNOSIS — E66.01 MORBID OBESITY: ICD-10-CM

## 2022-08-12 DIAGNOSIS — I83.11 VARICOSE VEINS OF BOTH LOWER EXTREMITIES WITH INFLAMMATION: ICD-10-CM

## 2022-08-12 DIAGNOSIS — I83.12 VARICOSE VEINS OF BOTH LOWER EXTREMITIES WITH INFLAMMATION: ICD-10-CM

## 2022-08-12 DIAGNOSIS — I89.0 LYMPHEDEMA: ICD-10-CM

## 2022-08-12 LAB
LEFT GIAC DIA: 0.28 MM
LEFT GREAT SAPHENOUS DISTAL THIGH DIA: 0.53 CM
LEFT GREAT SAPHENOUS JUNCTION DIA: 1.12 CM
LEFT GREAT SAPHENOUS KNEE DIA: 0.58 CM
LEFT GREAT SAPHENOUS KNEE REFLUX: 2700 MS
LEFT GREAT SAPHENOUS MIDDLE THIGH DIA: 0.72 CM
LEFT GREAT SAPHENOUS MIDDLE THIGH REFLUX: 2135 MS
LEFT GREAT SAPHENOUS PROXIMAL CALF DIA: 0.41 CM
RIGHT GREAT SAPHENOUS DISTAL THIGH DIA: 0.71 CM
RIGHT GREAT SAPHENOUS JUNCTION DIA: 1.34 CM
RIGHT GREAT SAPHENOUS KNEE DIA: 0.72 CM
RIGHT GREAT SAPHENOUS KNEE REFLUX: 2888 MS
RIGHT GREAT SAPHENOUS MIDDLE THIGH DIA: 0.71 CM
RIGHT GREAT SAPHENOUS MIDDLE THIGH REFLUX: 650 MS
RIGHT GREAT SAPHENOUS PROXIMAL CALF DIA: 0.41 CM
RIGHT SMALL SAPHENOUS KNEE DIA: 0.47 CM

## 2022-08-12 PROCEDURE — 1159F MED LIST DOCD IN RCRD: CPT | Mod: CPTII,,, | Performed by: INTERNAL MEDICINE

## 2022-08-12 PROCEDURE — 99205 OFFICE O/P NEW HI 60 MIN: CPT | Mod: S$PBB,,, | Performed by: INTERNAL MEDICINE

## 2022-08-12 PROCEDURE — 99999 PR PBB SHADOW E&M-EST. PATIENT-LVL V: CPT | Mod: PBBFAC,,, | Performed by: INTERNAL MEDICINE

## 2022-08-12 PROCEDURE — 3044F HG A1C LEVEL LT 7.0%: CPT | Mod: CPTII,,, | Performed by: INTERNAL MEDICINE

## 2022-08-12 PROCEDURE — 4010F ACE/ARB THERAPY RXD/TAKEN: CPT | Mod: CPTII,,, | Performed by: INTERNAL MEDICINE

## 2022-08-12 PROCEDURE — 3008F BODY MASS INDEX DOCD: CPT | Mod: CPTII,,, | Performed by: INTERNAL MEDICINE

## 2022-08-12 PROCEDURE — 99999 PR PBB SHADOW E&M-EST. PATIENT-LVL V: ICD-10-PCS | Mod: PBBFAC,,, | Performed by: INTERNAL MEDICINE

## 2022-08-12 PROCEDURE — 3044F PR MOST RECENT HEMOGLOBIN A1C LEVEL <7.0%: ICD-10-PCS | Mod: CPTII,,, | Performed by: INTERNAL MEDICINE

## 2022-08-12 PROCEDURE — 4010F PR ACE/ARB THEARPY RXD/TAKEN: ICD-10-PCS | Mod: CPTII,,, | Performed by: INTERNAL MEDICINE

## 2022-08-12 PROCEDURE — 93970 EXTREMITY STUDY: CPT | Mod: TC

## 2022-08-12 PROCEDURE — 1159F PR MEDICATION LIST DOCUMENTED IN MEDICAL RECORD: ICD-10-PCS | Mod: CPTII,,, | Performed by: INTERNAL MEDICINE

## 2022-08-12 PROCEDURE — 3078F DIAST BP <80 MM HG: CPT | Mod: CPTII,,, | Performed by: INTERNAL MEDICINE

## 2022-08-12 PROCEDURE — 93970 EXTREMITY STUDY: CPT | Mod: 26,,, | Performed by: INTERNAL MEDICINE

## 2022-08-12 PROCEDURE — 3074F SYST BP LT 130 MM HG: CPT | Mod: CPTII,,, | Performed by: INTERNAL MEDICINE

## 2022-08-12 PROCEDURE — 93970 CV US LOWER VENOUS INSUFFICIENCY BILATERAL (CUPID ONLY): ICD-10-PCS | Mod: 26,,, | Performed by: INTERNAL MEDICINE

## 2022-08-12 PROCEDURE — 3008F PR BODY MASS INDEX (BMI) DOCUMENTED: ICD-10-PCS | Mod: CPTII,,, | Performed by: INTERNAL MEDICINE

## 2022-08-12 PROCEDURE — 3078F PR MOST RECENT DIASTOLIC BLOOD PRESSURE < 80 MM HG: ICD-10-PCS | Mod: CPTII,,, | Performed by: INTERNAL MEDICINE

## 2022-08-12 PROCEDURE — 3074F PR MOST RECENT SYSTOLIC BLOOD PRESSURE < 130 MM HG: ICD-10-PCS | Mod: CPTII,,, | Performed by: INTERNAL MEDICINE

## 2022-08-12 PROCEDURE — 99215 OFFICE O/P EST HI 40 MIN: CPT | Mod: PBBFAC,25 | Performed by: INTERNAL MEDICINE

## 2022-08-12 PROCEDURE — 99205 PR OFFICE/OUTPT VISIT, NEW, LEVL V, 60-74 MIN: ICD-10-PCS | Mod: S$PBB,,, | Performed by: INTERNAL MEDICINE

## 2022-08-12 NOTE — PROGRESS NOTES
[facility]      Chief Complaint   Patient presents with    [facility]    Follow-up    Referral Authorization     R side of body stays swollen,per pt.    Leg Swelling    Foot Swelling       Patient ID: Irina Dugan is a 51 y.o. female with HTN, woman obesity, who presents for an initial appointment.  Pertinent history/events are as follows:     -Pt kindly referred by Dr. Kostas elam for evaluation of lymphedema.    HPI:  Ms. Dugan reports leg swelling for several years, which became worse over the past 1 year.  She has no claudication or tissue loss.  BLE Venous Reflux Study on 2022 demonstrated hemodynamically significant BLE venous reflux and no evidence of DVT.     Past Medical History:   Diagnosis Date    Colitis, nonspecific 2012    Hypertension 10/12/2012    Ovarian cyst     Tubal ectopic pregnancy      Past Surgical History:   Procedure Laterality Date    CARPAL TUNNEL RELEASE Bilateral 2020    Procedure: RELEASE, CARPAL TUNNEL;  Surgeon: Aakash Zuluaga Jr., MD;  Location: [facility] OR;  Service: Orthopedics;  Laterality: Bilateral;    CARPAL TUNNEL RELEASE Left 2020    Procedure: RELEASE, CARPAL TUNNEL;  Surgeon: Aakash Zuluaga Jr., MD;  Location: [facility] OR;  Service: Orthopedics;  Laterality: Left;     SECTION, CLASSIC      CHOLECYSTECTOMY      ECTOPIC PREGNANCY SURGERY  age 25    TUBAL LIGATION       Social History     Socioeconomic History    Marital status: Single    Number of children: 2    Years of education: 15   Occupational History    Occupation: Dental assistant      Employer: [employer]   Tobacco Use    Smoking status: Never Smoker    Smokeless tobacco: Never Used   Substance and Sexual Activity    Alcohol use: Yes     Alcohol/week: 0.0 standard drinks     Comment: socially    Drug use: No    Sexual activity: Yes     Partners: Male     Birth control/protection: None   Social History Narrative    Single mom of 2 children, 19 & yr old.      Family History   Problem Relation Age of Onset    Hypertension Other     Cancer Other     Ovarian cysts Other     Breast cancer Mother     Colon cancer Neg Hx     Ovarian cancer Neg Hx     Allergic rhinitis Neg Hx     Angioedema Neg Hx     Atopy Neg Hx     Immunodeficiency Neg Hx     Rhinitis Neg Hx     Urticaria Neg Hx     Eczema Neg Hx     Asthma Neg Hx     Allergies Neg Hx        Review of patient's allergies indicates:  No Known Allergies    Medication List with Changes/Refills   Current Medications    ACETAMINOPHEN 325 MG CAP    Take 650 mg by mouth as needed.    ASCORBIC ACID (VITAMIN C ORAL)    Take 2 tablets by mouth once daily.     ASCORBIC ACID, VITAMIN C, (VITAMIN C) 1000 MG TABLET    Take 500 mg by mouth once daily.    CIPROFLOXACIN HCL (CIPRO) 500 MG TABLET    Take 1 tablet (500 mg total) by mouth every 12 (twelve) hours.    FLUCONAZOLE (DIFLUCAN) 150 MG TAB    Take 1 tablet (150 mg total) by mouth once daily.    GUAIFEN/PHENYLEPH/ACETAMINOPHN (MUCINEX COLD AND SINUS ORAL)    Take by mouth.    HYDROCHLOROTHIAZIDE (HYDRODIURIL) 25 MG TABLET    Take 1 tablet (25 mg total) by mouth once daily.    IBUPROFEN (ADVIL,MOTRIN) 600 MG TABLET    Take 1 tablet (600 mg total) by mouth every 6 (six) hours as needed.    NYSTATIN (MYCOSTATIN) POWDER    Apply topically 2 (two) times daily.    OLMESARTAN (BENICAR) 20 MG TABLET    Take 1 tablet (20 mg total) by mouth once daily.    OSPEMIFENE (OSPHENA) 60 MG TAB    Take 60 mg by mouth once daily at 6am.    ZINC ORAL    Take 60 mg by mouth once daily.        Review of Systems  Constitution: Denies chills, fever, and sweats.  HENT: Denies headaches or blurry vision.  Cardiovascular: Denies chest pain or irregular heart beat.  Respiratory: Denies cough or shortness of breath.   Gastrointestinal: Denies abdominal pain, nausea, or vomiting.  Musculoskeletal: Positive for leg swelling.  Neurological: Denies dizziness or focal weakness.  Psychiatric/Behavioral: Normal mental  "status.  Hematologic/Lymphatic: Denies bleeding problem or easy bruising/bleeding.  Skin: Denies rash or suspicious lesions    Physical Examination  /77 (BP Location: Left arm, Patient Position: Sitting, BP Method: Large (Automatic))   Pulse 77   Ht 5' 6" (1.676 m)   Wt (!) 139.2 kg (306 lb 14.1 oz)   SpO2 95%   BMI 49.53 kg/m²     Constitutional: No acute distress, conversant  HEENT: Sclera anicteric, Pupils equal, round and reactive to light, extraocular motions intact, Oropharynx clear  Neck: No JVD, no carotid bruits  Cardiovascular: regular rate and rhythm, no murmur, rubs or gallops, normal S1/S2  Pulmonary: Clear to auscultation bilaterally  Abdominal: Abdomen soft, nontender, nondistended, positive bowel sounds  Extremities: BLE's with trace pitting edema, prominent varicose veins, venous stasis dermatitis, and changes consistent with lymphedema (R>L)   Pulses:  Carotid pulses are 2+ on the right side, and 2+ on the left side.  Radial pulses are 2+ on the right side, and 2+ on the left side.   Femoral pulses are 2+ on the right side, and 2+ on the left side.  Popliteal pulses are 2+ on the right side, and 2+ on the left side.   Dorsalis pedis pulses are 2+ on the right side, and 2+ on the left side.   Posterior tibial pulses are 2+ on the right side, and 2+ on the left side.    Skin: No ecchymosis, erythema, or ulcers  Psych: Alert and oriented x 3, appropriate affect  Neuro: CNII-XII intact, no focal deficits    Labs:  Most Recent Data  CBC:   Lab Results   Component Value Date    WBC 5.39 06/01/2022    HGB 12.6 06/01/2022    HCT 39.1 06/01/2022     06/01/2022    MCV 87 06/01/2022    RDW 14.3 06/01/2022     BMP:   Lab Results   Component Value Date     06/01/2022    K 3.8 06/01/2022     06/01/2022    CO2 27 06/01/2022    BUN 16 06/01/2022    CREATININE 0.6 06/01/2022    GLU 93 06/01/2022    CALCIUM 9.6 06/01/2022    MG 1.9 07/26/2020    PHOS 4.7 (H) 07/26/2020     LFTS;   Lab " Results   Component Value Date    PROT 7.4 06/01/2022    ALBUMIN 4.2 06/01/2022    BILITOT 0.5 06/01/2022    AST 29 06/01/2022    ALKPHOS 99 06/01/2022    ALT 53 (H) 06/01/2022     COAGS:   Lab Results   Component Value Date    INR 1.0 06/02/2021     FLP:   Lab Results   Component Value Date    CHOL 175 06/01/2022    HDL 56 06/01/2022    LDLCALC 101.4 06/01/2022    TRIG 88 06/01/2022    CHOLHDL 32.0 06/01/2022     CARDIAC:   Lab Results   Component Value Date    TROPONINI 0.024 07/19/2020       Imaging:    BLE Venous Reflux Study 8/12/2022:  There is no evidence of a right lower extremity DVT.  The right greater saphenous vein has reflux.  There is no evidence of a left lower extremity DVT.  The left common femoral vein has reflux.  The left superficial femoral middle vein has reflux.  The left greater saphenous vein has reflux.     Assessment/Plan:   Irina Dugan is a 51 y.o. female with HTN, woman obesity, who presents for an initial appointment.    1. BLE Lymphedema and Venous Insufficiency- Check SUZETTE study.  Refer to lymphedema clinic.  Pt to limit sodium intake to 2,000 mg daily.  Limit volume intake to 1.5 liters daily.  Elevate legs when resting.     2. Morbid Obesity- Refer to Bariatric Medicine for evaluation.    3. HTN- Continue current medications.    Follow up in 3 months    Total duration of face to face visit time 30 minutes.  Total time spent counseling greater than fifty percent of total visit time.  Counseling included discussion regarding imaging findings, diagnosis, possibilities, treatment options, risks and benefits.  The patient had many questions regarding the options and long-term effects.    Dalton Mueller MD, PhD  Interventional Cardiology    Follow up addendum 12/2/2022:  Ms. Dugan has been compliant with compression of 20-30mmHg, elevation, and exercise for at least 4 weeks yet symptoms persist. Hyperplasia is present with this edema.

## 2022-08-12 NOTE — TELEPHONE ENCOUNTER
----- Message from Dalton Mueller MD PhD sent at 8/12/2022  4:42 PM CDT -----  Regarding: FW: Referral  Please give the patient the information below regarding bariatric medicine.  Thank you  ----- Message -----  From: Timmy Mancilla  Sent: 8/12/2022   4:34 PM CDT  To: Dalton Mueller MD PhD  Subject: Referral                                         Good afternoon,    Thank you for the Bariatric Referral. Ms. Dugan has Medicaid as her primary insurance.  Unfortunately, the [facility] does not accept Medicaid for Bariatric Services. [facility] ([phone] or [phone]: Fax [phone]) and [facility] ([phone] or [phone]: Fax [phone]) Bariatric Clinic accept Medicaid.    Thanks,    Timmy Mancilla  [facility]

## 2022-08-12 NOTE — PATIENT INSTRUCTIONS
Assessment/Plan:   Irina Dugan is a 51 y.o. female with HTN, woman obesity, who presents for an initial appointment.    1. BLE Lymphedema and Venous Insufficiency- Check SUZETTE study.  Refer to lymphedema clinic.  Pt to limit sodium intake to 2,000 mg daily.  Limit volume intake to 1.5 liters daily.  Elevate legs when resting.     2. Morbid Obesity- Refer to Bariatric Medicine for evaluation.    3. HTN- Continue current medications.    Follow up in 3 months

## 2022-08-15 ENCOUNTER — TELEPHONE (OUTPATIENT)
Dept: BARIATRICS | Facility: CLINIC | Age: 52
End: 2022-08-15
Payer: MEDICAID

## 2022-08-15 NOTE — TELEPHONE ENCOUNTER
Pt received information on bariatric medicine. advised pt to reach out to Dr. Mueller's office to have the order faxed over to which every facility she choose. Pt verbalized understanding.

## 2022-09-12 ENCOUNTER — PATIENT MESSAGE (OUTPATIENT)
Dept: INTERNAL MEDICINE | Facility: CLINIC | Age: 52
End: 2022-09-12
Payer: MEDICAID

## 2022-09-13 ENCOUNTER — PATIENT MESSAGE (OUTPATIENT)
Dept: INTERNAL MEDICINE | Facility: CLINIC | Age: 52
End: 2022-09-13
Payer: MEDICAID

## 2022-09-13 RX ORDER — CIPROFLOXACIN 500 MG/1
500 TABLET ORAL EVERY 12 HOURS
Qty: 14 TABLET | Refills: 0 | Status: SHIPPED | OUTPATIENT
Start: 2022-09-13 | End: 2022-12-28

## 2022-10-21 DIAGNOSIS — I10 ESSENTIAL HYPERTENSION: ICD-10-CM

## 2022-10-21 NOTE — TELEPHONE ENCOUNTER
----- Message from Joanne Prince sent at 10/21/2022 11:30 AM CDT -----  Regarding: refill  Contact: patient  [phone]  Requesting an RX refill or new RX.   Is this a refill or new RX:   RX name and strength hydroCHLOROthiazide (HYDRODIURIL) 25 MG tablet  Is this a 30 day or 90 day RX:   Pharmacy name and phone #      [facility] [address] [place], [place] - [address] [facility]   [address] [facility] [address]   Phone: [phone] Fax: [phone]     The doctors have asked that we provide their patients with the following 2 reminders -- prescription refills can take up to 72 hours, and a friendly reminder that in the future you can use your [facility] account to request refills: yes

## 2022-10-21 NOTE — TELEPHONE ENCOUNTER
----- Message from Joanne Prince sent at 10/21/2022 11:26 AM CDT -----  Regarding: refill & phar change  Contact: patient [phone]  Requesting an RX refill or new RX.  Is this a refill or new RX:   RX name and strength (olmesartan (BENICAR) 20 MG tablet    Is this a 30 day or 90 day RX:   Pharmacy name and phone #      [facility] [address] [place], [place] - [address] [facility] [address]  [address] [facility] [address] [place] [address]  Phone: [phone] Fax: [phone]    The doctors have asked that we provide their patients with the following 2 reminders -- prescription refills can take up to 72 hours, and a friendly reminder that in the future you can use your [facility] account to request refills: yes

## 2022-10-22 RX ORDER — OLMESARTAN MEDOXOMIL 20 MG/1
20 TABLET ORAL DAILY
Qty: 90 TABLET | Refills: 3 | Status: SHIPPED | OUTPATIENT
Start: 2022-10-22 | End: 2023-08-17 | Stop reason: SDUPTHER

## 2022-10-22 RX ORDER — HYDROCHLOROTHIAZIDE 25 MG/1
25 TABLET ORAL DAILY
Qty: 90 TABLET | Refills: 3 | Status: SHIPPED | OUTPATIENT
Start: 2022-10-22 | End: 2023-12-06 | Stop reason: SDUPTHER

## 2022-10-28 ENCOUNTER — TELEPHONE (OUTPATIENT)
Dept: CARDIOLOGY | Facility: CLINIC | Age: 52
End: 2022-10-28
Payer: MEDICAID

## 2022-10-28 NOTE — TELEPHONE ENCOUNTER
Pt is going to call back the week of November 7 to see what's available to schedule. I tried to reschedule her appointment there wasn't any available schedule to book on.      Thanks  Shonda

## 2022-10-28 NOTE — TELEPHONE ENCOUNTER
----- Message from Gigi Hooker sent at 10/28/2022  9:20 AM CDT -----  Regarding: R/S  PT will begin therapy on 11/07/2022 and needs to r/s f/u with Dr. Maurer due to him wanting to see her after therapy has been completely.      Thanks

## 2022-11-10 ENCOUNTER — CLINICAL SUPPORT (OUTPATIENT)
Dept: REHABILITATION | Facility: HOSPITAL | Age: 52
End: 2022-11-10
Payer: MEDICAID

## 2022-11-10 DIAGNOSIS — I89.0 LYMPHEDEMA OF BOTH LOWER EXTREMITIES: ICD-10-CM

## 2022-11-10 PROCEDURE — 97530 THERAPEUTIC ACTIVITIES: CPT

## 2022-11-10 PROCEDURE — 97165 OT EVAL LOW COMPLEX 30 MIN: CPT

## 2022-11-11 NOTE — PLAN OF CARE
[facility]  Occupational Therapy Initial Evaluation    Date: 11/10/2022  Name: Irina Dugan  Clinic Number: [id number]    Therapy Diagnosis:   Encounter Diagnosis   Name Primary?    Lymphedema of both lower extremities      Physician: Dalton Mueller MD*    Physician Orders: OT Eval and Treat  Medical Diagnosis: I89.0 (ICD-10-CM) - Lymphedema of both lower extremities  Evaluation Date: 11/10/2022  Insurance Authorization Period Expiration: 8/12/2023  Plan of Care Certification Period: 12/21/2022  Visit # / Visits authorized: 1 / 1  FOTO: see media    Precautions:  Standard    Time In:1:00  Time Out: 1:48  Total Appointment Time (timed & untimed codes): 48 minutes    SUBJECTIVE     Date of Onset: 5 years ago, R>L, Upper and lower extremities   Prior Therapy: No, has worn compression socks but they had silicone and it gave her a rash therefore she stopped wearing     History of Current Condition/Mechanism of Injury: Irina Dugan is a 51 y.o. female who presents to [facility] for evaluation secondary to lymphedema. Patient was referred to therapy by Dalton Mueller MD* , which is the patient's cardiologist. Patient reports edema began 5 years ago in arms and legs, R side of body worse than the L. Got COVID and edema worsened. Then had a b/l carpal tunnel release and UE edema improved. BLE edema persists.     Falls: no    Occupation/Working presently: employed  Duties:     Functional Limitations/Social History:    Previous functional status includes: Independent with all ADLs.     Current Functional Status   Home/Living environment: lives with their family      Limitation of Functional Status as follows:   ADLs/IADLs:     - Feeding: none    - Bathing: none    - Dressing/Grooming: none    - Driving: none      Pain:  Functional Pain Scale Rating 0-10: Current 3/10, worst 6/10, best 1/10   Location: R knee,  "veins  Description: Aching  Aggravating Factors: Walking  Easing Factors: ice    Patient's Goals for Therapy: "To try to get more mobility."    Pt denies CHF, KF, DVT, CA, infection, severe PAD    Imaging:     CV US Lower Extremity Veins Bilateral Insufficiency  Conclusion  There is no evidence of a right lower extremity DVT.  The right greater saphenous vein has reflux.  There is no evidence of a left lower extremity DVT.  The left common femoral vein has reflux.  The left superficial femoral middle vein has reflux.  The left greater saphenous vein has reflux.    Medical History:   Past Medical History:   Diagnosis Date    Colitis, nonspecific 2012    Hypertension 10/12/2012    Ovarian cyst     Tubal ectopic pregnancy        Surgical History:    has a past surgical history that includes Cholecystectomy;  section, classic; Ectopic pregnancy surgery (age 25); Tubal ligation; Carpal tunnel release (Bilateral, 2020); and Carpal tunnel release (Left, 2020).    Medications:   has a current medication list which includes the following prescription(s): acetaminophen, ascorbic acid, ascorbic acid (vitamin c), ciprofloxacin hcl, fluconazole, guaifen/phenyleph/acetaminophn, hydrochlorothiazide, ibuprofen, nystatin, olmesartan, osphena, and zinc.    Allergies:   Review of patient's allergies indicates:  No Known Allergies       OBJECTIVE     Patient arrived     Affected Areas: RLE and LLE  Stemmer Sign: negative b/l  Shape: LLE unremarkable. RLE with fluid pockets at at ankle and below knee.  Tissue Texture: soft, pliable, pitting  Skin Integrity: varicose veins  Sensation: intact  Circulation: intact    ROM/Strength: WFL    Girth Measurements (in centimeters)  LANDMARK LEFT LE  11/10 RIGHT LE  11/10 DIFF   at eval   SBP + 20 - cm - cm - cm   SBP + 10  - cm - cm - cm   SBP 63.0 cm 64.5 cm 1.5 cm   10 below SBP 55.0 cm 55.5 cm 0.5 cm   20 below SBP 45.5 cm 49.0 cm 3.5 cm   30 below SBP 34.0 cm 36.0 cm " 2.0 cm   35 below SBP 29.0 cm 30.5 cm 1.5 cm   Ankle 30.0 cm 30.5 cm 0.5 cm   Forefoot 26.0 cm 26.0 cm 0 cm           Treatment   Total Treatment time (time-based codes) separate from Evaluation: 20 minutes    Irina received the treatments listed below:     Therapeutic activities to improve functional performance for 20  minutes, including:  Pt was educated in potential compression needs.  Demo of products including socks, garments, and Inelastic Velcro wraps.   Discussed cost/coverage and authorization per insurance with Durable Medical Equipment(DME) provider.  Compression require orders from referring provider and coverage or purchase of products from DME or self order.      Discussed wear schedule, don/doff, wash and management of products.  Size and compression class and AM/PM needs.    Product information provided.   Vendor list provided.    Informed insurance coverage of compression is per DME provider and typically Medicare and Medicare group plans may not cover cost beyond pair of standard sized knee high garments.   Commitment to attendance as well as commitment to securing compression needs is critical to edema management.        Patient Education and Home Exercises      Education provided:   1. Educated on definition of lymphedema.  2. Explained the Complete Decongestive Therapy protocol in depth  3. Educated on Phase 1 and 2 of protocol.  4. Reviewed treatment frequency and likely duration of weeks  5.Contraindications for treatment.  6. Plan of care and goals.  7. Educated on home management protocols.     Home Exercises and Patient Education Provided  Education provided:   - Pt was educated in lymphedema etiology and management plans.  Pt was provided with written risk reductions and precautions for managing lymphedema.     Patient/Family Education: role of OT, goals for OT, scheduling/cancellations - pt verbalized understanding. Discussed insurance limitations with patient.      ASSESSMENT      Irina Dugan is a 51 y.o. female referred to outpatient occupational therapy and presents with a medical diagnosis of lymphedema secondary to venous insufficiency. Lymphedema, left untreated increases risk of infection, gait deviation causing ortho problems and poor body image. Patient presents with the following therapy deficits: lymphedema of bilateral lower limbs and demonstrates limitations as described in the chart below. Following medical record review it is determined that pt will benefit from complete decongestive therapy services for the treatment and management of this chronic condition. The following goals were discussed with the patient and patient is in agreement with them as to be addressed in the treatment plan. The patient's rehab potential is Good.     Anticipated barriers to occupational therapy: none  Pt has no cultural, educational or language barriers to learning provided.    Profile and History Assessment of Occupational Performance Level of Clinical Decision Making Complexity Score   Occupational Profile:   Irina Dugan is a 51 y.o. female who lives with their family and is currently employed Irina Dugan has difficulty with  ADLs and IADLs as listed previously, which  Affecting herdaily functional abilities.      Comorbidities:    has a past medical history of Colitis, nonspecific, Hypertension, Ovarian cyst, and Tubal ectopic pregnancy.    Medical and Therapy History Review:   Brief               Performance Deficits    Physical:  Edema    Cognitive:  No Deficits    Psychosocial:    No Deficits     Clinical Decision Making:  low    Assessment Process:  Problem-Focused Assessments    Modification/Need for Assistance:  Not Necessary    Intervention Selection:  Several Treatment Options       low  Based on PMHX, co morbidities , data from assessments and functional level of assistance required with task and clinical presentation directly impacting function.       The  following goals were discussed with the patient and patient is in agreement with them as to be addressed in the treatment plan.     Goals:   Short Term Goals for 3 weeks: (phase 1 of protocol)  1. Patient and/or caregiver will demonstrate understanding of lymphedema precautions to decrease the risk of infection and lymphedema exacerbation. - Ongoing 11/10/2022   2. Patient will tolerate daily activities wearing multilayered bandaging.- Ongoing 11/10/2022   3. Patient and/or caregiver will order/obtain appropriate compression garments- Ongoing 11/10/2022  4. Patient will experience a decrease in girth of affected areas of 1 cm- Ongoing 11/10/2022      Long Term Goals for 6 weeks: (Phase 2 of goals)  1. Patient and/or caregiver will be independent with donning and doffing of compression garments.  2. Patient and/or caregiver will be independent in self-bandaging and self MLD techniques.  3. Patient and/or caregiver will be independent with HEP and lymphedema management to help prevent edema relapse and reduce risk of infection.  4. Patient will experience a decreased in girth of affected areas of 2 cm      PLAN   Plan of Care Certification: 11/10/2022 to 12/21/2022.     Outpatient Occupational Therapy 2 times weekly for 6 weeks to include the following interventions: Manual therapy/joint mobilizations, Therapeutic exercises/activities., and Edema Control.      Miriam Jones, OT, CLWT      I CERTIFY THE NEED FOR THESE SERVICES FURNISHED UNDER THIS PLAN OF TREATMENT AND WHILE UNDER MY CARE  Physician's comments:      Physician's Signature: ___________________________________________________

## 2022-11-14 ENCOUNTER — CLINICAL SUPPORT (OUTPATIENT)
Dept: REHABILITATION | Facility: HOSPITAL | Age: 52
End: 2022-11-14
Payer: MEDICAID

## 2022-11-14 DIAGNOSIS — I89.0 LYMPHEDEMA OF BOTH LOWER EXTREMITIES: Primary | ICD-10-CM

## 2022-11-14 PROCEDURE — 97140 MANUAL THERAPY 1/> REGIONS: CPT

## 2022-11-14 NOTE — PROGRESS NOTES
[facility]  Occupational Therapy Treatment Note    Date: 11/14/2022  Name: Irina Dugan  Clinic Number: [id number]    Time In: 4:06  Time Out: 5:00  Total Billable Time: 54 minutes    Therapy Diagnosis:   Encounter Diagnosis   Name Primary?    Lymphedema of both lower extremities Yes     Physician: Dalton Mueller MD*  Physician Orders: OT Eval and Treat    Evaluation Date: 11/10/2022  Insurance Authorization Period Expiration: 12/31/2022  Plan of Care Expiration: 12/21/2022  Visit # / Visits authorized: 1 / 12    Precautions:  Standard    SUBJECTIVE     Pt reports: ready to start therapy  She was compliant with home exercise program given last session.   Response to previous treatment:N/A  Functional change: N/A    Pain: 0/10      OBJECTIVE     Pt arrived in NAD    Objective Measures updated at progress report unless specified.    Treatment     Irina received the treatments listed below:     Manual therapy techniques were applied for 54 minutes, including:    MANUAL LYMPHATIC DRAINAGE (MLD):    While supine with LEs elevated stimulation along GI region, B inguinal regions, drainage of entire B LE pancho lower leg, ankle, and foot with return proximally,  Use of Aquaphor due to dryness.   Educated in self massage to abdominal areas, B inguinal areas, thigh, and remaining LE within reach.    MULTILAYERED BANDAGING:  issued supplies and bandaged B LE with cotton stockinette, cellona, rosidal K rolls 1-6cm 1-8cm and 1- 10cm foot to knee, to leave intact 12-24 hrs as tolerated, discontinue with any problems, return rolled bandages next session. Wash and wear schedules confirmed.       Patient Education and Home Exercises      Education provided:   1. Educated on cause of lymphedema.  2. Explained the Complete Decongestive Therapy protocol in depth  3. Educated on Phase 1 and 2 of protocol.  4. Reviewed treatment frequency and likely duration of weeks  5. Precautions and  contraindications for treatment.  6. Plan of care and goals.  7. Educated on home management protocols.     Written Home Exercises Provided: yes.  Exercises were reviewed and Irina was able to demonstrate them prior to the end of the session.  Irina demonstrated good  understanding of the HEP provided. See EMR under Patient Instructions for exercises provided during therapy sessions.       Assessment     Initiated MLD and multi-layer bandaging. Pt to purchase compression socks and wear daily until next session.    Irina is progressing well towards her goals and there are no updates to goals at this time. Pt prognosis is Good.     Pt will continue to benefit from skilled outpatient occupational therapy to address the deficits listed in the problem list on initial evaluation provide pt/family education and to maximize pt's level of independence in the home and community environment.     Pt's spiritual, cultural and educational needs considered and pt agreeable to plan of care and goals.    Anticipated barriers to occupational therapy: none    Goals:   Short Term Goals for 3 weeks: (phase 1 of protocol)  1. Patient and/or caregiver will demonstrate understanding of lymphedema precautions to decrease the risk of infection and lymphedema exacerbation. - Ongoing 11/14/2022   2. Patient will tolerate daily activities wearing multilayered bandaging.- Ongoing 11/14/2022   3. Patient and/or caregiver will order/obtain appropriate compression garments- Ongoing 11/14/2022  4. Patient will experience a decrease in girth of affected areas of 1 cm- Ongoing 11/14/2022      Long Term Goals for 6 weeks: (Phase 2 of goals)  1. Patient and/or caregiver will be independent with donning and doffing of compression garments.  2. Patient and/or caregiver will be independent in self-bandaging and self MLD techniques.  3. Patient and/or caregiver will be independent with HEP and lymphedema management to help prevent edema relapse and  reduce risk of infection.  4. Patient will experience a decreased in girth of affected areas of 2 cm      PLAN     Continue plan of care 2 times weekly for 6 weeks to include the following interventions: Manual therapy/joint mobilizations, Therapeutic exercises/activities., and Edema Control.    Miriam Jones, OT, CLWT

## 2022-11-28 ENCOUNTER — CLINICAL SUPPORT (OUTPATIENT)
Dept: REHABILITATION | Facility: HOSPITAL | Age: 52
End: 2022-11-28
Payer: MEDICAID

## 2022-11-28 DIAGNOSIS — I89.0 LYMPHEDEMA OF BOTH LOWER EXTREMITIES: Primary | ICD-10-CM

## 2022-11-28 NOTE — PROGRESS NOTES
[facility]  Occupational Therapy Treatment Note    Date: 11/28/2022  Name: Irina Dugan  Clinic Number: [id number]    Time In: 11:00  Time Out: 11:58  Total Billable Time: 58 minutes    Therapy Diagnosis:   Encounter Diagnosis   Name Primary?    Lymphedema of both lower extremities Yes       Physician: Dalton Mueller MD*  Physician Orders: OT Eval and Treat    Evaluation Date: 11/10/2022  Insurance Authorization Period Expiration: 12/31/2022  Plan of Care Expiration: 12/21/2022  Visit # / Visits authorized: 2 / 12    Precautions:  Standard    SUBJECTIVE     Pt reports: LLE uncomfortable in bandages, RLE tolerated x 24 hours, purchased knee high socks 20-30 mm hg, BLE with less pain when wearing compression socks  She was compliant with home exercise program given last session.   Response to previous treatment:N/A  Functional change: N/A    Pain: 0/10      OBJECTIVE     Pt arrived wearing knee high compression socks in NAD    Objective Measures updated at progress report unless specified.    Treatment     Irina received the treatments listed below:     Manual therapy techniques were applied for 58 minutes, including:    MANUAL LYMPHATIC DRAINAGE (MLD):    While supine with LEs elevated stimulation along GI region, B inguinal regions, drainage of entire B LE pancho lower leg, ankle, and foot with return proximally,  Use of Aquaphor due to dryness.   Educated in self massage to abdominal areas, B inguinal areas, thigh, and remaining LE within reach.    MULTILAYERED BANDAGING:  issued supplies and bandaged B LE with cotton stockinette, cellona, rosidal K rolls 1-8cm and 1- 10cm foot to knee, to leave intact 24 hrs as tolerated, discontinue with any problems, return rolled bandages next session. Wash and wear schedules confirmed.     Edema wear size L donned to LLE below knee to mid thigh to address fluid pockets      Patient Education and Home Exercises      Education provided:    1. Educated on cause of lymphedema.  2. Explained the Complete Decongestive Therapy protocol in depth  3. Educated on Phase 1 and 2 of protocol.  4. Reviewed treatment frequency and likely duration of weeks  5. Precautions and contraindications for treatment.  6. Plan of care and goals.  7. Educated on home management protocols.     Written Home Exercises Provided: yes.  Exercises were reviewed and Irina was able to demonstrate them prior to the end of the session.  Irina demonstrated good  understanding of the HEP provided. See EMR under Patient Instructions for exercises provided during therapy sessions.       Assessment     Pt endorsed decreased pain when wearing compression socks, however, socks do not cover fluid pocket below knee. Discussed benefits of compression leggings or thigh high socks to address area. Pt given rx and to go to DME store for thigh high socks. Self purchased compression leggings to assess fit.    Irina is progressing well towards her goals and there are no updates to goals at this time. Pt prognosis is Good.     Pt will continue to benefit from skilled outpatient occupational therapy to address the deficits listed in the problem list on initial evaluation provide pt/family education and to maximize pt's level of independence in the home and community environment.     Pt's spiritual, cultural and educational needs considered and pt agreeable to plan of care and goals.    Anticipated barriers to occupational therapy: none    Goals:   Short Term Goals for 3 weeks: (phase 1 of protocol)  1. Patient and/or caregiver will demonstrate understanding of lymphedema precautions to decrease the risk of infection and lymphedema exacerbation. - Ongoing 11/28/2022   2. Patient will tolerate daily activities wearing multilayered bandaging.- Ongoing 11/28/2022   3. Patient and/or caregiver will order/obtain appropriate compression garments- Ongoing 11/28/2022  4. Patient will experience a decrease in  girth of affected areas of 1 cm- Ongoing 11/28/2022      Long Term Goals for 6 weeks: (Phase 2 of goals)  1. Patient and/or caregiver will be independent with donning and doffing of compression garments.  2. Patient and/or caregiver will be independent in self-bandaging and self MLD techniques.  3. Patient and/or caregiver will be independent with HEP and lymphedema management to help prevent edema relapse and reduce risk of infection.  4. Patient will experience a decreased in girth of affected areas of 2 cm      PLAN     Continue plan of care 2 times weekly for 6 weeks to include the following interventions: Manual therapy/joint mobilizations, Therapeutic exercises/activities., and Edema Control.    Miriam Jones, OT, CLWT

## 2022-11-30 ENCOUNTER — CLINICAL SUPPORT (OUTPATIENT)
Dept: REHABILITATION | Facility: HOSPITAL | Age: 52
End: 2022-11-30
Payer: MEDICAID

## 2022-11-30 DIAGNOSIS — I89.0 LYMPHEDEMA OF BOTH LOWER EXTREMITIES: Primary | ICD-10-CM

## 2022-11-30 PROCEDURE — 97140 MANUAL THERAPY 1/> REGIONS: CPT

## 2022-11-30 NOTE — PROGRESS NOTES
[facility]  Occupational Therapy Treatment Note    Date: 11/30/2022  Name: Irina Dugan  Clinic Number: [id number]    Time In: 10:06  Time Out: 10:56  Total Billable Time: 50 minutes    Therapy Diagnosis:   Encounter Diagnosis   Name Primary?    Lymphedema of both lower extremities Yes         Physician: Dalton Mueller MD*  Physician Orders: OT Eval and Treat    Evaluation Date: 11/10/2022  Insurance Authorization Period Expiration: 12/31/2022  Plan of Care Expiration: 12/21/2022  Visit # / Visits authorized: 3 / 12    Precautions:  Standard    SUBJECTIVE     Pt reports: ordered compression leggings, arriving tomorrow   She was compliant with home exercise program given last session.   Response to previous treatment:N/A  Functional change: N/A    Pain: 5/10, right knee      OBJECTIVE     Pt arrived wearing knee high compression socks in NAD    Objective Measures updated at progress report unless specified.    Treatment     Irina received the treatments listed below:     Manual therapy techniques were applied for 50 minutes, including:    MANUAL LYMPHATIC DRAINAGE (MLD):    While supine with LEs elevated stimulation along GI region, B inguinal regions, drainage of entire B LE pancho lower leg, ankle, and foot with return proximally,  Use of Aquaphor due to dryness.   Educated in self massage to abdominal areas, B inguinal areas, thigh, and remaining LE within reach.    MULTILAYERED BANDAGING:  issued supplies and bandaged B LE with cotton stockinette, cellona, rosidal K rolls 1-8cm and 1- 10cm foot to knee, to leave intact 24 hrs as tolerated, discontinue with any problems, return rolled bandages next session. Wash and wear schedules confirmed.     Edema wear size L donned to BLE below knee to mid thigh to address fluid pockets      Patient Education and Home Exercises      Education provided:   1. Educated on cause of lymphedema.  2. Explained the Complete Decongestive Therapy  protocol in depth  3. Educated on Phase 1 and 2 of protocol.  4. Reviewed treatment frequency and likely duration of weeks  5. Precautions and contraindications for treatment.  6. Plan of care and goals.  7. Educated on home management protocols.     Written Home Exercises Provided: yes.  Exercises were reviewed and Irina was able to demonstrate them prior to the end of the session.  Irina demonstrated good  understanding of the HEP provided. See EMR under Patient Instructions for exercises provided during therapy sessions.       Assessment     Reported edema wear helped on RLE, LLE bandages unraveled, therefore, edema wear included on LLE this session. Reported pain at lateral elbow. Instructed to stretch wrist extensors before work and ice after work. If pain does not improve, seek referral from MD for orthopedic OT.    Irina is progressing well towards her goals and there are no updates to goals at this time. Pt prognosis is Good.     Pt will continue to benefit from skilled outpatient occupational therapy to address the deficits listed in the problem list on initial evaluation provide pt/family education and to maximize pt's level of independence in the home and community environment.     Pt's spiritual, cultural and educational needs considered and pt agreeable to plan of care and goals.    Anticipated barriers to occupational therapy: none    Goals:   Short Term Goals for 3 weeks: (phase 1 of protocol)  1. Patient and/or caregiver will demonstrate understanding of lymphedema precautions to decrease the risk of infection and lymphedema exacerbation. - Ongoing 11/30/2022   2. Patient will tolerate daily activities wearing multilayered bandaging.- Ongoing 11/30/2022   3. Patient and/or caregiver will order/obtain appropriate compression garments- Ongoing 11/30/2022  4. Patient will experience a decrease in girth of affected areas of 1 cm- Ongoing 11/30/2022      Long Term Goals for 6 weeks: (Phase 2 of  goals)  1. Patient and/or caregiver will be independent with donning and doffing of compression garments.  2. Patient and/or caregiver will be independent in self-bandaging and self MLD techniques.  3. Patient and/or caregiver will be independent with HEP and lymphedema management to help prevent edema relapse and reduce risk of infection.  4. Patient will experience a decreased in girth of affected areas of 2 cm      PLAN     Continue plan of care 2 times weekly for 6 weeks to include the following interventions: Manual therapy/joint mobilizations, Therapeutic exercises/activities., and Edema Control.    Miriam Jones, OT, CLWT

## 2022-12-12 ENCOUNTER — CLINICAL SUPPORT (OUTPATIENT)
Dept: REHABILITATION | Facility: HOSPITAL | Age: 52
End: 2022-12-12
Payer: MEDICAID

## 2022-12-12 DIAGNOSIS — I89.0 LYMPHEDEMA OF BOTH LOWER EXTREMITIES: Primary | ICD-10-CM

## 2022-12-12 PROCEDURE — 97140 MANUAL THERAPY 1/> REGIONS: CPT

## 2022-12-12 NOTE — PROGRESS NOTES
[facility]  Occupational Therapy Treatment Note    Date: 12/12/2022  Name: Irina Dugan  Clinic Number: [id number]    Time In: 11:15  Time Out: 12:00  Total Billable Time: 45 minutes    Therapy Diagnosis:   Encounter Diagnosis   Name Primary?    Lymphedema of both lower extremities Yes       Physician: Dalton Mueller MD*  Physician Orders: OT Eval and Treat    Evaluation Date: 11/10/2022  Insurance Authorization Period Expiration: 12/31/2022  Plan of Care Expiration: 12/21/2022  Visit # / Visits authorized: 4 / 12    Precautions:  Standard    SUBJECTIVE     Pt reports: has been wearing compression stockings or leggings almost every day  She was compliant with home exercise program given last session.   Response to previous treatment:N/A  Functional change: N/A    Pain: 5/10, knees      OBJECTIVE     Pt arrived out of compression in NAD    Objective Measures updated at progress report unless specified.    Treatment     Irina received the treatments listed below:     Manual therapy techniques were applied for 45 minutes, including:    MANUAL LYMPHATIC DRAINAGE (MLD):    While supine with LEs elevated stimulation along GI region, B inguinal regions, drainage of entire B LE pancho lower leg, ankle, and foot with return proximally,  Use of Aquaphor due to dryness.   Educated in self massage to abdominal areas, B inguinal areas, thigh, and remaining LE within reach.    MULTILAYERED BANDAGING:  issued supplies and bandaged B LE with cotton stockinette, cellona, rosidal K rolls 2 8cm and 2 10cm foot to knee, to leave intact 24-48 hrs as tolerated, discontinue with any problems, return rolled bandages next session. Wash and wear schedules confirmed.     Edema wear size L donned to BLE below knee to mid thigh to address fluid pockets    Patient Education and Home Exercises      Education provided:   1. Educated on cause of lymphedema.  2. Explained the Complete Decongestive Therapy  protocol in depth  3. Educated on Phase 1 and 2 of protocol.  4. Reviewed treatment frequency and likely duration of weeks  5. Precautions and contraindications for treatment.  6. Plan of care and goals.  7. Educated on home management protocols.     Written Home Exercises Provided: yes.  Exercises were reviewed and Irina was able to demonstrate them prior to the end of the session.  Irina demonstrated good  understanding of the HEP provided. See EMR under Patient Instructions for exercises provided during therapy sessions.       Assessment     Tolerating compression well. Reports edema greatly reduced when she wears stockings/bandages.    Irina is progressing well towards her goals and there are no updates to goals at this time. Pt prognosis is Good.     Pt will continue to benefit from skilled outpatient occupational therapy to address the deficits listed in the problem list on initial evaluation provide pt/family education and to maximize pt's level of independence in the home and community environment.     Pt's spiritual, cultural and educational needs considered and pt agreeable to plan of care and goals.    Anticipated barriers to occupational therapy: none    Goals:   Short Term Goals for 3 weeks: (phase 1 of protocol)  1. Patient and/or caregiver will demonstrate understanding of lymphedema precautions to decrease the risk of infection and lymphedema exacerbation. - Ongoing 12/12/2022   2. Patient will tolerate daily activities wearing multilayered bandaging.- Ongoing 12/12/2022   3. Patient and/or caregiver will order/obtain appropriate compression garments- Ongoing 12/12/2022  4. Patient will experience a decrease in girth of affected areas of 1 cm- Ongoing 12/12/2022      Long Term Goals for 6 weeks: (Phase 2 of goals)  1. Patient and/or caregiver will be independent with donning and doffing of compression garments.  2. Patient and/or caregiver will be independent in self-bandaging and self MLD  techniques.  3. Patient and/or caregiver will be independent with HEP and lymphedema management to help prevent edema relapse and reduce risk of infection.  4. Patient will experience a decreased in girth of affected areas of 2 cm      PLAN     Continue plan of care 2 times weekly for 6 weeks to include the following interventions: Manual therapy/joint mobilizations, Therapeutic exercises/activities., and Edema Control.    Miriam Jones, OT, CLWT

## 2022-12-14 ENCOUNTER — CLINICAL SUPPORT (OUTPATIENT)
Dept: REHABILITATION | Facility: HOSPITAL | Age: 52
End: 2022-12-14
Payer: MEDICAID

## 2022-12-14 DIAGNOSIS — I89.0 LYMPHEDEMA OF BOTH LOWER EXTREMITIES: Primary | ICD-10-CM

## 2022-12-14 PROCEDURE — 97140 MANUAL THERAPY 1/> REGIONS: CPT

## 2022-12-14 NOTE — PROGRESS NOTES
[facility]  Occupational Therapy Progress Note    Date: 12/14/2022  Name: Irina Dugan  Clinic Number: [id number]    Time In: 10:12  Time Out: 11:00  Total Billable Time: 48 minutes    Therapy Diagnosis:   Encounter Diagnosis   Name Primary?    Lymphedema of both lower extremities Yes       Physician: Dalton Mueller MD*  Physician Orders: OT Eval and Treat    Evaluation Date: 11/10/2022  Insurance Authorization Period Expiration: 12/31/2022  Plan of Care Expiration: 12/21/2022  Visit # / Visits authorized: 5 / 12    Precautions:  Standard    SUBJECTIVE     Pt reports: has been wearing compression stockings or leggings almost every day  She was compliant with home exercise program given last session.   Response to previous treatment:N/A  Functional change: N/A    Pain: 3/10, knees      OBJECTIVE     Pt arrived out of compression in NAD    Objective Measures updated at progress report unless specified.    Girth Measurements (in centimeters)  LANDMARK LEFT LE  11/10 RIGHT LE  11/10 DIFF   at eval LEFT LE  12/14 RIGHT LE  12/14   SBP + 20 - cm - cm - cm - cm - cm   SBP + 10  - cm - cm - cm - cm - cm   SBP 63.0 cm 64.5 cm 1.5 cm 63.0 cm 64.0 cm   10 below SBP 55.0 cm 55.5 cm 0.5 cm 55.0 cm 56.0 cm   20 below SBP 45.5 cm 49.0 cm 3.5 cm 46.5 cm 46.0 cm   30 below SBP 34.0 cm 36.0 cm 2.0 cm 32.5 cm 34.5 cm   35 below SBP 29.0 cm 30.5 cm 1.5 cm 29.0 cm 29.0 cm   Ankle 30.0 cm 30.5 cm 0.5 cm 29.5 cm 30.0 cm   Forefoot 26.0 cm 26.0 cm 0 cm 25.0 cm 26.0 cm       Treatment     Irina received the treatments listed below:     Manual therapy techniques were applied for 48 minutes, including:    MANUAL LYMPHATIC DRAINAGE (MLD):    While supine with LEs elevated stimulation along GI region, B inguinal regions, drainage of entire B LE pancho lower leg, ankle, and foot with return proximally,  Use of Aquaphor due to dryness.   Educated in self massage to abdominal areas, B inguinal areas, thigh,  and remaining LE within reach.    SEQUENTIAL COMPRESSION PUMP: full leg sleeve applied to B LE  VES 5200 with default setting with distal pressures starting at 45mmHg entire LE x 30 minutes     Pt did not bring bandages or compression garment to session     Patient Education and Home Exercises      Education provided:   1. Educated on cause of lymphedema.  2. Explained the Complete Decongestive Therapy protocol in depth  3. Educated on Phase 1 and 2 of protocol.  4. Reviewed treatment frequency and likely duration of weeks  5. Precautions and contraindications for treatment.  6. Plan of care and goals.  7. Educated on home management protocols.     Written Home Exercises Provided: yes.  Exercises were reviewed and Irina was able to demonstrate them prior to the end of the session.  Irina demonstrated good  understanding of the HEP provided. See EMR under Patient Instructions for exercises provided during therapy sessions.       Assessment     Pt did not bring bandages therefore MLD/pneumatic pump only completed this session. Education provided re: importance of bandaging to maximize reductions.     Irina is progressing well towards her goals and there are no updates to goals at this time. Pt prognosis is Good.     Pt will continue to benefit from skilled outpatient occupational therapy to address the deficits listed in the problem list on initial evaluation provide pt/family education and to maximize pt's level of independence in the home and community environment.     Pt's spiritual, cultural and educational needs considered and pt agreeable to plan of care and goals.    Anticipated barriers to occupational therapy: none    Goals:   Short Term Goals for 3 weeks: (phase 1 of protocol)  1. Patient and/or caregiver will demonstrate understanding of lymphedema precautions to decrease the risk of infection and lymphedema exacerbation. - Ongoing 12/14/2022   2. Patient will tolerate daily activities wearing  multilayered bandaging.- Ongoing 12/14/2022   3. Patient and/or caregiver will order/obtain appropriate compression garments- Ongoing 12/14/2022  4. Patient will experience a decrease in girth of affected areas of 1 cm- Ongoing 12/14/2022      Long Term Goals for 6 weeks: (Phase 2 of goals)  1. Patient and/or caregiver will be independent with donning and doffing of compression garments.  2. Patient and/or caregiver will be independent in self-bandaging and self MLD techniques.  3. Patient and/or caregiver will be independent with HEP and lymphedema management to help prevent edema relapse and reduce risk of infection.  4. Patient will experience a decreased in girth of affected areas of 2 cm      PLAN     Continue plan of care 2 times weekly for 6 weeks to include the following interventions: Manual therapy/joint mobilizations, Therapeutic exercises/activities., and Edema Control.    Miriam Jones, OT, CLWT

## 2022-12-19 ENCOUNTER — TELEPHONE (OUTPATIENT)
Dept: INTERNAL MEDICINE | Facility: CLINIC | Age: 52
End: 2022-12-19
Payer: MEDICAID

## 2022-12-19 NOTE — TELEPHONE ENCOUNTER
Let the pt know she should be evaluated at an urgent care facility to be properly dx, pt verbalized understanding.

## 2022-12-19 NOTE — TELEPHONE ENCOUNTER
----- Message from Ney Ledbetter sent at 12/19/2022  8:20 AM CST -----  Contact: Pt [phone]  The patient wants to know if you or a colleague can do a virtual visit today or send something to the pharmacy for her symptoms: Coughing up green mucus, runny nose, and sore throat. 3 days    Thank you

## 2022-12-20 ENCOUNTER — OFFICE VISIT (OUTPATIENT)
Dept: URGENT CARE | Facility: CLINIC | Age: 52
End: 2022-12-20
Payer: MEDICAID

## 2022-12-20 VITALS
RESPIRATION RATE: 16 BRPM | DIASTOLIC BLOOD PRESSURE: 108 MMHG | HEIGHT: 66 IN | BODY MASS INDEX: 47.09 KG/M2 | TEMPERATURE: 98 F | SYSTOLIC BLOOD PRESSURE: 162 MMHG | OXYGEN SATURATION: 97 % | HEART RATE: 82 BPM | WEIGHT: 293 LBS

## 2022-12-20 DIAGNOSIS — J06.9 VIRAL URI: Primary | ICD-10-CM

## 2022-12-20 PROBLEM — N90.89 LESION OF VULVA: Status: ACTIVE | Noted: 2022-12-20

## 2022-12-20 LAB
CTP QC/QA: YES
POC MOLECULAR INFLUENZA A AGN: NEGATIVE
POC MOLECULAR INFLUENZA B AGN: NEGATIVE

## 2022-12-20 PROCEDURE — 4010F PR ACE/ARB THEARPY RXD/TAKEN: ICD-10-PCS | Mod: CPTII,S$GLB,, | Performed by: PHYSICIAN ASSISTANT

## 2022-12-20 PROCEDURE — 3008F BODY MASS INDEX DOCD: CPT | Mod: CPTII,S$GLB,, | Performed by: PHYSICIAN ASSISTANT

## 2022-12-20 PROCEDURE — 99213 PR OFFICE/OUTPT VISIT, EST, LEVL III, 20-29 MIN: ICD-10-PCS | Mod: S$GLB,,, | Performed by: PHYSICIAN ASSISTANT

## 2022-12-20 PROCEDURE — 99213 OFFICE O/P EST LOW 20 MIN: CPT | Mod: S$GLB,,, | Performed by: PHYSICIAN ASSISTANT

## 2022-12-20 PROCEDURE — 3044F PR MOST RECENT HEMOGLOBIN A1C LEVEL <7.0%: ICD-10-PCS | Mod: CPTII,S$GLB,, | Performed by: PHYSICIAN ASSISTANT

## 2022-12-20 PROCEDURE — 3044F HG A1C LEVEL LT 7.0%: CPT | Mod: CPTII,S$GLB,, | Performed by: PHYSICIAN ASSISTANT

## 2022-12-20 PROCEDURE — 1160F RVW MEDS BY RX/DR IN RCRD: CPT | Mod: CPTII,S$GLB,, | Performed by: PHYSICIAN ASSISTANT

## 2022-12-20 PROCEDURE — 3080F DIAST BP >= 90 MM HG: CPT | Mod: CPTII,S$GLB,, | Performed by: PHYSICIAN ASSISTANT

## 2022-12-20 PROCEDURE — 4010F ACE/ARB THERAPY RXD/TAKEN: CPT | Mod: CPTII,S$GLB,, | Performed by: PHYSICIAN ASSISTANT

## 2022-12-20 PROCEDURE — 3077F SYST BP >= 140 MM HG: CPT | Mod: CPTII,S$GLB,, | Performed by: PHYSICIAN ASSISTANT

## 2022-12-20 PROCEDURE — 1160F PR REVIEW ALL MEDS BY PRESCRIBER/CLIN PHARMACIST DOCUMENTED: ICD-10-PCS | Mod: CPTII,S$GLB,, | Performed by: PHYSICIAN ASSISTANT

## 2022-12-20 PROCEDURE — 3008F PR BODY MASS INDEX (BMI) DOCUMENTED: ICD-10-PCS | Mod: CPTII,S$GLB,, | Performed by: PHYSICIAN ASSISTANT

## 2022-12-20 PROCEDURE — 3077F PR MOST RECENT SYSTOLIC BLOOD PRESSURE >= 140 MM HG: ICD-10-PCS | Mod: CPTII,S$GLB,, | Performed by: PHYSICIAN ASSISTANT

## 2022-12-20 PROCEDURE — 1159F PR MEDICATION LIST DOCUMENTED IN MEDICAL RECORD: ICD-10-PCS | Mod: CPTII,S$GLB,, | Performed by: PHYSICIAN ASSISTANT

## 2022-12-20 PROCEDURE — 3080F PR MOST RECENT DIASTOLIC BLOOD PRESSURE >= 90 MM HG: ICD-10-PCS | Mod: CPTII,S$GLB,, | Performed by: PHYSICIAN ASSISTANT

## 2022-12-20 PROCEDURE — 87502 POCT INFLUENZA A/B MOLECULAR: ICD-10-PCS | Mod: QW,S$GLB,, | Performed by: PHYSICIAN ASSISTANT

## 2022-12-20 PROCEDURE — 1159F MED LIST DOCD IN RCRD: CPT | Mod: CPTII,S$GLB,, | Performed by: PHYSICIAN ASSISTANT

## 2022-12-20 PROCEDURE — 87502 INFLUENZA DNA AMP PROBE: CPT | Mod: QW,S$GLB,, | Performed by: PHYSICIAN ASSISTANT

## 2022-12-20 RX ORDER — ALBUTEROL SULFATE 90 UG/1
2 AEROSOL, METERED RESPIRATORY (INHALATION) EVERY 6 HOURS PRN
Qty: 18 G | Refills: 0 | Status: SHIPPED | OUTPATIENT
Start: 2022-12-20 | End: 2023-12-06

## 2022-12-20 RX ORDER — IPRATROPIUM BROMIDE 21 UG/1
2 SPRAY, METERED NASAL 2 TIMES DAILY
Qty: 30 ML | Refills: 0 | Status: SHIPPED | OUTPATIENT
Start: 2022-12-20 | End: 2023-12-06

## 2022-12-20 NOTE — PROGRESS NOTES
"Subjective:       Patient ID: Irina Dugan is a 52 y.o. female.    Vitals:  height is 5' 6" (1.676 m) and weight is 138.8 kg (306 lb) (abnormal). Her temperature is 97.9 °F (36.6 °C). Her blood pressure is 162/108 (abnormal) and her pulse is 82. Her respiration is 16 and oxygen saturation is 97%.     Chief Complaint: Cough    Patient is a 52 year old female who reports a productive cough, sinus congestion, sore throat and ear pain x's 2 days. Patient is currently taking Mucinex-D for symptoms. Patient denies chest pain, shortness of breath, nausea, vomiting, or diarrhea. She denies taking her hypertension medication today. She has no known sick contacts.     Cough  This is a new problem. The current episode started yesterday. The problem has been unchanged. The problem occurs constantly. The cough is Productive of sputum. Associated symptoms include ear pain and a sore throat. Pertinent negatives include no chest pain, chills, fever, headaches, myalgias, postnasal drip, shortness of breath or wheezing. The symptoms are aggravated by lying down. She has tried OTC cough suppressant for the symptoms. The treatment provided mild relief.     Constitution: Negative for chills and fever.   HENT:  Positive for ear pain, congestion and sore throat. Negative for postnasal drip, sinus pain, sinus pressure and trouble swallowing.    Neck: Negative for painful lymph nodes.   Cardiovascular:  Negative for chest pain.   Respiratory:  Positive for cough and sputum production. Negative for shortness of breath and wheezing.    Gastrointestinal:  Negative for abdominal pain, nausea, vomiting and diarrhea.   Musculoskeletal:  Negative for muscle ache.   Neurological:  Negative for headaches.   Hematologic/Lymphatic: Negative for swollen lymph nodes.     Objective:      Physical Exam   Constitutional: She is oriented to person, place, and time. She appears well-developed. She is cooperative.  Non-toxic appearance. She does not " appear ill. No distress.   HENT:   Head: Normocephalic and atraumatic.   Ears:   Right Ear: Hearing, tympanic membrane, external ear and ear canal normal.   Left Ear: Hearing, tympanic membrane, external ear and ear canal normal.   Nose: Congestion present. No mucosal edema, rhinorrhea or nasal deformity. No epistaxis. Right sinus exhibits no maxillary sinus tenderness and no frontal sinus tenderness. Left sinus exhibits no maxillary sinus tenderness and no frontal sinus tenderness.   Mouth/Throat: Uvula is midline and mucous membranes are normal. No trismus in the jaw. Normal dentition. No uvula swelling. Posterior oropharyngeal erythema present. No oropharyngeal exudate or posterior oropharyngeal edema.   Eyes: Conjunctivae and lids are normal. No scleral icterus.   Neck: Trachea normal and phonation normal. Neck supple. No edema present. No erythema present. No neck rigidity present.   Cardiovascular: Normal rate, regular rhythm, normal heart sounds and normal pulses.   No murmur heard.  Pulmonary/Chest: Effort normal and breath sounds normal. No respiratory distress. She has no decreased breath sounds. She has no wheezes. She has no rhonchi.   Abdominal: Normal appearance.   Musculoskeletal: Normal range of motion.         General: No deformity. Normal range of motion.   Neurological: She is alert and oriented to person, place, and time. She exhibits normal muscle tone. Coordination normal.   Skin: Skin is warm, dry, intact, not diaphoretic and not pale.   Psychiatric: Her speech is normal and behavior is normal. Judgment and thought content normal.   Nursing note and vitals reviewed.      Results for orders placed or performed in visit on 12/20/22   POCT Influenza A/B MOLECULAR   Result Value Ref Range    POC Molecular Influenza A Ag Negative Negative, Not Reported    POC Molecular Influenza B Ag Negative Negative, Not Reported     Acceptable Yes        Assessment:       1. Viral URI           Plan:         Viral URI  -     POCT Influenza A/B MOLECULAR  -     ipratropium (ATROVENT) 21 mcg (0.03 %) nasal spray; 2 sprays by Each Nostril route 2 (two) times daily.  Dispense: 30 mL; Refill: 0  -     albuterol (PROVENTIL HFA) 90 mcg/actuation inhaler; Inhale 2 puffs into the lungs every 6 (six) hours as needed for Wheezing. Rescue  Dispense: 18 g; Refill: 0    Discussed results with patient.  Discussed use of OTC medications for symptom control as this is a likely viral disease.   All ER precautions covered including but not limited to shortness of breath, intractable fever, or chest pain.  Discussed RTC if symptoms worsen, change, or persist.     Patient verbalized understanding and agreed with the plan.     Sarahi Ferrara PA-C    Patient Instructions   PLEASE READ YOUR DISCHARGE INSTRUCTIONS ENTIRELY AS IT CONTAINS IMPORTANT INFORMATION.      Please drink plenty of fluids.    Please get plenty of rest.    Please return here or go to the Emergency Department for any concerns or worsening of condition.    Please take an over the counter antihistamine medication (allegra/Claritin/Zyrtec) of your choice as directed.    Try an over the counter decongestant like Mucinex D or Sudafed. You buy this behind the pharmacy counter    If you do have Hypertension or palpitations, it is safe to take Coricidin HBP for relief of sinus symptoms.    If not allergic, please take over the counter Tylenol (Acetaminophen) and/or Motrin (Ibuprofen) as directed for control of pain and/or fever.  Please follow up with your primary care doctor or specialist as needed.    Sore throat recommendations: Warm fluids, warm salt water gargles, throat lozenges, tea, honey, soup, rest, hydration.    Use over the counter flonase: one spray each nostril twice daily OR two sprays each nostril once daily.     Sinus rinses DO NOT USE TAP WATER, if you must, water must be a rolling boil for 1 minute, let it cool, then use.  May use distilled  water, or over the counter nasal saline rinses.  Vics vapor rub in shower to help open nasal passages.  May use nasal gel to keep passages moisturized.  May use Nasal saline sprays during the day for added relief of congestion.   For those who go to the gym, please do not use the sauna or steam room now to clear sinuses.    If you  smoke, please stop smoking.      Please return or see your primary care doctor if you develop new or worsening symptoms.     Please arrange follow up with your primary medical clinic as soon as possible. You must understand that you've received an Urgent Care treatment only and that you may be released before all of your medical problems are known or treated. You, the patient, will arrange for follow up as instructed. If your symptoms worsen or fail to improve you should go to the Emergency Room.

## 2022-12-28 ENCOUNTER — OFFICE VISIT (OUTPATIENT)
Dept: URGENT CARE | Facility: CLINIC | Age: 52
End: 2022-12-28
Payer: MEDICAID

## 2022-12-28 VITALS
DIASTOLIC BLOOD PRESSURE: 98 MMHG | TEMPERATURE: 98 F | HEIGHT: 66 IN | WEIGHT: 293 LBS | SYSTOLIC BLOOD PRESSURE: 162 MMHG | HEART RATE: 76 BPM | BODY MASS INDEX: 47.09 KG/M2 | RESPIRATION RATE: 16 BRPM | OXYGEN SATURATION: 98 %

## 2022-12-28 DIAGNOSIS — H66.90 OTITIS MEDIA, UNSPECIFIED LATERALITY, UNSPECIFIED OTITIS MEDIA TYPE: ICD-10-CM

## 2022-12-28 DIAGNOSIS — J32.9 SINUSITIS, UNSPECIFIED CHRONICITY, UNSPECIFIED LOCATION: Primary | ICD-10-CM

## 2022-12-28 PROCEDURE — 99214 OFFICE O/P EST MOD 30 MIN: CPT | Mod: S$GLB,,, | Performed by: INTERNAL MEDICINE

## 2022-12-28 PROCEDURE — 3008F PR BODY MASS INDEX (BMI) DOCUMENTED: ICD-10-PCS | Mod: CPTII,S$GLB,, | Performed by: INTERNAL MEDICINE

## 2022-12-28 PROCEDURE — 3077F SYST BP >= 140 MM HG: CPT | Mod: CPTII,S$GLB,, | Performed by: INTERNAL MEDICINE

## 2022-12-28 PROCEDURE — 3080F PR MOST RECENT DIASTOLIC BLOOD PRESSURE >= 90 MM HG: ICD-10-PCS | Mod: CPTII,S$GLB,, | Performed by: INTERNAL MEDICINE

## 2022-12-28 PROCEDURE — 3008F BODY MASS INDEX DOCD: CPT | Mod: CPTII,S$GLB,, | Performed by: INTERNAL MEDICINE

## 2022-12-28 PROCEDURE — 3044F HG A1C LEVEL LT 7.0%: CPT | Mod: CPTII,S$GLB,, | Performed by: INTERNAL MEDICINE

## 2022-12-28 PROCEDURE — 4010F ACE/ARB THERAPY RXD/TAKEN: CPT | Mod: CPTII,S$GLB,, | Performed by: INTERNAL MEDICINE

## 2022-12-28 PROCEDURE — 1159F PR MEDICATION LIST DOCUMENTED IN MEDICAL RECORD: ICD-10-PCS | Mod: CPTII,S$GLB,, | Performed by: INTERNAL MEDICINE

## 2022-12-28 PROCEDURE — 99214 PR OFFICE/OUTPT VISIT, EST, LEVL IV, 30-39 MIN: ICD-10-PCS | Mod: S$GLB,,, | Performed by: INTERNAL MEDICINE

## 2022-12-28 PROCEDURE — 4010F PR ACE/ARB THEARPY RXD/TAKEN: ICD-10-PCS | Mod: CPTII,S$GLB,, | Performed by: INTERNAL MEDICINE

## 2022-12-28 PROCEDURE — 3080F DIAST BP >= 90 MM HG: CPT | Mod: CPTII,S$GLB,, | Performed by: INTERNAL MEDICINE

## 2022-12-28 PROCEDURE — 3044F PR MOST RECENT HEMOGLOBIN A1C LEVEL <7.0%: ICD-10-PCS | Mod: CPTII,S$GLB,, | Performed by: INTERNAL MEDICINE

## 2022-12-28 PROCEDURE — 3077F PR MOST RECENT SYSTOLIC BLOOD PRESSURE >= 140 MM HG: ICD-10-PCS | Mod: CPTII,S$GLB,, | Performed by: INTERNAL MEDICINE

## 2022-12-28 PROCEDURE — 1159F MED LIST DOCD IN RCRD: CPT | Mod: CPTII,S$GLB,, | Performed by: INTERNAL MEDICINE

## 2022-12-28 RX ORDER — DEXAMETHASONE SODIUM PHOSPHATE 100 MG/10ML
10 INJECTION INTRAMUSCULAR; INTRAVENOUS
Status: COMPLETED | OUTPATIENT
Start: 2022-12-28 | End: 2022-12-28

## 2022-12-28 RX ORDER — AMOXICILLIN AND CLAVULANATE POTASSIUM 875; 125 MG/1; MG/1
1 TABLET, FILM COATED ORAL 2 TIMES DAILY
Qty: 14 TABLET | Refills: 0 | Status: SHIPPED | OUTPATIENT
Start: 2022-12-28 | End: 2023-12-06 | Stop reason: SDUPTHER

## 2022-12-28 RX ORDER — FLUCONAZOLE 150 MG/1
150 TABLET ORAL DAILY
Qty: 1 TABLET | Refills: 0 | Status: SHIPPED | OUTPATIENT
Start: 2022-12-28 | End: 2022-12-29

## 2022-12-28 RX ADMIN — DEXAMETHASONE SODIUM PHOSPHATE 10 MG: 100 INJECTION INTRAMUSCULAR; INTRAVENOUS at 11:12

## 2022-12-28 NOTE — PATIENT INSTRUCTIONS
If your condition worsens we recommend that you receive another evaluation at the emergency room immediately or contact your primary medical clinics after hours call service to discuss your concerns. You must understand that you've received an Urgent Care treatment only and that you may be released before all of your medical problems are known or treated. You, the patient, will arrange for follow up care as instructed.  Drink plenty of Fluids  Wash hands frequently using mild antibacterial soap lathering for at least 15 seconds then rinse  Get plenty of Rest  Salt water gargles  Follow up in 1-2 weeks with Primary Care physician if not significantly better.   If you are not allergic please Tylenol every 4-6 hours as needed and/or Ibuprofen every 6-8 hours as needed, over the counter for pain or fever.  Take OTC Cough/Congestion medicine as needed. Talk to your pharmacist about the best option for you.

## 2022-12-28 NOTE — PROGRESS NOTES
"Subjective:       Patient ID: Irina Dugan is a 52 y.o. female.    Vitals:  height is 5' 6" (1.676 m) and weight is 136.1 kg (300 lb). Her oral temperature is 97.8 °F (36.6 °C). Her blood pressure is 162/98 (abnormal) and her pulse is 76. Her respiration is 16 and oxygen saturation is 98%.     Chief Complaint: Sinus Problem    Pt presents with congestion, cough, ear pain, headaches, hoarse voice, sinus pressure, sneezing, and sore throat originating around a week ago. Pt states she came to the clinic last week and was told she has a URI and ear infection. Pt states the provider was going to giver her an antibiotic then changed her mind. Pt states that her condition has since worsened. Pt reports taking Robitussin DM, NyQuil, DayQuil, earache drops, nasal spray, and an albuterol inhaler which provides mild relief.       Sinus Problem  This is a new problem. The current episode started 1 to 4 weeks ago. The problem has been gradually worsening since onset. Her pain is at a severity of 8/10. The pain is severe. Associated symptoms include congestion, coughing, ear pain, headaches, a hoarse voice, sinus pressure, sneezing and a sore throat. Past treatments include oral decongestants and spray decongestants. The treatment provided mild relief.     HENT:  Positive for ear pain, congestion, sinus pressure and sore throat.    Respiratory:  Positive for cough.    Allergic/Immunologic: Positive for sneezing.   Neurological:  Positive for headaches.     Objective:      Physical Exam   Constitutional: She is oriented to person, place, and time. She appears well-developed. She is cooperative.  Non-toxic appearance. She does not appear ill. No distress.   HENT:   Head: Normocephalic and atraumatic.   Ears:   Right Ear: Hearing, external ear and ear canal normal. Tympanic membrane is bulging.   Left Ear: Hearing, external ear and ear canal normal. Tympanic membrane is bulging.   Nose: Nose normal. No mucosal edema, " rhinorrhea or nasal deformity. No epistaxis. Right sinus exhibits no maxillary sinus tenderness and no frontal sinus tenderness. Left sinus exhibits no maxillary sinus tenderness and no frontal sinus tenderness.   Mouth/Throat: Uvula is midline and mucous membranes are normal. No trismus in the jaw. Normal dentition. No uvula swelling. Posterior oropharyngeal erythema present. No oropharyngeal exudate or posterior oropharyngeal edema.   Eyes: Conjunctivae and lids are normal. No scleral icterus.   Neck: Trachea normal and phonation normal. Neck supple. No edema present. No erythema present. No neck rigidity present.   Cardiovascular: Normal rate, regular rhythm, normal heart sounds and normal pulses.   Pulmonary/Chest: Effort normal and breath sounds normal. No respiratory distress. She has no decreased breath sounds. She has no rhonchi.   Abdominal: Normal appearance.   Musculoskeletal: Normal range of motion.         General: No deformity. Normal range of motion.   Neurological: She is alert and oriented to person, place, and time. She exhibits normal muscle tone. Coordination normal.   Skin: Skin is warm, dry, intact, not diaphoretic and not pale.   Psychiatric: Her speech is normal and behavior is normal. Judgment and thought content normal.   Nursing note and vitals reviewed.      Assessment:       1. Sinusitis, unspecified chronicity, unspecified location    2. Otitis media, unspecified laterality, unspecified otitis media type          Plan:         Sinusitis, unspecified chronicity, unspecified location  -     dexAMETHasone injection 10 mg  -     amoxicillin-clavulanate 875-125mg (AUGMENTIN) 875-125 mg per tablet; Take 1 tablet by mouth 2 (two) times daily. for 7 days  Dispense: 14 tablet; Refill: 0    Otitis media, unspecified laterality, unspecified otitis media type         Patient Instructions   If your condition worsens we recommend that you receive another evaluation at the emergency room immediately  or contact your primary medical clinics after hours call service to discuss your concerns. You must understand that you've received an Urgent Care treatment only and that you may be released before all of your medical problems are known or treated. You, the patient, will arrange for follow up care as instructed.  Drink plenty of Fluids  Wash hands frequently using mild antibacterial soap lathering for at least 15 seconds then rinse  Get plenty of Rest  Salt water gargles  Follow up in 1-2 weeks with Primary Care physician if not significantly better.   If you are not allergic please Tylenol every 4-6 hours as needed and/or Ibuprofen every 6-8 hours as needed, over the counter for pain or fever.  Take OTC Cough/Congestion medicine as needed. Talk to your pharmacist about the best option for you.     My notes were dictated with M*Modal Fluency Software. Any misspellings or nonsensical grammar should be attributed to its use and allowances made for errors and typographic syntactical error(s).

## 2023-01-06 ENCOUNTER — TELEPHONE (OUTPATIENT)
Dept: CARDIOLOGY | Facility: CLINIC | Age: 53
End: 2023-01-06
Payer: MEDICAID

## 2023-01-19 ENCOUNTER — OFFICE VISIT (OUTPATIENT)
Dept: CARDIOLOGY | Facility: CLINIC | Age: 53
End: 2023-01-19
Payer: MEDICAID

## 2023-01-19 VITALS
SYSTOLIC BLOOD PRESSURE: 144 MMHG | HEIGHT: 66 IN | BODY MASS INDEX: 47.09 KG/M2 | DIASTOLIC BLOOD PRESSURE: 92 MMHG | WEIGHT: 293 LBS | HEART RATE: 88 BPM

## 2023-01-19 DIAGNOSIS — I83.12 VARICOSE VEINS OF BOTH LOWER EXTREMITIES WITH INFLAMMATION: ICD-10-CM

## 2023-01-19 DIAGNOSIS — I87.2 VENOUS INSUFFICIENCY OF BOTH LOWER EXTREMITIES: ICD-10-CM

## 2023-01-19 DIAGNOSIS — R26.89 IMPAIRED GAIT AND MOBILITY: ICD-10-CM

## 2023-01-19 DIAGNOSIS — I87.2 VENOUS STASIS DERMATITIS OF BOTH LOWER EXTREMITIES: ICD-10-CM

## 2023-01-19 DIAGNOSIS — I83.11 VARICOSE VEINS OF BOTH LOWER EXTREMITIES WITH INFLAMMATION: ICD-10-CM

## 2023-01-19 DIAGNOSIS — I89.0 LYMPHEDEMA OF BOTH LOWER EXTREMITIES: Primary | ICD-10-CM

## 2023-01-19 DIAGNOSIS — I10 ESSENTIAL HYPERTENSION: ICD-10-CM

## 2023-01-19 PROCEDURE — 1159F MED LIST DOCD IN RCRD: CPT | Mod: CPTII,,, | Performed by: INTERNAL MEDICINE

## 2023-01-19 PROCEDURE — 99215 PR OFFICE/OUTPT VISIT, EST, LEVL V, 40-54 MIN: ICD-10-PCS | Mod: S$PBB,,, | Performed by: INTERNAL MEDICINE

## 2023-01-19 PROCEDURE — 3077F PR MOST RECENT SYSTOLIC BLOOD PRESSURE >= 140 MM HG: ICD-10-PCS | Mod: CPTII,,, | Performed by: INTERNAL MEDICINE

## 2023-01-19 PROCEDURE — 3077F SYST BP >= 140 MM HG: CPT | Mod: CPTII,,, | Performed by: INTERNAL MEDICINE

## 2023-01-19 PROCEDURE — 3008F PR BODY MASS INDEX (BMI) DOCUMENTED: ICD-10-PCS | Mod: CPTII,,, | Performed by: INTERNAL MEDICINE

## 2023-01-19 PROCEDURE — 3080F DIAST BP >= 90 MM HG: CPT | Mod: CPTII,,, | Performed by: INTERNAL MEDICINE

## 2023-01-19 PROCEDURE — 99214 OFFICE O/P EST MOD 30 MIN: CPT | Mod: PBBFAC | Performed by: INTERNAL MEDICINE

## 2023-01-19 PROCEDURE — 99999 PR PBB SHADOW E&M-EST. PATIENT-LVL IV: CPT | Mod: PBBFAC,,, | Performed by: INTERNAL MEDICINE

## 2023-01-19 PROCEDURE — 3080F PR MOST RECENT DIASTOLIC BLOOD PRESSURE >= 90 MM HG: ICD-10-PCS | Mod: CPTII,,, | Performed by: INTERNAL MEDICINE

## 2023-01-19 PROCEDURE — 3008F BODY MASS INDEX DOCD: CPT | Mod: CPTII,,, | Performed by: INTERNAL MEDICINE

## 2023-01-19 PROCEDURE — 99999 PR PBB SHADOW E&M-EST. PATIENT-LVL IV: ICD-10-PCS | Mod: PBBFAC,,, | Performed by: INTERNAL MEDICINE

## 2023-01-19 PROCEDURE — 99215 OFFICE O/P EST HI 40 MIN: CPT | Mod: S$PBB,,, | Performed by: INTERNAL MEDICINE

## 2023-01-19 PROCEDURE — 1159F PR MEDICATION LIST DOCUMENTED IN MEDICAL RECORD: ICD-10-PCS | Mod: CPTII,,, | Performed by: INTERNAL MEDICINE

## 2023-01-19 NOTE — PROGRESS NOTES
[facility]      Chief Complaint   Patient presents with    Varicose Veins    lymphedema of both lower extremities    Hypertension       Patient ID: Irina Dugan is a 52 y.o. female with HTN, woman obesity, who presents for a follow up appointment.  Pertinent history/events are as follows:     -Pt kindly referred by Dr. Urban elam for evaluation of lymphedema.    -At our initial clinic visit on 8/12/2022, Ms. Dugan reported leg swelling for several years, which became worse over the past 1 year.  She has no claudication or tissue loss.  BLE Venous Reflux Study on 8/12/2022 demonstrated hemodynamically significant BLE venous reflux and no evidence of DVT.   Plan:   BLE Lymphedema and Venous Insufficiency- Check SUZETTE study.  Refer to lymphedema clinic.  Pt to limit sodium intake to 2,000 mg daily.  Limit volume intake to 1.5 liters daily.  Elevate legs when resting.   Morbid Obesity- Refer to Bariatric Medicine for evaluation.  HTN- Continue current medications.    Follow up addendum 12/2/2022:  Ms. Dugan has been compliant with compression of 20-30mmHg, elevation, and exercise for at least 4 weeks yet symptoms persist. Hyperplasia is present with this edema.    HPI:  Ms. Dugan reports significant improvement in BLE edema since starting lymphedema clinic.  She continues to have pain at the site of varicose veins despite wearing graduated compression hose for several months.  She has no claudication or tissue loss.      Past Medical History:   Diagnosis Date    Colitis, nonspecific 11/6/2012    Hypertension 10/12/2012    Ovarian cyst     Tubal ectopic pregnancy      Past Surgical History:   Procedure Laterality Date    CARPAL TUNNEL RELEASE Bilateral 9/23/2020    Procedure: RELEASE, CARPAL TUNNEL;  Surgeon: Aakash Zuluaga Jr., MD;  [facility];  Service: Orthopedics;  Laterality: Bilateral;    CARPAL TUNNEL RELEASE Left 12/22/2020    Procedure: RELEASE, CARPAL TUNNEL;  Surgeon: Aakash CASPER  Zan Clemente MD;  Location: [facility] OR;  Service: Orthopedics;  Laterality: Left;     SECTION, CLASSIC      CHOLECYSTECTOMY      ECTOPIC PREGNANCY SURGERY  age 25    TUBAL LIGATION       Social History     Socioeconomic History    Marital status: Single    Number of children: 2    Years of education: 15   Occupational History    Occupation: Dental assistant      Employer: [employer]   Tobacco Use    Smoking status: Never    Smokeless tobacco: Never   Substance and Sexual Activity    Alcohol use: Yes     Alcohol/week: 0.0 standard drinks     Comment: socially    Drug use: No    Sexual activity: Yes     Partners: Male     Birth control/protection: None   Social History Narrative    Single mom of 2 children, 19 & yr old.     Family History   Problem Relation Age of Onset    Hypertension Other     Cancer Other     Ovarian cysts Other     Breast cancer Mother     Colon cancer Neg Hx     Ovarian cancer Neg Hx     Allergic rhinitis Neg Hx     Angioedema Neg Hx     Atopy Neg Hx     Immunodeficiency Neg Hx     Rhinitis Neg Hx     Urticaria Neg Hx     Eczema Neg Hx     Asthma Neg Hx     Allergies Neg Hx        Review of patient's allergies indicates:  No Known Allergies    Medication List with Changes/Refills   Current Medications    ACETAMINOPHEN 325 MG CAP    Take 650 mg by mouth as needed.    ALBUTEROL (PROVENTIL HFA) 90 MCG/ACTUATION INHALER    Inhale 2 puffs into the lungs every 6 (six) hours as needed for Wheezing. Rescue    ASCORBIC ACID, VITAMIN C, (VITAMIN C) 1000 MG TABLET    Take 500 mg by mouth once daily.    FLUCONAZOLE (DIFLUCAN) 150 MG TAB    Take 1 tablet (150 mg total) by mouth once daily.    GUAIFEN/PHENYLEPH/ACETAMINOPHN (MUCINEX COLD AND SINUS ORAL)    Take by mouth.    HYDROCHLOROTHIAZIDE (HYDRODIURIL) 25 MG TABLET    Take 1 tablet (25 mg total) by mouth once daily.    IBUPROFEN (ADVIL,MOTRIN) 600 MG TABLET    Take 1 tablet (600 mg total) by mouth every 6 (six) hours as needed.    IPRATROPIUM  "(ATROVENT) 21 MCG (0.03 %) NASAL SPRAY    2 sprays by Each Nostril route 2 (two) times daily.    NYSTATIN (MYCOSTATIN) POWDER    Apply topically 2 (two) times daily.    OLMESARTAN (BENICAR) 20 MG TABLET    Take 1 tablet (20 mg total) by mouth once daily.    OSPEMIFENE (OSPHENA) 60 MG TAB    Take 60 mg by mouth once daily at 6am.    ZINC ORAL    Take 60 mg by mouth once daily.    Discontinued Medications    ASCORBIC ACID (VITAMIN C ORAL)    Take 2 tablets by mouth once daily.        Review of Systems  Constitution: Denies chills, fever, and sweats.  HENT: Denies headaches or blurry vision.  Cardiovascular: Denies chest pain or irregular heart beat.  Respiratory: Denies cough or shortness of breath.   Gastrointestinal: Denies abdominal pain, nausea, or vomiting.  Musculoskeletal: Positive for leg swelling.  Neurological: Denies dizziness or focal weakness.  Psychiatric/Behavioral: Normal mental status.  Hematologic/Lymphatic: Denies bleeding problem or easy bruising/bleeding.  Skin: Denies rash or suspicious lesions    Physical Examination  BP (!) 144/92   Pulse 88   Ht 5' 6" (1.676 m)   Wt (!) 139.9 kg (308 lb 6.8 oz)   BMI 49.78 kg/m²     Constitutional: No acute distress, conversant  HEENT: Sclera anicteric, Pupils equal, round and reactive to light, extraocular motions intact, Oropharynx clear  Neck: No JVD, no carotid bruits  Cardiovascular: regular rate and rhythm, no murmur, rubs or gallops, normal S1/S2  Pulmonary: Clear to auscultation bilaterally  Abdominal: Abdomen soft, nontender, nondistended, positive bowel sounds  Extremities: BLE's with trace pitting edema, prominent varicose veins, venous stasis dermatitis, and changes consistent with lymphedema (R>L)   Pulses:  Carotid pulses are 2+ on the right side, and 2+ on the left side.  Radial pulses are 2+ on the right side, and 2+ on the left side.   Femoral pulses are 2+ on the right side, and 2+ on the left side.  Popliteal pulses are 2+ on the right " side, and 2+ on the left side.   Dorsalis pedis pulses are 2+ on the right side, and 2+ on the left side.   Posterior tibial pulses are 2+ on the right side, and 2+ on the left side.    Skin: No ecchymosis, erythema, or ulcers  Psych: Alert and oriented x 3, appropriate affect  Neuro: CNII-XII intact, no focal deficits    Labs:  Most Recent Data  CBC:   Lab Results   Component Value Date    WBC 5.39 06/01/2022    HGB 12.6 06/01/2022    HCT 39.1 06/01/2022     06/01/2022    MCV 87 06/01/2022    RDW 14.3 06/01/2022     BMP:   Lab Results   Component Value Date     06/01/2022    K 3.8 06/01/2022     06/01/2022    CO2 27 06/01/2022    BUN 16 06/01/2022    CREATININE 0.6 06/01/2022    GLU 93 06/01/2022    CALCIUM 9.6 06/01/2022    MG 1.9 07/26/2020    PHOS 4.7 (H) 07/26/2020     LFTS;   Lab Results   Component Value Date    PROT 7.4 06/01/2022    ALBUMIN 4.2 06/01/2022    BILITOT 0.5 06/01/2022    AST 29 06/01/2022    ALKPHOS 99 06/01/2022    ALT 53 (H) 06/01/2022     COAGS:   Lab Results   Component Value Date    INR 1.0 06/02/2021     FLP:   Lab Results   Component Value Date    CHOL 175 06/01/2022    HDL 56 06/01/2022    LDLCALC 101.4 06/01/2022    TRIG 88 06/01/2022    CHOLHDL 32.0 06/01/2022     CARDIAC:   Lab Results   Component Value Date    TROPONINI 0.024 07/19/2020       Imaging:    BLE Venous Reflux Study 8/12/2022:  There is no evidence of a right lower extremity DVT.  The right greater saphenous vein has reflux.  There is no evidence of a left lower extremity DVT.  The left common femoral vein has reflux.  The left superficial femoral middle vein has reflux.  The left greater saphenous vein has reflux.     Assessment/Plan:  Irina Dugan is a 52 y.o. female with BLE venous insufficiency, venous insufficiency, HTN, woman obesity, who presents for a follow up appointment.    1. BLE Lymphedema and Venous Insufficiency- Ms. Dugan reports significant improvement in BLE edema since  starting lymphedema clinic.  She continues to have pain at the site of varicose veins despite wearing graduated compression hose for several months.  Given continued symptoms despite conservative measures for several months, will pursue EVLT/Sclerotherapy.  Continue lymphedema clinic therapy.  Pt to limit sodium intake to 2,000 mg daily.  Limit volume intake to 1.5 liters daily.  Elevate legs when resting.     2. Morbid Obesity- Pt referred to Bariatric Medicine for evaluation.    3. HTN- Continue current medications.    Follow up in 3 months    Total duration of face to face visit time 30 minutes.  Total time spent counseling greater than fifty percent of total visit time.  Counseling included discussion regarding imaging findings, diagnosis, possibilities, treatment options, risks and benefits.  The patient had many questions regarding the options and long-term effects.    Dalton Mueller MD, PhD  Interventional Cardiology

## 2023-01-20 ENCOUNTER — TELEPHONE (OUTPATIENT)
Dept: CARDIOLOGY | Facility: CLINIC | Age: 53
End: 2023-01-20
Payer: MEDICAID

## 2023-01-20 NOTE — TELEPHONE ENCOUNTER
----- Message from Selma Betancourt MA sent at 1/19/2023  5:07 PM CST -----  Regarding: scheduling  Can you please schedule this pt with  ,per Dr. Mueller. Referral is already. Thanks

## 2023-01-23 ENCOUNTER — PATIENT MESSAGE (OUTPATIENT)
Dept: ADMINISTRATIVE | Facility: HOSPITAL | Age: 53
End: 2023-01-23
Payer: MEDICAID

## 2023-01-23 ENCOUNTER — PATIENT OUTREACH (OUTPATIENT)
Dept: ADMINISTRATIVE | Facility: HOSPITAL | Age: 53
End: 2023-01-23
Payer: MEDICAID

## 2023-01-25 ENCOUNTER — OFFICE VISIT (OUTPATIENT)
Dept: CARDIOLOGY | Facility: CLINIC | Age: 53
End: 2023-01-25
Payer: MEDICAID

## 2023-01-25 VITALS
SYSTOLIC BLOOD PRESSURE: 108 MMHG | BODY MASS INDEX: 47.09 KG/M2 | WEIGHT: 293 LBS | HEART RATE: 89 BPM | HEIGHT: 66 IN | DIASTOLIC BLOOD PRESSURE: 80 MMHG

## 2023-01-25 DIAGNOSIS — I87.2 VENOUS INSUFFICIENCY OF BOTH LOWER EXTREMITIES: ICD-10-CM

## 2023-01-25 PROCEDURE — 3079F DIAST BP 80-89 MM HG: CPT | Mod: CPTII,,, | Performed by: INTERNAL MEDICINE

## 2023-01-25 PROCEDURE — 1160F PR REVIEW ALL MEDS BY PRESCRIBER/CLIN PHARMACIST DOCUMENTED: ICD-10-PCS | Mod: CPTII,,, | Performed by: INTERNAL MEDICINE

## 2023-01-25 PROCEDURE — 3008F BODY MASS INDEX DOCD: CPT | Mod: CPTII,,, | Performed by: INTERNAL MEDICINE

## 2023-01-25 PROCEDURE — 99214 OFFICE O/P EST MOD 30 MIN: CPT | Mod: S$PBB,,, | Performed by: INTERNAL MEDICINE

## 2023-01-25 PROCEDURE — 1159F PR MEDICATION LIST DOCUMENTED IN MEDICAL RECORD: ICD-10-PCS | Mod: CPTII,,, | Performed by: INTERNAL MEDICINE

## 2023-01-25 PROCEDURE — 3074F PR MOST RECENT SYSTOLIC BLOOD PRESSURE < 130 MM HG: ICD-10-PCS | Mod: CPTII,,, | Performed by: INTERNAL MEDICINE

## 2023-01-25 PROCEDURE — 99999 PR PBB SHADOW E&M-EST. PATIENT-LVL III: ICD-10-PCS | Mod: PBBFAC,,, | Performed by: INTERNAL MEDICINE

## 2023-01-25 PROCEDURE — 3079F PR MOST RECENT DIASTOLIC BLOOD PRESSURE 80-89 MM HG: ICD-10-PCS | Mod: CPTII,,, | Performed by: INTERNAL MEDICINE

## 2023-01-25 PROCEDURE — 3074F SYST BP LT 130 MM HG: CPT | Mod: CPTII,,, | Performed by: INTERNAL MEDICINE

## 2023-01-25 PROCEDURE — 99999 PR PBB SHADOW E&M-EST. PATIENT-LVL III: CPT | Mod: PBBFAC,,, | Performed by: INTERNAL MEDICINE

## 2023-01-25 PROCEDURE — 3008F PR BODY MASS INDEX (BMI) DOCUMENTED: ICD-10-PCS | Mod: CPTII,,, | Performed by: INTERNAL MEDICINE

## 2023-01-25 PROCEDURE — 99214 PR OFFICE/OUTPT VISIT, EST, LEVL IV, 30-39 MIN: ICD-10-PCS | Mod: S$PBB,,, | Performed by: INTERNAL MEDICINE

## 2023-01-25 PROCEDURE — 99213 OFFICE O/P EST LOW 20 MIN: CPT | Mod: PBBFAC,PO | Performed by: INTERNAL MEDICINE

## 2023-01-25 PROCEDURE — 1160F RVW MEDS BY RX/DR IN RCRD: CPT | Mod: CPTII,,, | Performed by: INTERNAL MEDICINE

## 2023-01-25 PROCEDURE — 1159F MED LIST DOCD IN RCRD: CPT | Mod: CPTII,,, | Performed by: INTERNAL MEDICINE

## 2023-01-25 NOTE — PROGRESS NOTES
HISTORY:    52 o F w a h/o hypertension, OA, and venous insufficiency referred by Dr. Mueller for venous insufficiency management.    Pt has a long h/o B LE varicose veins and venous insufficiency symptoms dating back to her 20s. She has B LE edema and painful varicosities when standing or sitting for long periods of time. She has a heavy feeling in her legs with numbness when she stands for long periods of time. Better when she walks. Symptoms have been progressive over years. RLE worse than LLE. No h/o ulcers. No h/o VTE.     She started seeing Dr. Mueller in August of 2022 and has been started on compression stockings as well as home compression therapy for lymphedema component. Symptoms are better managed, but still present and limit activity levels. She is also on diuretic therapy. She is working on weight loss.    MEDICATIONS:      Current Outpatient Medications:     acetaminophen 325 mg Cap, Take 650 mg by mouth as needed., Disp: , Rfl:     albuterol (PROVENTIL HFA) 90 mcg/actuation inhaler, Inhale 2 puffs into the lungs every 6 (six) hours as needed for Wheezing. Rescue, Disp: 18 g, Rfl: 0    ascorbic acid, vitamin C, (VITAMIN C) 1000 MG tablet, Take 500 mg by mouth once daily., Disp: , Rfl:     fluconazole (DIFLUCAN) 150 MG Tab, Take 1 tablet (150 mg total) by mouth once daily., Disp: 2 tablet, Rfl: 3    guaifen/phenyleph/acetaminophn (MUCINEX COLD AND SINUS ORAL), Take by mouth., Disp: , Rfl:     hydroCHLOROthiazide (HYDRODIURIL) 25 MG tablet, Take 1 tablet (25 mg total) by mouth once daily., Disp: 90 tablet, Rfl: 3    ibuprofen (ADVIL,MOTRIN) 600 MG tablet, Take 1 tablet (600 mg total) by mouth every 6 (six) hours as needed., Disp: 20 tablet, Rfl: 0    ipratropium (ATROVENT) 21 mcg (0.03 %) nasal spray, 2 sprays by Each Nostril route 2 (two) times daily., Disp: 30 mL, Rfl: 0    nystatin (MYCOSTATIN) powder, Apply topically 2 (two) times daily., Disp: 60 g, Rfl: 3    olmesartan (BENICAR) 20 MG tablet,  Take 1 tablet (20 mg total) by mouth once daily., Disp: 90 tablet, Rfl: 3    ospemifene (OSPHENA) 60 mg Tab, Take 60 mg by mouth once daily at 6am., Disp: 30 tablet, Rfl: 12    ZINC ORAL, Take 60 mg by mouth once daily. , Disp: , Rfl:       PHYSICAL EXAM:    Vitals:    01/25/23 1453   BP: 108/80   Pulse: 89       NAD, A+Ox3.  No jvd, no bruit.  RRR nml s1,s2. No murmurs.  CTA B no wheezes or crackles.  2+ B radial and 1+ B DP/PT. B LE varicosities above and below the knee. B corona phlebectatica. Mild edema bilaterally. B LE skin changes across both shins.    LABS/STUDIES (imaging reviewed during clinic visit):    2022 CBC and CMP normal.  Venous us August 2022 No e/o DVT bilaterally. B GSV reflux.   SUZETTE.duplex 2015 Normal bilaterally.     ASSESSMENT & PLAN:    1. Venous insufficiency of both lower extremities        Orders Placed This Encounter    CV Chemical Adhesive Treatment - Single Vein        Pt with significant venous insufficiency bilaterally. C4c, Ep, As, Pr. Right side more symptomatic with a VCSS of 16 bilaterally despite 6 months of treatment with compression stockings and chronic diuretic therapy.    Pt is also doing home compression for possible lymphedema component and working on weight loss.     Recommend ablation. Will start with RLE. Pt has no h/o cyanoacrylate allergy and has nail extensions. No h/o auto-immune disease. Will submit to insurance. She understands the potential risks and benefits of proceeding and agrees.         Patricia Andres MD

## 2023-02-16 ENCOUNTER — DOCUMENTATION ONLY (OUTPATIENT)
Dept: CARDIOLOGY | Facility: CLINIC | Age: 53
End: 2023-02-16
Payer: MEDICAID

## 2023-02-16 NOTE — PROGRESS NOTES
Mrs. Dugan has continued to be compliant with waist high compression garments of 20-30mmhg, elevation and a home exercise program following lymphedema therapy, however her symptoms persist. She has used the Entre system daily which is a leg-only basic pump that applies 30mmHg of pressure to her lower extremities for the past two months. She continues to have persistent swelling and has noticed an increase in swelling in her thighs and lower abdomen. A measurement was taken around her abdomen on 11/22/22 and it was 149cm. Another abdominal measurement was taken on 2/6/23 and it was 150cm. After using the FlexiTouch, the abdominal measurement decreased to 148cm. I am recommending the FlexiTouch which is an advanced pump that includes a truncal component to properly decongest both lower extremities as well as the hips/abdomen. I am recommending that she use this pump 1-2 times per day in addition to continuing conservative therapies to better manage her swelling long term.

## 2023-03-13 ENCOUNTER — TELEPHONE (OUTPATIENT)
Dept: INTERNAL MEDICINE | Facility: CLINIC | Age: 53
End: 2023-03-13
Payer: MEDICAID

## 2023-03-13 DIAGNOSIS — R35.0 URINARY FREQUENCY: Primary | ICD-10-CM

## 2023-03-13 RX ORDER — CIPROFLOXACIN 500 MG/1
500 TABLET ORAL EVERY 12 HOURS
Qty: 14 TABLET | Refills: 0 | Status: SHIPPED | OUTPATIENT
Start: 2023-03-13 | End: 2023-12-06

## 2023-03-13 NOTE — TELEPHONE ENCOUNTER
Spoke to pt and she c/o chills, fatigue, body ache, lower right side back pain, diarrhea, pt has taken Imodium. 3 days   urinary frequency and pain off and on. 1 week    Please advise. Scheduled appt for 04/19/2023 at 1:30 pm

## 2023-03-13 NOTE — TELEPHONE ENCOUNTER
----- Message from Genie Henry sent at 3/13/2023 12:14 PM CDT -----  Contact: Irina bhatti [phone]  [email or web address]     Patient is calling for Medical Advice regarding:    How long has patient had these symptoms:    Pharmacy name and phone#:    Would like response via [employer]:call back    Comments: Pt is requesting a call back form the nurse because she has been having diarrhea for a few days and would like to know if there is something she can take otc

## 2023-03-13 NOTE — TELEPHONE ENCOUNTER
Returned patient's call.  She reports Saturday she left work early because she was not feeling well.  She complains of diarrhea.  She also complains of cough and chills.  Recommend urinalysis and urine culture, as well as labs.  Patient has been scheduled for lab appointment tomorrow.  Please schedule patient for sooner appointment if possible.

## 2023-03-14 ENCOUNTER — TELEPHONE (OUTPATIENT)
Dept: INTERNAL MEDICINE | Facility: CLINIC | Age: 53
End: 2023-03-14
Payer: MEDICAID

## 2023-03-14 ENCOUNTER — LAB VISIT (OUTPATIENT)
Dept: LAB | Facility: HOSPITAL | Age: 53
End: 2023-03-14
Attending: INTERNAL MEDICINE
Payer: MEDICAID

## 2023-03-14 DIAGNOSIS — R74.8 ELEVATED LIVER ENZYMES: Primary | ICD-10-CM

## 2023-03-14 DIAGNOSIS — R35.0 URINARY FREQUENCY: ICD-10-CM

## 2023-03-14 LAB
ALBUMIN SERPL BCP-MCNC: 3.9 G/DL (ref 3.5–5.2)
ALP SERPL-CCNC: 81 U/L (ref 55–135)
ALT SERPL W/O P-5'-P-CCNC: 51 U/L (ref 10–44)
ANION GAP SERPL CALC-SCNC: 6 MMOL/L (ref 8–16)
AST SERPL-CCNC: 34 U/L (ref 10–40)
BASOPHILS # BLD AUTO: 0.03 K/UL (ref 0–0.2)
BASOPHILS NFR BLD: 0.7 % (ref 0–1.9)
BILIRUB SERPL-MCNC: 0.4 MG/DL (ref 0.1–1)
BUN SERPL-MCNC: 22 MG/DL (ref 6–20)
CALCIUM SERPL-MCNC: 9.2 MG/DL (ref 8.7–10.5)
CHLORIDE SERPL-SCNC: 104 MMOL/L (ref 95–110)
CO2 SERPL-SCNC: 28 MMOL/L (ref 23–29)
CREAT SERPL-MCNC: 0.7 MG/DL (ref 0.5–1.4)
DIFFERENTIAL METHOD: ABNORMAL
EOSINOPHIL # BLD AUTO: 0.1 K/UL (ref 0–0.5)
EOSINOPHIL NFR BLD: 2.7 % (ref 0–8)
ERYTHROCYTE [DISTWIDTH] IN BLOOD BY AUTOMATED COUNT: 13.8 % (ref 11.5–14.5)
EST. GFR  (NO RACE VARIABLE): >60 ML/MIN/1.73 M^2
ESTIMATED AVG GLUCOSE: 131 MG/DL (ref 68–131)
GLUCOSE SERPL-MCNC: 98 MG/DL (ref 70–110)
HBA1C MFR BLD: 6.2 % (ref 4–5.6)
HCT VFR BLD AUTO: 40.5 % (ref 37–48.5)
HGB BLD-MCNC: 12.9 G/DL (ref 12–16)
IMM GRANULOCYTES # BLD AUTO: 0.01 K/UL (ref 0–0.04)
IMM GRANULOCYTES NFR BLD AUTO: 0.2 % (ref 0–0.5)
LYMPHOCYTES # BLD AUTO: 1.2 K/UL (ref 1–4.8)
LYMPHOCYTES NFR BLD: 27 % (ref 18–48)
MCH RBC QN AUTO: 28.2 PG (ref 27–31)
MCHC RBC AUTO-ENTMCNC: 31.9 G/DL (ref 32–36)
MCV RBC AUTO: 88 FL (ref 82–98)
MONOCYTES # BLD AUTO: 0.5 K/UL (ref 0.3–1)
MONOCYTES NFR BLD: 11.6 % (ref 4–15)
NEUTROPHILS # BLD AUTO: 2.6 K/UL (ref 1.8–7.7)
NEUTROPHILS NFR BLD: 57.8 % (ref 38–73)
NRBC BLD-RTO: 0 /100 WBC
PLATELET # BLD AUTO: 162 K/UL (ref 150–450)
PMV BLD AUTO: 12 FL (ref 9.2–12.9)
POTASSIUM SERPL-SCNC: 3.8 MMOL/L (ref 3.5–5.1)
PROT SERPL-MCNC: 7.5 G/DL (ref 6–8.4)
RBC # BLD AUTO: 4.58 M/UL (ref 4–5.4)
SODIUM SERPL-SCNC: 138 MMOL/L (ref 136–145)
WBC # BLD AUTO: 4.48 K/UL (ref 3.9–12.7)

## 2023-03-14 PROCEDURE — 85025 COMPLETE CBC W/AUTO DIFF WBC: CPT | Performed by: INTERNAL MEDICINE

## 2023-03-14 PROCEDURE — 80053 COMPREHEN METABOLIC PANEL: CPT | Performed by: INTERNAL MEDICINE

## 2023-03-14 PROCEDURE — 36415 COLL VENOUS BLD VENIPUNCTURE: CPT | Mod: PO | Performed by: INTERNAL MEDICINE

## 2023-03-14 PROCEDURE — 83036 HEMOGLOBIN GLYCOSYLATED A1C: CPT | Performed by: INTERNAL MEDICINE

## 2023-03-14 NOTE — TELEPHONE ENCOUNTER
Please inform patient that labs are significant for an elevated hemoglobin A1c, consistent with prediabetes and ALT, which is a liver enzyme.  Recommend healthy diet and regular exercise.  Also, recommend we check acute hepatitis panel.  Please schedule.

## 2023-03-15 ENCOUNTER — PATIENT MESSAGE (OUTPATIENT)
Dept: INTERNAL MEDICINE | Facility: CLINIC | Age: 53
End: 2023-03-15
Payer: MEDICAID

## 2023-03-16 ENCOUNTER — PATIENT MESSAGE (OUTPATIENT)
Dept: INTERNAL MEDICINE | Facility: CLINIC | Age: 53
End: 2023-03-16
Payer: MEDICAID

## 2023-03-18 ENCOUNTER — OFFICE VISIT (OUTPATIENT)
Dept: URGENT CARE | Facility: CLINIC | Age: 53
End: 2023-03-18
Payer: MEDICAID

## 2023-03-18 VITALS
RESPIRATION RATE: 16 BRPM | SYSTOLIC BLOOD PRESSURE: 138 MMHG | OXYGEN SATURATION: 97 % | HEART RATE: 74 BPM | DIASTOLIC BLOOD PRESSURE: 93 MMHG | TEMPERATURE: 98 F | HEIGHT: 66 IN | WEIGHT: 293 LBS | BODY MASS INDEX: 47.09 KG/M2

## 2023-03-18 DIAGNOSIS — R10.9 RIGHT FLANK PAIN: ICD-10-CM

## 2023-03-18 DIAGNOSIS — R10.9 ABDOMINAL PAIN, UNSPECIFIED ABDOMINAL LOCATION: ICD-10-CM

## 2023-03-18 DIAGNOSIS — M54.9 BACK PAIN, UNSPECIFIED BACK LOCATION, UNSPECIFIED BACK PAIN LATERALITY, UNSPECIFIED CHRONICITY: Primary | ICD-10-CM

## 2023-03-18 LAB
BILIRUB UR QL STRIP: NEGATIVE
GLUCOSE UR QL STRIP: NEGATIVE
KETONES UR QL STRIP: NEGATIVE
LEUKOCYTE ESTERASE UR QL STRIP: NEGATIVE
PH, POC UA: 5 (ref 5–8)
POC BLOOD, URINE: NEGATIVE
POC NITRATES, URINE: NEGATIVE
PROT UR QL STRIP: NEGATIVE
SP GR UR STRIP: 1.02 (ref 1–1.03)
UROBILINOGEN UR STRIP-ACNC: NORMAL (ref 0.1–1.1)

## 2023-03-18 PROCEDURE — 74018 RADEX ABDOMEN 1 VIEW: CPT | Mod: S$GLB,,, | Performed by: RADIOLOGY

## 2023-03-18 PROCEDURE — 81003 URINALYSIS AUTO W/O SCOPE: CPT | Mod: QW,S$GLB,, | Performed by: EMERGENCY MEDICINE

## 2023-03-18 PROCEDURE — 99213 OFFICE O/P EST LOW 20 MIN: CPT | Mod: S$GLB,,, | Performed by: EMERGENCY MEDICINE

## 2023-03-18 PROCEDURE — 99213 PR OFFICE/OUTPT VISIT, EST, LEVL III, 20-29 MIN: ICD-10-PCS | Mod: S$GLB,,, | Performed by: EMERGENCY MEDICINE

## 2023-03-18 PROCEDURE — 74018 XR KUB: ICD-10-PCS | Mod: S$GLB,,, | Performed by: RADIOLOGY

## 2023-03-18 PROCEDURE — 81003 POCT URINALYSIS, DIPSTICK, AUTOMATED, W/O SCOPE: ICD-10-PCS | Mod: QW,S$GLB,, | Performed by: EMERGENCY MEDICINE

## 2023-03-18 RX ORDER — CEFDINIR 300 MG/1
300 CAPSULE ORAL 2 TIMES DAILY
Qty: 20 CAPSULE | Refills: 0 | Status: SHIPPED | OUTPATIENT
Start: 2023-03-18 | End: 2023-03-28

## 2023-03-18 RX ORDER — KETOROLAC TROMETHAMINE 10 MG/1
10 TABLET, FILM COATED ORAL EVERY 6 HOURS
Qty: 20 TABLET | Refills: 0 | Status: SHIPPED | OUTPATIENT
Start: 2023-03-18 | End: 2023-03-23

## 2023-03-18 NOTE — PROGRESS NOTES
"Subjective:       Patient ID: Irina Dugna is a 52 y.o. female.    Vitals:  height is 5' 6" (1.676 m) and weight is 140.6 kg (310 lb) (abnormal). Her oral temperature is 98 °F (36.7 °C). Her blood pressure is 138/93 (abnormal) and her pulse is 74. Her respiration is 16 and oxygen saturation is 97%.     Chief Complaint: Back Pain    Patient presents today with right sided lower back pain that radiates to RLQ of abdomen that began about a week ago.  About 5 days ago, patient was prescribed an antibiotic by PCP for possible UTI.  Pain is still present (aching) and is worse in a sitting position.  Patient states she is having difficulty urinating.      Back Pain  This is a new problem. The current episode started 1 to 4 weeks ago. Radiates to: RLQ abdomen. The pain is The same all the time. The symptoms are aggravated by sitting. Associated symptoms include abdominal pain. Risk factors include obesity.     Gastrointestinal:  Positive for abdominal pain.   Musculoskeletal:  Positive for back pain.     Objective:      Physical Exam   Constitutional: She is oriented to person, place, and time. She appears well-developed.   HENT:   Head: Normocephalic and atraumatic.   Ears:   Right Ear: External ear normal.   Left Ear: External ear normal.   Nose: Nose normal.   Mouth/Throat: Mucous membranes are normal.   Eyes: Conjunctivae and lids are normal.   Neck: Trachea normal. Neck supple.   Cardiovascular: Normal rate, regular rhythm and normal heart sounds.   Pulmonary/Chest: Effort normal and breath sounds normal. No respiratory distress.   Abdominal: Normal appearance and bowel sounds are normal. She exhibits no distension and no mass. Soft. There is no abdominal tenderness. There is no rebound, no guarding, no left CVA tenderness and no right CVA tenderness. No hernia.      Comments: Benign exam.  No McBurney's point tenderness.  No Parra sign.  No CVA tenderness.   Musculoskeletal: Normal range of motion.         " General: No swelling or tenderness. Normal range of motion.      Comments: No lumbar spine or back muscle tenderness noted.  Negative straight leg raise.  Twist lumbar spine and flexion and extension does not change the patient's pain.   Neurological: She is alert and oriented to person, place, and time. She has normal strength.   Skin: Skin is warm, dry, intact, not diaphoretic and not pale.   Psychiatric: Her speech is normal and behavior is normal. Judgment and thought content normal.   Nursing note and vitals reviewed.       52-year-old postmenopausal complaining of one-week pain in the right lower back above the pelvis and radiating to the right lower quadrant, groin area.  The patient does have increase in pain when she urinates.  No gross hematuria.  No nausea vomiting.  No fever chills.  No trauma.  Patient had labs several days ago which were unremarkable.  Urinalysis is also unremarkable.  She was placed on Cipro but stop taking it.  Urinalysis here is clear.  Differential diagnosis includes renal colic, lumbar radicular pain, and very low index suspicion of intra-abdominal infection.  Will order outpatient CT scan and give prescription for ketorolac as well as cefdinir.  Patient understands if symptoms worsen she should go to the emergency department.  Otherwise she should follow-up with her primary care doctor.    KUB reveals large amount of stool in colon.  No other abnormalities.    Assessment:       1. Back pain, unspecified back location, unspecified back pain laterality, unspecified chronicity    2. Right flank pain    3. Abdominal pain, unspecified abdominal location          Plan:         Back pain, unspecified back location, unspecified back pain laterality, unspecified chronicity  -     POCT Urinalysis, Dipstick, Automated, W/O Scope    Right flank pain  -     ketorolac (TORADOL) 10 mg tablet; Take 1 tablet (10 mg total) by mouth every 6 (six) hours. for 5 days  Dispense: 20 tablet; Refill: 0  -      cefdinir (OMNICEF) 300 MG capsule; Take 1 capsule (300 mg total) by mouth 2 (two) times daily. for 10 days  Dispense: 20 capsule; Refill: 0  -     X-Ray KUB; Future; Expected date: 03/18/2023    Abdominal pain, unspecified abdominal location  -     CT Renal Stone Study ABD Pelvis WO; Future; Expected date: 03/18/2023

## 2023-03-23 ENCOUNTER — TELEPHONE (OUTPATIENT)
Dept: URGENT CARE | Facility: CLINIC | Age: 53
End: 2023-03-23
Payer: MEDICAID

## 2023-03-23 ENCOUNTER — HOSPITAL ENCOUNTER (OUTPATIENT)
Dept: CARDIOLOGY | Facility: HOSPITAL | Age: 53
Discharge: HOME | End: 2023-03-23
Attending: INTERNAL MEDICINE
Payer: MEDICAID

## 2023-03-23 VITALS — SYSTOLIC BLOOD PRESSURE: 124 MMHG | DIASTOLIC BLOOD PRESSURE: 88 MMHG

## 2023-03-23 DIAGNOSIS — I87.2 VENOUS INSUFFICIENCY OF BOTH LOWER EXTREMITIES: ICD-10-CM

## 2023-03-23 DIAGNOSIS — I87.2 VENOUS INSUFFICIENCY OF BOTH LOWER EXTREMITIES: Primary | ICD-10-CM

## 2023-03-23 PROCEDURE — 36482 ENDOVEN THER CHEM ADHES 1ST: CPT | Mod: ,,, | Performed by: INTERNAL MEDICINE

## 2023-03-23 PROCEDURE — C1888 ENDOVAS NON-CARDIAC ABL CATH: HCPCS

## 2023-03-23 PROCEDURE — 36482 CV CHEMICAL ADHESIVE TREATMENT - SINGLE VEIN (CUPID ONLY): ICD-10-PCS | Mod: ,,, | Performed by: INTERNAL MEDICINE

## 2023-03-23 RX ORDER — METHOCARBAMOL 750 MG/1
750 TABLET, FILM COATED ORAL 3 TIMES DAILY
Qty: 30 TABLET | Refills: 0 | Status: SHIPPED | OUTPATIENT
Start: 2023-03-23 | End: 2023-04-02

## 2023-03-25 ENCOUNTER — TELEPHONE (OUTPATIENT)
Dept: URGENT CARE | Facility: CLINIC | Age: 53
End: 2023-03-25
Payer: MEDICAID

## 2023-03-25 NOTE — TELEPHONE ENCOUNTER
Pt viewed mychart      ----- Message from Trav Stevens MD sent at 3/21/2023  2:44 PM CDT -----  Please contact the patient and let them know that their results were normal- no stones found. If sx persists, to f/u with PCP

## 2023-03-27 ENCOUNTER — HOSPITAL ENCOUNTER (OUTPATIENT)
Dept: CARDIOLOGY | Facility: HOSPITAL | Age: 53
Discharge: HOME | End: 2023-03-27
Attending: INTERNAL MEDICINE
Payer: MEDICAID

## 2023-03-27 DIAGNOSIS — I87.2 VENOUS INSUFFICIENCY OF BOTH LOWER EXTREMITIES: ICD-10-CM

## 2023-03-27 PROCEDURE — 93971 CV US DOPPLER VENOUS LEG RIGHT (CUPID ONLY): ICD-10-PCS | Mod: 26,RT,, | Performed by: INTERNAL MEDICINE

## 2023-03-27 PROCEDURE — 93971 EXTREMITY STUDY: CPT | Mod: 26,RT,, | Performed by: INTERNAL MEDICINE

## 2023-03-27 PROCEDURE — 93971 EXTREMITY STUDY: CPT | Mod: RT

## 2023-03-29 ENCOUNTER — PATIENT MESSAGE (OUTPATIENT)
Dept: INTERNAL MEDICINE | Facility: CLINIC | Age: 53
End: 2023-03-29
Payer: MEDICAID

## 2023-03-30 ENCOUNTER — TELEPHONE (OUTPATIENT)
Dept: CARDIOLOGY | Facility: CLINIC | Age: 53
End: 2023-03-30
Payer: MEDICAID

## 2023-03-30 NOTE — TELEPHONE ENCOUNTER
----- Message from Desi Patel sent at 3/30/2023  2:12 PM CDT -----  Contact: [phone] patient  Good Afternoon    No blue slot available to schedule an appointment for the patient.  Patient is established with which PCP: Dr Duggan  Reason for the visit: 3 MONTH FOLLOW UP  Would the patient like a call back, or a response through their [facility] portal?: call

## 2023-04-10 ENCOUNTER — TELEPHONE (OUTPATIENT)
Dept: CARDIOLOGY | Facility: CLINIC | Age: 53
End: 2023-04-10
Payer: MEDICAID

## 2023-04-19 ENCOUNTER — OFFICE VISIT (OUTPATIENT)
Dept: INTERNAL MEDICINE | Facility: CLINIC | Age: 53
End: 2023-04-19
Payer: MEDICAID

## 2023-04-19 VITALS
BODY MASS INDEX: 47.09 KG/M2 | DIASTOLIC BLOOD PRESSURE: 84 MMHG | WEIGHT: 293 LBS | TEMPERATURE: 97 F | HEART RATE: 79 BPM | HEIGHT: 66 IN | SYSTOLIC BLOOD PRESSURE: 120 MMHG | OXYGEN SATURATION: 99 %

## 2023-04-19 DIAGNOSIS — L84 CALLUS OF FOOT: ICD-10-CM

## 2023-04-19 DIAGNOSIS — E66.01 CLASS 3 SEVERE OBESITY DUE TO EXCESS CALORIES WITH SERIOUS COMORBIDITY AND BODY MASS INDEX (BMI) OF 45.0 TO 49.9 IN ADULT: ICD-10-CM

## 2023-04-19 DIAGNOSIS — R74.8 ELEVATED LIVER ENZYMES: ICD-10-CM

## 2023-04-19 DIAGNOSIS — I10 ESSENTIAL HYPERTENSION: Primary | ICD-10-CM

## 2023-04-19 PROCEDURE — 99215 OFFICE O/P EST HI 40 MIN: CPT | Mod: PBBFAC,PO | Performed by: INTERNAL MEDICINE

## 2023-04-19 PROCEDURE — 4010F ACE/ARB THERAPY RXD/TAKEN: CPT | Mod: CPTII,,, | Performed by: INTERNAL MEDICINE

## 2023-04-19 PROCEDURE — 1159F PR MEDICATION LIST DOCUMENTED IN MEDICAL RECORD: ICD-10-PCS | Mod: CPTII,,, | Performed by: INTERNAL MEDICINE

## 2023-04-19 PROCEDURE — 3079F DIAST BP 80-89 MM HG: CPT | Mod: CPTII,,, | Performed by: INTERNAL MEDICINE

## 2023-04-19 PROCEDURE — 1159F MED LIST DOCD IN RCRD: CPT | Mod: CPTII,,, | Performed by: INTERNAL MEDICINE

## 2023-04-19 PROCEDURE — 3008F PR BODY MASS INDEX (BMI) DOCUMENTED: ICD-10-PCS | Mod: CPTII,,, | Performed by: INTERNAL MEDICINE

## 2023-04-19 PROCEDURE — 4010F PR ACE/ARB THEARPY RXD/TAKEN: ICD-10-PCS | Mod: CPTII,,, | Performed by: INTERNAL MEDICINE

## 2023-04-19 PROCEDURE — 3044F HG A1C LEVEL LT 7.0%: CPT | Mod: CPTII,,, | Performed by: INTERNAL MEDICINE

## 2023-04-19 PROCEDURE — 3008F BODY MASS INDEX DOCD: CPT | Mod: CPTII,,, | Performed by: INTERNAL MEDICINE

## 2023-04-19 PROCEDURE — 1160F RVW MEDS BY RX/DR IN RCRD: CPT | Mod: CPTII,,, | Performed by: INTERNAL MEDICINE

## 2023-04-19 PROCEDURE — 3044F PR MOST RECENT HEMOGLOBIN A1C LEVEL <7.0%: ICD-10-PCS | Mod: CPTII,,, | Performed by: INTERNAL MEDICINE

## 2023-04-19 PROCEDURE — 99999 PR PBB SHADOW E&M-EST. PATIENT-LVL V: CPT | Mod: PBBFAC,,, | Performed by: INTERNAL MEDICINE

## 2023-04-19 PROCEDURE — 99214 OFFICE O/P EST MOD 30 MIN: CPT | Mod: S$PBB,,, | Performed by: INTERNAL MEDICINE

## 2023-04-19 PROCEDURE — 99999 PR PBB SHADOW E&M-EST. PATIENT-LVL V: ICD-10-PCS | Mod: PBBFAC,,, | Performed by: INTERNAL MEDICINE

## 2023-04-19 PROCEDURE — 3074F SYST BP LT 130 MM HG: CPT | Mod: CPTII,,, | Performed by: INTERNAL MEDICINE

## 2023-04-19 PROCEDURE — 99214 PR OFFICE/OUTPT VISIT, EST, LEVL IV, 30-39 MIN: ICD-10-PCS | Mod: S$PBB,,, | Performed by: INTERNAL MEDICINE

## 2023-04-19 PROCEDURE — 3079F PR MOST RECENT DIASTOLIC BLOOD PRESSURE 80-89 MM HG: ICD-10-PCS | Mod: CPTII,,, | Performed by: INTERNAL MEDICINE

## 2023-04-19 PROCEDURE — 3074F PR MOST RECENT SYSTOLIC BLOOD PRESSURE < 130 MM HG: ICD-10-PCS | Mod: CPTII,,, | Performed by: INTERNAL MEDICINE

## 2023-04-19 PROCEDURE — 1160F PR REVIEW ALL MEDS BY PRESCRIBER/CLIN PHARMACIST DOCUMENTED: ICD-10-PCS | Mod: CPTII,,, | Performed by: INTERNAL MEDICINE

## 2023-04-19 NOTE — PROGRESS NOTES
CC: followup of hypertension  HPI:  The patient is a 52 y.o. year old female who presents to the office for followup of hypertension.  The patient denies any chest pain, shortness of breath, blurred vision, excessive fatigue, nausea or vomiting, but reports headaches.  She has been under a lot of stress caring for her mother and step father.  She reports she has been unable to sleep at night.      PAST MEDICAL HISTORY:  Past Medical History:   Diagnosis Date    Colitis, nonspecific 2012    Hypertension 10/12/2012    Ovarian cyst     Tubal ectopic pregnancy        SURGICAL HISTORY:  Past Surgical History:   Procedure Laterality Date    CARPAL TUNNEL RELEASE Bilateral 2020    Procedure: RELEASE, CARPAL TUNNEL;  Surgeon: Aakash Zuluaga Jr., MD;  Location: [facility] OR;  Service: Orthopedics;  Laterality: Bilateral;    CARPAL TUNNEL RELEASE Left 2020    Procedure: RELEASE, CARPAL TUNNEL;  Surgeon: Aakash Zuluaga Jr., MD;  Location: [facility] OR;  Service: Orthopedics;  Laterality: Left;     SECTION, CLASSIC      CHOLECYSTECTOMY      ECTOPIC PREGNANCY SURGERY  age 25    TUBAL LIGATION         MEDS:  Medcard reviewed and updated    ALLERGIES: Allergy Card reviewed and updated    SOCIAL HISTORY:   The patient is a nonsmoker.    PE:   APPEARANCE: Obese, in no acute distress.    CHEST: Lungs clear to auscultation with unlabored respirations.  CARDIOVASCULAR: Normal S1, S2. No murmurs. No carotid bruits. No pedal edema.  ABDOMEN: Bowel sounds normal. Not distended. Soft. No tenderness or masses.   PSYCHIATRIC: The patient is oriented to person, place, and time and has a pleasant affect.        ASSESSMENT/PLAN:  Irina was seen today for discuss lab results and immunizations.    Diagnoses and all orders for this visit:    Essential hypertension  -     blood pressure is controlled, continue current medication     Class 3 severe obesity due to excess calories with serious comorbidity and body mass index (BMI)  of 45.0 to 49.9 in adult  -     encouraged weight loss through healthy diet and regular exercise     Callus of foot  -     Ambulatory referral/consult to Podiatry; Future

## 2023-04-20 ENCOUNTER — TELEPHONE (OUTPATIENT)
Dept: INTERNAL MEDICINE | Facility: CLINIC | Age: 53
End: 2023-04-20
Payer: MEDICAID

## 2023-04-20 NOTE — TELEPHONE ENCOUNTER
----- Message from Rach Cai sent at 4/20/2023 11:53 AM CDT -----  Contact: Asia Ford [phone]  Liliana needs a call back about the naltrexone-bupropion (CONTRAVE) 8-90 mg TbSR

## 2023-04-20 NOTE — TELEPHONE ENCOUNTER
The instructions for Contrave are as follows:  Take 1 tablet daily in the morning x1 week; then take 1 tablet twice daily x1 week; then take 2 tablets in the morning and 1 tablet in the evening x1 week; then take 2 tablets twice daily.

## 2023-05-08 ENCOUNTER — PATIENT MESSAGE (OUTPATIENT)
Dept: INTERNAL MEDICINE | Facility: CLINIC | Age: 53
End: 2023-05-08
Payer: MEDICAID

## 2023-05-10 NOTE — TELEPHONE ENCOUNTER
Pt stopped  taking the contrave, because its making pressure very high and headaches . Dizzy and not feeling good at all  and low blood sugar

## 2023-06-14 DIAGNOSIS — Z12.31 OTHER SCREENING MAMMOGRAM: ICD-10-CM

## 2023-06-16 ENCOUNTER — PATIENT MESSAGE (OUTPATIENT)
Dept: PODIATRY | Facility: CLINIC | Age: 53
End: 2023-06-16
Payer: MEDICAID

## 2023-08-15 NOTE — LETTER
September 11, 2017      Adrianna Pope, DO  [address] [place] [address]           [facility] - Bariatric Surgery  [address]  [facility] [address]  Phone: [phone]  Fax: [phone]          Patient: Irina Dugan   MR Number: [id number]   YOB: 1970   Date of Visit: 9/11/2017       Dear Dr. Adrianna Pope:    Thank you for referring Irina Dugan to me for evaluation. Attached you will find relevant portions of my assessment and plan of care.    If you have questions, please do not hesitate to call me. I look forward to following Irina Dugan along with you.    Sincerely,    Divina Hardin MD    Enclosure  CC:  No Recipients    If you would like to receive this communication electronically, please contact [email or web address] or [phone] to request more information on [email or web address] Link access.    For providers and/or their staff who would like to refer a patient to [facility], please contact us through our one-stop-shop provider referral line, [facility], at [phone].    If you feel you have received this communication in error or would no longer like to receive these types of communications, please e-mail [email or web address]          Social Work/Discharge Planning:  Chart reviewed. [facility] to follow up today with patient daughter. Will continue to follow.   Electronically signed by DOUG Doss on 8/15/2023 at 9:49 AM

## 2023-11-26 NOTE — PATIENT INSTRUCTIONS
Assessment/Plan:  Irina Dugan is a 52 y.o. female with BLE venous insufficiency, venous insufficiency, HTN, woman obesity, who presents for a follow up appointment.    1. BLE Lymphedema and Venous Insufficiency- Ms. Dugan reports significant improvement in BLE edema since starting lymphedema clinic.  She continues to have pain at the site of varicose veins despite wearing graduated compression hose for several months.  Given continued symptoms despite conservative measures for several months, will pursue EVLT/Sclerotherapy.  Continue lymphedema clinic therapy.  Pt to limit sodium intake to 2,000 mg daily.  Limit volume intake to 1.5 liters daily.  Elevate legs when resting.     2. Morbid Obesity- Pt referred to Bariatric Medicine for evaluation.    3. HTN- Continue current medications.    Follow up in 3 months   No

## 2023-12-02 ENCOUNTER — OFFICE VISIT (OUTPATIENT)
Dept: URGENT CARE | Facility: CLINIC | Age: 53
End: 2023-12-02
Payer: MEDICAID

## 2023-12-02 VITALS
HEART RATE: 83 BPM | TEMPERATURE: 99 F | BODY MASS INDEX: 47.09 KG/M2 | OXYGEN SATURATION: 98 % | WEIGHT: 293 LBS | SYSTOLIC BLOOD PRESSURE: 140 MMHG | HEIGHT: 66 IN | DIASTOLIC BLOOD PRESSURE: 94 MMHG | RESPIRATION RATE: 18 BRPM

## 2023-12-02 DIAGNOSIS — R30.0 DYSURIA: Primary | ICD-10-CM

## 2023-12-02 DIAGNOSIS — J02.9 SORE THROAT: ICD-10-CM

## 2023-12-02 LAB
BILIRUB UR QL STRIP: NEGATIVE
CTP QC/QA: YES
GLUCOSE UR QL STRIP: NEGATIVE
KETONES UR QL STRIP: NEGATIVE
LEUKOCYTE ESTERASE UR QL STRIP: NEGATIVE
MOLECULAR STREP A: NEGATIVE
PH, POC UA: 5 (ref 5–8)
POC BLOOD, URINE: NEGATIVE
POC MOLECULAR INFLUENZA A AGN: NEGATIVE
POC MOLECULAR INFLUENZA B AGN: NEGATIVE
POC NITRATES, URINE: NEGATIVE
PROT UR QL STRIP: NEGATIVE
SARS-COV-2 AG RESP QL IA.RAPID: NEGATIVE
SP GR UR STRIP: 1.02 (ref 1–1.03)
UROBILINOGEN UR STRIP-ACNC: NORMAL (ref 0.1–1.1)

## 2023-12-02 PROCEDURE — 81003 URINALYSIS AUTO W/O SCOPE: CPT | Mod: QW,S$GLB,, | Performed by: NURSE PRACTITIONER

## 2023-12-02 PROCEDURE — 99213 PR OFFICE/OUTPT VISIT, EST, LEVL III, 20-29 MIN: ICD-10-PCS | Mod: S$GLB,,, | Performed by: NURSE PRACTITIONER

## 2023-12-02 PROCEDURE — 87651 STREP A DNA AMP PROBE: CPT | Mod: QW,S$GLB,, | Performed by: NURSE PRACTITIONER

## 2023-12-02 PROCEDURE — 99213 OFFICE O/P EST LOW 20 MIN: CPT | Mod: S$GLB,,, | Performed by: NURSE PRACTITIONER

## 2023-12-02 PROCEDURE — 87502 POCT INFLUENZA A/B MOLECULAR: ICD-10-PCS | Mod: QW,S$GLB,, | Performed by: NURSE PRACTITIONER

## 2023-12-02 PROCEDURE — 87811 SARS-COV-2 COVID19 W/OPTIC: CPT | Mod: QW,S$GLB,, | Performed by: NURSE PRACTITIONER

## 2023-12-02 PROCEDURE — 87502 INFLUENZA DNA AMP PROBE: CPT | Mod: QW,S$GLB,, | Performed by: NURSE PRACTITIONER

## 2023-12-02 PROCEDURE — 81003 POCT URINALYSIS, DIPSTICK, AUTOMATED, W/O SCOPE: ICD-10-PCS | Mod: QW,S$GLB,, | Performed by: NURSE PRACTITIONER

## 2023-12-02 PROCEDURE — 87811 SARS CORONAVIRUS 2 ANTIGEN POCT, MANUAL READ: ICD-10-PCS | Mod: QW,S$GLB,, | Performed by: NURSE PRACTITIONER

## 2023-12-02 PROCEDURE — 87651 POCT STREP A MOLECULAR: ICD-10-PCS | Mod: QW,S$GLB,, | Performed by: NURSE PRACTITIONER

## 2023-12-02 NOTE — PATIENT INSTRUCTIONS

## 2023-12-02 NOTE — PROGRESS NOTES
"Subjective:      Patient ID: Irina Dugan is a 52 y.o. female.    Vitals:  height is 5' 6" (1.676 m) and weight is 137.4 kg (303 lb) (abnormal). Her oral temperature is 98.5 °F (36.9 °C). Her blood pressure is 140/94 (abnormal) and her pulse is 83. Her respiration is 18 and oxygen saturation is 98%.     Chief Complaint: Sore Throat    Pt reports to clinic with sore throat, cough, congestion-green thick mucus and headaches x 3 days. Treatment Dayquil/nyquil at home. No known exposure. Pain 3/10.  Pt reports Pain in pelvic area x 2 weeks and urinary frequency. She is worried about UTI.    Sore Throat   This is a new problem. The current episode started in the past 7 days. The problem has been unchanged. Neither side of throat is experiencing more pain than the other. There has been no fever. Associated symptoms include congestion, coughing and headaches. She has tried acetaminophen for the symptoms. The treatment provided mild relief.       HENT:  Positive for congestion and sore throat.    Respiratory:  Positive for cough.    Neurological:  Positive for headaches.      Objective:     Physical Exam   Constitutional: She is oriented to person, place, and time. She appears well-developed. She is cooperative.  Non-toxic appearance. She does not appear ill. No distress.   HENT:   Head: Normocephalic and atraumatic.   Ears:   Right Ear: Hearing, tympanic membrane, external ear and ear canal normal.   Left Ear: Hearing, tympanic membrane, external ear and ear canal normal.   Nose: Nose normal. No mucosal edema, rhinorrhea or nasal deformity. No epistaxis. Right sinus exhibits no maxillary sinus tenderness and no frontal sinus tenderness. Left sinus exhibits no maxillary sinus tenderness and no frontal sinus tenderness.   Mouth/Throat: Uvula is midline and mucous membranes are normal. No trismus in the jaw. Normal dentition. No uvula swelling. Posterior oropharyngeal erythema present. No oropharyngeal exudate or " posterior oropharyngeal edema.   Eyes: Conjunctivae and lids are normal. No scleral icterus.   Neck: Trachea normal and phonation normal. Neck supple. No edema present. No erythema present. No neck rigidity present.   Cardiovascular: Normal rate, regular rhythm, normal heart sounds and normal pulses.   Pulmonary/Chest: Effort normal and breath sounds normal. No respiratory distress. She has no decreased breath sounds. She has no rhonchi.   Abdominal: Normal appearance and bowel sounds are normal. She exhibits no distension and no mass. Soft. There is no abdominal tenderness.   Musculoskeletal: Normal range of motion.         General: No deformity. Normal range of motion.   Neurological: She is alert and oriented to person, place, and time. She has normal strength. She exhibits normal muscle tone. Coordination normal.   Skin: Skin is warm, dry, intact, not diaphoretic and not pale.   Psychiatric: Her speech is normal and behavior is normal. Judgment and thought content normal.   Nursing note and vitals reviewed.      Assessment:     1. Dysuria    2. Sore throat      Results for orders placed or performed in visit on 12/02/23   SARS Coronavirus 2 Antigen, POCT Manual Read   Result Value Ref Range    SARS Coronavirus 2 Antigen Negative Negative     Acceptable Yes    POCT Strep A, Molecular   Result Value Ref Range    Molecular Strep A, POC Negative Negative     Acceptable Yes    POCT Influenza A/B MOLECULAR   Result Value Ref Range    POC Molecular Influenza A Ag Negative Negative, Not Reported    POC Molecular Influenza B Ag Negative Negative, Not Reported     Acceptable Yes    POCT Urinalysis, Dipstick, Automated, W/O Scope   Result Value Ref Range    POC Blood, Urine Negative Negative    POC Bilirubin, Urine Negative Negative    POC Urobilinogen, Urine normal 0.1 - 1.1    POC Ketones, Urine Negative Negative    POC Protein, Urine Negative Negative    POC Nitrates, Urine  Negative Negative    POC Glucose, Urine Negative Negative    pH, UA 5.0 5 - 8    POC Specific Gravity, Urine 1.020 1.003 - 1.029    POC Leukocytes, Urine Negative Negative       Plan:       Dysuria  -     POCT Urinalysis, Dipstick, Automated, W/O Scope    Sore throat  -     SARS Coronavirus 2 Antigen, POCT Manual Read  -     POCT Strep A, Molecular  -     POCT Influenza A/B MOLECULAR      Patient Instructions   INSTRUCTIONS:  - Rest.  - Drink plenty of fluids.  - Take Tylenol and/or Ibuprofen as directed as needed for fever/pain.  Do not take more than the recommended dose.  - follow up with your PCP within the next 1-2 weeks as needed.  - You must understand that you have received an Urgent Care treatment only and that you may be released before all of your medical problems are known or treated.   - You, the patient, will arrange for follow up care as instructed.   - If your condition worsens or fails to improve we recommend that you receive another evaluation at the ER immediately or contact your PCP to discuss your concerns.   - You can call [phone] or [phone] to help schedule an appointment with the appropriate provider.     -If you smoke cigarettes, it would be beneficial for you to stop.

## 2023-12-06 ENCOUNTER — PATIENT MESSAGE (OUTPATIENT)
Dept: INTERNAL MEDICINE | Facility: CLINIC | Age: 53
End: 2023-12-06

## 2023-12-06 ENCOUNTER — E-VISIT (OUTPATIENT)
Dept: INTERNAL MEDICINE | Facility: CLINIC | Age: 53
End: 2023-12-06
Payer: MEDICAID

## 2023-12-06 DIAGNOSIS — J20.9 ACUTE BRONCHITIS, UNSPECIFIED ORGANISM: Primary | ICD-10-CM

## 2023-12-06 PROCEDURE — 99421 OL DIG E/M SVC 5-10 MIN: CPT | Mod: ,,, | Performed by: INTERNAL MEDICINE

## 2023-12-06 PROCEDURE — 99421 PR E&M, ONLINE DIGIT, EST, < 7 DAYS, 5-10 MINS: ICD-10-PCS | Mod: ,,, | Performed by: INTERNAL MEDICINE

## 2023-12-06 RX ORDER — NYSTATIN 100000 [USP'U]/G
POWDER TOPICAL 2 TIMES DAILY
Qty: 60 G | Refills: 3 | Status: SHIPPED | OUTPATIENT
Start: 2023-12-06

## 2023-12-06 RX ORDER — FLUCONAZOLE 150 MG/1
150 TABLET ORAL DAILY
Qty: 2 TABLET | Refills: 3 | Status: SHIPPED | OUTPATIENT
Start: 2023-12-06

## 2023-12-06 RX ORDER — HYDROCHLOROTHIAZIDE 25 MG/1
25 TABLET ORAL DAILY
Qty: 90 TABLET | Refills: 1 | Status: SHIPPED | OUTPATIENT
Start: 2023-12-06

## 2023-12-06 RX ORDER — AMOXICILLIN AND CLAVULANATE POTASSIUM 875; 125 MG/1; MG/1
1 TABLET, FILM COATED ORAL 2 TIMES DAILY
Qty: 14 TABLET | Refills: 0 | Status: SHIPPED | OUTPATIENT
Start: 2023-12-06 | End: 2023-12-13

## 2023-12-06 NOTE — PROGRESS NOTES
Patient ID: Irina Dugan is a 52 y.o. female.    Chief Complaint: URI (Entered automatically based on patient selection in Patient Portal.)    The patient initiated a request through [employer] on 12/6/2023 for evaluation and management with a chief complaint of URI (Entered automatically based on patient selection in Patient Portal.)     I evaluated the questionnaire submission on 12/06/2023.    [facility] Peq Evisit Upper Respitatory/Cough Questionnaire    12/6/2023 10:19 AM CST - Filed by Patient   Do you agree to participate in an E-Visit? Yes   If you have any of the following symptoms, please present to your local ER or call 911:  I acknowledge   What is the main issue that you would like for your doctor to address today? Sore throat, green Phlegm, coughing all night   Are you able to take your vital signs? No   Are you currently pregnant, could you be pregnant, or are you breast feeding? None of the above   What symptoms do you currently have?  Cough;  Headache;  Nasal Congestion;  Sore throat;  Pain around the nose and face   Describe your cough: Productive (containing mucus);  Bothersome (interferes with daily activities)   Describe the mucus: Green;  Thick   Have you had any of the following? Difficulty breathing   Please enter a few details about your swallowing, breathing, or visual problems. A little bit trouble breathing. Chest feels congested   Have you ever smoked? I have never smoked   Have you had a fever? No   When did your symptoms first appear? 11/29/2023   In the last two weeks, have you been in close contact with someone who has COVID-19 or the Flu? No   In the last two weeks, have you worked or volunteered in a healthcare facility or as a ? Healthcare facilities include a hospital, medical or dental clinic, long-term care facility, or nursing home No   Do you live in a long-term care facility, nursing home, group home, or homeless shelter? No   List what you have done or taken to  help your symptoms. Vicks , cought drops , mucinex, tylenol sinus , robitusdin   How severe are your symptoms? Moderate   Have your symptoms improved since they first appeared? Worse   Have you taken an at home Covid test? No   Have you taken a Flu test? Yes   What were the results? Negative   Have you been fully vaccinated for COVID? (2 Pfizer, 2 Moderna or 1 [employer]an [employer] vaccine injections) Yes   Have you received a booster? No   Have you recieved a Flu shot? No   Do you have transportation to get tested for COVID if it is indicated and ordered for you at an [facility] location? Yes   Provide any information you feel is important to your history not asked above Urgent care visit took covid test and strep test all negative   Please attach any relevant images or files          Recent Labs Obtained:  Office Visit on 12/02/2023   Component Date Value Ref Range Status    SARS Coronavirus 2 Antigen 12/02/2023 Negative  Negative Final     Acceptable 12/02/2023 Yes   Final    Molecular Strep A, POC 12/02/2023 Negative  Negative Final     Acceptable 12/02/2023 Yes   Final    POC Molecular Influenza A Ag 12/02/2023 Negative  Negative, Not Reported Final    POC Molecular Influenza B Ag 12/02/2023 Negative  Negative, Not Reported Final     Acceptable 12/02/2023 Yes   Final    POC Blood, Urine 12/02/2023 Negative  Negative Final    POC Bilirubin, Urine 12/02/2023 Negative  Negative Final    POC Urobilinogen, Urine 12/02/2023 normal  0.1 - 1.1 Final    POC Ketones, Urine 12/02/2023 Negative  Negative Final    POC Protein, Urine 12/02/2023 Negative  Negative Final    POC Nitrates, Urine 12/02/2023 Negative  Negative Final    POC Glucose, Urine 12/02/2023 Negative  Negative Final    pH, UA 12/02/2023 5.0  5 - 8 Final    POC Specific Gravity, Urine 12/02/2023 1.020  1.003 - 1.029 Final    POC Leukocytes, Urine 12/02/2023 Negative  Negative Final       Encounter Diagnosis   Name  Primary?    Acute bronchitis, unspecified organism Yes        No orders of the defined types were placed in this encounter.           No follow-ups on file.      E-Visit Time Tracking:

## 2023-12-06 NOTE — TELEPHONE ENCOUNTER
No care due was identified.  [facility] Embedded Care Due Messages. Reference number: [id number].   12/06/2023 10:28:07 AM CST

## 2023-12-06 NOTE — TELEPHONE ENCOUNTER
Refill Routing Note   Medication(s) are not appropriate for processing by [facility] for the following reason(s):        Outside of protocol  Required vitals abnormal: BP (!) 140/94     [facility] action(s):  Defer  Route      Medication Therapy Plan:         Appointments  past 12m or future 3m with PCP    Date Provider   Last Visit   4/19/2023 Cherie Duggan MD   Next Visit   Visit date not found Cherie Duggan MD   ED visits in past 90 days: 0        Note composed:1:27 PM 12/06/2023

## 2023-12-06 NOTE — TELEPHONE ENCOUNTER
----- Message from Leno Valencia MD sent at 9/30/2019  2:40 PM CDT -----  I am covering for Dr. Pope.  Mammogram negative.  Repeat in one year.   No IV discontinued, cath removed intact

## 2023-12-14 ENCOUNTER — TELEPHONE (OUTPATIENT)
Dept: OBSTETRICS AND GYNECOLOGY | Facility: CLINIC | Age: 53
End: 2023-12-14
Payer: MEDICAID

## 2023-12-14 DIAGNOSIS — N95.0 PMB (POSTMENOPAUSAL BLEEDING): Primary | ICD-10-CM

## 2023-12-14 NOTE — TELEPHONE ENCOUNTER
Spoke w pt.     2 yrs w/o cycle. Pelvic pain. Pt is      2 days ago,spotting started     Cramping since before thanksgiving    Pelvic US ordered. Pt scheduled. Requests to go on same day as mammogram appt.

## 2023-12-14 NOTE — TELEPHONE ENCOUNTER
----- Message from Alexandra Dalton sent at 12/14/2023  1:23 PM CST -----  Patient would like to schedule appointment. Patient states she is spotting during Menopause and is having some Pelvic Pain.    Please contact patient at [phone]

## 2023-12-18 ENCOUNTER — PATIENT MESSAGE (OUTPATIENT)
Dept: OBSTETRICS AND GYNECOLOGY | Facility: CLINIC | Age: 53
End: 2023-12-18
Payer: MEDICAID

## 2024-01-16 ENCOUNTER — TELEPHONE (OUTPATIENT)
Dept: INTERNAL MEDICINE | Facility: CLINIC | Age: 54
End: 2024-01-16
Payer: MEDICAID

## 2024-01-19 ENCOUNTER — TELEPHONE (OUTPATIENT)
Dept: INTERNAL MEDICINE | Facility: CLINIC | Age: 54
End: 2024-01-19
Payer: MEDICAID

## 2024-03-27 DIAGNOSIS — I10 ESSENTIAL HYPERTENSION: ICD-10-CM

## 2024-04-03 ENCOUNTER — TELEPHONE (OUTPATIENT)
Dept: INTERNAL MEDICINE | Facility: CLINIC | Age: 54
End: 2024-04-03

## 2024-04-03 ENCOUNTER — TELEPHONE (OUTPATIENT)
Dept: INTERNAL MEDICINE | Facility: CLINIC | Age: 54
End: 2024-04-03
Payer: MEDICAID

## 2024-04-03 NOTE — TELEPHONE ENCOUNTER
----- Message -----   From: Irina Dugan   Sent: 4/1/2024  10:02 AM CDT   To: [facility] Clinical Support   Subject: Appointment Request                                Appointment Request From: Irina Dugan      With Provider: Cherie Duggan MD [[facility] Internal Medicine]      Preferred Date Range: Any      Preferred Times: Any Time      Reason for visit: Office Visit      Comments:   Need a release for bariatric surgery and blood work . Also referral. As soon as possible please .

## 2024-04-08 ENCOUNTER — TELEPHONE (OUTPATIENT)
Dept: INTERNAL MEDICINE | Facility: CLINIC | Age: 54
End: 2024-04-08
Payer: MEDICAID

## 2024-04-08 NOTE — TELEPHONE ENCOUNTER
----- Message from Winter Wallace sent at 4/8/2024  7:42 AM CDT -----  Contact: Irina   Irina would like a call back to kelsey the pre op appt that she had for today with Dr Howe @ 3:30 I was not able to kelsey the appt

## 2024-04-18 ENCOUNTER — OFFICE VISIT (OUTPATIENT)
Dept: INTERNAL MEDICINE | Facility: CLINIC | Age: 54
End: 2024-04-18
Payer: MEDICAID

## 2024-04-18 VITALS
DIASTOLIC BLOOD PRESSURE: 72 MMHG | TEMPERATURE: 98 F | SYSTOLIC BLOOD PRESSURE: 120 MMHG | HEART RATE: 100 BPM | RESPIRATION RATE: 16 BRPM | WEIGHT: 293 LBS | OXYGEN SATURATION: 99 % | HEIGHT: 66 IN | BODY MASS INDEX: 47.09 KG/M2

## 2024-04-18 DIAGNOSIS — Z01.818 PRE-OP EXAM: ICD-10-CM

## 2024-04-18 DIAGNOSIS — E66.01 CLASS 3 SEVERE OBESITY DUE TO EXCESS CALORIES WITH SERIOUS COMORBIDITY AND BODY MASS INDEX (BMI) OF 45.0 TO 49.9 IN ADULT: Primary | ICD-10-CM

## 2024-04-18 DIAGNOSIS — I10 ESSENTIAL HYPERTENSION: ICD-10-CM

## 2024-04-18 PROCEDURE — 3074F SYST BP LT 130 MM HG: CPT | Mod: CPTII,,, | Performed by: INTERNAL MEDICINE

## 2024-04-18 PROCEDURE — 99214 OFFICE O/P EST MOD 30 MIN: CPT | Mod: S$PBB,,, | Performed by: INTERNAL MEDICINE

## 2024-04-18 PROCEDURE — 99213 OFFICE O/P EST LOW 20 MIN: CPT | Mod: PBBFAC,25,PO | Performed by: INTERNAL MEDICINE

## 2024-04-18 PROCEDURE — 99999 PR PBB SHADOW E&M-EST. PATIENT-LVL III: CPT | Mod: PBBFAC,,, | Performed by: INTERNAL MEDICINE

## 2024-04-18 PROCEDURE — 1159F MED LIST DOCD IN RCRD: CPT | Mod: CPTII,,, | Performed by: INTERNAL MEDICINE

## 2024-04-18 PROCEDURE — 3078F DIAST BP <80 MM HG: CPT | Mod: CPTII,,, | Performed by: INTERNAL MEDICINE

## 2024-04-18 PROCEDURE — 3008F BODY MASS INDEX DOCD: CPT | Mod: CPTII,,, | Performed by: INTERNAL MEDICINE

## 2024-04-18 PROCEDURE — 93005 ELECTROCARDIOGRAM TRACING: CPT | Mod: PBBFAC,PO | Performed by: INTERNAL MEDICINE

## 2024-04-18 PROCEDURE — 93010 ELECTROCARDIOGRAM REPORT: CPT | Mod: S$PBB,,, | Performed by: INTERNAL MEDICINE

## 2024-04-18 PROCEDURE — 3044F HG A1C LEVEL LT 7.0%: CPT | Mod: CPTII,,, | Performed by: INTERNAL MEDICINE

## 2024-04-18 NOTE — PROGRESS NOTES
Subjective     Patient ID: Irina Dugan is a 53 y.o. female.    Chief Complaint: Pre-op Exam    HPI  Pt with Morbid Obesity, HTN is here for pre-op evaluation for gastric sleeve surgery by Dr Davison which has not been scheduled yet.  Pt denies any hx of reactions to anesthesia, difficulty with intubation, CAD/MI/CVA or any acute cardiopulmonary complaints today.   Review of Systems   Constitutional:  Negative for activity change, appetite change, chills, diaphoresis, fatigue, fever and unexpected weight change.   HENT:  Negative for nasal congestion, mouth sores, postnasal drip, rhinorrhea, sinus pressure/congestion, sneezing, sore throat, trouble swallowing and voice change.    Eyes:  Negative for pain, discharge and visual disturbance.   Respiratory:  Negative for cough, shortness of breath and wheezing.    Cardiovascular:  Negative for chest pain, palpitations and leg swelling.   Gastrointestinal:  Negative for abdominal pain, blood in stool, constipation, diarrhea, nausea and vomiting.   Endocrine: Negative for cold intolerance and heat intolerance.   Genitourinary:  Negative for difficulty urinating, dysuria, frequency, hematuria and urgency.   Musculoskeletal:  Negative for arthralgias and myalgias.   Integumentary:  Negative for rash and wound.   Allergic/Immunologic: Negative for environmental allergies and food allergies.   Neurological:  Negative for dizziness, tremors, seizures, syncope, weakness, light-headedness and headaches.   Hematological:  Negative for adenopathy. Does not bruise/bleed easily.   Psychiatric/Behavioral:  Negative for confusion and sleep disturbance. The patient is not nervous/anxious.           Objective     Physical Exam  Vitals and nursing note reviewed.   Constitutional:       General: She is not in acute distress.     Appearance: Normal appearance. She is well-developed. She is not diaphoretic.   HENT:      Head: Normocephalic and atraumatic.      Right Ear: External ear  normal.      Left Ear: External ear normal.      Nose: Nose normal.      Mouth/Throat:      Pharynx: No oropharyngeal exudate.   Eyes:      General: No scleral icterus.        Right eye: No discharge.         Left eye: No discharge.      Conjunctiva/sclera: Conjunctivae normal.      Pupils: Pupils are equal, round, and reactive to light.   Neck:      Thyroid: No thyromegaly.      Vascular: No JVD.   Cardiovascular:      Rate and Rhythm: Normal rate and regular rhythm.      Pulses: Normal pulses.      Heart sounds: Normal heart sounds. No murmur heard.  Pulmonary:      Effort: Pulmonary effort is normal. No respiratory distress.      Breath sounds: Normal breath sounds. No wheezing, rhonchi or rales.   Chest:      Chest wall: No tenderness.   Abdominal:      General: Bowel sounds are normal. There is no distension.      Palpations: Abdomen is soft.      Tenderness: There is no abdominal tenderness. There is no guarding or rebound.   Musculoskeletal:      Cervical back: Neck supple.      Right lower leg: No edema.      Left lower leg: No edema.   Lymphadenopathy:      Cervical: No cervical adenopathy.   Skin:     General: Skin is warm and dry.      Coloration: Skin is not pale.      Findings: No rash.   Neurological:      General: No focal deficit present.      Mental Status: She is alert and oriented to person, place, and time.      Gait: Gait normal.   Psychiatric:         Behavior: Behavior normal.         Thought Content: Thought content normal.         Judgment: Judgment normal.            Assessment and Plan     1. Class 3 severe obesity due to excess calories with serious comorbidity and body mass index (BMI) of 45.0 to 49.9 in adult  -     VITAMIN B6; Future; Expected date: 04/18/2024  -     VITAMIN B1; Future; Expected date: 04/18/2024  -     Vitamin B12 Deficiency Panel; Future; Expected date: 04/18/2024  -     VITAMIN A; Future; Expected date: 04/18/2024  -     Vitamin D; Future; Expected date:  04/18/2024  -     Lipid Panel; Future; Expected date: 04/18/2024  -     TSH; Future; Expected date: 04/18/2024  -     Hemoglobin A1C; Future; Expected date: 04/18/2024    2. Pre-op exam  -     CBC Auto Differential; Future; Expected date: 04/18/2024  -     Comprehensive Metabolic Panel; Future; Expected date: 04/18/2024  -     EKG 12-lead; Future; Expected date: 04/18/2024  -     Protime-INR; Future; Expected date: 04/18/2024  -     APTT; Future; Expected date: 04/18/2024  -     VITAMIN B6; Future; Expected date: 04/18/2024  -     VITAMIN B1; Future; Expected date: 04/18/2024  -     Vitamin B12 Deficiency Panel; Future; Expected date: 04/18/2024  -     VITAMIN A; Future; Expected date: 04/18/2024  -     Vitamin D; Future; Expected date: 04/18/2024  -     Lipid Panel; Future; Expected date: 04/18/2024  -     TSH; Future; Expected date: 04/18/2024    3. Essential hypertension        Pre-op- labs/EKG reviewed and are stable    Morbid Obesity- planning gastric sleeve     HTN- controlled       Pt has no active cardiac condition (ACS/USA, decompensated CHF, significant arrhythmias or severe valvular disease) and can easily achieve 4 METS.  Pt does not require any further workup prior to undergoing gastric surgery. These recommendations follow the most current Guideline on Perioperative Cardiovascular Evaluation and Management of Patients Undergoing Noncardiac Surgery released by the ACC/AHA. (JACC 2014.07.944).

## 2024-04-19 LAB
OHS QRS DURATION: 88 MS
OHS QTC CALCULATION: 452 MS

## 2024-04-20 ENCOUNTER — LAB VISIT (OUTPATIENT)
Dept: LAB | Facility: HOSPITAL | Age: 54
End: 2024-04-20
Attending: INTERNAL MEDICINE
Payer: MEDICAID

## 2024-04-20 DIAGNOSIS — Z01.818 PRE-OP EXAM: ICD-10-CM

## 2024-04-20 DIAGNOSIS — E66.01 CLASS 3 SEVERE OBESITY DUE TO EXCESS CALORIES WITH SERIOUS COMORBIDITY AND BODY MASS INDEX (BMI) OF 45.0 TO 49.9 IN ADULT: ICD-10-CM

## 2024-04-20 LAB
25(OH)D3+25(OH)D2 SERPL-MCNC: 36 NG/ML (ref 30–96)
ALBUMIN SERPL BCP-MCNC: 3.9 G/DL (ref 3.5–5.2)
ALP SERPL-CCNC: 80 U/L (ref 55–135)
ALT SERPL W/O P-5'-P-CCNC: 32 U/L (ref 10–44)
ANION GAP SERPL CALC-SCNC: 9 MMOL/L (ref 8–16)
AST SERPL-CCNC: 23 U/L (ref 10–40)
BASOPHILS # BLD AUTO: 0.04 K/UL (ref 0–0.2)
BASOPHILS NFR BLD: 1.1 % (ref 0–1.9)
BILIRUB SERPL-MCNC: 0.3 MG/DL (ref 0.1–1)
BUN SERPL-MCNC: 20 MG/DL (ref 6–20)
CALCIUM SERPL-MCNC: 9.5 MG/DL (ref 8.7–10.5)
CHLORIDE SERPL-SCNC: 105 MMOL/L (ref 95–110)
CHOLEST SERPL-MCNC: 151 MG/DL (ref 120–199)
CHOLEST/HDLC SERPL: 3.1 {RATIO} (ref 2–5)
CO2 SERPL-SCNC: 25 MMOL/L (ref 23–29)
CREAT SERPL-MCNC: 0.7 MG/DL (ref 0.5–1.4)
DIFFERENTIAL METHOD BLD: ABNORMAL
EOSINOPHIL # BLD AUTO: 0.1 K/UL (ref 0–0.5)
EOSINOPHIL NFR BLD: 2 % (ref 0–8)
ERYTHROCYTE [DISTWIDTH] IN BLOOD BY AUTOMATED COUNT: 13.9 % (ref 11.5–14.5)
EST. GFR  (NO RACE VARIABLE): >60 ML/MIN/1.73 M^2
ESTIMATED AVG GLUCOSE: 120 MG/DL (ref 68–131)
GLUCOSE SERPL-MCNC: 97 MG/DL (ref 70–110)
HBA1C MFR BLD: 5.8 % (ref 4–5.6)
HCT VFR BLD AUTO: 38 % (ref 37–48.5)
HDLC SERPL-MCNC: 49 MG/DL (ref 40–75)
HDLC SERPL: 32.5 % (ref 20–50)
HGB BLD-MCNC: 12.5 G/DL (ref 12–16)
IMM GRANULOCYTES # BLD AUTO: 0.01 K/UL (ref 0–0.04)
IMM GRANULOCYTES NFR BLD AUTO: 0.3 % (ref 0–0.5)
LDLC SERPL CALC-MCNC: 90.2 MG/DL (ref 63–159)
LYMPHOCYTES # BLD AUTO: 1 K/UL (ref 1–4.8)
LYMPHOCYTES NFR BLD: 28.9 % (ref 18–48)
MCH RBC QN AUTO: 28.7 PG (ref 27–31)
MCHC RBC AUTO-ENTMCNC: 32.9 G/DL (ref 32–36)
MCV RBC AUTO: 87 FL (ref 82–98)
MONOCYTES # BLD AUTO: 0.3 K/UL (ref 0.3–1)
MONOCYTES NFR BLD: 7.6 % (ref 4–15)
NEUTROPHILS # BLD AUTO: 2.1 K/UL (ref 1.8–7.7)
NEUTROPHILS NFR BLD: 60.1 % (ref 38–73)
NONHDLC SERPL-MCNC: 102 MG/DL
NRBC BLD-RTO: 0 /100 WBC
PLATELET # BLD AUTO: 162 K/UL (ref 150–450)
PMV BLD AUTO: 12.3 FL (ref 9.2–12.9)
POTASSIUM SERPL-SCNC: 4.3 MMOL/L (ref 3.5–5.1)
PROT SERPL-MCNC: 7.3 G/DL (ref 6–8.4)
RBC # BLD AUTO: 4.36 M/UL (ref 4–5.4)
SODIUM SERPL-SCNC: 139 MMOL/L (ref 136–145)
TRIGL SERPL-MCNC: 59 MG/DL (ref 30–150)
TSH SERPL DL<=0.005 MIU/L-ACNC: 0.89 UIU/ML (ref 0.4–4)
WBC # BLD AUTO: 3.56 K/UL (ref 3.9–12.7)

## 2024-04-20 PROCEDURE — 82607 VITAMIN B-12: CPT | Performed by: INTERNAL MEDICINE

## 2024-04-20 PROCEDURE — 82306 VITAMIN D 25 HYDROXY: CPT | Performed by: INTERNAL MEDICINE

## 2024-04-20 PROCEDURE — 84207 ASSAY OF VITAMIN B-6: CPT | Performed by: INTERNAL MEDICINE

## 2024-04-20 PROCEDURE — 85025 COMPLETE CBC W/AUTO DIFF WBC: CPT | Performed by: INTERNAL MEDICINE

## 2024-04-20 PROCEDURE — 85610 PROTHROMBIN TIME: CPT | Performed by: INTERNAL MEDICINE

## 2024-04-20 PROCEDURE — 80061 LIPID PANEL: CPT | Performed by: INTERNAL MEDICINE

## 2024-04-20 PROCEDURE — 84425 ASSAY OF VITAMIN B-1: CPT | Performed by: INTERNAL MEDICINE

## 2024-04-20 PROCEDURE — 83036 HEMOGLOBIN GLYCOSYLATED A1C: CPT | Performed by: INTERNAL MEDICINE

## 2024-04-20 PROCEDURE — 85730 THROMBOPLASTIN TIME PARTIAL: CPT | Performed by: INTERNAL MEDICINE

## 2024-04-20 PROCEDURE — 36415 COLL VENOUS BLD VENIPUNCTURE: CPT | Mod: PO | Performed by: INTERNAL MEDICINE

## 2024-04-20 PROCEDURE — 84590 ASSAY OF VITAMIN A: CPT | Performed by: INTERNAL MEDICINE

## 2024-04-20 PROCEDURE — 80053 COMPREHEN METABOLIC PANEL: CPT | Performed by: INTERNAL MEDICINE

## 2024-04-20 PROCEDURE — 84443 ASSAY THYROID STIM HORMONE: CPT | Performed by: INTERNAL MEDICINE

## 2024-04-22 ENCOUNTER — PATIENT MESSAGE (OUTPATIENT)
Dept: INTERNAL MEDICINE | Facility: CLINIC | Age: 54
End: 2024-04-22
Payer: MEDICAID

## 2024-04-22 LAB
APTT PPP: 27.3 SEC (ref 21–32)
INR PPP: 1 (ref 0.8–1.2)
PROTHROMBIN TIME: 10.8 SEC (ref 9–12.5)

## 2024-04-23 LAB — VIT B12 SERPL-MCNC: 501 NG/L (ref 180–914)

## 2024-04-26 LAB
PYRIDOXAL SERPL-MCNC: 42 UG/L (ref 5–50)
VIT A SERPL-MCNC: 41 UG/DL (ref 38–106)
VIT B1 BLD-MCNC: 70 UG/L (ref 38–122)

## 2024-07-22 RX ORDER — HYDROCHLOROTHIAZIDE 25 MG/1
25 TABLET ORAL
Qty: 90 TABLET | Refills: 0 | Status: SHIPPED | OUTPATIENT
Start: 2024-07-22

## 2024-07-22 NOTE — TELEPHONE ENCOUNTER
No care due was identified.  [facility] Embedded Care Due Messages. Reference number: [id number].   7/22/2024 8:39:59 AM CDT

## 2024-07-22 NOTE — TELEPHONE ENCOUNTER
Refill Routing Note   Medication(s) are not appropriate for processing by [facility] for the following reason(s):        Patient not seen by provider within 15 months    [facility] action(s):  Defer               Appointments  past 12m or future 3m with PCP    Date Provider   Last Visit   12/6/2023 Cherie Duggan MD   Next Visit   Visit date not found Cherie Duggan MD   ED visits in past 90 days: 0        Note composed:3:25 PM 07/22/2024

## 2024-08-09 ENCOUNTER — TELEPHONE (OUTPATIENT)
Dept: OBSTETRICS AND GYNECOLOGY | Facility: CLINIC | Age: 54
End: 2024-08-09
Payer: MEDICAID

## 2024-08-09 ENCOUNTER — TELEPHONE (OUTPATIENT)
Dept: INTERNAL MEDICINE | Facility: CLINIC | Age: 54
End: 2024-08-09
Payer: MEDICAID

## 2024-08-09 DIAGNOSIS — E66.01 CLASS 3 SEVERE OBESITY DUE TO EXCESS CALORIES WITH SERIOUS COMORBIDITY AND BODY MASS INDEX (BMI) OF 45.0 TO 49.9 IN ADULT: ICD-10-CM

## 2024-08-09 DIAGNOSIS — I10 ESSENTIAL HYPERTENSION: ICD-10-CM

## 2024-08-09 DIAGNOSIS — R74.8 ELEVATED LIVER ENZYMES: ICD-10-CM

## 2024-08-09 DIAGNOSIS — R73.01 IMPAIRED FASTING BLOOD SUGAR: Primary | ICD-10-CM

## 2024-08-14 ENCOUNTER — TELEPHONE (OUTPATIENT)
Dept: OPHTHALMOLOGY | Facility: CLINIC | Age: 54
End: 2024-08-14
Payer: MEDICAID

## 2024-08-14 NOTE — TELEPHONE ENCOUNTER
Appt Type:  EP      Date/Time Preference:any      Treating Provider:Earnest      Caller Name:Irina Dugan      Contact Preference:[phone] (home)       Comments/notes:Patient is calling to schedule for F/U and check on right  eye she has a freckle behind. Requesting a call back   ///  Called pt to [facility] appt for CH nevus eval.

## 2024-08-20 DIAGNOSIS — I10 ESSENTIAL HYPERTENSION: ICD-10-CM

## 2024-08-20 RX ORDER — OLMESARTAN MEDOXOMIL 20 MG/1
20 TABLET ORAL
Qty: 90 TABLET | Refills: 3 | Status: SHIPPED | OUTPATIENT
Start: 2024-08-20

## 2024-08-20 NOTE — TELEPHONE ENCOUNTER
No care due was identified.  [facility] Embedded Care Due Messages. Reference number: [id number].   8/20/2024 6:53:24 AM CDT

## 2024-08-20 NOTE — TELEPHONE ENCOUNTER
Refill Routing Note   Medication(s) are not appropriate for processing by [facility] for the following reason(s):        Patient not seen by provider within 15 months    [facility] action(s):  Defer               Appointments  past 12m or future 3m with PCP    Date Provider   Last Visit   12/6/2023 Cherie Duggan MD   Next Visit   9/5/2024 Cherie Duggan MD   ED visits in past 90 days: 0        Note composed:5:55 PM 08/20/2024

## 2024-08-23 ENCOUNTER — LAB VISIT (OUTPATIENT)
Dept: LAB | Facility: HOSPITAL | Age: 54
End: 2024-08-23
Payer: MEDICAID

## 2024-08-23 DIAGNOSIS — I10 ESSENTIAL HYPERTENSION: ICD-10-CM

## 2024-08-23 DIAGNOSIS — R73.01 IMPAIRED FASTING BLOOD SUGAR: ICD-10-CM

## 2024-08-23 DIAGNOSIS — R74.8 ELEVATED LIVER ENZYMES: ICD-10-CM

## 2024-08-23 DIAGNOSIS — E66.01 CLASS 3 SEVERE OBESITY DUE TO EXCESS CALORIES WITH SERIOUS COMORBIDITY AND BODY MASS INDEX (BMI) OF 45.0 TO 49.9 IN ADULT: ICD-10-CM

## 2024-08-23 LAB
ALBUMIN SERPL BCP-MCNC: 4.2 G/DL (ref 3.5–5.2)
ALBUMIN SERPL BCP-MCNC: 4.2 G/DL (ref 3.5–5.2)
ALP SERPL-CCNC: 85 U/L (ref 55–135)
ALP SERPL-CCNC: 85 U/L (ref 55–135)
ALT SERPL W/O P-5'-P-CCNC: 29 U/L (ref 10–44)
ALT SERPL W/O P-5'-P-CCNC: 29 U/L (ref 10–44)
ANION GAP SERPL CALC-SCNC: 12 MMOL/L (ref 8–16)
ANION GAP SERPL CALC-SCNC: 12 MMOL/L (ref 8–16)
AST SERPL-CCNC: 23 U/L (ref 10–40)
AST SERPL-CCNC: 23 U/L (ref 10–40)
BASOPHILS # BLD AUTO: 0.03 K/UL (ref 0–0.2)
BASOPHILS NFR BLD: 0.7 % (ref 0–1.9)
BILIRUB SERPL-MCNC: 0.4 MG/DL (ref 0.1–1)
BILIRUB SERPL-MCNC: 0.4 MG/DL (ref 0.1–1)
BUN SERPL-MCNC: 18 MG/DL (ref 6–20)
BUN SERPL-MCNC: 18 MG/DL (ref 6–20)
CALCIUM SERPL-MCNC: 9.7 MG/DL (ref 8.7–10.5)
CALCIUM SERPL-MCNC: 9.7 MG/DL (ref 8.7–10.5)
CHLORIDE SERPL-SCNC: 101 MMOL/L (ref 95–110)
CHLORIDE SERPL-SCNC: 101 MMOL/L (ref 95–110)
CHOLEST SERPL-MCNC: 162 MG/DL (ref 120–199)
CHOLEST/HDLC SERPL: 3.4 {RATIO} (ref 2–5)
CO2 SERPL-SCNC: 25 MMOL/L (ref 23–29)
CO2 SERPL-SCNC: 25 MMOL/L (ref 23–29)
CREAT SERPL-MCNC: 0.8 MG/DL (ref 0.5–1.4)
CREAT SERPL-MCNC: 0.8 MG/DL (ref 0.5–1.4)
DIFFERENTIAL METHOD BLD: NORMAL
EOSINOPHIL # BLD AUTO: 0.1 K/UL (ref 0–0.5)
EOSINOPHIL NFR BLD: 2 % (ref 0–8)
ERYTHROCYTE [DISTWIDTH] IN BLOOD BY AUTOMATED COUNT: 13.6 % (ref 11.5–14.5)
EST. GFR  (NO RACE VARIABLE): >60 ML/MIN/1.73 M^2
EST. GFR  (NO RACE VARIABLE): >60 ML/MIN/1.73 M^2
ESTIMATED AVG GLUCOSE: 117 MG/DL (ref 68–131)
GLUCOSE SERPL-MCNC: 93 MG/DL (ref 70–110)
GLUCOSE SERPL-MCNC: 93 MG/DL (ref 70–110)
HBA1C MFR BLD: 5.7 % (ref 4–5.6)
HCT VFR BLD AUTO: 38.5 % (ref 37–48.5)
HDLC SERPL-MCNC: 47 MG/DL (ref 40–75)
HDLC SERPL: 29 % (ref 20–50)
HGB BLD-MCNC: 12.9 G/DL (ref 12–16)
IMM GRANULOCYTES # BLD AUTO: 0.01 K/UL (ref 0–0.04)
IMM GRANULOCYTES NFR BLD AUTO: 0.2 % (ref 0–0.5)
LDLC SERPL CALC-MCNC: 100.6 MG/DL (ref 63–159)
LYMPHOCYTES # BLD AUTO: 1.2 K/UL (ref 1–4.8)
LYMPHOCYTES NFR BLD: 26.2 % (ref 18–48)
MCH RBC QN AUTO: 29.1 PG (ref 27–31)
MCHC RBC AUTO-ENTMCNC: 33.5 G/DL (ref 32–36)
MCV RBC AUTO: 87 FL (ref 82–98)
MONOCYTES # BLD AUTO: 0.4 K/UL (ref 0.3–1)
MONOCYTES NFR BLD: 9.2 % (ref 4–15)
NEUTROPHILS # BLD AUTO: 2.8 K/UL (ref 1.8–7.7)
NEUTROPHILS NFR BLD: 61.7 % (ref 38–73)
NONHDLC SERPL-MCNC: 115 MG/DL
NRBC BLD-RTO: 0 /100 WBC
PLATELET # BLD AUTO: 162 K/UL (ref 150–450)
PMV BLD AUTO: 12.6 FL (ref 9.2–12.9)
POTASSIUM SERPL-SCNC: 4 MMOL/L (ref 3.5–5.1)
POTASSIUM SERPL-SCNC: 4 MMOL/L (ref 3.5–5.1)
PROT SERPL-MCNC: 7.6 G/DL (ref 6–8.4)
PROT SERPL-MCNC: 7.6 G/DL (ref 6–8.4)
RBC # BLD AUTO: 4.43 M/UL (ref 4–5.4)
SODIUM SERPL-SCNC: 138 MMOL/L (ref 136–145)
SODIUM SERPL-SCNC: 138 MMOL/L (ref 136–145)
TRIGL SERPL-MCNC: 72 MG/DL (ref 30–150)
TSH SERPL DL<=0.005 MIU/L-ACNC: 0.96 UIU/ML (ref 0.4–4)
WBC # BLD AUTO: 4.55 K/UL (ref 3.9–12.7)

## 2024-08-23 PROCEDURE — 80061 LIPID PANEL: CPT | Performed by: INTERNAL MEDICINE

## 2024-08-23 PROCEDURE — 85025 COMPLETE CBC W/AUTO DIFF WBC: CPT | Performed by: INTERNAL MEDICINE

## 2024-08-23 PROCEDURE — 36415 COLL VENOUS BLD VENIPUNCTURE: CPT | Mod: PO | Performed by: INTERNAL MEDICINE

## 2024-08-23 PROCEDURE — 84443 ASSAY THYROID STIM HORMONE: CPT | Performed by: INTERNAL MEDICINE

## 2024-08-23 PROCEDURE — 83036 HEMOGLOBIN GLYCOSYLATED A1C: CPT | Performed by: INTERNAL MEDICINE

## 2024-08-23 PROCEDURE — 80053 COMPREHEN METABOLIC PANEL: CPT | Performed by: INTERNAL MEDICINE

## 2024-09-05 ENCOUNTER — OFFICE VISIT (OUTPATIENT)
Dept: INTERNAL MEDICINE | Facility: CLINIC | Age: 54
End: 2024-09-05
Payer: MEDICAID

## 2024-09-05 VITALS
WEIGHT: 293 LBS | DIASTOLIC BLOOD PRESSURE: 74 MMHG | OXYGEN SATURATION: 97 % | HEIGHT: 66 IN | BODY MASS INDEX: 47.09 KG/M2 | SYSTOLIC BLOOD PRESSURE: 130 MMHG | TEMPERATURE: 97 F | HEART RATE: 78 BPM

## 2024-09-05 DIAGNOSIS — I10 ESSENTIAL HYPERTENSION: ICD-10-CM

## 2024-09-05 DIAGNOSIS — Z00.00 ROUTINE MEDICAL EXAM: Primary | ICD-10-CM

## 2024-09-05 PROCEDURE — 4010F ACE/ARB THERAPY RXD/TAKEN: CPT | Mod: CPTII,,, | Performed by: INTERNAL MEDICINE

## 2024-09-05 PROCEDURE — 1159F MED LIST DOCD IN RCRD: CPT | Mod: CPTII,,, | Performed by: INTERNAL MEDICINE

## 2024-09-05 PROCEDURE — 99396 PREV VISIT EST AGE 40-64: CPT | Mod: S$PBB,,, | Performed by: INTERNAL MEDICINE

## 2024-09-05 PROCEDURE — 3078F DIAST BP <80 MM HG: CPT | Mod: CPTII,,, | Performed by: INTERNAL MEDICINE

## 2024-09-05 PROCEDURE — 3008F BODY MASS INDEX DOCD: CPT | Mod: CPTII,,, | Performed by: INTERNAL MEDICINE

## 2024-09-05 PROCEDURE — 3075F SYST BP GE 130 - 139MM HG: CPT | Mod: CPTII,,, | Performed by: INTERNAL MEDICINE

## 2024-09-05 PROCEDURE — 3044F HG A1C LEVEL LT 7.0%: CPT | Mod: CPTII,,, | Performed by: INTERNAL MEDICINE

## 2024-09-05 PROCEDURE — 1160F RVW MEDS BY RX/DR IN RCRD: CPT | Mod: CPTII,,, | Performed by: INTERNAL MEDICINE

## 2024-09-05 PROCEDURE — 99213 OFFICE O/P EST LOW 20 MIN: CPT | Mod: PBBFAC,PO | Performed by: INTERNAL MEDICINE

## 2024-09-05 PROCEDURE — 99999 PR PBB SHADOW E&M-EST. PATIENT-LVL III: CPT | Mod: PBBFAC,,, | Performed by: INTERNAL MEDICINE

## 2024-09-05 RX ORDER — OLMESARTAN MEDOXOMIL 20 MG/1
20 TABLET ORAL DAILY
Qty: 90 TABLET | Refills: 3 | Status: SHIPPED | OUTPATIENT
Start: 2024-09-05

## 2024-09-05 RX ORDER — SEMAGLUTIDE 0.5 MG/.5ML
0.5 INJECTION, SOLUTION SUBCUTANEOUS
Start: 2024-09-05

## 2024-09-05 RX ORDER — HYDROCHLOROTHIAZIDE 25 MG/1
25 TABLET ORAL DAILY
Qty: 90 TABLET | Refills: 3 | Status: SHIPPED | OUTPATIENT
Start: 2024-09-05

## 2024-09-05 RX ORDER — PHENTERMINE HYDROCHLORIDE 37.5 MG/1
37.5 TABLET ORAL
Start: 2024-09-05 | End: 2024-10-05

## 2024-09-05 NOTE — PROGRESS NOTES
The patient is a 53 y.o. old female who presents to the office for a physical.  Review of labs reveals essentially normal results, mildly elevated, but improved hemoglobin A1c.    PAST MEDICAL HISTORY  Past Medical History:   Diagnosis Date    Colitis, nonspecific 2012    Hypertension 10/12/2012    Ovarian cyst     Tubal ectopic pregnancy        SURGICAL HISTORY:  Past Surgical History:   Procedure Laterality Date    CARPAL TUNNEL RELEASE Bilateral 2020    Procedure: RELEASE, CARPAL TUNNEL;  Surgeon: Aakash Zuluaga Jr., MD;  Location: [facility] OR;  Service: Orthopedics;  Laterality: Bilateral;    CARPAL TUNNEL RELEASE Left 2020    Procedure: RELEASE, CARPAL TUNNEL;  Surgeon: Aakash Zuluaga Jr., MD;  Location: [facility] OR;  Service: Orthopedics;  Laterality: Left;     SECTION, CLASSIC      CHOLECYSTECTOMY      ECTOPIC PREGNANCY SURGERY  age 25    TUBAL LIGATION           MEDS:  Medcard reviewed and updated    ALLERGIES: Allergy Card reviewed and updated    SOCIAL HISTORY:   The patient is a nonsmoker, denies alcohol or illicit drug use.    ROS:  GENERAL: No fever, chills, fatigability or weight loss.  SKIN: No rashes.  HEAD: No headaches or recent head trauma.  EYES: No photophobia, ocular pain or diplopia.  EARS: Denies ear pain, discharge or vertigo.  NOSE: No epistaxis or postnasal drip.  MOUTH & THROAT: No hoarseness or change in voice.   NODES: Denies swollen glands.  CHEST: Denies shortness of breath, wheezing, cough and sputum production.  CARDIOVASCULAR: Denies chest pain or palpitations.  ABDOMEN: Appetite fine. Denies diarrhea, abdominal pain, constipation or blood in stool.  URINARY: No dysuria or hematuria.  MUSCULOSKELETAL: No joint stiffness or swelling. Denies back pain.  Positive nodules of fingers of right hand.  NEUROLOGIC: No history of seizures.  ENDOCRINE: Denies polyuria or polydipsia.  PSYCHIATRIC: Denies mood swings, depression, anxiety, homicidal or suicidal  thoughts.    SCREENINGS:  Last cholesterol:2024 .  Last colonoscopy/ cologuard: 2022  Last mammogram: 2023  Last Pap smear: 2022  Last tetanus: 2012  Last Pneumovax: none  Last eye exam: 2023  Last bone density: none  Last menstrual period: perimenopausal    PE:   Vitals:  Vitals:    09/05/24 0942   BP: 130/74   Pulse: 78   Temp: 97.4 °F (36.3 °C)       APPEARANCE: Well nourished, well developed, in no acute distress.    EYES: Sclerae anicteric. PERRL. EOMI.      EARS: TM's intact. No retraction or perforation.    NOSE: Mucosa pink. Airway clear.  MOUTH & THROAT: No tonsillar enlargement. No pharyngeal erythema or exudate. No stridor.  NECK: Supple, no thyromegaly.  CHEST: Lungs clear to auscultation with unlabored respirations.  CARDIOVASCULAR: Normal S1, S2. No murmurs. No carotid bruits. No pedal edema.  ABDOMEN: Bowel sounds normal. Not distended. Soft. No tenderness or masses.   MUSCULOSKELETAL:  Normal gait, no cyanosis or clubbing. Positive nodules of 5th digit of right hand.   SKIN: Normal skin turgor, warm and dry.  NEUROLOGIC: Cranial Nerves: Intact.  PSYCHIATRIC: The patient is oriented to person, place, and time and has a pleasant affect.        ASSESSMENT/PLAN:  Irina was seen today for annual exam, mass, hand pain and dry scalp.    Diagnoses and all orders for this visit:    Routine medical exam  -     labs reviewed    Essential hypertension  -     olmesartan (BENICAR) 20 MG tablet; Take 1 tablet (20 mg total) by mouth once daily.  -     blood pressure is controlled, continue current therapy    Other orders  -     semaglutide, weight loss, (WEGOVY) 0.5 mg/0.5 mL PnIj; Inject 0.5 mg into the skin every 7 days.  -     phentermine (ADIPEX-P) 37.5 mg tablet; Take 1 tablet (37.5 mg total) by mouth before breakfast.  -     hydroCHLOROthiazide (HYDRODIURIL) 25 MG tablet; Take 1 tablet (25 mg total) by mouth once daily.

## 2024-12-31 DIAGNOSIS — Z12.31 OTHER SCREENING MAMMOGRAM: ICD-10-CM

## 2025-04-15 ENCOUNTER — TELEPHONE (OUTPATIENT)
Dept: INTERNAL MEDICINE | Facility: CLINIC | Age: 55
End: 2025-04-15
Payer: MEDICAID

## 2025-04-15 ENCOUNTER — PATIENT MESSAGE (OUTPATIENT)
Dept: INTERNAL MEDICINE | Facility: CLINIC | Age: 55
End: 2025-04-15
Payer: MEDICAID

## 2025-04-15 NOTE — TELEPHONE ENCOUNTER
----- Message from Donna sent at 4/15/2025 11:02 AM CDT -----  Type:  Sooner Apoointment RequestCaller is requesting a sooner appointment.  Name of Caller:Irina When is the first available appointment?soon Symptoms:Dry skin Would the patient rather a call back or a response via [facility]? Call Best Call Back Number: [phone] Information:

## 2025-04-22 ENCOUNTER — PATIENT MESSAGE (OUTPATIENT)
Dept: INTERNAL MEDICINE | Facility: CLINIC | Age: 55
End: 2025-04-22

## 2025-04-22 ENCOUNTER — TELEPHONE (OUTPATIENT)
Dept: INTERNAL MEDICINE | Facility: CLINIC | Age: 55
End: 2025-04-22
Payer: MEDICAID

## 2025-04-22 ENCOUNTER — OFFICE VISIT (OUTPATIENT)
Dept: INTERNAL MEDICINE | Facility: CLINIC | Age: 55
End: 2025-04-22
Payer: MEDICAID

## 2025-04-22 VITALS
WEIGHT: 284.38 LBS | TEMPERATURE: 97 F | DIASTOLIC BLOOD PRESSURE: 68 MMHG | OXYGEN SATURATION: 98 % | HEART RATE: 79 BPM | SYSTOLIC BLOOD PRESSURE: 122 MMHG | BODY MASS INDEX: 45.7 KG/M2 | HEIGHT: 66 IN

## 2025-04-22 DIAGNOSIS — E66.01 CLASS 3 SEVERE OBESITY DUE TO EXCESS CALORIES WITH SERIOUS COMORBIDITY AND BODY MASS INDEX (BMI) OF 45.0 TO 49.9 IN ADULT: ICD-10-CM

## 2025-04-22 DIAGNOSIS — L85.3 DRY SKIN: Primary | ICD-10-CM

## 2025-04-22 DIAGNOSIS — E66.813 CLASS 3 SEVERE OBESITY DUE TO EXCESS CALORIES WITH SERIOUS COMORBIDITY AND BODY MASS INDEX (BMI) OF 45.0 TO 49.9 IN ADULT: ICD-10-CM

## 2025-04-22 PROCEDURE — 99213 OFFICE O/P EST LOW 20 MIN: CPT | Mod: PBBFAC,PO | Performed by: INTERNAL MEDICINE

## 2025-04-22 PROCEDURE — 1159F MED LIST DOCD IN RCRD: CPT | Mod: CPTII,,, | Performed by: INTERNAL MEDICINE

## 2025-04-22 PROCEDURE — 3008F BODY MASS INDEX DOCD: CPT | Mod: CPTII,,, | Performed by: INTERNAL MEDICINE

## 2025-04-22 PROCEDURE — G2211 COMPLEX E/M VISIT ADD ON: HCPCS | Mod: S$PBB,,, | Performed by: INTERNAL MEDICINE

## 2025-04-22 PROCEDURE — 99214 OFFICE O/P EST MOD 30 MIN: CPT | Mod: S$PBB,,, | Performed by: INTERNAL MEDICINE

## 2025-04-22 PROCEDURE — 3074F SYST BP LT 130 MM HG: CPT | Mod: CPTII,,, | Performed by: INTERNAL MEDICINE

## 2025-04-22 PROCEDURE — 99999 PR PBB SHADOW E&M-EST. PATIENT-LVL III: CPT | Mod: PBBFAC,,, | Performed by: INTERNAL MEDICINE

## 2025-04-22 PROCEDURE — 4010F ACE/ARB THERAPY RXD/TAKEN: CPT | Mod: CPTII,,, | Performed by: INTERNAL MEDICINE

## 2025-04-22 PROCEDURE — 3078F DIAST BP <80 MM HG: CPT | Mod: CPTII,,, | Performed by: INTERNAL MEDICINE

## 2025-04-22 PROCEDURE — 1160F RVW MEDS BY RX/DR IN RCRD: CPT | Mod: CPTII,,, | Performed by: INTERNAL MEDICINE

## 2025-04-22 RX ORDER — TIRZEPATIDE 7.5 MG/.5ML
7.5 INJECTION, SOLUTION SUBCUTANEOUS
Qty: 2 ML | Refills: 3 | Status: SHIPPED | OUTPATIENT
Start: 2025-04-22

## 2025-04-22 NOTE — PROGRESS NOTES
History of Present Illness    CHIEF COMPLAINT:  Irina presents today with concerns about dry, itchy ears and scalp.    DERMATOLOGIC:  She reports itchy and flaky ears bilaterally affecting both the ear loops and ear canals for approximately one month or longer, denying ear drainage or pain. She also experiences severe scalp dryness with large patches. Zinc shampoo provides some temporary relief but symptoms persist. She developed a rash from the silicone component in prescribed compression socks, limiting wear to approximately two days at a time.    MUSCULOSKELETAL:  She has chronic right plantar fasciitis causing tender foot pain that persists regardless of footwear. She experiences right knee pain with episodes of sharp, severe pain resulting in occasional falls while walking. These joint symptoms significantly improve in different climates, with complete resolution while in [facility] and [place], recurring upon return to current location.    VASCULAR:  She has history of vein sealing procedure on one side which was extremely painful. She uses compression socks during prolonged standing and travel.    MEDICATIONS:  She takes Olmesartan and Hydrochlorothiazide for blood pressure management. She continues tirzepatide for weight management but reports decreased effectiveness compared to previous experience with reduced cravings and successful weight loss. She now experiences increased anxiety about snacking with diminished results. She denies medication allergies.      ROS:  ENT: -ear pain, -ear discharge, -nasal congestion, -rhinorrhea, +ear pruritus  Musculoskeletal: +joint pain, +limb pain  Integumentary: +dry skin       PAST MEDICAL HISTORY:  Past Medical History:   Diagnosis Date    Colitis, nonspecific 11/6/2012    Hypertension 10/12/2012    Ovarian cyst     Tubal ectopic pregnancy        SURGICAL HISTORY:  Past Surgical History:   Procedure Laterality Date    CARPAL TUNNEL RELEASE Bilateral 9/23/2020     Procedure: RELEASE, CARPAL TUNNEL;  Surgeon: Aakash Zuluaga Jr., MD;  Location: [facility] OR;  Service: Orthopedics;  Laterality: Bilateral;    CARPAL TUNNEL RELEASE Left 2020    Procedure: RELEASE, CARPAL TUNNEL;  Surgeon: Aakash Zuluaga Jr., MD;  Location: [facility] OR;  Service: Orthopedics;  Laterality: Left;     SECTION, CLASSIC      CHOLECYSTECTOMY      ECTOPIC PREGNANCY SURGERY  age 25    TUBAL LIGATION         MEDS:  Medcard reviewed and updated    ALLERGIES: Allergy Card reviewed and updated    SOCIAL HISTORY:   The patient is a nonsmoker.    PE:   APPEARANCE: Well nourished, well developed, in no acute distress.    EARS: TM's intact. No retraction or perforation.    CHEST: Lungs clear to auscultation with unlabored respirations.  CARDIOVASCULAR: Normal S1, S2. No murmurs. No carotid bruits. No pedal edema.  ABDOMEN: Bowel sounds normal. Not distended. Soft. No tenderness or masses.   PSYCHIATRIC: The patient is oriented to person, place, and time and has a pleasant affect.        ASSESSMENT/PLAN:  Irina was seen today for dry ears, dry scalp and itchy ears.    Diagnoses and all orders for this visit:    Dry skin  -     Ambulatory referral/consult to Dermatology; Future    Class 3 severe obesity due to excess calories with serious comorbidity and body mass index (BMI) of 45.0 to 49.9 in adult  -     tirzepatide, weight loss, (ZEPBOUND) 7.5 mg/0.5 mL Soln; Inject 7.5 mg into the skin every 7 days.  -     encouraged weight loss through healthy diet and regular exercise        IMPRESSION:  - Considered eczema or chronic external otitis as potential causes for ear itching and flaking.  - Evaluated foot and knee pain, noting possible connection between the two.  - Discussed weight management medication options, including tirzepatide and potential direct sourcing from  through [employer] program.    IMPACTED CERUMEN:  - Observed a small amount of wax inside the ear canal.  - Instructed to use  mineral oil drops in ears daily or apply to outer ear with cotton swab to break up earwax.    SEBORRHEIC DERMATITIS:  - Noted patient's report of dry, flaky, and itchy scalp with large patches.  - Considered the possibility of eczema in the scalp and discussed potential treatments.  - Recommend potential prescription of steroid shampoo by dermatologist.    IRRITANT CONTACT DERMATITIS:  - Noted patient's report of itchy ears for approximately 1 month or more, with flaky skin inside and outside the ear canal.  - Considered the possibility of eczema in the ear and discussed potential treatments.  - Recommend conservative treatment with mineral oil before considering prescription steroid ear drops.  - Suggested taking loratadine for itching.  - Noted patient's report of itchy and flaky skin in and around the ears.  - Considered the possibility of eczema or allergies causing the skin irritation.  - Recommend conservative treatment with OTC antihistamine before considering prescription steroid ear drops.  - Prescribed loratadine 1 tablet daily for itching.  - Advised to potentially alleviate itching with mineral oil drops in ears daily or apply to outer ear with cotton swab.    PLANTAR FASCIAL FIBROMATOSIS:  - Noted patient's report of persistent pain in the right foot, specifically in the plantar area.    PAIN IN RIGHT KNEE:  - Noted patient's report of persistent pain in the right knee, possibly related to foot pain.  - Advised patient to wear compression stockings or sleeves that cover from ankle to knee to improve venous return.    ESSENTIAL HYPERTENSION:  - Continued current treatment with olmesartan and hydrochlorothiazide for blood pressure management.    ANXIETY DISORDER:  - Noted patient's report of feeling anxiety related to snacking and weight management.    FOLLOW-UP:  - Referred to Dermatology for evaluation and management of dry, itchy, flaky skin on ears and scalp.  - Referred patient to dermatology for  further evaluation and treatment.

## 2025-04-22 NOTE — TELEPHONE ENCOUNTER
Please inform patient that prescription for Zepbound has been sent to [facility].  They should contact her regarding the cost prior to shipping the medication.

## 2025-07-29 ENCOUNTER — OFFICE VISIT (OUTPATIENT)
Dept: URGENT CARE | Facility: CLINIC | Age: 55
End: 2025-07-29

## 2025-07-29 VITALS
TEMPERATURE: 98 F | WEIGHT: 284 LBS | OXYGEN SATURATION: 97 % | DIASTOLIC BLOOD PRESSURE: 93 MMHG | HEART RATE: 78 BPM | BODY MASS INDEX: 45.64 KG/M2 | SYSTOLIC BLOOD PRESSURE: 138 MMHG | RESPIRATION RATE: 18 BRPM | HEIGHT: 66 IN

## 2025-07-29 DIAGNOSIS — N39.0 URINARY TRACT INFECTION WITHOUT HEMATURIA, SITE UNSPECIFIED: Primary | ICD-10-CM

## 2025-07-29 DIAGNOSIS — R30.0 DYSURIA: ICD-10-CM

## 2025-07-29 LAB
BILIRUBIN, UA POC OHS: NEGATIVE
BLOOD, UA POC OHS: NEGATIVE
CLARITY, UA POC OHS: CLEAR
COLOR, UA POC OHS: YELLOW
GLUCOSE, UA POC OHS: NEGATIVE
KETONES, UA POC OHS: NEGATIVE
LEUKOCYTES, UA POC OHS: NEGATIVE
NITRITE, UA POC OHS: NEGATIVE
PH, UA POC OHS: 6
PROTEIN, UA POC OHS: NEGATIVE
SPECIFIC GRAVITY, UA POC OHS: 1.02
UROBILINOGEN, UA POC OHS: 0.2

## 2025-07-29 RX ORDER — FLUCONAZOLE 150 MG/1
150 TABLET ORAL DAILY
Qty: 1 TABLET | Refills: 1 | Status: SHIPPED | OUTPATIENT
Start: 2025-07-29 | End: 2025-07-30

## 2025-07-29 RX ORDER — NITROFURANTOIN 25; 75 MG/1; MG/1
100 CAPSULE ORAL 2 TIMES DAILY
Qty: 10 CAPSULE | Refills: 0 | Status: SHIPPED | OUTPATIENT
Start: 2025-07-29 | End: 2025-08-03

## 2025-07-29 NOTE — PATIENT INSTRUCTIONS

## 2025-07-29 NOTE — PROGRESS NOTES
"Subjective:      Patient ID: Irina Dugan is a 54 y.o. female.    Vitals:  height is 5' 6" (1.676 m) and weight is 128.8 kg (284 lb). Her oral temperature is 98.3 °F (36.8 °C). Her blood pressure is 138/93 (abnormal) and her pulse is 78. Her respiration is 18 and oxygen saturation is 97%.     Chief Complaint: Dysuria    54 year old patient presents with dysuria, nausea, abdomen dull pain, and flank pain. Pt denies taking any OTC medications.       Provider note begins below:  Patient denies fever or chills. Denies vaginal bleeding/discharge, hematuria, vomiting or CVA tenderness. Some nausea reported. She had a normal BM this am. No frequent history of uti's.    Dysuria   The problem occurs every urination. The problem has been unchanged. The pain is at a severity of 4/10. The pain is mild. There has been no fever. Associated symptoms include flank pain, frequency, nausea and urgency. Pertinent negatives include no chills, discharge, hematuria, vomiting or rash. She has tried nothing for the symptoms.       Constitution: Negative. Negative for chills, sweating and fatigue.   HENT: Negative.  Negative for ear pain, facial swelling, congestion and sore throat.    Neck: Negative for painful lymph nodes.   Cardiovascular: Negative.  Negative for chest trauma, chest pain and sob on exertion.   Eyes: Negative.  Negative for eye itching and eye pain.   Respiratory: Negative.  Negative for chest tightness, cough and asthma.    Gastrointestinal:  Positive for nausea. Negative for vomiting and diarrhea.   Endocrine: negative. cold intolerance and excessive thirst.   Genitourinary:  Positive for dysuria, frequency, urgency and flank pain. Negative for hematuria, vaginal discharge and vaginal bleeding.   Musculoskeletal:  Negative for pain, trauma and joint pain.   Skin: Negative.  Negative for rash, wound and hives.   Allergic/Immunologic: Negative.  Negative for eczema, asthma, hives and itching.   Neurological: " Negative.  Negative for disorientation and altered mental status.   Hematologic/Lymphatic: Negative.  Negative for swollen lymph nodes.   Psychiatric/Behavioral: Negative.  Negative for altered mental status, disorientation and confusion.       Objective:     Physical Exam   Constitutional: She is oriented to person, place, and time. She appears well-developed.  Non-toxic appearance. She does not appear ill. No distress.   HENT:   Head: Normocephalic and atraumatic.   Ears:   Right Ear: External ear normal.   Left Ear: External ear normal.   Nose: Nose normal. No nasal deformity. No epistaxis.   Mouth/Throat: Oropharynx is clear and moist and mucous membranes are normal.   Eyes: Lids are normal.   Neck: Trachea normal and phonation normal. Neck supple.   Cardiovascular: Normal pulses.   Pulmonary/Chest: Effort normal and breath sounds normal. No stridor. No respiratory distress. She has no wheezes. She has no rhonchi. She has no rales. She exhibits no tenderness.   Abdominal: Normal appearance and bowel sounds are normal. She exhibits no distension. Soft. flat abdomen There is abdominal tenderness in the suprapubic area. There is no rebound, no guarding, no tenderness at McBurney's point, negative Parra's sign, no left CVA tenderness and no right CVA tenderness.   Neurological: no focal deficit. She is alert, oriented to person, place, and time and at baseline.   Skin: Skin is warm, dry, intact and not diaphoretic.   Psychiatric: Her speech is normal and behavior is normal. Mood, judgment and thought content normal.   Nursing note and vitals reviewed.    Results for orders placed or performed in visit on 07/29/25   POCT Urinalysis(Instrument)    Collection Time: 07/29/25 12:07 PM   Result Value Ref Range    Color, POC UA Yellow Yellow, Straw, Colorless    Clarity, POC UA Clear Clear    Glucose, POC UA Negative Negative    Bilirubin, POC UA Negative Negative    Ketones, POC UA Negative Negative    Spec Grav POC UA  1.020 1.005 - 1.030    Blood, POC UA Negative Negative    pH, POC UA 6.0 5.0 - 8.0    Protein, POC UA Negative Negative    Urobilinogen, POC UA 0.2 <=1.0    Nitrite, POC UA Negative Negative    WBC, POC UA Negative Negative         Assessment:     1. Urinary tract infection without hematuria, site unspecified    2. Dysuria        Plan:   Discussed urinalysis results with patient. Will treat empirically with abx based on current symptoms. No urine culture indicated at this time, no history of UTI. Patient instructed to increase fluid intake.  PCP and/or specialist follow-up recommended.  Patient verbalized understanding and agreed to plan.     Patient's chart reviewed in detail including all recent visits, pertinent medical history, medications, allergies and recent labs prior to prescribing medications.      Strict ER precautions for any worsening sx or failure to improve. Pt v/u.    FOLLOWUP  Follow up if symptoms worsen or fail to improve, for PLEASE CONTACT PCP OR CONTACT THE EMERGENCY ROOM..     PATIENT INSTRUCTIONS  Patient Instructions   INSTRUCTIONS:  - Rest.  - Drink plenty of fluids.  - Take Tylenol and/or Ibuprofen as directed as needed for fever/pain.  Do not take more than the recommended dose.  - follow up with your PCP within the next 1-2 weeks as needed.  - You must understand that you have received an Urgent Care treatment only and that you may be released before all of your medical problems are known or treated.   - You, the patient, will arrange for follow up care as instructed.   - If your condition worsens or fails to improve we recommend that you receive another evaluation at the ER immediately or contact your PCP to discuss your concerns.   - You can call [phone] or [phone] to help schedule an appointment with the appropriate provider.     -If you smoke cigarettes, it would be beneficial for you to stop.         Thank you for allowing me to participate in your health care,      FNP-C       Urinary tract infection without hematuria, site unspecified  -     nitrofurantoin, macrocrystal-monohydrate, (MACROBID) 100 MG capsule; Take 1 capsule (100 mg total) by mouth 2 (two) times daily. for 5 days  Dispense: 10 capsule; Refill: 0  -     fluconazole (DIFLUCAN) 150 MG Tab; Take 1 tablet (150 mg total) by mouth once daily. for 1 day  Dispense: 1 tablet; Refill: 1    Dysuria  -     POCT Urinalysis(Instrument)

## (undated) DEVICE — SUT MONOCRYL 4-0 PS-2

## (undated) DEVICE — GAUZE SPONGE 4X4 12PLY

## (undated) DEVICE — NDL HYPO REG 25G X 1 1/2

## (undated) DEVICE — PACK BASIC

## (undated) DEVICE — ALCOHOL 70% ISOP W/GREEN 16OZ

## (undated) DEVICE — TOURNIQUET SB QC DP 24X4IN

## (undated) DEVICE — SEE L#120831

## (undated) DEVICE — PAD CAST SPECIALIST 2X4

## (undated) DEVICE — SPLINT WRIST FOAM 8IN MD/LFT

## (undated) DEVICE — MANIFOLD 4 PORT

## (undated) DEVICE — ELECTRODE REM PLYHSV RETURN 9

## (undated) DEVICE — STOCKINET 4INX48

## (undated) DEVICE — GLOVE SURG BIOGEL LATEX SZ 7.5

## (undated) DEVICE — SEE MEDLINE ITEM 156955

## (undated) DEVICE — ADHESIVE DERMABOND ADVANCED

## (undated) DEVICE — SEE MEDLINE ITEM 146268

## (undated) DEVICE — BLADE SURG #15 CARBON STEEL

## (undated) DEVICE — PAD PREP 50/CA

## (undated) DEVICE — SEE MEDLINE ITEM 157116

## (undated) DEVICE — SEE MEDLINE ITEM 157173

## (undated) DEVICE — PADDING CAST SYNTHETIC 2X4IN

## (undated) DEVICE — BLADE SCALP OPHTL BEVEL STR

## (undated) DEVICE — SEE MEDLINE ITEM 157117

## (undated) DEVICE — SEE MEDLINE ITEM 152522

## (undated) DEVICE — SYR 10CC LUER LOCK

## (undated) DEVICE — ADHESIVE DERMABOND MINI HV

## (undated) DEVICE — BANDAGE ELASTIC 2X5 VELCRO ST

## (undated) DEVICE — APPLICATOR CHLORAPREP ORN 26ML

## (undated) DEVICE — COVER OVERHEAD SURG LT BLUE

## (undated) DEVICE — SEE MEDLINE ITEM 152622